# Patient Record
Sex: MALE | Race: WHITE | NOT HISPANIC OR LATINO | Employment: PART TIME
[De-identification: names, ages, dates, MRNs, and addresses within clinical notes are randomized per-mention and may not be internally consistent; named-entity substitution may affect disease eponyms.]

---

## 2018-04-25 ENCOUNTER — RECORDS - HEALTHEAST (OUTPATIENT)
Dept: ADMINISTRATIVE | Facility: OTHER | Age: 33
End: 2018-04-25

## 2018-04-25 ENCOUNTER — SURGERY - HEALTHEAST (OUTPATIENT)
Dept: GASTROENTEROLOGY | Facility: CLINIC | Age: 33
End: 2018-04-25

## 2018-04-25 ASSESSMENT — MIFFLIN-ST. JEOR
SCORE: 1603.51
SCORE: 1603.51

## 2018-04-26 ASSESSMENT — MIFFLIN-ST. JEOR
SCORE: 1609.86
SCORE: 1609.86

## 2018-04-28 ENCOUNTER — SURGERY - HEALTHEAST (OUTPATIENT)
Dept: SURGERY | Facility: CLINIC | Age: 33
End: 2018-04-28

## 2018-04-28 ENCOUNTER — ANESTHESIA - HEALTHEAST (OUTPATIENT)
Dept: SURGERY | Facility: CLINIC | Age: 33
End: 2018-04-28

## 2018-04-29 ASSESSMENT — MIFFLIN-ST. JEOR
SCORE: 1634.8
SCORE: 1634.8

## 2019-05-01 ENCOUNTER — OFFICE VISIT - HEALTHEAST (OUTPATIENT)
Dept: FAMILY MEDICINE | Facility: CLINIC | Age: 34
End: 2019-05-01

## 2019-05-01 DIAGNOSIS — F10.10 ALCOHOL ABUSE: ICD-10-CM

## 2019-05-01 DIAGNOSIS — R19.7 VOMITING AND DIARRHEA: ICD-10-CM

## 2019-05-01 DIAGNOSIS — E86.0 DEHYDRATION: ICD-10-CM

## 2019-05-01 DIAGNOSIS — R11.10 VOMITING AND DIARRHEA: ICD-10-CM

## 2019-05-01 DIAGNOSIS — K92.1 MELENA: ICD-10-CM

## 2019-05-01 DIAGNOSIS — K22.6 MALLORY-WEISS TEAR: ICD-10-CM

## 2019-05-10 ENCOUNTER — OFFICE VISIT - HEALTHEAST (OUTPATIENT)
Dept: FAMILY MEDICINE | Facility: CLINIC | Age: 34
End: 2019-05-10

## 2019-05-10 DIAGNOSIS — E83.39 HYPOPHOSPHATEMIA: ICD-10-CM

## 2019-05-10 DIAGNOSIS — F10.939 ALCOHOL WITHDRAWAL SYNDROME WITH COMPLICATION (H): ICD-10-CM

## 2019-05-10 DIAGNOSIS — Z09 HOSPITAL DISCHARGE FOLLOW-UP: ICD-10-CM

## 2019-05-10 DIAGNOSIS — F32.A DEPRESSION, UNSPECIFIED DEPRESSION TYPE: ICD-10-CM

## 2019-05-10 DIAGNOSIS — E83.42 HYPOMAGNESEMIA: ICD-10-CM

## 2019-05-10 DIAGNOSIS — F10.20 ALCOHOLISM (H): ICD-10-CM

## 2019-05-10 LAB
ALBUMIN SERPL-MCNC: 3.4 G/DL (ref 3.5–5)
ALP SERPL-CCNC: 146 U/L (ref 45–120)
ALT SERPL W P-5'-P-CCNC: 90 U/L (ref 0–45)
ANION GAP SERPL CALCULATED.3IONS-SCNC: 10 MMOL/L (ref 5–18)
AST SERPL W P-5'-P-CCNC: 207 U/L (ref 0–40)
BILIRUB SERPL-MCNC: 0.5 MG/DL (ref 0–1)
BUN SERPL-MCNC: 9 MG/DL (ref 8–22)
CALCIUM SERPL-MCNC: 9.5 MG/DL (ref 8.5–10.5)
CHLORIDE BLD-SCNC: 101 MMOL/L (ref 98–107)
CO2 SERPL-SCNC: 25 MMOL/L (ref 22–31)
CREAT SERPL-MCNC: 0.6 MG/DL (ref 0.7–1.3)
GFR SERPL CREATININE-BSD FRML MDRD: >60 ML/MIN/1.73M2
GLUCOSE BLD-MCNC: 102 MG/DL (ref 70–125)
PHOSPHATE SERPL-MCNC: 4.5 MG/DL (ref 2.5–4.5)
POTASSIUM BLD-SCNC: 5.3 MMOL/L (ref 3.5–5)
PROT SERPL-MCNC: 7 G/DL (ref 6–8)
SODIUM SERPL-SCNC: 136 MMOL/L (ref 136–145)

## 2019-05-10 ASSESSMENT — MIFFLIN-ST. JEOR: SCORE: 1610.31

## 2019-05-13 ENCOUNTER — COMMUNICATION - HEALTHEAST (OUTPATIENT)
Dept: FAMILY MEDICINE | Facility: CLINIC | Age: 34
End: 2019-05-13

## 2019-05-13 DIAGNOSIS — E87.5 HYPERKALEMIA: ICD-10-CM

## 2019-05-14 ENCOUNTER — COMMUNICATION - HEALTHEAST (OUTPATIENT)
Dept: FAMILY MEDICINE | Facility: CLINIC | Age: 34
End: 2019-05-14

## 2020-02-26 ENCOUNTER — RECORDS - HEALTHEAST (OUTPATIENT)
Dept: ADMINISTRATIVE | Facility: OTHER | Age: 35
End: 2020-02-26

## 2020-02-28 ENCOUNTER — OFFICE VISIT - HEALTHEAST (OUTPATIENT)
Dept: FAMILY MEDICINE | Facility: CLINIC | Age: 35
End: 2020-02-28

## 2020-02-28 ENCOUNTER — RECORDS - HEALTHEAST (OUTPATIENT)
Dept: ADMINISTRATIVE | Facility: OTHER | Age: 35
End: 2020-02-28

## 2020-02-28 DIAGNOSIS — K70.30 ALCOHOLIC CIRRHOSIS OF LIVER WITHOUT ASCITES (H): ICD-10-CM

## 2020-02-28 DIAGNOSIS — F17.200 TOBACCO USE DISORDER: ICD-10-CM

## 2020-02-28 DIAGNOSIS — F33.1 MAJOR DEPRESSIVE DISORDER, RECURRENT EPISODE, MODERATE (H): ICD-10-CM

## 2020-02-28 DIAGNOSIS — E43 SEVERE PROTEIN-CALORIE MALNUTRITION (H): ICD-10-CM

## 2020-02-28 DIAGNOSIS — F10.20 ALCOHOL USE DISORDER, SEVERE, DEPENDENCE (H): ICD-10-CM

## 2020-02-28 LAB
ALBUMIN SERPL-MCNC: 2.4 G/DL (ref 3.5–5)
ALP SERPL-CCNC: 100 U/L (ref 45–120)
ALT SERPL W P-5'-P-CCNC: 30 U/L (ref 0–45)
AMYLASE SERPL-CCNC: 85 U/L (ref 5–120)
ANION GAP SERPL CALCULATED.3IONS-SCNC: 9 MMOL/L (ref 5–18)
AST SERPL W P-5'-P-CCNC: 52 U/L (ref 0–40)
BASOPHILS # BLD AUTO: 0 THOU/UL (ref 0–0.2)
BASOPHILS NFR BLD AUTO: 0 % (ref 0–2)
BILIRUB SERPL-MCNC: 1.5 MG/DL (ref 0–1)
BUN SERPL-MCNC: 17 MG/DL (ref 8–22)
CALCIUM SERPL-MCNC: 8.6 MG/DL (ref 8.5–10.5)
CHLORIDE BLD-SCNC: 104 MMOL/L (ref 98–107)
CO2 SERPL-SCNC: 23 MMOL/L (ref 22–31)
CREAT SERPL-MCNC: 0.68 MG/DL (ref 0.7–1.3)
EOSINOPHIL # BLD AUTO: 0.1 THOU/UL (ref 0–0.4)
EOSINOPHIL NFR BLD AUTO: 2 % (ref 0–6)
ERYTHROCYTE [DISTWIDTH] IN BLOOD BY AUTOMATED COUNT: 13.3 % (ref 11–14.5)
FERRITIN SERPL-MCNC: 30 NG/ML (ref 27–300)
GFR SERPL CREATININE-BSD FRML MDRD: >60 ML/MIN/1.73M2
GLUCOSE BLD-MCNC: 120 MG/DL (ref 70–125)
HCT VFR BLD AUTO: 24.6 % (ref 40–54)
HGB BLD-MCNC: 8.2 G/DL (ref 14–18)
IRON SATN MFR SERPL: 9 % (ref 20–50)
IRON SERPL-MCNC: 33 UG/DL (ref 42–175)
LIPASE SERPL-CCNC: 71 U/L (ref 0–52)
LYMPHOCYTES # BLD AUTO: 1.2 THOU/UL (ref 0.8–4.4)
LYMPHOCYTES NFR BLD AUTO: 18 % (ref 20–40)
MAGNESIUM SERPL-MCNC: 1.6 MG/DL (ref 1.8–2.6)
MCH RBC QN AUTO: 29.9 PG (ref 27–34)
MCHC RBC AUTO-ENTMCNC: 33.5 G/DL (ref 32–36)
MCV RBC AUTO: 89 FL (ref 80–100)
MONOCYTES # BLD AUTO: 0.6 THOU/UL (ref 0–0.9)
MONOCYTES NFR BLD AUTO: 9 % (ref 2–10)
NEUTROPHILS # BLD AUTO: 4.8 THOU/UL (ref 2–7.7)
NEUTROPHILS NFR BLD AUTO: 71 % (ref 50–70)
PHOSPHATE SERPL-MCNC: 5.2 MG/DL (ref 2.5–4.5)
PLATELET # BLD AUTO: 190 THOU/UL (ref 140–440)
PMV BLD AUTO: 7.3 FL (ref 7–10)
POTASSIUM BLD-SCNC: 3.9 MMOL/L (ref 3.5–5)
PROT SERPL-MCNC: 6.8 G/DL (ref 6–8)
RBC # BLD AUTO: 2.75 MILL/UL (ref 4.4–6.2)
SODIUM SERPL-SCNC: 136 MMOL/L (ref 136–145)
TIBC SERPL-MCNC: 358 UG/DL (ref 313–563)
TRANSFERRIN SERPL-MCNC: 286 MG/DL (ref 212–360)
WBC: 6.8 THOU/UL (ref 4–11)

## 2020-02-28 ASSESSMENT — ANXIETY QUESTIONNAIRES
6. BECOMING EASILY ANNOYED OR IRRITABLE: MORE THAN HALF THE DAYS
7. FEELING AFRAID AS IF SOMETHING AWFUL MIGHT HAPPEN: NEARLY EVERY DAY
2. NOT BEING ABLE TO STOP OR CONTROL WORRYING: MORE THAN HALF THE DAYS
3. WORRYING TOO MUCH ABOUT DIFFERENT THINGS: NEARLY EVERY DAY
5. BEING SO RESTLESS THAT IT IS HARD TO SIT STILL: SEVERAL DAYS
GAD7 TOTAL SCORE: 16
4. TROUBLE RELAXING: NEARLY EVERY DAY
1. FEELING NERVOUS, ANXIOUS, OR ON EDGE: MORE THAN HALF THE DAYS

## 2020-02-28 ASSESSMENT — MIFFLIN-ST. JEOR: SCORE: 1724.84

## 2020-02-28 ASSESSMENT — PATIENT HEALTH QUESTIONNAIRE - PHQ9: SUM OF ALL RESPONSES TO PHQ QUESTIONS 1-9: 20

## 2020-03-02 ENCOUNTER — COMMUNICATION - HEALTHEAST (OUTPATIENT)
Dept: FAMILY MEDICINE | Facility: CLINIC | Age: 35
End: 2020-03-02

## 2020-03-02 DIAGNOSIS — E87.6 HYPOKALEMIA: ICD-10-CM

## 2020-03-02 DIAGNOSIS — G93.41 ACUTE METABOLIC ENCEPHALOPATHY: ICD-10-CM

## 2020-03-04 ENCOUNTER — COMMUNICATION - HEALTHEAST (OUTPATIENT)
Dept: FAMILY MEDICINE | Facility: CLINIC | Age: 35
End: 2020-03-04

## 2020-03-04 DIAGNOSIS — R00.0 SINUS TACHYCARDIA: ICD-10-CM

## 2020-03-04 DIAGNOSIS — K70.30 ALCOHOLIC CIRRHOSIS OF LIVER WITHOUT ASCITES (H): ICD-10-CM

## 2020-03-04 DIAGNOSIS — I10 BENIGN ESSENTIAL HYPERTENSION: ICD-10-CM

## 2020-03-04 DIAGNOSIS — E43 SEVERE PROTEIN-CALORIE MALNUTRITION (H): ICD-10-CM

## 2020-03-08 ENCOUNTER — COMMUNICATION - HEALTHEAST (OUTPATIENT)
Dept: SCHEDULING | Facility: CLINIC | Age: 35
End: 2020-03-08

## 2020-03-23 ENCOUNTER — OFFICE VISIT - HEALTHEAST (OUTPATIENT)
Dept: FAMILY MEDICINE | Facility: CLINIC | Age: 35
End: 2020-03-23

## 2020-03-23 DIAGNOSIS — E88.09 HYPOALBUMINEMIA: ICD-10-CM

## 2020-03-23 DIAGNOSIS — F10.21 CHRONIC ALCOHOLISM IN REMISSION (H): ICD-10-CM

## 2020-03-23 DIAGNOSIS — M79.89 LEG SWELLING: ICD-10-CM

## 2020-03-31 ENCOUNTER — COMMUNICATION - HEALTHEAST (OUTPATIENT)
Dept: FAMILY MEDICINE | Facility: CLINIC | Age: 35
End: 2020-03-31

## 2020-03-31 DIAGNOSIS — F33.1 MAJOR DEPRESSIVE DISORDER, RECURRENT EPISODE, MODERATE (H): ICD-10-CM

## 2020-04-08 ENCOUNTER — COMMUNICATION - HEALTHEAST (OUTPATIENT)
Dept: FAMILY MEDICINE | Facility: CLINIC | Age: 35
End: 2020-04-08

## 2020-04-08 ENCOUNTER — COMMUNICATION - HEALTHEAST (OUTPATIENT)
Dept: SCHEDULING | Facility: CLINIC | Age: 35
End: 2020-04-08

## 2020-04-08 DIAGNOSIS — F33.2 SEVERE RECURRENT MAJOR DEPRESSION WITHOUT PSYCHOTIC FEATURES (H): ICD-10-CM

## 2020-04-14 ENCOUNTER — COMMUNICATION - HEALTHEAST (OUTPATIENT)
Dept: FAMILY MEDICINE | Facility: CLINIC | Age: 35
End: 2020-04-14

## 2020-04-14 DIAGNOSIS — G47.9 SLEEP DIFFICULTIES: ICD-10-CM

## 2020-04-19 ENCOUNTER — COMMUNICATION - HEALTHEAST (OUTPATIENT)
Dept: FAMILY MEDICINE | Facility: CLINIC | Age: 35
End: 2020-04-19

## 2020-04-19 DIAGNOSIS — F33.1 MAJOR DEPRESSIVE DISORDER, RECURRENT EPISODE, MODERATE (H): ICD-10-CM

## 2020-04-23 ENCOUNTER — OFFICE VISIT - HEALTHEAST (OUTPATIENT)
Dept: FAMILY MEDICINE | Facility: CLINIC | Age: 35
End: 2020-04-23

## 2020-04-23 DIAGNOSIS — F33.1 MAJOR DEPRESSIVE DISORDER, RECURRENT EPISODE, MODERATE (H): ICD-10-CM

## 2020-04-23 DIAGNOSIS — M79.89 LEG SWELLING: ICD-10-CM

## 2020-04-23 DIAGNOSIS — F10.21 ALCOHOL USE DISORDER, MODERATE, IN EARLY REMISSION (H): ICD-10-CM

## 2020-04-29 ENCOUNTER — COMMUNICATION - HEALTHEAST (OUTPATIENT)
Dept: VASCULAR SURGERY | Facility: CLINIC | Age: 35
End: 2020-04-29

## 2020-05-01 ENCOUNTER — OFFICE VISIT - HEALTHEAST (OUTPATIENT)
Dept: FAMILY MEDICINE | Facility: CLINIC | Age: 35
End: 2020-05-01

## 2020-05-01 ENCOUNTER — COMMUNICATION - HEALTHEAST (OUTPATIENT)
Dept: FAMILY MEDICINE | Facility: CLINIC | Age: 35
End: 2020-05-01

## 2020-05-01 ENCOUNTER — AMBULATORY - HEALTHEAST (OUTPATIENT)
Dept: LAB | Facility: CLINIC | Age: 35
End: 2020-05-01

## 2020-05-01 DIAGNOSIS — M79.89 LEG SWELLING: ICD-10-CM

## 2020-05-01 DIAGNOSIS — D50.0 IRON DEFICIENCY ANEMIA DUE TO CHRONIC BLOOD LOSS: ICD-10-CM

## 2020-05-01 DIAGNOSIS — F10.21 ALCOHOL USE DISORDER, MODERATE, IN EARLY REMISSION (H): ICD-10-CM

## 2020-05-01 LAB
ALBUMIN SERPL-MCNC: 2.9 G/DL (ref 3.5–5)
ALP SERPL-CCNC: 84 U/L (ref 45–120)
ALT SERPL W P-5'-P-CCNC: 12 U/L (ref 0–45)
ANION GAP SERPL CALCULATED.3IONS-SCNC: 11 MMOL/L (ref 5–18)
AST SERPL W P-5'-P-CCNC: 24 U/L (ref 0–40)
BASOPHILS # BLD AUTO: 0 THOU/UL (ref 0–0.2)
BASOPHILS NFR BLD AUTO: 0 % (ref 0–2)
BILIRUB SERPL-MCNC: 1.3 MG/DL (ref 0–1)
BUN SERPL-MCNC: 12 MG/DL (ref 8–22)
CALCIUM SERPL-MCNC: 7.8 MG/DL (ref 8.5–10.5)
CHLORIDE BLD-SCNC: 105 MMOL/L (ref 98–107)
CO2 SERPL-SCNC: 19 MMOL/L (ref 22–31)
CREAT SERPL-MCNC: 1.11 MG/DL (ref 0.7–1.3)
EOSINOPHIL # BLD AUTO: 0.1 THOU/UL (ref 0–0.4)
EOSINOPHIL NFR BLD AUTO: 2 % (ref 0–6)
ERYTHROCYTE [DISTWIDTH] IN BLOOD BY AUTOMATED COUNT: 14.4 % (ref 11–14.5)
GFR SERPL CREATININE-BSD FRML MDRD: >60 ML/MIN/1.73M2
GLUCOSE BLD-MCNC: 136 MG/DL (ref 70–125)
HCT VFR BLD AUTO: 17 % (ref 40–54)
HGB BLD-MCNC: 5.6 G/DL (ref 14–18)
LYMPHOCYTES # BLD AUTO: 1.1 THOU/UL (ref 0.8–4.4)
LYMPHOCYTES NFR BLD AUTO: 22 % (ref 20–40)
MCH RBC QN AUTO: 23.7 PG (ref 27–34)
MCHC RBC AUTO-ENTMCNC: 33.3 G/DL (ref 32–36)
MCV RBC AUTO: 71 FL (ref 80–100)
MONOCYTES # BLD AUTO: 0.5 THOU/UL (ref 0–0.9)
MONOCYTES NFR BLD AUTO: 11 % (ref 2–10)
NEUTROPHILS # BLD AUTO: 3.2 THOU/UL (ref 2–7.7)
NEUTROPHILS NFR BLD AUTO: 64 % (ref 50–70)
PLATELET # BLD AUTO: 167 THOU/UL (ref 140–440)
PMV BLD AUTO: 6.9 FL (ref 7–10)
POTASSIUM BLD-SCNC: 3.6 MMOL/L (ref 3.5–5)
PROT SERPL-MCNC: 6.7 G/DL (ref 6–8)
RBC # BLD AUTO: 2.37 MILL/UL (ref 4.4–6.2)
SODIUM SERPL-SCNC: 135 MMOL/L (ref 136–145)
TSH SERPL DL<=0.005 MIU/L-ACNC: 1.71 UIU/ML (ref 0.3–5)
WBC: 5 THOU/UL (ref 4–11)

## 2020-05-03 ENCOUNTER — SURGERY - HEALTHEAST (OUTPATIENT)
Dept: SURGERY | Facility: CLINIC | Age: 35
End: 2020-05-03

## 2020-05-03 ENCOUNTER — ANESTHESIA - HEALTHEAST (OUTPATIENT)
Dept: SURGERY | Facility: CLINIC | Age: 35
End: 2020-05-03

## 2020-05-04 ENCOUNTER — HOME CARE/HOSPICE - HEALTHEAST (OUTPATIENT)
Dept: HOME HEALTH SERVICES | Facility: HOME HEALTH | Age: 35
End: 2020-05-04

## 2020-05-05 ENCOUNTER — COMMUNICATION - HEALTHEAST (OUTPATIENT)
Dept: HOME HEALTH SERVICES | Facility: HOME HEALTH | Age: 35
End: 2020-05-05

## 2020-05-06 ENCOUNTER — COMMUNICATION - HEALTHEAST (OUTPATIENT)
Dept: FAMILY MEDICINE | Facility: CLINIC | Age: 35
End: 2020-05-06

## 2020-05-06 DIAGNOSIS — F33.2 SEVERE RECURRENT MAJOR DEPRESSION WITHOUT PSYCHOTIC FEATURES (H): ICD-10-CM

## 2020-05-07 ENCOUNTER — COMMUNICATION - HEALTHEAST (OUTPATIENT)
Dept: HOME HEALTH SERVICES | Facility: HOME HEALTH | Age: 35
End: 2020-05-07

## 2020-05-07 ENCOUNTER — HOME CARE/HOSPICE - HEALTHEAST (OUTPATIENT)
Dept: HOME HEALTH SERVICES | Facility: HOME HEALTH | Age: 35
End: 2020-05-07

## 2020-05-08 ENCOUNTER — HOME CARE/HOSPICE - HEALTHEAST (OUTPATIENT)
Dept: HOME HEALTH SERVICES | Facility: HOME HEALTH | Age: 35
End: 2020-05-08

## 2020-05-10 ENCOUNTER — COMMUNICATION - HEALTHEAST (OUTPATIENT)
Dept: HOME HEALTH SERVICES | Facility: HOME HEALTH | Age: 35
End: 2020-05-10

## 2020-05-10 DIAGNOSIS — F10.21 CHRONIC ALCOHOLISM IN REMISSION (H): ICD-10-CM

## 2020-05-13 ENCOUNTER — HOME CARE/HOSPICE - HEALTHEAST (OUTPATIENT)
Dept: HOME HEALTH SERVICES | Facility: HOME HEALTH | Age: 35
End: 2020-05-13

## 2020-05-14 ENCOUNTER — HOME CARE/HOSPICE - HEALTHEAST (OUTPATIENT)
Dept: HOME HEALTH SERVICES | Facility: HOME HEALTH | Age: 35
End: 2020-05-14

## 2020-05-18 ENCOUNTER — RECORDS - HEALTHEAST (OUTPATIENT)
Dept: ADMINISTRATIVE | Facility: OTHER | Age: 35
End: 2020-05-18

## 2020-05-18 ENCOUNTER — COMMUNICATION - HEALTHEAST (OUTPATIENT)
Dept: FAMILY MEDICINE | Facility: CLINIC | Age: 35
End: 2020-05-18

## 2020-05-18 DIAGNOSIS — F33.1 MAJOR DEPRESSIVE DISORDER, RECURRENT EPISODE, MODERATE (H): ICD-10-CM

## 2020-05-20 ENCOUNTER — HOME CARE/HOSPICE - HEALTHEAST (OUTPATIENT)
Dept: HOME HEALTH SERVICES | Facility: HOME HEALTH | Age: 35
End: 2020-05-20

## 2020-06-08 ENCOUNTER — AMBULATORY - HEALTHEAST (OUTPATIENT)
Dept: LAB | Facility: CLINIC | Age: 35
End: 2020-06-08

## 2020-06-08 DIAGNOSIS — F10.21 CHRONIC ALCOHOLISM IN REMISSION (H): ICD-10-CM

## 2020-06-08 LAB
ANION GAP SERPL CALCULATED.3IONS-SCNC: 14 MMOL/L (ref 5–18)
BUN SERPL-MCNC: 7 MG/DL (ref 8–22)
CALCIUM SERPL-MCNC: 8.2 MG/DL (ref 8.5–10.5)
CHLORIDE BLD-SCNC: 105 MMOL/L (ref 98–107)
CO2 SERPL-SCNC: 19 MMOL/L (ref 22–31)
CREAT SERPL-MCNC: 0.71 MG/DL (ref 0.7–1.3)
GFR SERPL CREATININE-BSD FRML MDRD: >60 ML/MIN/1.73M2
GLUCOSE BLD-MCNC: 94 MG/DL (ref 70–125)
POTASSIUM BLD-SCNC: 3.1 MMOL/L (ref 3.5–5)
SODIUM SERPL-SCNC: 138 MMOL/L (ref 136–145)

## 2020-06-10 ENCOUNTER — COMMUNICATION - HEALTHEAST (OUTPATIENT)
Dept: FAMILY MEDICINE | Facility: CLINIC | Age: 35
End: 2020-06-10

## 2020-06-10 DIAGNOSIS — F33.2 SEVERE RECURRENT MAJOR DEPRESSION WITHOUT PSYCHOTIC FEATURES (H): ICD-10-CM

## 2020-06-14 ENCOUNTER — COMMUNICATION - HEALTHEAST (OUTPATIENT)
Dept: FAMILY MEDICINE | Facility: CLINIC | Age: 35
End: 2020-06-14

## 2020-06-14 DIAGNOSIS — F33.1 MAJOR DEPRESSIVE DISORDER, RECURRENT EPISODE, MODERATE (H): ICD-10-CM

## 2020-06-23 ENCOUNTER — COMMUNICATION - HEALTHEAST (OUTPATIENT)
Dept: HOME HEALTH SERVICES | Facility: HOME HEALTH | Age: 35
End: 2020-06-23

## 2020-06-23 DIAGNOSIS — K70.30 ALCOHOLIC CIRRHOSIS OF LIVER WITHOUT ASCITES (H): ICD-10-CM

## 2020-07-02 ENCOUNTER — AMBULATORY - HEALTHEAST (OUTPATIENT)
Dept: BEHAVIORAL HEALTH | Facility: CLINIC | Age: 35
End: 2020-07-02

## 2020-07-06 ENCOUNTER — MEDICAL CORRESPONDENCE (OUTPATIENT)
Dept: HEALTH INFORMATION MANAGEMENT | Facility: CLINIC | Age: 35
End: 2020-07-06

## 2020-07-07 ENCOUNTER — TRANSFERRED RECORDS (OUTPATIENT)
Dept: HEALTH INFORMATION MANAGEMENT | Facility: CLINIC | Age: 35
End: 2020-07-07

## 2020-07-08 ENCOUNTER — COMMUNICATION - HEALTHEAST (OUTPATIENT)
Dept: FAMILY MEDICINE | Facility: CLINIC | Age: 35
End: 2020-07-08

## 2020-07-08 DIAGNOSIS — F33.2 SEVERE RECURRENT MAJOR DEPRESSION WITHOUT PSYCHOTIC FEATURES (H): ICD-10-CM

## 2020-07-13 ENCOUNTER — TELEPHONE (OUTPATIENT)
Dept: BEHAVIORAL HEALTH | Facility: CLINIC | Age: 35
End: 2020-07-13

## 2020-07-13 NOTE — TELEPHONE ENCOUNTER
Date: 7/13/2020    To: ANDREIA RN    Please call this patient at [phone] to complete a medical screening to clarify his medications and medical condition and to complete a COVID-19 screening.      The patient has a history of:  Need covid screen    OUTSIDE: This is an outside referral so I will tube up the assessment and any other clinical documentation to the 6th floor.     Thanks,  KEVIN Black

## 2020-07-25 ENCOUNTER — COMMUNICATION - HEALTHEAST (OUTPATIENT)
Dept: HOME HEALTH SERVICES | Facility: HOME HEALTH | Age: 35
End: 2020-07-25

## 2020-07-25 DIAGNOSIS — K70.30 ALCOHOLIC CIRRHOSIS OF LIVER WITHOUT ASCITES (H): ICD-10-CM

## 2020-07-25 DIAGNOSIS — F33.1 MAJOR DEPRESSIVE DISORDER, RECURRENT EPISODE, MODERATE (H): ICD-10-CM

## 2020-07-27 ENCOUNTER — OFFICE VISIT - HEALTHEAST (OUTPATIENT)
Dept: FAMILY MEDICINE | Facility: CLINIC | Age: 35
End: 2020-07-27

## 2020-07-27 ENCOUNTER — COMMUNICATION - HEALTHEAST (OUTPATIENT)
Dept: FAMILY MEDICINE | Facility: CLINIC | Age: 35
End: 2020-07-27

## 2020-07-27 DIAGNOSIS — K70.30 ALCOHOLIC CIRRHOSIS OF LIVER WITHOUT ASCITES (H): ICD-10-CM

## 2020-07-27 DIAGNOSIS — K76.9 LIVER DISEASE, CHRONIC, WITH CIRRHOSIS (H): ICD-10-CM

## 2020-07-27 DIAGNOSIS — K21.9 GASTROESOPHAGEAL REFLUX DISEASE WITHOUT ESOPHAGITIS: ICD-10-CM

## 2020-07-27 DIAGNOSIS — N62 GYNECOMASTIA, MALE: ICD-10-CM

## 2020-07-27 DIAGNOSIS — Z87.19: ICD-10-CM

## 2020-07-27 DIAGNOSIS — F10.21 CHRONIC ALCOHOLISM IN REMISSION (H): ICD-10-CM

## 2020-07-27 DIAGNOSIS — F33.1 MAJOR DEPRESSIVE DISORDER, RECURRENT EPISODE, MODERATE (H): ICD-10-CM

## 2020-07-27 DIAGNOSIS — K74.60 LIVER DISEASE, CHRONIC, WITH CIRRHOSIS (H): ICD-10-CM

## 2020-08-06 ENCOUNTER — COMMUNICATION - HEALTHEAST (OUTPATIENT)
Dept: FAMILY MEDICINE | Facility: CLINIC | Age: 35
End: 2020-08-06

## 2020-08-11 ENCOUNTER — COMMUNICATION - HEALTHEAST (OUTPATIENT)
Dept: SCHEDULING | Facility: CLINIC | Age: 35
End: 2020-08-11

## 2020-09-03 ENCOUNTER — OFFICE VISIT - HEALTHEAST (OUTPATIENT)
Dept: FAMILY MEDICINE | Facility: CLINIC | Age: 35
End: 2020-09-03

## 2020-09-03 DIAGNOSIS — M79.89 LEG SWELLING: ICD-10-CM

## 2020-09-03 DIAGNOSIS — F10.21 CHRONIC ALCOHOLISM IN REMISSION (H): ICD-10-CM

## 2020-11-06 ENCOUNTER — COMMUNICATION - HEALTHEAST (OUTPATIENT)
Dept: FAMILY MEDICINE | Facility: CLINIC | Age: 35
End: 2020-11-06

## 2020-11-12 ENCOUNTER — OFFICE VISIT - HEALTHEAST (OUTPATIENT)
Dept: FAMILY MEDICINE | Facility: CLINIC | Age: 35
End: 2020-11-12

## 2020-11-12 DIAGNOSIS — N52.9 ERECTILE DYSFUNCTION, UNSPECIFIED ERECTILE DYSFUNCTION TYPE: ICD-10-CM

## 2020-11-24 ENCOUNTER — HOSPITAL ENCOUNTER (INPATIENT)
Facility: CLINIC | Age: 35
LOS: 2 days | Discharge: NURSING, REHAB OR LONG-TERM CARE | End: 2020-11-27
Attending: EMERGENCY MEDICINE | Admitting: PSYCHIATRY & NEUROLOGY
Payer: COMMERCIAL

## 2020-11-24 DIAGNOSIS — Z20.828 EXPOSURE TO SARS-ASSOCIATED CORONAVIRUS: ICD-10-CM

## 2020-11-24 DIAGNOSIS — F10.920 ALCOHOLIC INTOXICATION WITHOUT COMPLICATION (H): ICD-10-CM

## 2020-11-24 DIAGNOSIS — F17.200 NICOTINE DEPENDENCE, UNCOMPLICATED, UNSPECIFIED NICOTINE PRODUCT TYPE: Primary | ICD-10-CM

## 2020-11-24 LAB
ALBUMIN SERPL-MCNC: 3.7 G/DL (ref 3.4–5)
ALCOHOL BREATH TEST: 0.3 (ref 0–0.01)
ALP SERPL-CCNC: 171 U/L (ref 40–150)
ALT SERPL W P-5'-P-CCNC: 86 U/L (ref 0–70)
ANION GAP SERPL CALCULATED.3IONS-SCNC: 7 MMOL/L (ref 3–14)
AST SERPL W P-5'-P-CCNC: 209 U/L (ref 0–45)
BASOPHILS # BLD AUTO: 0 10E9/L (ref 0–0.2)
BASOPHILS NFR BLD AUTO: 0.1 %
BILIRUB SERPL-MCNC: 1.1 MG/DL (ref 0.2–1.3)
BUN SERPL-MCNC: 6 MG/DL (ref 7–30)
CALCIUM SERPL-MCNC: 8 MG/DL (ref 8.5–10.1)
CHLORIDE SERPL-SCNC: 107 MMOL/L (ref 94–109)
CO2 SERPL-SCNC: 29 MMOL/L (ref 20–32)
CREAT SERPL-MCNC: 0.62 MG/DL (ref 0.66–1.25)
DIFFERENTIAL METHOD BLD: ABNORMAL
EOSINOPHIL # BLD AUTO: 0.1 10E9/L (ref 0–0.7)
EOSINOPHIL NFR BLD AUTO: 1.2 %
ERYTHROCYTE [DISTWIDTH] IN BLOOD BY AUTOMATED COUNT: 14.9 % (ref 10–15)
GFR SERPL CREATININE-BSD FRML MDRD: >90 ML/MIN/{1.73_M2}
GLUCOSE SERPL-MCNC: 99 MG/DL (ref 70–99)
HCT VFR BLD AUTO: 42.9 % (ref 40–53)
HGB BLD-MCNC: 14.3 G/DL (ref 13.3–17.7)
IMM GRANULOCYTES # BLD: 0 10E9/L (ref 0–0.4)
IMM GRANULOCYTES NFR BLD: 0.1 %
LYMPHOCYTES # BLD AUTO: 2 10E9/L (ref 0.8–5.3)
LYMPHOCYTES NFR BLD AUTO: 28.4 %
MCH RBC QN AUTO: 27.4 PG (ref 26.5–33)
MCHC RBC AUTO-ENTMCNC: 33.3 G/DL (ref 31.5–36.5)
MCV RBC AUTO: 82 FL (ref 78–100)
MONOCYTES # BLD AUTO: 0.3 10E9/L (ref 0–1.3)
MONOCYTES NFR BLD AUTO: 4.1 %
NEUTROPHILS # BLD AUTO: 4.6 10E9/L (ref 1.6–8.3)
NEUTROPHILS NFR BLD AUTO: 66.1 %
NRBC # BLD AUTO: 0 10*3/UL
NRBC BLD AUTO-RTO: 0 /100
PLATELET # BLD AUTO: 70 10E9/L (ref 150–450)
POTASSIUM SERPL-SCNC: 3.3 MMOL/L (ref 3.4–5.3)
PROT SERPL-MCNC: 8.1 G/DL (ref 6.8–8.8)
RBC # BLD AUTO: 5.21 10E12/L (ref 4.4–5.9)
SARS-COV-2 RNA SPEC QL NAA+PROBE: NORMAL
SODIUM SERPL-SCNC: 143 MMOL/L (ref 133–144)
SPECIMEN SOURCE: NORMAL
WBC # BLD AUTO: 6.9 10E9/L (ref 4–11)

## 2020-11-24 PROCEDURE — 99285 EMERGENCY DEPT VISIT HI MDM: CPT | Performed by: EMERGENCY MEDICINE

## 2020-11-24 PROCEDURE — 80320 DRUG SCREEN QUANTALCOHOLS: CPT | Performed by: EMERGENCY MEDICINE

## 2020-11-24 PROCEDURE — 80053 COMPREHEN METABOLIC PANEL: CPT | Performed by: EMERGENCY MEDICINE

## 2020-11-24 PROCEDURE — U0003 INFECTIOUS AGENT DETECTION BY NUCLEIC ACID (DNA OR RNA); SEVERE ACUTE RESPIRATORY SYNDROME CORONAVIRUS 2 (SARS-COV-2) (CORONAVIRUS DISEASE [COVID-19]), AMPLIFIED PROBE TECHNIQUE, MAKING USE OF HIGH THROUGHPUT TECHNOLOGIES AS DESCRIBED BY CMS-2020-01-R: HCPCS | Performed by: EMERGENCY MEDICINE

## 2020-11-24 PROCEDURE — C9803 HOPD COVID-19 SPEC COLLECT: HCPCS | Performed by: EMERGENCY MEDICINE

## 2020-11-24 PROCEDURE — 82075 ASSAY OF BREATH ETHANOL: CPT | Performed by: EMERGENCY MEDICINE

## 2020-11-24 PROCEDURE — 85025 COMPLETE CBC W/AUTO DIFF WBC: CPT | Performed by: EMERGENCY MEDICINE

## 2020-11-24 PROCEDURE — HZ2ZZZZ DETOXIFICATION SERVICES FOR SUBSTANCE ABUSE TREATMENT: ICD-10-PCS | Performed by: PSYCHIATRY & NEUROLOGY

## 2020-11-24 PROCEDURE — 80307 DRUG TEST PRSMV CHEM ANLYZR: CPT | Performed by: EMERGENCY MEDICINE

## 2020-11-24 RX ORDER — TRAZODONE HYDROCHLORIDE 100 MG/1
100 TABLET ORAL AT BEDTIME
Status: ON HOLD | COMMUNITY
Start: 2020-07-27 | End: 2020-11-27

## 2020-11-24 RX ORDER — DIAZEPAM 5 MG
5-20 TABLET ORAL EVERY 30 MIN PRN
Status: DISCONTINUED | OUTPATIENT
Start: 2020-11-24 | End: 2020-11-25 | Stop reason: CLARIF

## 2020-11-24 RX ORDER — HYDROXYZINE PAMOATE 50 MG/1
50 CAPSULE ORAL 4 TIMES DAILY PRN
COMMUNITY
Start: 2020-11-05 | End: 2022-01-01

## 2020-11-24 RX ORDER — QUETIAPINE FUMARATE 100 MG/1
100 TABLET, FILM COATED ORAL AT BEDTIME
Status: ON HOLD | COMMUNITY
Start: 2020-07-16 | End: 2020-11-27

## 2020-11-24 RX ORDER — GABAPENTIN 100 MG/1
200 CAPSULE ORAL 3 TIMES DAILY
Status: ON HOLD | COMMUNITY
Start: 2020-11-11 | End: 2021-05-18

## 2020-11-24 RX ORDER — SILDENAFIL CITRATE 20 MG/1
TABLET ORAL
Status: ON HOLD | COMMUNITY
Start: 2020-11-12 | End: 2021-05-18

## 2020-11-24 RX ORDER — FUROSEMIDE 40 MG
40 TABLET ORAL 2 TIMES DAILY
COMMUNITY
Start: 2020-07-13 | End: 2022-01-01

## 2020-11-24 RX ORDER — MIRTAZAPINE 15 MG/1
15 TABLET, FILM COATED ORAL AT BEDTIME
Status: ON HOLD | COMMUNITY
Start: 2020-11-04 | End: 2020-11-27

## 2020-11-24 RX ORDER — POTASSIUM CHLORIDE 1500 MG/1
20 TABLET, EXTENDED RELEASE ORAL 2 TIMES DAILY
COMMUNITY
Start: 2020-07-13 | End: 2022-01-01

## 2020-11-24 ASSESSMENT — MIFFLIN-ST. JEOR: SCORE: 1546.81

## 2020-11-25 LAB
AMPHETAMINES UR QL SCN: NEGATIVE
BARBITURATES UR QL: NEGATIVE
BENZODIAZ UR QL: POSITIVE
CANNABINOIDS UR QL SCN: POSITIVE
CHOLEST SERPL-MCNC: 99 MG/DL
COCAINE UR QL: NEGATIVE
ETHANOL UR QL SCN: POSITIVE
GGT SERPL-CCNC: 135 U/L (ref 0–75)
HDLC SERPL-MCNC: 51 MG/DL
LABORATORY COMMENT REPORT: NORMAL
LDLC SERPL CALC-MCNC: 39 MG/DL
NONHDLC SERPL-MCNC: 48 MG/DL
OPIATES UR QL SCN: NEGATIVE
SARS-COV-2 RNA SPEC QL NAA+PROBE: NEGATIVE
SPECIMEN SOURCE: NORMAL
TRIGL SERPL-MCNC: 46 MG/DL
TSH SERPL DL<=0.005 MIU/L-ACNC: 1.79 MU/L (ref 0.4–4)
VIT B12 SERPL-MCNC: 1023 PG/ML (ref 193–986)

## 2020-11-25 PROCEDURE — 99207 PR CONSULT E&M CHANGED TO SUBSEQUENT LEVEL: CPT | Performed by: NURSE PRACTITIONER

## 2020-11-25 PROCEDURE — 99232 SBSQ HOSP IP/OBS MODERATE 35: CPT | Performed by: NURSE PRACTITIONER

## 2020-11-25 PROCEDURE — 128N000004 HC R&B CD ADULT

## 2020-11-25 PROCEDURE — 84443 ASSAY THYROID STIM HORMONE: CPT | Performed by: NURSE PRACTITIONER

## 2020-11-25 PROCEDURE — 36415 COLL VENOUS BLD VENIPUNCTURE: CPT | Performed by: NURSE PRACTITIONER

## 2020-11-25 PROCEDURE — 82607 VITAMIN B-12: CPT | Performed by: NURSE PRACTITIONER

## 2020-11-25 PROCEDURE — 250N000013 HC RX MED GY IP 250 OP 250 PS 637: Performed by: NURSE PRACTITIONER

## 2020-11-25 PROCEDURE — 82977 ASSAY OF GGT: CPT | Performed by: NURSE PRACTITIONER

## 2020-11-25 PROCEDURE — 80061 LIPID PANEL: CPT | Performed by: NURSE PRACTITIONER

## 2020-11-25 PROCEDURE — 250N000013 HC RX MED GY IP 250 OP 250 PS 637: Performed by: PSYCHIATRY & NEUROLOGY

## 2020-11-25 RX ORDER — GABAPENTIN 100 MG/1
200 CAPSULE ORAL 3 TIMES DAILY
Status: DISCONTINUED | OUTPATIENT
Start: 2020-11-25 | End: 2020-11-27 | Stop reason: HOSPADM

## 2020-11-25 RX ORDER — MAGNESIUM HYDROXIDE/ALUMINUM HYDROXICE/SIMETHICONE 120; 1200; 1200 MG/30ML; MG/30ML; MG/30ML
30 SUSPENSION ORAL EVERY 4 HOURS PRN
Status: DISCONTINUED | OUTPATIENT
Start: 2020-11-25 | End: 2020-11-27 | Stop reason: HOSPADM

## 2020-11-25 RX ORDER — TRAZODONE HYDROCHLORIDE 100 MG/1
100 TABLET ORAL AT BEDTIME
Status: DISCONTINUED | OUTPATIENT
Start: 2020-11-25 | End: 2020-11-25

## 2020-11-25 RX ORDER — AMOXICILLIN 250 MG
1 CAPSULE ORAL 2 TIMES DAILY PRN
Status: DISCONTINUED | OUTPATIENT
Start: 2020-11-25 | End: 2020-11-27 | Stop reason: HOSPADM

## 2020-11-25 RX ORDER — LANOLIN ALCOHOL/MO/W.PET/CERES
100 CREAM (GRAM) TOPICAL DAILY
Status: DISCONTINUED | OUTPATIENT
Start: 2020-11-25 | End: 2020-11-27 | Stop reason: HOSPADM

## 2020-11-25 RX ORDER — LOPERAMIDE HCL 2 MG
2 CAPSULE ORAL 4 TIMES DAILY PRN
Status: DISCONTINUED | OUTPATIENT
Start: 2020-11-25 | End: 2020-11-27 | Stop reason: HOSPADM

## 2020-11-25 RX ORDER — HYDROXYZINE HYDROCHLORIDE 50 MG/1
50 TABLET, FILM COATED ORAL AT BEDTIME
Status: DISCONTINUED | OUTPATIENT
Start: 2020-11-25 | End: 2020-11-27 | Stop reason: HOSPADM

## 2020-11-25 RX ORDER — FOLIC ACID 1 MG/1
1 TABLET ORAL DAILY
Status: DISCONTINUED | OUTPATIENT
Start: 2020-11-25 | End: 2020-11-27 | Stop reason: HOSPADM

## 2020-11-25 RX ORDER — POTASSIUM CHLORIDE 1500 MG/1
20 TABLET, EXTENDED RELEASE ORAL 2 TIMES DAILY
Status: DISCONTINUED | OUTPATIENT
Start: 2020-11-25 | End: 2020-11-27 | Stop reason: HOSPADM

## 2020-11-25 RX ORDER — LORAZEPAM 1 MG/1
1-4 TABLET ORAL EVERY 30 MIN PRN
Status: DISCONTINUED | OUTPATIENT
Start: 2020-11-25 | End: 2020-11-27 | Stop reason: HOSPADM

## 2020-11-25 RX ORDER — QUETIAPINE FUMARATE 100 MG/1
100 TABLET, FILM COATED ORAL AT BEDTIME
Status: DISCONTINUED | OUTPATIENT
Start: 2020-11-25 | End: 2020-11-25

## 2020-11-25 RX ORDER — LANOLIN ALCOHOL/MO/W.PET/CERES
6 CREAM (GRAM) TOPICAL AT BEDTIME
Status: DISCONTINUED | OUTPATIENT
Start: 2020-11-25 | End: 2020-11-27 | Stop reason: HOSPADM

## 2020-11-25 RX ORDER — MULTIPLE VITAMINS W/ MINERALS TAB 9MG-400MCG
1 TAB ORAL DAILY
Status: DISCONTINUED | OUTPATIENT
Start: 2020-11-25 | End: 2020-11-27 | Stop reason: HOSPADM

## 2020-11-25 RX ORDER — FUROSEMIDE 40 MG
40 TABLET ORAL 2 TIMES DAILY
Status: DISCONTINUED | OUTPATIENT
Start: 2020-11-25 | End: 2020-11-27 | Stop reason: HOSPADM

## 2020-11-25 RX ORDER — NICOTINE 21 MG/24HR
1 PATCH, TRANSDERMAL 24 HOURS TRANSDERMAL DAILY
Status: DISCONTINUED | OUTPATIENT
Start: 2020-11-25 | End: 2020-11-27 | Stop reason: HOSPADM

## 2020-11-25 RX ORDER — ONDANSETRON 4 MG/1
4 TABLET, ORALLY DISINTEGRATING ORAL EVERY 6 HOURS PRN
Status: DISCONTINUED | OUTPATIENT
Start: 2020-11-25 | End: 2020-11-27 | Stop reason: HOSPADM

## 2020-11-25 RX ORDER — ATENOLOL 50 MG/1
50 TABLET ORAL DAILY PRN
Status: DISCONTINUED | OUTPATIENT
Start: 2020-11-25 | End: 2020-11-27 | Stop reason: HOSPADM

## 2020-11-25 RX ORDER — IBUPROFEN 600 MG/1
600 TABLET, FILM COATED ORAL EVERY 6 HOURS PRN
Status: DISCONTINUED | OUTPATIENT
Start: 2020-11-25 | End: 2020-11-27 | Stop reason: HOSPADM

## 2020-11-25 RX ADMIN — POTASSIUM CHLORIDE 20 MEQ: 1500 TABLET, EXTENDED RELEASE ORAL at 20:55

## 2020-11-25 RX ADMIN — POTASSIUM CHLORIDE 20 MEQ: 1500 TABLET, EXTENDED RELEASE ORAL at 09:04

## 2020-11-25 RX ADMIN — NICOTINE 1 PATCH: 21 PATCH, EXTENDED RELEASE TRANSDERMAL at 09:06

## 2020-11-25 RX ADMIN — LORAZEPAM 2 MG: 1 TABLET ORAL at 05:50

## 2020-11-25 RX ADMIN — Medication 100 MG: at 09:12

## 2020-11-25 RX ADMIN — GABAPENTIN 200 MG: 100 CAPSULE ORAL at 09:12

## 2020-11-25 RX ADMIN — GABAPENTIN 200 MG: 100 CAPSULE ORAL at 20:55

## 2020-11-25 RX ADMIN — LORAZEPAM 2 MG: 1 TABLET ORAL at 11:49

## 2020-11-25 RX ADMIN — GABAPENTIN 200 MG: 100 CAPSULE ORAL at 13:32

## 2020-11-25 RX ADMIN — RIFAXIMIN 550 MG: 550 TABLET ORAL at 11:47

## 2020-11-25 RX ADMIN — HYDROXYZINE HYDROCHLORIDE 50 MG: 50 TABLET, FILM COATED ORAL at 21:37

## 2020-11-25 RX ADMIN — RIFAXIMIN 550 MG: 550 TABLET ORAL at 20:55

## 2020-11-25 RX ADMIN — LORAZEPAM 2 MG: 1 TABLET ORAL at 09:12

## 2020-11-25 RX ADMIN — FOLIC ACID 1 MG: 1 TABLET ORAL at 09:12

## 2020-11-25 RX ADMIN — LORAZEPAM 2 MG: 1 TABLET ORAL at 16:29

## 2020-11-25 RX ADMIN — FUROSEMIDE 40 MG: 40 TABLET ORAL at 19:30

## 2020-11-25 RX ADMIN — MULTIPLE VITAMINS W/ MINERALS TAB 1 TABLET: TAB at 09:12

## 2020-11-25 RX ADMIN — MELATONIN TAB 3 MG 6 MG: 3 TAB at 21:37

## 2020-11-25 RX ADMIN — FUROSEMIDE 40 MG: 40 TABLET ORAL at 09:04

## 2020-11-25 NOTE — PLAN OF CARE
"SBAR    S = Situation:   Masoud Huddleston is a 35 year old year old male voluntarily admitted to detox from alcohol.     B  = Background:   Patient reported drinking 1 liter of vodka daily for the past 5 days. Last drink was on 11/24/20 at 1800. Breathalyzer was 0.299 on 11/24/20 at 2107.    He was recently kicked out of his [place] treatment program and his sober house after violating drinking policy. He is currently homeless.    Pt endorsed history of DTs.      History of liver cirrhosis; noncompliant with all of his medications for the past 2 weeks.     A  =  Assessment:   Patient was calm and cooperative. Safety search completed; no contraband found.    MSSA= 5; patient denied withdrawal symptoms. At 05:50am, patient was reassessed. He continues to deny withdrawal symptoms; however, his heart rate was high at 120. MSSA=11; ativan 2mg administered. Disoriented on the date, sleep disturbance, and mild tremor.     Patient denied any homicidal suicidal ideations.    Vital Signs: /71   Pulse 106   Temp 98.5  F (36.9  C) (Temporal)   Resp 16   Ht 1.803 m (5' 11\")   Wt 59 kg (130 lb)   SpO2 96%   BMI 18.13 kg/m    R =   Request or Recommendation:   MSSA protocol with Ativan d/t his elevated liver enzymes. Psychiatrist and internal medicine to assess. CTC to be assigned. Patient is unsure about discharge plans.  "

## 2020-11-25 NOTE — PLAN OF CARE
Physical Therapy Daily Treatment     Visit Count: 2  Plan of Care Dates: Initial: 3/1/2018 Through: 4/26/2018  Insurance Information: Medicaid  Next Referring Provider Visit: Not established   Referred by: Nya Buckley MD  Medical Diagnosis (from order):  Fracture of tibial plateau, right, sequela [905.4 (ICD-9-CM)]  Treatment Diagnosis: Knee Symptoms with Pain, Impaired Posture, Impaired Joint Mobility, Impaired Range of Motion, Impaired Motor Function/Muscle Performance, Radiating Pain, Impaired Mobility, Impaired Balance and Movement Coordination Impairments  Insurance: 1. ANTHEM/BCBS MEDICAID  2. N/A  Diagnosis Precautions: Right knee tibial plateau fracture-2013--open reduction internal fixation of right tibial  Chart reviewed: Relevant co-morbidities, allergies, tests and medications: Yes  : Mabel Rincon      SUBJECTIVE   States that she had a lot of pain in her low back after last session and had a feeling of heat in the R lower leg/foot. Had trouble sleeping.   Current Pain: 3/10.    Functional Change: None reported.    OBJECTIVE   Range of Motion (degrees)  [] All motions within functional/normal limits except those noted.   [x] Only those motions that were assessed are noted.  [] Uninvolved extremity motion within functional/normal limits.     Norm Left Right   Date   Initial Initial   Knee Flexion 135  137 109*   Knee Extension 0-5 -5 -5   standard testing positions unless otherwise noted; Key: ranges are reported in active range of motion unless noted as AA=active assistive or P=passive range of motion, * denotes pain      Muscle Flexibility:  Hamstring - Left: within normal limits, Right: severe tightness   Reproduction of distal symptoms with long arc quads. Patient in mild slump position resulting in neural tensioning (may be a result of keeping knee in flexed position with limited functional use).      Palpation:  Pain over tibial plateau (lateral), pes anserine insertion, as well  Behavioral Team Discussion: (11/25/2020)    Continued Stay Criteria/Rationale: Patient admitted for Chemical Use Issues.  Plan: The following services will be provided to the patient; psychiatric assessment, medication management, therapeutic milieu, individual and group support, and skills groups.   Participants: 3A Provider: Dr. Johnny Denise MD; 3A RN's: Ricardo Bray, RN; 3A CM's: Sonia Victoria.  Summary/Recommendation: Providers will assess today for treatment recommendations, discharge planning, and aftercare plans. CM will meet with pt for discharge planning.   Medical/Physical: Per RN admission note:  History of liver cirrhosis; noncompliant with all of his medications for the past 2 weeks.   Precautions:   Behavioral Orders   Procedures     Code 1 - Restrict to Unit     Routine Programming     As clinically indicated     Status 15     Every 15 minutes.     Withdrawal precautions     Rationale for change in precautions or plan: N/A  Progress: Initial.     as lateral knee, patellar tendon, anterior tibialis, L1-5 (including erector spinae).    Treatment   Therapeutic Exercise:   Attempted seated exercise to mobilize low back/spine to decrease possible neural tension symptoms: Seated spinal twist, seated L SLR, Seated L march, L arm raise. Instructed to perform within pain free/minimal pain ROM and to stop doing them before pain increased or became tired. Most exercises did bother her back.      Current Home Program (not performed this date except as noted above):   -Spinal twist, L SLR, L March, L arm raise (pain free)  -SAQ    ASSESSMENT   Tolerates very little movement in her spine or RLE. Hypersensitive to touch in both areas, as well. Attempted to reintroduce some pain free motion using twists and extremity motion to improve low back pain and movement. Patient reported increase in back pain after treatment today, but knee felt about the same. Had some delay in starting treatment with getting  set up.     Pain after treatment: 5/10  Result of above outlined education: Verbalizes understanding    Goals:  To be obtained by end of this plan of care:  1. Patient independent with modified and progressed home exercise program.  2. Patient will decrease involved knee pain/symptoms to 2/10  to aid in community ambulation for activities of independent living.   3. Patient will increase involved knee active range of motion to 120° to aid in stair ambulation.   4. Patient will increase involved knee strength to 4+/5 to aid in completion of household tasks for independent living and age appropriate activities.  5. Patient will be able to ascend and descend 1 flight of stairs using step-to pattern with minimal pain/difficulty.  6. Improve single leg standing balance to 15 seconds to improve ability to amb on level and unlevel surfaces to improve function in community interaction and reduce risk for falls.  7. Patient will be able to sleep 6 hours without disruption  from pain.      PLAN   Frequency/Duration: 2 times per week for 8 weeks with tapering as the patient progresses  Skilled training and instruction for the following interventions:  Gait Training (69448)  Manual Therapy (68267)  Neuromuscular Re-Education (58897)  Therapeutic Activity (60444)  Therapeutic Exercise (76813)  Electrical Stimulation (78549//45064)   Heat/Cold (30020)  Ultrasound/Phonophoresis (11927)  Vasopneumatic Device (53654)  Aquatic Therapy (47090)  Biofeedback (33875/70354)     The plan of care and goals were established with the patient who concurs.  Patient has been given attendance policy at time of initial evaluation.    THERAPY DAILY BILLING   Primary Insurance:  ANTHEM/BCBS MEDICAID  Secondary Insurance: N/A    Evaluation Procedures:  No evaluation codes were used on this date of service    Timed Procedures:  Therapeutic Exercise, 23 minutes    Untimed Procedures:  No untimed codes were used on this date of service    Total Treatment Time: 35 minutes    The referring provider's electronic or written signature on the evaluation authorizes the therapy plan of care and certifies the need for these services, furnished under this plan of care while under their care.  Physician Signature on file.

## 2020-11-25 NOTE — H&P
Admitted:     11/24/2020      HISTORY OF PRESENT ILLNESS:  Mr. Masoud Huddleston, date of birth 1985, is a 35-year-old man admitted to [facility] detox unit on 11/24/2020.  He was admitted to the hospital to detoxify from alcohol.  He has been drinking 1 liter per day for about 5 days and had a breathalyzer of 0.299.  Prior to that, he had been living in sober housing but was kicked out of sober housing as part of this relapse.      He has a history of alcohol use disorder severe with delirium tremens, liver cirrhosis, esophageal varices, thrombocytopenia, gynecomastia, and there is a reference in Care Everywhere to hepatitis C, but he is uncertain about this.  He had been taking medications of gabapentin 200 mg 3 times a day, Lasix 40 mg per day, Vistaril, mirtazapine 15 mg at bedtime, trazodone 100 mg at bedtime, Seroquel 100 mg at bedtime, Prilosec, Xifaxan, Revatio, and he notes that he stopped these about 2 weeks ago because he was not having a sex drive.  It did not return fully, but stopping them did help.  It was erectile dysfunction primarily that was affecting him.      He notes that he takes the Seroquel simply for sleep.      Seroquel side effects were discussed including tardive dyskinesia, diabetes, and other side effects.      REVIEW OF SYSTEMS:  Ten-point review of systems in the emergency room was unremarkable except for    the alcohol.      VITAL SIGNS:  Temperature 96.5, pulse 114, blood pressure 127/51, respirations 18, height 5 feet 11 inches, weight 130 pounds, SpO2 of 95 on room air and except for being intoxicated and tachycardic, the examination was normal.      LABORATORY DATA:  Returned with platelets at 70.  , ALT 86, alkaline phosphatase 171, GGT at 135.      MENTAL STATUS EXAMINATION:  Shows an alert man lying in bed.        Motor behavior is decreased.  No abnormal movements and no signs of tardive dyskinesia were noted.  General appearance is disheveled.  Speech  production is decreased.  Thought processes are linear.  He is not suicidal.  There is no sign of psychosis.  Associations are intact.  Insight and judgment are good.  Recent and remote memory seemed to be intact.  Attention span and concentration are good.  Mood is fair.  Affect is fair.  Gait is not examined, but muscle strength is unremarkable.  Care Everywhere was reviewed, as well as notes from this hospital stay.      Major depressive diagnosis is present in Care Everywhere, although he denies depression at this time.      DIAGNOSES:   1.  Alcohol use disorder, severe with withdrawal and with complications of liver disease, esophageal varices, gynecomastia, and low platelets.   2.  Major depressive disorder by history.  He has not been taking antidepressant for 2 weeks.      PLAN:  The plan at this time, due to the erectile dysfunction, is to start the gabapentin back again but hold off on the Seroquel, trazodone and mirtazapine.  Will discuss alternate antidepressants if needed when he feels better.  Estimated length of stay is 3-5 days to stabilize, and he is looking at a return to treatment.        This evaluation was done by telemedicine using the [employer] system due to COVID and was 30 minutes.         BELEM LYNCH MD             D: 2020   T: 2020   MT:       Name:     NEERAJ MARQUEZ   MRN:      [id number]        Account:      [id number]   :      1985        Admitted:     2020                   Document: [id number]

## 2020-11-25 NOTE — PROGRESS NOTES
[place] Prescription Drug Control Monitoring Note:      NARX SCORES  Narcotic  000  Sedative  000  Stimulant  000  Explanation and Guidance  OVERDOSE RISK SCORE     000    (Range 000-999)  Explanation and Guidance  ADDITIONAL RISK INDICATORS ( 0 )     Explanation and Guidance   This NarxCare report is based on search criteria supplied and the data entered by the dispensing pharmacy. For more information about any prescription, please contact the dispensing pharmacy or the prescriber. NarxCare scores and reports are intended to aid, not replace, medical decision making. None of the information presented should be used as sole justification for providing or refusing to provide medications. The information on this report is not warranted as accurate or complete.   Graphs   RX GRAPH  Narcotic Buprenorphine Sedative Stimulant Other     All Prescribers    Prescribers    6 - Nas Bloom    5 - Wing Pena    4 - Kelby Dumont,    3 - Martha Park    2 - Trever Pozo    1 - Matti Smith    Timeline  11/25  2m  6m  1y  2y    Buprenorphine mg    16    4    0  28    Timeline  11/25  2m  6m  1y  2y  Morphine MgEq (MME)    200    80    0  320    Timeline  11/25  2m  6m  1y  2y  Lorazepam MgEq (LME)    10    2    0  18    Timeline  11/25  2m  6m  1y  2y  *Per CDC guidance, the MME conversion factors prescribed or provided as part of the medication-assisted treatment for opioid use disorder should not be used to benchmark against dosage thresholds meant for opioids prescribed for pain. Buprenorphine products have no agreed upon morphine equivalency, and as partial opioid agonists, are not expected to be associated with overdose risk in the same dose-dependent manner as doses for full agonist opioids. MME = morphine milligram equivalents. LME = Lorazepam milligram equivalents. mg = dose in milligrams.     Summary     Summary  Total Prescriptions:     10   Total Prescribers:     6   Total Pharmacies:     4    Narcotics* (excluding Buprenorphine)  Current Qty:     0   Current MME/day:     0.00  30 Day Avg MME/day:     0.00   Sedatives*  Current Qty:     0   Current LME/day:    0.00  30 Day Avg LME/day:    0.00  Buprenorphine*  Current Qty:     0   Current mg/day:    0.00  30 Day Avg mg/day:    0.00  Rx Data   PRESCRIPTIONS  Total Prescriptions:  10  Total Private Pay:  0  Fill Date  ID  Written  Sold  Drug  Qty  Days  Prescriber  Rx #  Pharmacy  Refill  Daily Dose *  Pymt Type    11/11/2020   4   10/14/2020     Gabapentin 100 Mg Capsule   180.00  30  Ja Ose   [id number]   Gen (2588)   1/2    [place]  11/03/2020   4   10/14/2020     Gabapentin 100 Mg Capsule   54.00  9  Ja Ose   [id number]   Gen (2588)   0/1    [place]  09/09/2020   3   08/20/2020     Gabapentin 100 Mg Capsule   180.00  30  Ja Ose   [id number]   Gen (2588)   0/1    [place]  08/12/2020   3   07/29/2020     Gabapentin 100 Mg Capsule   180.00  30  Ja Ose   [id number]   Gen (2588)   0/1    [place]  07/16/2020   6   07/16/2020 07/17/2020   Gabapentin 100 Mg Capsule   180.00  30  Ed Tri   [id number]   He[place] (1287)   0/0    [place]icaid   [place]  06/10/2020   2   06/10/2020   06/11/2020   Gabapentin 400 Mg Capsule   90.00  30  To Smi   [id number]   Wal (6020)   0/0    [place]  05/06/2020   2   05/06/2020 05/14/2020   Gabapentin 400 Mg Capsule   90.00  30   Xavier   [id number]   Wal (6020)   0/0    [place]  04/08/2020   2   04/08/2020 04/09/2020   Gabapentin 400 Mg Capsule   90.00  30  Os Ane   [id number]   Wal (6020)   0/0    [place]  03/04/2020   1   03/03/2020 03/04/2020   Gabapentin 400 Mg Capsule   90.00  30  Lauryn Zim   [id number]   Reg (6199)   0/0    [place]  02/07/2020   5   02/07/2020 02/13/2020   Gabapentin 400 Mg Capsule   90.00  30  Ed Tri   [id number]   Hea (1287)   0/0    [place]icaid   [place]  *Per CDC guidance, the MME conversion factors prescribed or provided as part of the medication-assisted treatment for opioid use disorder should not  be used to benchmark against dosage thresholds meant for opioids prescribed for pain. Buprenorphine products have no agreed upon morphine equivalency, and as partial opioid agonists, are not expected to be associated with overdose risk in the same dose-dependent manner as doses for full agonist opioids. MME = morphine milligram equivalents. LME = Lorazepam milligram equivalents. mg = dose in milligrams.     Providers  Total Providers: 6   Name   Address   City   State   Zipcode   Phone   Matti Hammonds  [address]    [place] [facility]  [address]  [phone]  Trever Bonner M.D.  [address] [facility]  [address]  [phone]  Martha Stanton Cheli Vicente  [address] [facility]  [address]  [phone]  Kelby Dumont MD  [address] [facility]  [address]  [phone]  Nas Bloom  [address] [facility] [address]   [facility]  [address]  [phone]  Wing Conn MD  [address] [facility] [address]   [place]  [address]  [phone]  Pharmacies  Total Pharmacies: 4   Name   Address   City   State   Zipcode   Phone   [facility] ([id number])  [address] [place]  [address]  [phone]  [facility] [phone]    [place]  [place]  [address]  [phone]  [facility] [phone]  [facility] [address]   [facility]  [address]  [phone]  [facility] (4858)  [address] [facility] [address]   [place]  [address]  [phone]    The report provided is based upon the search criteria entered and the corresponding data as it has been reported by dispenser(s). If erroneous information is identified or additional information is needed, please contact the dispenser or the prescriber provided on the report. Date Sold signifies the date the prescription was sold (left the pharmacy). The absence of Date Sold does not necessarily indicate the prescription was not dispensed. Fill Date represents the date the medication was filled or prepared by  the pharmacy. Note, federal regulation (CFR Title 42: Part 2) requires patient consent prior to releasing certain patient data from federally funded opioid treatment programs (OTPs). As such, controlled substances dispensed from OTPs for medication-assisted treatment may not appear in the [place]  report. Morphine milligram equivalent (MME) conversion factors published by the CDC are used in the MME calculation. Per the CDC, the MME conversion factor is intended only for analytic purposes where prescription data are used to retrospectively calculate daily MME to inform analyses of risks associated with opioid prescribing. This value does not constitute clinical guidance or recommendations for converting patients from one form of opioid analgesic to another. Per the CDC, the conversion factors for drugs prescribed or provided, as part of medication-assisted treatment for opioid use disorder should not be used to benchmark against MME dosage thresholds meant for opioids prescribed for pain. Buprenorphine products listed in the CDC s MME file do not have an associated conversion factor. Lastly, the CDC notes, in clinical practice, calculating MME for methadone often involves a sliding-scale approach, whereby the conversion factor increases with increasing dose. The conversion factor of 3 for methadone presented in this file could underestimate MME for a given patient. This report contains confidential information, including patient identifiers, and is not a public record. The information on this report must be treated as protected health information and is only to be disclosed to others as authorized by applicable state and Federal regulations.

## 2020-11-25 NOTE — CONSULTS
Internal Medicine Consult - Initial Visit       Masoud Huddleston MRN# [id number]   YOB: 1985 Age: 35 year old   Date of Admission: 11/24/2020  PCP: System, Provider Not In  Date of Service: 11/25/2020    Referring Provider: Rm Solis MD  Reason for Consult: Medical co-management of detox          Assessment and Recommendations:   Masoud Huddleston is a 35 year old male with a history of alcohol use disorder, liver cirrhosis, HE, EV, chronic anemia, depression, and anxiety admitted to station 3A for alcohol withdrawal.         # Alcohol withdrawal, hx of alcohol use disorder - MSSA 10 this shift.  Reports a 5 day period of binge drinking.  Denies hx of withdrawal seizures.  - Continue MSSA   - Folvite, multi-vites, thiamine supplementation   - Further management per Psychiatry     # Elevated LFTs - , ALT 86, .  Likely 2/2 alcohol use c/b underlying liver cirrhosis.  Denies abdominal pain.    - Recheck in 1-2 days to ensure downtrend       # Liver cirrhosis, hx HE, EVs without bleeding, hx portal hypertensive gastropathy - Reports that he stopped taking all of his medications about 2 weeks ago because he felt they were interfering with his ability to have an erection.  Last had a paracentesis about 8 months ago.  Hospitalized in 5/2020 for acute anemia w/ Hgb 5.4 d/t suspected GI bleed.  Denies hx SBP.  Appears that he does not have established care w/ GI.  His family practice physician manages his medications.  Last abdominal US in 7/4/2020 with stable enlarged fatty liver.       - Resume PTA Lasix   - Resume Rifaximin 550mg BID   - Anti-emetics PRN to minimize vomiting given hx varices   - Follow up OP with GI/Hepatology      # Thrombocytopenia - Likely 2/2 alcohol use.  Plt 70 on admission.    - Repeat CBC in 1-2 days to ensure normalization     Medicine will continue to follow along peripherally for repeat labs.  Recommendations relayed to primary team via this progress note.  Thank  "you for the opportunity to be involved in this patient's care.      Juliet Purcell, CNP, APRN  Internal Medicine [facility] Hospitalist  [facility]  Pager [phone]           History of Present Illness:   History is obtained from the patient and medical record.     This patient is a 35 year old male with a history of alcohol use disorder, liver cirrhosis, HE, EV, chronic anemia, depression, and anxiety admitted to station 3A for alcohol withdrawal.           Internal Medicine service was asked to see patient for medical co-management of detox.  Masoud is resting in bed.  He reports going on a \"5 day streak\" of drinking hard liquor, about 1 liter daily.  Denies withdrawal seizures.  Reports that he stopped taking all of his medications about two weeks ago because he was having difficult with getting an erection and his PCP started him on sildenafil.  He feels okay, mostly tired.  Denies hematemesis, had some vomiting prior to admission that was mostly bilious.            Review of Systems:   A 10 point ROS was performed and negative unless otherwise noted in HPI.           Past Medical History:   Reviewed and updated in Epic.  History reviewed. No pertinent past medical history.          Past Surgical History:   Reviewed and updated in Epic.  History reviewed. No pertinent surgical history.          Social History:   Reviewed and updated in [facility].  Social History     Socioeconomic History     Marital status: Single     Spouse name: Not on file     Number of children: Not on file     Years of education: Not on file     Highest education level: Not on file   Occupational History     Not on file   Social Needs     Financial resource strain: Not on file     Food insecurity     Worry: Not on file     Inability: Not on file     Transportation needs     Medical: Not on file     Non-medical: Not on file   Tobacco Use     Smoking status: Current Every Day Smoker     Packs/day: 1.00     Types: Cigarettes     " Smokeless tobacco: Never Used   Substance and Sexual Activity     Alcohol use: Yes     Comment: a liter of Vodka everyday     Drug use: Yes     Types: Marijuana     Sexual activity: Not Currently   Lifestyle     Physical activity     Days per week: Not on file     Minutes per session: Not on file     Stress: Not on file   Relationships     Social connections     Talks on phone: Not on file     Gets together: Not on file     Attends [employer] service: Not on file     Active member of club or organization: Not on file     Attends meetings of clubs or organizations: Not on file     Relationship status: Not on file     Intimate partner violence     Fear of current or ex partner: Not on file     Emotionally abused: Not on file     Physically abused: Not on file     Forced sexual activity: Not on file   Other Topics Concern     Not on file   Social History Narrative     Not on file              Family History:   Reviewed and updated in Epic.  History reviewed. No pertinent family history.          Allergies:   No Known Allergies          Medications:     Current Facility-Administered Medications   Medication     alum & mag hydroxide-simethicone (MAALOX) suspension 30 mL     atenolol (TENORMIN) tablet 50 mg     folic acid (FOLVITE) tablet 1 mg     furosemide (LASIX) tablet 40 mg     gabapentin (NEURONTIN) capsule 200 mg     hydrOXYzine (ATARAX) tablet 50 mg     ibuprofen (ADVIL/MOTRIN) tablet 600 mg     loperamide (IMODIUM) capsule 2 mg     LORazepam (ATIVAN) tablet 1-4 mg     multivitamin w/minerals (THERA-VIT-M) tablet 1 tablet     nicotine (NICODERM CQ) 21 MG/24HR 24 hr patch 1 patch     nicotine Patch in Place     ondansetron (ZOFRAN-ODT) ODT tab 4 mg     potassium chloride ER (KLOR-CON M) CR tablet 20 mEq     QUEtiapine (SEROquel) tablet 100 mg     senna-docusate (SENOKOT-S/PERICOLACE) 8.6-50 MG per tablet 1 tablet     thiamine (B-1) tablet 100 mg     traZODone (DESYREL) tablet 100 mg            Physical Exam:   Blood  "pressure 116/48, pulse 94, temperature 99.2  F (37.3  C), temperature source Temporal, resp. rate 16, height 1.803 m (5' 11\"), weight 59 kg (130 lb), SpO2 96 %.  Body mass index is 18.13 kg/m .    GENERAL: Alert and oriented x 3. Well nourished, well developed.  No acute distress.    HEENT: Normocephalic, atraumatic. Anicteric sclera. Mucous membranes moist.   CV: RRR. S1, S2. Soft systolic murmur (present since childhood per pt report).   RESPIRATORY: Effort normal on room air. Lungs CTAB with no wheezing, rales, or rhonchi.   GI: Abdomen soft and non distended, bowel sounds present x all 4 quadrants. No tenderness, rebound, or guarding.   NEUROLOGICAL: No focal deficits. Follows commands.  Strength equal in upper and lower extremities.   MUSCULOSKELETAL: No joint swelling or tenderness. Moves all extremities.   EXTREMITIES: No gross deformities. No peripheral edema. Intact bilateral pedal pulses.   SKIN: Grossly warm, dry, and intact. No jaundice. No rashes.             Data:   I personally reviewed the following studies:    ROUTINE IP LABS (Last four results)  CMP   Recent Labs   Lab 11/24/20  2236      POTASSIUM 3.3*   CHLORIDE 107   CO2 29   ANIONGAP 7   GLC 99   BUN 6*   CR 0.62*   FÉLIX 8.0*   PROTTOTAL 8.1   ALBUMIN 3.7   BILITOTAL 1.1   ALKPHOS 171*   *   ALT 86*     CBC   Recent Labs   Lab 11/24/20  2236   WBC 6.9   RBC 5.21   HGB 14.3   HCT 42.9   MCV 82   MCH 27.4   MCHC 33.3   RDW 14.9   PLT 70*     INR No lab results found in last 7 days.        Unresulted Labs Ordered in the Past 30 Days of this Admission     Date and Time Order Name Status Description    11/25/2020 0309 Vitamin B12 In process     11/24/2020 2236 SARS-CoV-2 COVID-19 Virus (Coronavirus) RT-PCR In process              "

## 2020-11-25 NOTE — PROGRESS NOTES
11/25/20 0222   Patient Belongings   Did you bring any home meds/supplements to the hospital?  No   Patient Belongings remains with patient;locker;sent to security per site process;returned to patient at discharge   Patient Belongings Remaining with Patient clothing;other (see comments)   Patient Belongings Put in Hospital Secure Location (Security or Locker, etc.) other (see comments)   Belongings Search Yes   Clothing Search Yes   Second Staff Jay [employer] Stephan   STORAGE BIN: sweater with strings, jacket, hat, backpack, pens/markers, binder, spiral notebooks, pencil bag, pants with strings, laced shoes, 2 cigarette pack, 2 lighter, e cig  MED BIN: sunglass, headphone, , wallet, phone, watch  SECURITY: [place] 's license, mastercard ([employer]), Visa ([place]), $104 [place] Check, $1036 cash, white necklace with pendent  A               Admission:  I am responsible for any personal items that are not sent to the safe or pharmacy.  [facility] is not responsible for loss, theft or damage of any property in my possession.    Signature:  _________________________________ Date: _______  Time: _____                                              Staff Signature:  ____________________________ Date: ________  Time: _____      2nd Staff person, if patient is unable/unwilling to sign:    Signature: ________________________________ Date: ________  Time: _____     Discharge:  [facility] has returned all of my personal belongings:    Signature: _________________________________ Date: ________  Time: _____                                          Staff Signature:  ____________________________ Date: ________  Time: _____

## 2020-11-25 NOTE — PROGRESS NOTES
Writer met with pt to discuss aftercare plans. Pt states he was recently kicked out of sober house for drinking alcohol. Pt states he is on a civil commitment through [facility]. Pt states his  is Uyen Blackwood through [facility] ([phone]). Placed call to Uyen and left voicemail message requesting return call to discuss commitment and potential treatment referrals or Care facility referral. Waiting for return call.

## 2020-11-25 NOTE — PHARMACY-ADMISSION MEDICATION HISTORY
"  Admission Medication History Completed by Pharmacy    See [facility] Admission Navigator for allergy information, preferred outpatient pharmacy, prior to admission medications and immunization status.     Medication History Sources:     [facility] and [facility] (via phone)    Changes made to PTA medication list (reason):    Added: Cerovite multivitamin    Deleted: None    Changed: (Per patient and Surescripts records)  o Mirtazapine - added instructions \"take 15 mg by mouth at bedtime\"    Additional Information:    Gabapentin: Last filled and sold on 7/17/20 for #180 capsules (30 day supply)    Spirinolactone: patient reports he asked his provider to take him off of the medication about two and a half months ago because he experienced gynecomastia, or breast development. Did not add to PTA medication list, since last took more than a month ago.    Melatonin: patient reports taking OTC melatonin every night, but unsure of home dose. Did not add to PTA medication list.    Prior to Admission medications    Medication Sig Last Dose Taking? Auth Provider   furosemide (LASIX) 40 MG tablet Take 40 mg by mouth 2 times daily  Past Week at Unknown time Yes Reported, Patient   gabapentin (NEURONTIN) 100 MG capsule 200 mg 3 times daily  Past Week at Unknown time Yes Reported, Patient   hydrOXYzine (VISTARIL) 50 MG capsule 50 mg At Bedtime  Past Week at Unknown time Yes Reported, Patient   mirtazapine (REMERON) 15 MG tablet Take 15 mg by mouth At Bedtime  Past Week at Unknown time Yes Reported, Patient   Multiple Vitamins-Minerals (CEROVITE PO) Take 1 tablet by mouth daily Past Week at Unknown time Yes Unknown, Entered By History   omeprazole (PRILOSEC) 20 MG DR capsule Take 20 mg by mouth daily  Past Week at Patient taking twice daily lately to control heartburn. Yes Reported, Patient   potassium chloride ER (KLOR-CON M) 20 MEQ CR tablet Take 20 mEq by mouth 2 times daily  Past Week at Unknown time Yes " Reported, Patient   QUEtiapine (SEROQUEL) 100 MG tablet Take 100 mg by mouth At Bedtime  Past Week at Unknown time Yes Reported, Patient   rifaximin (XIFAXAN) 550 MG TABS tablet Take 550 mg by mouth 2 times daily  Past Week at Unknown time Yes Reported, Patient   sildenafil (REVATIO) 20 MG tablet Take 3-4 tabs one hour before sexual activity as directed Past Week at Unknown time Yes Reported, Patient   traZODone (DESYREL) 100 MG tablet Take 100 mg by mouth At Bedtime  Past Week at Unknown time Yes Reported, Patient       Date completed: 11/25/20    Medication history completed by:     Nicollette McMann, PharmD  [facility]: Ascom *[phone] or [phone]

## 2020-11-25 NOTE — ED PROVIDER NOTES
ED Provider Note  [facility]      History     Chief Complaint   Patient presents with     Alcohol Intoxication     pt drinks a liter of Vodka,      HPI  Masoud Huddleston is a 35 year old male who presents to emergency room intoxicated.  Patient states that he drinks approximately a liter a day and has been doing this for approximately 5 days.  He has a long history of alcohol abuse and was currently in the [facility] program.  He was asked to leave today because he violated their policy on drinking.  He denies any history of seizures but does admit to having delirium tremens in the past.  He denies any other drug use states that he has a history of liver cirrhosis and is currently noncompliant with all of his medications.  He does not use any other significant drugs other than marijuana.  He denies any homicidal suicidal ideations.  He is requesting detox and is voluntary at this time but he does have a civil commitment.    Past Medical History  History reviewed. No pertinent past medical history.  History reviewed. No pertinent surgical history.       furosemide (LASIX) 40 MG tablet       gabapentin (NEURONTIN) 100 MG capsule       hydrOXYzine (VISTARIL) 50 MG capsule       mirtazapine (REMERON) 15 MG tablet       omeprazole (PRILOSEC) 20 MG DR capsule       potassium chloride ER (KLOR-CON M) 20 MEQ CR tablet       QUEtiapine (SEROQUEL) 100 MG tablet       rifaximin (XIFAXAN) 550 MG TABS tablet       sildenafil (REVATIO) 20 MG tablet       traZODone (DESYREL) 100 MG tablet      No Known Allergies  Family History  History reviewed. No pertinent family history.  Social History   Social History     Tobacco Use     Smoking status: Current Every Day Smoker     Packs/day: 1.00     Types: Cigarettes     Smokeless tobacco: Never Used   Substance Use Topics     Alcohol use: Yes     Comment: a liter of Vodka everyday     Drug use: Yes     Types: Marijuana      Past medical history, past surgical  "history, medications, allergies, family history, and social history were reviewed with the patient. No additional pertinent items.       Review of Systems  A complete review of systems was performed with pertinent positives and negatives noted in the HPI, and all other systems negative.    Physical Exam   BP: 127/51  Pulse: 114  Temp: 96.5  F (35.8  C)  Resp: 18  Height: 180.3 cm (5' 11\")  Weight: 59 kg (130 lb)  SpO2: 95 %  Physical Exam  Vitals signs and nursing note reviewed.   Constitutional:       General: He is not in acute distress.     Appearance: He is well-developed. He is not ill-appearing, toxic-appearing or diaphoretic.      Comments: Moderate intoxication  Comfortably resting, lying in bed, NAD, nondiaphoretic, lucid, fully conversant, no  respiratory distress, alert and oriented.     HENT:      Head: Normocephalic and atraumatic.   Eyes:      General: No scleral icterus.  Neck:      Musculoskeletal: Normal range of motion and neck supple.   Cardiovascular:      Rate and Rhythm: Tachycardia present.   Pulmonary:      Effort: Pulmonary effort is normal. No respiratory distress.   Skin:     General: Skin is warm and dry.      Findings: No rash.   Neurological:      Mental Status: He is alert and oriented to person, place, and time.           ED Course      Procedures                         Results for orders placed or performed during the hospital encounter of 11/24/20   CBC with platelets differential     Status: Abnormal   Result Value Ref Range    WBC 6.9 4.0 - 11.0 10e9/L    RBC Count 5.21 4.4 - 5.9 10e12/L    Hemoglobin 14.3 13.3 - 17.7 g/dL    Hematocrit 42.9 40.0 - 53.0 %    MCV 82 78 - 100 fl    MCH 27.4 26.5 - 33.0 pg    MCHC 33.3 31.5 - 36.5 g/dL    RDW 14.9 10.0 - 15.0 %    Platelet Count 70 (L) 150 - 450 10e9/L    Diff Method Automated Method     % Neutrophils 66.1 %    % Lymphocytes 28.4 %    % Monocytes 4.1 %    % Eosinophils 1.2 %    % Basophils 0.1 %    % Immature Granulocytes 0.1 %    " Nucleated RBCs 0 0 /100    Absolute Neutrophil 4.6 1.6 - 8.3 10e9/L    Absolute Lymphocytes 2.0 0.8 - 5.3 10e9/L    Absolute Monocytes 0.3 0.0 - 1.3 10e9/L    Absolute Eosinophils 0.1 0.0 - 0.7 10e9/L    Absolute Basophils 0.0 0.0 - 0.2 10e9/L    Abs Immature Granulocytes 0.0 0 - 0.4 10e9/L    Absolute Nucleated RBC 0.0    Comprehensive metabolic panel     Status: Abnormal   Result Value Ref Range    Sodium 143 133 - 144 mmol/L    Potassium 3.3 (L) 3.4 - 5.3 mmol/L    Chloride 107 94 - 109 mmol/L    Carbon Dioxide 29 20 - 32 mmol/L    Anion Gap 7 3 - 14 mmol/L    Glucose 99 70 - 99 mg/dL    Urea Nitrogen 6 (L) 7 - 30 mg/dL    Creatinine 0.62 (L) 0.66 - 1.25 mg/dL    GFR Estimate >90 >60 mL/min/[1.73_m2]    GFR Estimate If Black >90 >60 mL/min/[1.73_m2]    Calcium 8.0 (L) 8.5 - 10.1 mg/dL    Bilirubin Total 1.1 0.2 - 1.3 mg/dL    Albumin 3.7 3.4 - 5.0 g/dL    Protein Total 8.1 6.8 - 8.8 g/dL    Alkaline Phosphatase 171 (H) 40 - 150 U/L    ALT 86 (H) 0 - 70 U/L     (H) 0 - 45 U/L   Asymptomatic COVID-19 Virus (Coronavirus) by PCR     Status: None    Specimen: Nasopharyngeal   Result Value Ref Range    COVID-19 Virus PCR to [facility] - Source Nasopharyngeal     COVID-19 Virus PCR to [facility] - Result       Test received-See reflex to IDDL test SARS CoV2 (COVID-19) Virus RT-PCR   Alcohol breath test POCT     Status: Abnormal   Result Value Ref Range    Alcohol Breath Test 0.299 (A) 0.00 - 0.01     Medications   diazepam (VALIUM) tablet 5-20 mg (has no administration in time range)        Assessments & Plan (with Medical Decision Making)   This is a 35-year-old male coming into the emergency room requesting detox from alcohol.  He is moderately intoxicated here in the emergency room but has no significant signs of withdrawal or injuries.  At this time he will be admitted to the detox service for continued care and management.    I have reviewed the nursing notes. I have reviewed the findings, diagnosis, plan and need  for follow up with the patient.    New Prescriptions    No medications on file       Final diagnoses:   Alcoholic intoxication without complication (H)       --  Brayden Nam MD  [facility] EMERGENCY DEPARTMENT  11/24/2020     Brayden Nam MD  11/24/20 8998

## 2020-11-25 NOTE — ED NOTES
"ED to Behavioral Floor Handoff    SITUATION  Masoud Huddleston is a 35 year old male who speaks English and lives in a home with others The patient arrived in the ED by private car from home with a complaint of Alcohol Intoxication (pt drinks a liter of Vodka, )  .The patient's current symptoms started/worsened 1 month(s) ago and during this time the symptoms have increased.   In the ED, pt was diagnosed with   Final diagnoses:   None        Initial vitals were: BP: 127/51  Pulse: 114  Temp: 96.5  F (35.8  C)  Resp: 18  Height: 180.3 cm (5' 11\")  Weight: 59 kg (130 lb)  SpO2: 95 %   --------  Is the patient diabetic? No   If yes, last blood glucose? --     If yes, was this treated in the ED? --  --------  Is the patient inebriated (ETOH) Yes or Impaired on other substances? No  MSSA done? Yes  Last MSSA score: --    Were withdrawal symptoms treated? N/A  Does the patient have a seizure history? No. If yes, date of most recent seizure--  --------  Is the patient patient experiencing suicidal ideation? denies current or recent suicidal ideation     Homicidal ideation? denies current or recent homicidal ideation or behaviors.    Self-injurious behavior/urges? denies current or recent self injurious behavior or ideation.  ------  Was pt aggressive in the ED No  Was a code called No  Is the pt now cooperative? Yes  -------  Meds given in ED:   Medications   diazepam (VALIUM) tablet 5-20 mg (has no administration in time range)      Family present during ED course? No  Family currently present? No    BACKGROUND  Does the patient have a cognitive impairment or developmental disability? No  Allergies: No Known Allergies.   Social demographics are   Social History     Socioeconomic History     Marital status: Single     Spouse name: None     Number of children: None     Years of education: None     Highest education level: None   Occupational History     None   Social Needs     Financial resource strain: None     Food " insecurity     Worry: None     Inability: None     Transportation needs     Medical: None     Non-medical: None   Tobacco Use     Smoking status: Current Every Day Smoker     Packs/day: 1.00     Types: Cigarettes     Smokeless tobacco: Never Used   Substance and Sexual Activity     Alcohol use: Yes     Comment: a liter of Vodka everyday     Drug use: Yes     Types: Marijuana     Sexual activity: Not Currently   Lifestyle     Physical activity     Days per week: None     Minutes per session: None     Stress: None   Relationships     Social connections     Talks on phone: None     Gets together: None     Attends Gnosticism service: None     Active member of club or organization: None     Attends meetings of clubs or organizations: None     Relationship status: None     Intimate partner violence     Fear of current or ex partner: None     Emotionally abused: None     Physically abused: None     Forced sexual activity: None   Other Topics Concern     None   Social History Narrative     None        ASSESSMENT  Labs results   Labs Ordered and Resulted from Time of ED Arrival Up to the Time of Departure from the ED   CBC WITH PLATELETS DIFFERENTIAL - Abnormal; Notable for the following components:       Result Value    Platelet Count 70 (*)     All other components within normal limits   COMPREHENSIVE METABOLIC PANEL - Abnormal; Notable for the following components:    Potassium 3.3 (*)     Urea Nitrogen 6 (*)     Creatinine 0.62 (*)     Calcium 8.0 (*)     All other components within normal limits   ALCOHOL BREATH TEST POCT - Abnormal; Notable for the following components:    Alcohol Breath Test 0.299 (*)     All other components within normal limits   COVID-19 VIRUS (CORONAVIRUS) BY PCR   DRUG ABUSE SCREEN 6 CHEM DEP URINE ([facility])   MSSA SCORE AND VS   NOTIFY      Imaging Studies: No results found for this or any previous visit (from the past 24 hour(s)).   Most recent vital signs /51   Pulse 114   Temp 96.5  F  "(35.8  C) (Tympanic)   Resp 18   Ht 1.803 m (5' 11\")   Wt 59 kg (130 lb)   SpO2 95%   BMI 18.13 kg/m     Abnormal labs/tests/findings requiring intervention:---   Pain control: pt had none  Nausea control: pt had none    RECOMMENDATION  Are any infection precautions needed (MRSA, VRE, etc.)? No If yes, what infection? --  ---  Does the patient have mobility issues? independently. If yes, what device does the pt use? ---  ---  Is patient on 72 hour hold or commitment? No If on 72 hour hold, have hold and rights been given to patient? N/A  Are admitting orders written if after 10 p.m. ?Yes  Tasks needing to be completed:---     Segundo Salazar RN    [phone] [facility] [place]   "

## 2020-11-26 LAB — POTASSIUM SERPL-SCNC: 3.9 MMOL/L (ref 3.4–5.3)

## 2020-11-26 PROCEDURE — 250N000013 HC RX MED GY IP 250 OP 250 PS 637: Performed by: PSYCHIATRY & NEUROLOGY

## 2020-11-26 PROCEDURE — 250N000013 HC RX MED GY IP 250 OP 250 PS 637: Performed by: NURSE PRACTITIONER

## 2020-11-26 PROCEDURE — 36415 COLL VENOUS BLD VENIPUNCTURE: CPT | Performed by: PHYSICIAN ASSISTANT

## 2020-11-26 PROCEDURE — 128N000004 HC R&B CD ADULT

## 2020-11-26 PROCEDURE — 84132 ASSAY OF SERUM POTASSIUM: CPT | Performed by: PHYSICIAN ASSISTANT

## 2020-11-26 RX ADMIN — POTASSIUM CHLORIDE 20 MEQ: 1500 TABLET, EXTENDED RELEASE ORAL at 08:50

## 2020-11-26 RX ADMIN — FOLIC ACID 1 MG: 1 TABLET ORAL at 08:48

## 2020-11-26 RX ADMIN — RIFAXIMIN 550 MG: 550 TABLET ORAL at 20:03

## 2020-11-26 RX ADMIN — HYDROXYZINE HYDROCHLORIDE 50 MG: 50 TABLET, FILM COATED ORAL at 22:06

## 2020-11-26 RX ADMIN — Medication 100 MG: at 08:48

## 2020-11-26 RX ADMIN — GABAPENTIN 200 MG: 100 CAPSULE ORAL at 14:49

## 2020-11-26 RX ADMIN — MULTIPLE VITAMINS W/ MINERALS TAB 1 TABLET: TAB at 08:48

## 2020-11-26 RX ADMIN — NICOTINE 1 PATCH: 21 PATCH, EXTENDED RELEASE TRANSDERMAL at 08:47

## 2020-11-26 RX ADMIN — GABAPENTIN 200 MG: 100 CAPSULE ORAL at 20:03

## 2020-11-26 RX ADMIN — RIFAXIMIN 550 MG: 550 TABLET ORAL at 08:48

## 2020-11-26 RX ADMIN — POTASSIUM CHLORIDE 20 MEQ: 1500 TABLET, EXTENDED RELEASE ORAL at 20:37

## 2020-11-26 RX ADMIN — FUROSEMIDE 40 MG: 40 TABLET ORAL at 18:54

## 2020-11-26 RX ADMIN — GABAPENTIN 200 MG: 100 CAPSULE ORAL at 08:48

## 2020-11-26 RX ADMIN — MELATONIN TAB 3 MG 6 MG: 3 TAB at 22:06

## 2020-11-26 RX ADMIN — FUROSEMIDE 40 MG: 40 TABLET ORAL at 08:48

## 2020-11-26 ASSESSMENT — ACTIVITIES OF DAILY LIVING (ADL)
LAUNDRY: WITH SUPERVISION
ORAL_HYGIENE: INDEPENDENT
DRESS: INDEPENDENT
HYGIENE/GROOMING: INDEPENDENT

## 2020-11-26 NOTE — PLAN OF CARE
Pt continues to be monitored for alcohol withdrawal. MSSA scores this shift 6 and 7. Pt is isolative to room and minimally engaged. Denies depression/anxiety/SI.

## 2020-11-26 NOTE — PROGRESS NOTES
Received voicemail message from pt's commitment  Uyen stating she will not be revoking the provisional discharge so long as pt is agreeable to return to treatment. Writer spoke with pt who reports he would like to return to treatment at [facility]. Pt states he has been in this program recently, is unsure if he will need a new assessment for referral. Pt signed PATRICIA for [facility], writer will follow-up with program on 11/27 after holiday. Pt was instructed to complete paperwork for CD assessment and referral to program should he need new assessment. Pt reports he will work on this and turn into RN when complete. Pt has Blue Plus for insurance. AVS initiated.  CM continues.

## 2020-11-26 NOTE — PLAN OF CARE
Problem: Alcohol Withdrawal  Goal: Alcohol Withdrawal Symptom Control  Outcome: No Change     Pt remains on MSSA for alcohol withdrawal, scores of 10, 7; Pt medicated with 2 mg Ativan on evening shift. Pt was med compliant.    Pt asking when he will be discharged.    Denied SI/SIB.

## 2020-11-26 NOTE — PLAN OF CARE
Problem: Alcohol Withdrawal  Goal: Alcohol Withdrawal Symptom Control  Outcome: Completed     Pt has not required medication for withdrawal symptoms for 24 hours. Pt has been placed in Out Of Detox status    Pt denies SI/SIB and his hopeful for discharge on 11/27 now that he is out of detox.

## 2020-11-27 VITALS
DIASTOLIC BLOOD PRESSURE: 74 MMHG | SYSTOLIC BLOOD PRESSURE: 128 MMHG | RESPIRATION RATE: 16 BRPM | BODY MASS INDEX: 18.2 KG/M2 | WEIGHT: 130 LBS | HEART RATE: 82 BPM | TEMPERATURE: 98.4 F | OXYGEN SATURATION: 98 % | HEIGHT: 71 IN

## 2020-11-27 PROCEDURE — 250N000013 HC RX MED GY IP 250 OP 250 PS 637: Performed by: NURSE PRACTITIONER

## 2020-11-27 RX ORDER — NICOTINE 21 MG/24HR
1 PATCH, TRANSDERMAL 24 HOURS TRANSDERMAL DAILY
Qty: 28 PATCH | Refills: 3 | Status: SHIPPED | OUTPATIENT
Start: 2020-11-28 | End: 2022-01-01

## 2020-11-27 RX ORDER — LANOLIN ALCOHOL/MO/W.PET/CERES
6 CREAM (GRAM) TOPICAL AT BEDTIME
Qty: 60 TABLET | Refills: 3 | Status: SHIPPED | OUTPATIENT
Start: 2020-11-27 | End: 2022-01-01

## 2020-11-27 RX ADMIN — POTASSIUM CHLORIDE 20 MEQ: 1500 TABLET, EXTENDED RELEASE ORAL at 09:14

## 2020-11-27 RX ADMIN — RIFAXIMIN 550 MG: 550 TABLET ORAL at 08:06

## 2020-11-27 RX ADMIN — FUROSEMIDE 40 MG: 40 TABLET ORAL at 08:06

## 2020-11-27 RX ADMIN — Medication 100 MG: at 08:06

## 2020-11-27 RX ADMIN — MULTIPLE VITAMINS W/ MINERALS TAB 1 TABLET: TAB at 08:06

## 2020-11-27 RX ADMIN — FOLIC ACID 1 MG: 1 TABLET ORAL at 08:06

## 2020-11-27 RX ADMIN — NICOTINE 1 PATCH: 21 PATCH, EXTENDED RELEASE TRANSDERMAL at 08:07

## 2020-11-27 RX ADMIN — GABAPENTIN 200 MG: 100 CAPSULE ORAL at 08:06

## 2020-11-27 ASSESSMENT — ACTIVITIES OF DAILY LIVING (ADL)
LAUNDRY: WITH SUPERVISION
ORAL_HYGIENE: INDEPENDENT
HYGIENE/GROOMING: INDEPENDENT
DRESS: INDEPENDENT

## 2020-11-27 NOTE — DISCHARGE INSTRUCTIONS
Behavioral Discharge Planning and Instructions  THANK YOU FOR CHOOSING [facility]  3A  [phone]    Summary: You were admitted to [facility] on 11/25/20 for detoxification from alcohol.  A medical exam was performed that included lab work. You have met with a  and opted to return to [facility].  Please take care and make your recovery a daily priority, Masoud! It was a pleasure working with you and the entire treatment team here wishes you the very best in your recovery!     Recommendation:  [facility]    Main Diagnoses:  Per Dr. Rm Solis MD: psychiatrist  DIAGNOSES:   1.  Alcohol use disorder, severe with withdrawal and with complications of liver disease, esophageal varices, gynecomastia, and low platelets.   2.  Major depressive disorder by history.  He has not been taking antidepressant for 2 weeks.       Major Treatments, Procedures and Findings:  Your alcohol withdrawal was treated with ativan using the Modified Selective Severity Assessment (MSSA) protocol  . You had labs drawn and those results were reviewed with you. Please take a copy of your lab work with you to your next primary care physician appointment.    Symptoms to Report:  If you experience more anxiety, confusion, sleeplessness, deep sadness or thoughts of suicide, notify your treatment team or notify your primary care physician. IF ANY OF THE SYMPTOMS YOU ARE EXPERIENCING ARE A MEDICAL EMERGENCY CALL 911 IMMEDIATELY.     Lifestyle Adjustment: Health Action Plan:  1.Create a daily schedule  2. Eat Healthy  3. Plan Enjoyable Sober Activities  4. Use Problem Solving Skills and Deal with Issues as they Arise.   5. Be Physically Active  6. Take your medications as prescribed  7. Get enough restful sleep  8. Practice Relaxation  9. Spend time with Supportive People  10. No use of alcohol, illegal drugs or addictive medications other than what is currently prescribed.   11.AA, NA  Sponsor are excellent resources for support    Disposition: [facility]    Facts about COVID19 at www.cdc.gov/COVID19 and www.[place].gov/covid19    Keeping hands clean is one of the most important steps we can take to avoid getting sick and spreading germs to others.  Please wash your hands frequently and lather with soap for at least 20 seconds!    Medical Follow-Up:  [facility] in [place], [place]    COVID-19 info: [email or web address]  Address: [address], [place] [address]    Hours:   Open ? Closes 5PM    Phone: [phone]    Appointment : Tuesday , December 1, 2020 @ 2:15 pm     Appointments: [email or web address]  Please follow up with your family physician in 1-2 weeks for hospital follow up and to obtain repeat labs including CBC and CMP.     You are aware you should make a follow up appointment with your primary care doctor for medical and medication management      Treatment Follow-Up:  [facility]  Admission: Monday November 30th at 8:30 am  [address] [place] [address]  [phone]    Recovery apps for your phone to locate current in person and zoom recovery meetings  [place] - meeting juan  AA  - meeting juan  Meeting guide - meeting juan  Quick NA meeting - meeting juan  Laura- has various apps    Resources:  *due to covid-19 most AA/NA meetings are being held online*  AA meetings can be found online; search for them at: http://aa-intergroup.org/directory.php  AA meetings via ZOOM for [place] area can be found online at: https://a[place].org/find-a-meeting/holiday-closings/  NA meetings via ZOOM for [place] area can be found online at: https://sites.google.com/view/[place]regionofnarcoticsanonymous/home?authuser=2  AA/NA and Sponsors are excellent resources for support and you can find one at any support group meeting.   Alcoholics Anonymous (www.alcoholics-anonymous.org): for local information 24 hours/day  AA Intergroup service office  in [place] (http://www.aastpaul.org/) [phone]  AA Intergroup service office in [facility]: [phone]. (http://www.a[place].org/)  Narcotics Anonymous (www.naminn[place].org) (185) 297-4309  https://aafairviewriverside.org/meetings  SMART Recovery - self management for addiction recovery:  www.smartrecRealeyes 3Dy.org  Pathways ~ A Health Crisis Resource & Support Center:  790.297.9261.  https://prescribetoprevent.org/patient-education/videos/  http://www.harmreduction.org  [facility] [phone]  Support Group:  AA/EFREN and Sponsor/support.  National [place] on Mental Illness (www.mn.estuardo.org): 280.733.4029 or 158-573-5942.  Alcoholics Anonymous (www.alcoholics-anonymous.org): Check your phone book for your local chapter.  Suicide Awareness Voices of Education (SAVE) (www.save.org): 754-075-WMKS (4196)  National Suicide Prevention Line (www.mentalhealthmn.org): 459-239-DBBC (4541)  Mental Health Consumer/Survivor Network of [place] (www.mhcsn.net): 897.128.3381 or 098-889-3498  Mental Health Association of [place] (www.mentalhealth.org): 430.447.1370 or 502-201-5995   Substance Abuse and Mental Health Services (www.samhsa.gov)  [place] Opioid Prevention Coalition: www.opioidcoalition.org    [place] Recovery Connection (MRC)  [facility] connects people seeking recovery to resources that help foster and sustain long-term recovery.  Whether you are seeking resources for treatment, transportation, housing, job training, education, health care or other pathways to recovery, [facility] is a great place to start.  [phone].  www.[place].org    Great Pod casts for nutrition and wellness  Listen on Apple Podcasts  Dishing Up Nutrition   [employer]   Nutrition       Understand the connection between what you eat and how you feel. Hosted by licensed nutritionists and dietitians from [employer] we share practical, real-life solutions for healthier living  through nutrition.     General Medication Instructions:   See your medication sheet(s) for instructions.   Take all medications as prescribed.  Make no changes unless your doctor suggests them.   Go to all your doctor visits.  Be sure to have all your required lab tests. This way, your medicines can be refilled on time.  Do not use any forms of alcohol.    Please Note:  If you have any questions at anytime after you are discharged please call [facility] detox unit 3AW at [phone].  [facility], Behavioral Intake [phone]  Medical Records call [phone]  Outpatient Behavioral Intake call [phone]  LP+ Wait List/Bed Availability call [phone]    Please remember to take all of your behavioral discharge planning and lab paperwork to any follow up appointments, it contains your lab results, diagnosis, medication list and discharge recommendations.      THANK YOU FOR CHOOSING [facility]

## 2020-11-27 NOTE — DISCHARGE SUMMARY
HISTORY OF PRESENT ILLNESS:  Mr. Masoud Huddleston, date of birth 1985, is a 35-year-old man admitted to [facility] detox unit on 11/24/2020.  He was admitted to the hospital to detoxify from alcohol.  He has been drinking 1 liter per day for about 5 days and had a breathalyzer of 0.299.  Prior to that, he had been living in sober housing but was kicked out of sober housing as part of this relapse.      He has a history of alcohol use disorder severe with delirium tremens, liver cirrhosis, esophageal varices, thrombocytopenia, gynecomastia, and there is a reference in Care Everywhere to hepatitis C, but he is uncertain about this.  He had been taking medications of gabapentin 200 mg 3 times a day, Lasix 40 mg per day, Vistaril, mirtazapine 15 mg at bedtime, trazodone 100 mg at bedtime, Seroquel 100 mg at bedtime, Prilosec, Xifaxan, Revatio, and he notes that he stopped these about 2 weeks ago because he was not having a sex drive.  It did not return fully, but stopping them did help.  It was erectile dysfunction primarily that was affecting him.      He notes that he takes the Seroquel simply for sleep.      Seroquel side effects were discussed including tardive dyskinesia, diabetes, and other side effects.      REVIEW OF SYSTEMS:  Ten-point review of systems in the emergency room was unremarkable except for    the alcohol.      VITAL SIGNS:  Temperature 96.5, pulse 114, blood pressure 127/51, respirations 18, height 5 feet 11 inches, weight 130 pounds, SpO2 of 95 on room air and except for being intoxicated and tachycardic, the examination was normal.      LABORATORY DATA:  Returned with platelets at 70.  , ALT 86, alkaline phosphatase 171, GGT at 135.      MENTAL STATUS EXAMINATION:  Shows an alert man lying in bed.        Motor behavior is decreased.  No abnormal movements and no signs of tardive dyskinesia were noted.  General appearance is disheveled.  Speech production is decreased.  Thought  processes are linear.  He is not suicidal.  There is no sign of psychosis.  Associations are intact.  Insight and judgment are good.  Recent and remote memory seemed to be intact.  Attention span and concentration are good.  Mood is fair.  Affect is fair.  Gait is not examined, but muscle strength is unremarkable.  Care Everywhere was reviewed, as well as notes from this hospital stay.      Major depressive diagnosis is present in Care Everywhere, although he denies depression at this time.      DIAGNOSES:   1.  Alcohol use disorder, severe with withdrawal and with complications of liver disease, esophageal varices, gynecomastia, and low platelets.   2.  Major depressive disorder by history.  He has not been taking antidepressant for 2 weeks.      PLAN:  The plan at this time, due to the erectile dysfunction, is to start the gabapentin back again but hold off on the Seroquel, trazodone and mirtazapine.  Will discuss alternate antidepressants if needed when he feels better.  Estimated length of stay is 3-5 days to stabilize, and he is looking at a return to treatment.        Hospital course: he completed detox and was discharged.  Meds were stopped as above.      Current Facility-Administered Medications:      alum & mag hydroxide-simethicone (MAALOX) suspension 30 mL, 30 mL, Oral, Q4H PRN, Eliana Oneill APRN CNP     atenolol (TENORMIN) tablet 50 mg, 50 mg, Oral, Daily PRN, Eliana Oneill APRN CNP     folic acid (FOLVITE) tablet 1 mg, 1 mg, Oral, Daily, Eliana Oneill APRN CNP, 1 mg at 11/27/20 0806     furosemide (LASIX) tablet 40 mg, 40 mg, Oral, BID, Eliana Oneill APRN CNP, 40 mg at 11/27/20 0806     gabapentin (NEURONTIN) capsule 200 mg, 200 mg, Oral, TID, Eliana Oneill APRN CNP, 200 mg at 11/27/20 0806     hydrOXYzine (ATARAX) tablet 50 mg, 50 mg, Oral, At Bedtime, Eliana Oneill APRN CNP, 50 mg at 11/26/20 2206     ibuprofen (ADVIL/MOTRIN) tablet 600 mg,  600 mg, Oral, Q6H PRN, Eliana Oneill APRN CNP     loperamide (IMODIUM) capsule 2 mg, 2 mg, Oral, 4x Daily PRN, Eliana Oneill APRN CNP     LORazepam (ATIVAN) tablet 1-4 mg, 1-4 mg, Oral, Q30 Min PRN, Eliana Oneill APRN CNP, 2 mg at 11/25/20 1629     melatonin tablet 6 mg, 6 mg, Oral, At Bedtime, Rm Solis MD, 6 mg at 11/26/20 2206     multivitamin w/minerals (THERA-VIT-M) tablet 1 tablet, 1 tablet, Oral, Daily, Eliana Oneill APRN CNP, 1 tablet at 11/27/20 0806     nicotine (NICODERM CQ) 21 MG/24HR 24 hr patch 1 patch, 1 patch, Transdermal, Daily, Eliana Oneill APRN CNP, 1 patch at 11/27/20 0807     nicotine Patch in Place, , Transdermal, Q8H, Eliana Oneill APRN CNP     ondansetron (ZOFRAN-ODT) ODT tab 4 mg, 4 mg, Oral, Q6H PRN, Eliana Oneill APRN CNP     potassium chloride ER (KLOR-CON M) CR tablet 20 mEq, 20 mEq, Oral, BID, Eliana Oneill APRN CNP, 20 mEq at 11/27/20 0914     rifaximin (XIFAXAN) tablet 550 mg, 550 mg, Oral, BID, Juliet Purcell APRN CNP, 550 mg at 11/27/20 0806     senna-docusate (SENOKOT-S/PERICOLACE) 8.6-50 MG per tablet 1 tablet, 1 tablet, Oral, BID PRN, Eliana Oneill APRN CNP     thiamine (B-1) tablet 100 mg, 100 mg, Oral, Daily, Eliana Oneill APRN CNP, 100 mg at 11/27/20 0806  Recent Results (from the past 168 hour(s))   Alcohol breath test POCT    Collection Time: 11/24/20  9:07 PM   Result Value Ref Range    Alcohol Breath Test 0.299 (A) 0.00 - 0.01   CBC with platelets differential    Collection Time: 11/24/20 10:36 PM   Result Value Ref Range    WBC 6.9 4.0 - 11.0 10e9/L    RBC Count 5.21 4.4 - 5.9 10e12/L    Hemoglobin 14.3 13.3 - 17.7 g/dL    Hematocrit 42.9 40.0 - 53.0 %    MCV 82 78 - 100 fl    MCH 27.4 26.5 - 33.0 pg    MCHC 33.3 31.5 - 36.5 g/dL    RDW 14.9 10.0 - 15.0 %    Platelet Count 70 (L) 150 - 450 10e9/L    Diff Method Automated Method     % Neutrophils 66.1 %     % Lymphocytes 28.4 %    % Monocytes 4.1 %    % Eosinophils 1.2 %    % Basophils 0.1 %    % Immature Granulocytes 0.1 %    Nucleated RBCs 0 0 /100    Absolute Neutrophil 4.6 1.6 - 8.3 10e9/L    Absolute Lymphocytes 2.0 0.8 - 5.3 10e9/L    Absolute Monocytes 0.3 0.0 - 1.3 10e9/L    Absolute Eosinophils 0.1 0.0 - 0.7 10e9/L    Absolute Basophils 0.0 0.0 - 0.2 10e9/L    Abs Immature Granulocytes 0.0 0 - 0.4 10e9/L    Absolute Nucleated RBC 0.0    Comprehensive metabolic panel    Collection Time: 11/24/20 10:36 PM   Result Value Ref Range    Sodium 143 133 - 144 mmol/L    Potassium 3.3 (L) 3.4 - 5.3 mmol/L    Chloride 107 94 - 109 mmol/L    Carbon Dioxide 29 20 - 32 mmol/L    Anion Gap 7 3 - 14 mmol/L    Glucose 99 70 - 99 mg/dL    Urea Nitrogen 6 (L) 7 - 30 mg/dL    Creatinine 0.62 (L) 0.66 - 1.25 mg/dL    GFR Estimate >90 >60 mL/min/[1.73_m2]    GFR Estimate If Black >90 >60 mL/min/[1.73_m2]    Calcium 8.0 (L) 8.5 - 10.1 mg/dL    Bilirubin Total 1.1 0.2 - 1.3 mg/dL    Albumin 3.7 3.4 - 5.0 g/dL    Protein Total 8.1 6.8 - 8.8 g/dL    Alkaline Phosphatase 171 (H) 40 - 150 U/L    ALT 86 (H) 0 - 70 U/L     (H) 0 - 45 U/L   Asymptomatic COVID-19 Virus (Coronavirus) by PCR    Collection Time: 11/24/20 10:36 PM    Specimen: Nasopharyngeal   Result Value Ref Range    COVID-19 Virus PCR to [facility] - Source Nasopharyngeal     COVID-19 Virus PCR to [facility] - Result       Test received-See reflex to IDDL test SARS CoV2 (COVID-19) Virus RT-PCR   SARS-CoV-2 COVID-19 Virus (Coronavirus) RT-PCR Nasopharyngeal    Collection Time: 11/24/20 10:36 PM    Specimen: Nasopharyngeal   Result Value Ref Range    SARS-CoV-2 Virus Specimen Source Nasopharyngeal     SARS-CoV-2 PCR Result NEGATIVE     SARS-CoV-2 PCR Comment       Testing was performed using the Vinylmint SARS-CoV-2 Assay on the Percolate Instrument System.   Additional information about this Emergency Use Authorization (EUA) assay can be found via   the Lab Guide.     Drug abuse  "screen 6 urine (tox)    Collection Time: 11/24/20 11:43 PM   Result Value Ref Range    Amphetamine Qual Urine Negative NEG^Negative    Barbiturates Qual Urine Negative NEG^Negative    Benzodiazepine Qual Urine Positive (A) NEG^Negative    Cannabinoids Qual Urine Positive (A) NEG^Negative    Cocaine Qual Urine Negative NEG^Negative    Ethanol Qual Urine Positive (A) NEG^Negative    Opiates Qualitative Urine Negative NEG^Negative   GGT    Collection Time: 11/25/20  6:55 AM   Result Value Ref Range     (H) 0 - 75 U/L   Lipid panel    Collection Time: 11/25/20  6:55 AM   Result Value Ref Range    Cholesterol 99 <200 mg/dL    Triglycerides 46 <150 mg/dL    HDL Cholesterol 51 >39 mg/dL    LDL Cholesterol Calculated 39 <100 mg/dL    Non HDL Cholesterol 48 <130 mg/dL   Vitamin B12    Collection Time: 11/25/20  6:55 AM   Result Value Ref Range    Vitamin B12 1,023 (H) 193 - 986 pg/mL   TSH with free T4 reflex and/or T3 as indicated    Collection Time: 11/25/20  6:55 AM   Result Value Ref Range    TSH 1.79 0.40 - 4.00 mU/L   Potassium    Collection Time: 11/26/20 12:43 PM   Result Value Ref Range    Potassium 3.9 3.4 - 5.3 mmol/L   11/27:  General appearance: good  Alert.   Affect: fair  Mood: fair    Speech:  normal.   Eye contact:  good.    Psychomotor behavior: normal  Gait: normal.    Abnormal movements: none  Delusions: none  Hallucinations:  none  Thoughts: logical  Associations: intact  Judgement: good  Insight: good  Cognitions: intact in conversation  Memory:  intact in conversation  Orientation: normal    Not suicidal.    11/27: Blood pressure 128/74, pulse 82, temperature 98.4  F (36.9  C), temperature source Temporal, resp. rate 16, height 1.803 m (5' 11\"), weight 59 kg (130 lb), SpO2 98 %.      Video-Visit Details    Type of service:  Video Visit    Video Start Time (time video started): 1100    Video End Time (time video stopped): 1120    Originating Location (pt. Location): [facility]    Distant Location " (provider location): Provider remote location    Mode of Communication:  Video Conference via [employer]    Physician has received verbal consent for a Video Visit from the patient? Yes      Rm Solis MD

## 2020-11-27 NOTE — PLAN OF CARE
Problem: Adult Inpatient Plan of Care  Goal: Readiness for Transition of Care  Outcome: Adequate for Discharge   Pt being discharge to sober house. Pt alcohol withdrawal is complete.   Pt out on unit. Pt reports his depression level a 2 on a 0-10 severe scale.   Denies SI.   Pt parents were picking up patient.   Pt states he has been layed off and worked as a .     Appetite good. Sleep fair.     Pt plans to attended [facility] starting next Monday.     Reviewed discharge instruction with patient and he verbalized understanding.     Pt made a follow up appointment for continued management of his liver function.    See discharge instructions.

## 2020-11-27 NOTE — PROGRESS NOTES
"Pt scheduled to be readmitted to [facility] on Monday 11/20 at 8:30 am by Elizabeth at [facility] in [place]. Pt reports that he is able to return to [facility] sober home [facility] and can go back after discharge from hospital. Pt hopeful to discharge today, 11/27. AVS updated with contact information.   "

## 2020-11-30 ENCOUNTER — COMMUNICATION - HEALTHEAST (OUTPATIENT)
Dept: FAMILY MEDICINE | Facility: CLINIC | Age: 35
End: 2020-11-30

## 2020-11-30 DIAGNOSIS — K70.30 ALCOHOLIC CIRRHOSIS OF LIVER WITHOUT ASCITES (H): ICD-10-CM

## 2020-12-01 ENCOUNTER — OFFICE VISIT - HEALTHEAST (OUTPATIENT)
Dept: FAMILY MEDICINE | Facility: CLINIC | Age: 35
End: 2020-12-01

## 2020-12-01 DIAGNOSIS — K70.30 ALCOHOLIC CIRRHOSIS OF LIVER WITHOUT ASCITES (H): ICD-10-CM

## 2020-12-01 DIAGNOSIS — I89.0 CHRONIC ACQUIRED LYMPHEDEMA: ICD-10-CM

## 2020-12-01 DIAGNOSIS — F10.20 ALCOHOL USE DISORDER, SEVERE, DEPENDENCE (H): ICD-10-CM

## 2020-12-01 DIAGNOSIS — Z87.19: ICD-10-CM

## 2020-12-01 ASSESSMENT — MIFFLIN-ST. JEOR: SCORE: 1708.28

## 2020-12-15 ENCOUNTER — COMMUNICATION - HEALTHEAST (OUTPATIENT)
Dept: BEHAVIORAL HEALTH | Facility: CLINIC | Age: 35
End: 2020-12-15

## 2020-12-17 ENCOUNTER — COMMUNICATION - HEALTHEAST (OUTPATIENT)
Dept: SCHEDULING | Facility: CLINIC | Age: 35
End: 2020-12-17

## 2020-12-24 ENCOUNTER — DOCUMENTATION ONLY (OUTPATIENT)
Dept: OTHER | Facility: CLINIC | Age: 35
End: 2020-12-24

## 2020-12-24 ENCOUNTER — AMBULATORY - HEALTHEAST (OUTPATIENT)
Dept: OTHER | Facility: CLINIC | Age: 35
End: 2020-12-24

## 2021-01-01 ENCOUNTER — HEALTH MAINTENANCE LETTER (OUTPATIENT)
Age: 36
End: 2021-01-01

## 2021-01-04 ENCOUNTER — OFFICE VISIT - HEALTHEAST (OUTPATIENT)
Dept: FAMILY MEDICINE | Facility: CLINIC | Age: 36
End: 2021-01-04

## 2021-01-04 DIAGNOSIS — N52.9 ERECTILE DYSFUNCTION, UNSPECIFIED ERECTILE DYSFUNCTION TYPE: ICD-10-CM

## 2021-01-04 DIAGNOSIS — F10.930 ALCOHOL WITHDRAWAL, UNCOMPLICATED (H): ICD-10-CM

## 2021-01-04 DIAGNOSIS — F10.21 CHRONIC ALCOHOLISM IN REMISSION (H): ICD-10-CM

## 2021-01-04 DIAGNOSIS — I85.00 ESOPHAGEAL VARICES WITHOUT BLEEDING, UNSPECIFIED ESOPHAGEAL VARICES TYPE (H): ICD-10-CM

## 2021-01-04 DIAGNOSIS — E43 SEVERE PROTEIN-CALORIE MALNUTRITION (H): ICD-10-CM

## 2021-01-04 DIAGNOSIS — F33.2 MAJOR DEPRESSIVE DISORDER, RECURRENT SEVERE WITHOUT PSYCHOTIC FEATURES (H): ICD-10-CM

## 2021-01-04 DIAGNOSIS — D69.6 THROMBOCYTOPENIA, UNSPECIFIED (H): ICD-10-CM

## 2021-01-04 ASSESSMENT — MIFFLIN-ST. JEOR: SCORE: 1799.46

## 2021-03-30 ENCOUNTER — COMMUNICATION - HEALTHEAST (OUTPATIENT)
Dept: FAMILY MEDICINE | Facility: CLINIC | Age: 36
End: 2021-03-30

## 2021-04-14 ENCOUNTER — COMMUNICATION - HEALTHEAST (OUTPATIENT)
Dept: FAMILY MEDICINE | Facility: CLINIC | Age: 36
End: 2021-04-14

## 2021-04-14 DIAGNOSIS — I89.0 CHRONIC ACQUIRED LYMPHEDEMA: ICD-10-CM

## 2021-04-14 DIAGNOSIS — K70.30 ALCOHOLIC CIRRHOSIS OF LIVER WITHOUT ASCITES (H): ICD-10-CM

## 2021-05-16 ENCOUNTER — TELEPHONE (OUTPATIENT)
Dept: BEHAVIORAL HEALTH | Facility: CLINIC | Age: 36
End: 2021-05-16

## 2021-05-16 ENCOUNTER — NURSE TRIAGE (OUTPATIENT)
Dept: NURSING | Facility: CLINIC | Age: 36
End: 2021-05-16

## 2021-05-16 ENCOUNTER — HOSPITAL ENCOUNTER (INPATIENT)
Facility: CLINIC | Age: 36
LOS: 2 days | Discharge: HOME | End: 2021-05-18
Attending: EMERGENCY MEDICINE | Admitting: PSYCHIATRY & NEUROLOGY
Payer: COMMERCIAL

## 2021-05-16 DIAGNOSIS — Z20.822 COVID-19 RULED OUT BY LABORATORY TESTING: ICD-10-CM

## 2021-05-16 DIAGNOSIS — F10.930 ALCOHOL WITHDRAWAL SYNDROME WITHOUT COMPLICATION (H): ICD-10-CM

## 2021-05-16 DIAGNOSIS — K74.60 CIRRHOSIS OF LIVER WITHOUT ASCITES, UNSPECIFIED HEPATIC CIRRHOSIS TYPE (H): Primary | ICD-10-CM

## 2021-05-16 LAB
ALBUMIN SERPL-MCNC: 4.1 G/DL (ref 3.4–5)
ALCOHOL BREATH TEST: 0.18 (ref 0–0.01)
ALP SERPL-CCNC: 130 U/L (ref 40–150)
ALT SERPL W P-5'-P-CCNC: 48 U/L (ref 0–70)
AMPHETAMINES UR QL SCN: NEGATIVE
ANION GAP SERPL CALCULATED.3IONS-SCNC: 8 MMOL/L (ref 3–14)
AST SERPL W P-5'-P-CCNC: 79 U/L (ref 0–45)
BARBITURATES UR QL: NEGATIVE
BASOPHILS # BLD AUTO: 0 10E9/L (ref 0–0.2)
BASOPHILS NFR BLD AUTO: 0.3 %
BENZODIAZ UR QL: NEGATIVE
BILIRUB SERPL-MCNC: 0.9 MG/DL (ref 0.2–1.3)
BUN SERPL-MCNC: 12 MG/DL (ref 7–30)
CALCIUM SERPL-MCNC: 8.7 MG/DL (ref 8.5–10.1)
CANNABINOIDS UR QL SCN: NEGATIVE
CHLORIDE SERPL-SCNC: 105 MMOL/L (ref 94–109)
CO2 SERPL-SCNC: 29 MMOL/L (ref 20–32)
COCAINE UR QL: NEGATIVE
CREAT SERPL-MCNC: 0.71 MG/DL (ref 0.66–1.25)
DIFFERENTIAL METHOD BLD: ABNORMAL
EOSINOPHIL # BLD AUTO: 0.1 10E9/L (ref 0–0.7)
EOSINOPHIL NFR BLD AUTO: 1 %
ERYTHROCYTE [DISTWIDTH] IN BLOOD BY AUTOMATED COUNT: 13.8 % (ref 10–15)
ETHANOL UR QL SCN: POSITIVE
GFR SERPL CREATININE-BSD FRML MDRD: >90 ML/MIN/{1.73_M2}
GLUCOSE SERPL-MCNC: 94 MG/DL (ref 70–99)
HCT VFR BLD AUTO: 46.5 % (ref 40–53)
HGB BLD-MCNC: 16.1 G/DL (ref 13.3–17.7)
IMM GRANULOCYTES # BLD: 0 10E9/L (ref 0–0.4)
IMM GRANULOCYTES NFR BLD: 0.1 %
LABORATORY COMMENT REPORT: NORMAL
LYMPHOCYTES # BLD AUTO: 1.6 10E9/L (ref 0.8–5.3)
LYMPHOCYTES NFR BLD AUTO: 21.1 %
MCH RBC QN AUTO: 28.9 PG (ref 26.5–33)
MCHC RBC AUTO-ENTMCNC: 34.6 G/DL (ref 31.5–36.5)
MCV RBC AUTO: 84 FL (ref 78–100)
MONOCYTES # BLD AUTO: 0.3 10E9/L (ref 0–1.3)
MONOCYTES NFR BLD AUTO: 4.5 %
NEUTROPHILS # BLD AUTO: 5.4 10E9/L (ref 1.6–8.3)
NEUTROPHILS NFR BLD AUTO: 73 %
NRBC # BLD AUTO: 0 10*3/UL
NRBC BLD AUTO-RTO: 0 /100
OPIATES UR QL SCN: NEGATIVE
PLATELET # BLD AUTO: 113 10E9/L (ref 150–450)
POTASSIUM SERPL-SCNC: 3.8 MMOL/L (ref 3.4–5.3)
PROT SERPL-MCNC: 8.4 G/DL (ref 6.8–8.8)
RBC # BLD AUTO: 5.57 10E12/L (ref 4.4–5.9)
SARS-COV-2 RNA RESP QL NAA+PROBE: NEGATIVE
SODIUM SERPL-SCNC: 142 MMOL/L (ref 133–144)
SPECIMEN SOURCE: NORMAL
WBC # BLD AUTO: 7.3 10E9/L (ref 4–11)

## 2021-05-16 PROCEDURE — 99285 EMERGENCY DEPT VISIT HI MDM: CPT | Mod: 25 | Performed by: EMERGENCY MEDICINE

## 2021-05-16 PROCEDURE — 250N000011 HC RX IP 250 OP 636: Performed by: EMERGENCY MEDICINE

## 2021-05-16 PROCEDURE — 250N000013 HC RX MED GY IP 250 OP 250 PS 637: Performed by: PSYCHIATRY & NEUROLOGY

## 2021-05-16 PROCEDURE — 80307 DRUG TEST PRSMV CHEM ANLYZR: CPT | Performed by: EMERGENCY MEDICINE

## 2021-05-16 PROCEDURE — 87635 SARS-COV-2 COVID-19 AMP PRB: CPT | Performed by: EMERGENCY MEDICINE

## 2021-05-16 PROCEDURE — 80053 COMPREHEN METABOLIC PANEL: CPT | Performed by: EMERGENCY MEDICINE

## 2021-05-16 PROCEDURE — 128N000004 HC R&B CD ADULT

## 2021-05-16 PROCEDURE — 99285 EMERGENCY DEPT VISIT HI MDM: CPT | Performed by: EMERGENCY MEDICINE

## 2021-05-16 PROCEDURE — 99221 1ST HOSP IP/OBS SF/LOW 40: CPT | Mod: AI | Performed by: PSYCHIATRY & NEUROLOGY

## 2021-05-16 PROCEDURE — HZ2ZZZZ DETOXIFICATION SERVICES FOR SUBSTANCE ABUSE TREATMENT: ICD-10-PCS | Performed by: PSYCHIATRY & NEUROLOGY

## 2021-05-16 PROCEDURE — C9803 HOPD COVID-19 SPEC COLLECT: HCPCS | Performed by: EMERGENCY MEDICINE

## 2021-05-16 PROCEDURE — 80320 DRUG SCREEN QUANTALCOHOLS: CPT | Performed by: EMERGENCY MEDICINE

## 2021-05-16 PROCEDURE — 250N000013 HC RX MED GY IP 250 OP 250 PS 637: Performed by: EMERGENCY MEDICINE

## 2021-05-16 PROCEDURE — 85025 COMPLETE CBC W/AUTO DIFF WBC: CPT | Performed by: EMERGENCY MEDICINE

## 2021-05-16 PROCEDURE — 82075 ASSAY OF BREATH ETHANOL: CPT | Performed by: EMERGENCY MEDICINE

## 2021-05-16 RX ORDER — LORAZEPAM 1 MG/1
1-4 TABLET ORAL EVERY 30 MIN PRN
Status: DISCONTINUED | OUTPATIENT
Start: 2021-05-16 | End: 2021-05-16

## 2021-05-16 RX ORDER — LANOLIN ALCOHOL/MO/W.PET/CERES
100 CREAM (GRAM) TOPICAL DAILY
Status: DISCONTINUED | OUTPATIENT
Start: 2021-05-16 | End: 2021-05-18 | Stop reason: HOSPADM

## 2021-05-16 RX ORDER — ONDANSETRON 8 MG/1
8 TABLET, ORALLY DISINTEGRATING ORAL ONCE
Status: COMPLETED | OUTPATIENT
Start: 2021-05-16 | End: 2021-05-16

## 2021-05-16 RX ORDER — FUROSEMIDE 40 MG
40 TABLET ORAL 2 TIMES DAILY
Status: DISCONTINUED | OUTPATIENT
Start: 2021-05-16 | End: 2021-05-18 | Stop reason: HOSPADM

## 2021-05-16 RX ORDER — MULTIPLE VITAMINS W/ MINERALS TAB 9MG-400MCG
1 TAB ORAL DAILY
Status: DISCONTINUED | OUTPATIENT
Start: 2021-05-16 | End: 2021-05-18 | Stop reason: HOSPADM

## 2021-05-16 RX ORDER — NICOTINE 21 MG/24HR
1 PATCH, TRANSDERMAL 24 HOURS TRANSDERMAL DAILY
Status: DISCONTINUED | OUTPATIENT
Start: 2021-05-16 | End: 2021-05-18 | Stop reason: HOSPADM

## 2021-05-16 RX ORDER — POTASSIUM CHLORIDE 750 MG/1
20 TABLET, EXTENDED RELEASE ORAL 2 TIMES DAILY
Status: DISCONTINUED | OUTPATIENT
Start: 2021-05-16 | End: 2021-05-18 | Stop reason: HOSPADM

## 2021-05-16 RX ORDER — NICOTINE 21 MG/24HR
1 PATCH, TRANSDERMAL 24 HOURS TRANSDERMAL DAILY
Status: DISCONTINUED | OUTPATIENT
Start: 2021-05-16 | End: 2021-05-16

## 2021-05-16 RX ORDER — LORAZEPAM 1 MG/1
1-4 TABLET ORAL EVERY 30 MIN PRN
Status: DISCONTINUED | OUTPATIENT
Start: 2021-05-16 | End: 2021-05-18 | Stop reason: HOSPADM

## 2021-05-16 RX ORDER — NICOTINE 21 MG/24HR
1 PATCH, TRANSDERMAL 24 HOURS TRANSDERMAL ONCE
Status: DISCONTINUED | OUTPATIENT
Start: 2021-05-16 | End: 2021-05-16

## 2021-05-16 RX ORDER — ATENOLOL 50 MG/1
50 TABLET ORAL DAILY PRN
Status: DISCONTINUED | OUTPATIENT
Start: 2021-05-16 | End: 2021-05-18 | Stop reason: HOSPADM

## 2021-05-16 RX ORDER — TRAZODONE HYDROCHLORIDE 100 MG/1
100 TABLET ORAL AT BEDTIME
Status: DISCONTINUED | OUTPATIENT
Start: 2021-05-16 | End: 2021-05-16

## 2021-05-16 RX ORDER — QUETIAPINE FUMARATE 100 MG/1
100 TABLET, FILM COATED ORAL AT BEDTIME
Status: ON HOLD | COMMUNITY
End: 2021-05-18

## 2021-05-16 RX ORDER — MAGNESIUM HYDROXIDE/ALUMINUM HYDROXICE/SIMETHICONE 120; 1200; 1200 MG/30ML; MG/30ML; MG/30ML
30 SUSPENSION ORAL EVERY 4 HOURS PRN
Status: DISCONTINUED | OUTPATIENT
Start: 2021-05-16 | End: 2021-05-18 | Stop reason: HOSPADM

## 2021-05-16 RX ORDER — MIRTAZAPINE 15 MG/1
15 TABLET, FILM COATED ORAL AT BEDTIME
Status: ON HOLD | COMMUNITY
End: 2021-05-18

## 2021-05-16 RX ORDER — FOLIC ACID 1 MG/1
1 TABLET ORAL DAILY
Status: DISCONTINUED | OUTPATIENT
Start: 2021-05-16 | End: 2021-05-16

## 2021-05-16 RX ORDER — MIRTAZAPINE 15 MG/1
15 TABLET, FILM COATED ORAL AT BEDTIME
Status: DISCONTINUED | OUTPATIENT
Start: 2021-05-16 | End: 2021-05-18 | Stop reason: HOSPADM

## 2021-05-16 RX ORDER — FOLIC ACID 1 MG/1
1 TABLET ORAL DAILY
Status: DISCONTINUED | OUTPATIENT
Start: 2021-05-16 | End: 2021-05-18 | Stop reason: HOSPADM

## 2021-05-16 RX ORDER — LANOLIN ALCOHOL/MO/W.PET/CERES
100 CREAM (GRAM) TOPICAL DAILY
Status: DISCONTINUED | OUTPATIENT
Start: 2021-05-16 | End: 2021-05-16

## 2021-05-16 RX ORDER — HYDROXYZINE HYDROCHLORIDE 25 MG/1
50 TABLET, FILM COATED ORAL AT BEDTIME
Status: DISCONTINUED | OUTPATIENT
Start: 2021-05-16 | End: 2021-05-18 | Stop reason: HOSPADM

## 2021-05-16 RX ORDER — AMOXICILLIN 250 MG
1 CAPSULE ORAL 2 TIMES DAILY PRN
Status: DISCONTINUED | OUTPATIENT
Start: 2021-05-16 | End: 2021-05-18 | Stop reason: HOSPADM

## 2021-05-16 RX ORDER — HYDROXYZINE HYDROCHLORIDE 25 MG/1
25 TABLET, FILM COATED ORAL EVERY 4 HOURS PRN
Status: DISCONTINUED | OUTPATIENT
Start: 2021-05-16 | End: 2021-05-18 | Stop reason: HOSPADM

## 2021-05-16 RX ORDER — GABAPENTIN 100 MG/1
200 CAPSULE ORAL 3 TIMES DAILY
Status: DISCONTINUED | OUTPATIENT
Start: 2021-05-16 | End: 2021-05-16

## 2021-05-16 RX ORDER — GABAPENTIN 300 MG/1
300 CAPSULE ORAL 3 TIMES DAILY
Status: DISCONTINUED | OUTPATIENT
Start: 2021-05-16 | End: 2021-05-18 | Stop reason: HOSPADM

## 2021-05-16 RX ORDER — QUETIAPINE FUMARATE 100 MG/1
100 TABLET, FILM COATED ORAL AT BEDTIME
Status: DISCONTINUED | OUTPATIENT
Start: 2021-05-16 | End: 2021-05-18 | Stop reason: HOSPADM

## 2021-05-16 RX ORDER — ONDANSETRON 4 MG/1
4 TABLET, ORALLY DISINTEGRATING ORAL EVERY 6 HOURS PRN
Status: DISCONTINUED | OUTPATIENT
Start: 2021-05-16 | End: 2021-05-18 | Stop reason: HOSPADM

## 2021-05-16 RX ORDER — ACAMPROSATE CALCIUM 333 MG/1
666 TABLET, DELAYED RELEASE ORAL 3 TIMES DAILY
COMMUNITY
End: 2022-01-01

## 2021-05-16 RX ORDER — LOPERAMIDE HCL 2 MG
2 CAPSULE ORAL 4 TIMES DAILY PRN
Status: DISCONTINUED | OUTPATIENT
Start: 2021-05-16 | End: 2021-05-18 | Stop reason: HOSPADM

## 2021-05-16 RX ORDER — TRAZODONE HYDROCHLORIDE 100 MG/1
100 TABLET ORAL AT BEDTIME
Status: ON HOLD | COMMUNITY
End: 2021-05-18

## 2021-05-16 RX ORDER — MULTIPLE VITAMINS W/ MINERALS TAB 9MG-400MCG
1 TAB ORAL DAILY
Status: DISCONTINUED | OUTPATIENT
Start: 2021-05-16 | End: 2021-05-16

## 2021-05-16 RX ADMIN — GABAPENTIN 200 MG: 100 CAPSULE ORAL at 16:26

## 2021-05-16 RX ADMIN — RIFAXIMIN 550 MG: 550 TABLET ORAL at 21:05

## 2021-05-16 RX ADMIN — GABAPENTIN 300 MG: 300 CAPSULE ORAL at 21:04

## 2021-05-16 RX ADMIN — QUETIAPINE FUMARATE 100 MG: 100 TABLET ORAL at 22:28

## 2021-05-16 RX ADMIN — FUROSEMIDE 40 MG: 40 TABLET ORAL at 21:05

## 2021-05-16 RX ADMIN — LORAZEPAM 2 MG: 1 TABLET ORAL at 16:26

## 2021-05-16 RX ADMIN — ONDANSETRON 8 MG: 8 TABLET, ORALLY DISINTEGRATING ORAL at 08:19

## 2021-05-16 RX ADMIN — NICOTINE 1 PATCH: 21 PATCH, EXTENDED RELEASE TRANSDERMAL at 14:24

## 2021-05-16 RX ADMIN — MIRTAZAPINE 15 MG: 15 TABLET, FILM COATED ORAL at 22:28

## 2021-05-16 RX ADMIN — HYDROXYZINE HYDROCHLORIDE 50 MG: 25 TABLET, FILM COATED ORAL at 22:28

## 2021-05-16 RX ADMIN — LORAZEPAM 2 MG: 1 TABLET ORAL at 14:26

## 2021-05-16 RX ADMIN — LORAZEPAM 2 MG: 1 TABLET ORAL at 08:19

## 2021-05-16 RX ADMIN — LORAZEPAM 1 MG: 1 TABLET ORAL at 21:05

## 2021-05-16 RX ADMIN — POTASSIUM CHLORIDE 20 MEQ: 750 TABLET, EXTENDED RELEASE ORAL at 21:05

## 2021-05-16 RX ADMIN — OMEPRAZOLE 20 MG: 20 CAPSULE, DELAYED RELEASE ORAL at 14:24

## 2021-05-16 ASSESSMENT — ACTIVITIES OF DAILY LIVING (ADL)
HYGIENE/GROOMING: INDEPENDENT
DOING_ERRANDS_INDEPENDENTLY_DIFFICULTY: NO
HEARING_DIFFICULTY_OR_DEAF: NO
ADL_ASSESSMENT: WDL
WEAR_GLASSES_OR_BLIND: NO
DIFFICULTY_COMMUNICATING: NO
WALKING_OR_CLIMBING_STAIRS_DIFFICULTY: NO
LAUNDRY: WITH SUPERVISION
TOILETING_ISSUES: NO
DRESS: SCRUBS (BEHAVIORAL HEALTH)
DIFFICULTY_EATING/SWALLOWING: NO
HYGIENE/GROOMING: INDEPENDENT
ORAL_HYGIENE: INDEPENDENT
DRESSING/BATHING_DIFFICULTY: NO
LAUNDRY: WITH SUPERVISION
CONCENTRATING,_REMEMBERING_OR_MAKING_DECISIONS_DIFFICULTY: NO
FALL_HISTORY_WITHIN_LAST_SIX_MONTHS: NO
ORAL_HYGIENE: INDEPENDENT
DRESS: INDEPENDENT
PATIENT_/_FAMILY_COMMUNICATION_STYLE: SPOKEN LANGUAGE (ENGLISH OR BILINGUAL)

## 2021-05-16 ASSESSMENT — ENCOUNTER SYMPTOMS
MUSCULOSKELETAL NEGATIVE: 1
ABDOMINAL PAIN: 0
NERVOUS/ANXIOUS: 1
NAUSEA: 0
SHORTNESS OF BREATH: 0
EYES NEGATIVE: 1
VOMITING: 0
FEVER: 0
HEADACHES: 0

## 2021-05-16 NOTE — TELEPHONE ENCOUNTER
"\"Masoud is detoxing pretty bad.\" Patient's father calling on behalf of his son who is currently with him. Med states that he took his son to [facility], but was directed to call the after hours nurse line when he realized the ED was closed. Caller is wondering where he should bring his son. RN advised that can bring to [facility]. eMd (father) verbalized understanding and had no further questions.     Reason for Disposition    General information question, no triage required and triager able to answer question    Protocols used: INFORMATION ONLY CALL - NO TRIAGE-A-    Zahida Moses, RN-BSN  [facility]   "

## 2021-05-16 NOTE — ED PROVIDER NOTES
"ED Provider Note  [facility]      History     Chief Complaint   Patient presents with     Alcohol Problem     Pt seeking detox from alcohol, last drink 8pm yesterday; relapsed 5/11 and drinking 1.5L vodka since, seizure hx      HPI  Masoud Huddleston is a 35 year old male with a h/o alcohol abuse who who presents for alcohol detox.  He is here with his father.  The patient has recently relapsed and is drinking alcohol continuously.  He states \"I cannot put down the bottle.  The only way I feel better is to drink.\".  The patient had been living in a sober home but has relapsed.  He is currently staying in hotels.  His father states he is drinking himself to death.  He drinks 1-1/2 L of vodka a day.  His last drink was 12 hours ago.  He has had 2 withdrawal seizures, one up at detox.  He also uses marijuana.  Social: Here with father    Past Medical History  Past Medical History:   Diagnosis Date     History of alcohol use disorder      No past surgical history on file.  furosemide (LASIX) 40 MG tablet  gabapentin (NEURONTIN) 100 MG capsule  hydrOXYzine (VISTARIL) 50 MG capsule  melatonin 3 MG tablet  Multiple Vitamins-Minerals (CEROVITE PO)  nicotine (NICODERM CQ) 21 MG/24HR 24 hr patch  omeprazole (PRILOSEC) 20 MG DR capsule  potassium chloride ER (KLOR-CON M) 20 MEQ CR tablet  rifaximin (XIFAXAN) 550 MG TABS tablet  sildenafil (REVATIO) 20 MG tablet      No Known Allergies  Family History  No family history on file.  Social History   Social History     Tobacco Use     Smoking status: Current Every Day Smoker     Packs/day: 1.00     Types: Cigarettes     Smokeless tobacco: Never Used   Substance Use Topics     Alcohol use: Yes     Comment: a liter of Vodka everyday     Drug use: Yes     Types: Marijuana      Past medical history, past surgical history, medications, allergies, family history, and social history were reviewed with the patient. No additional pertinent items.       Review of " Systems   Constitutional: Negative for fever.   Eyes: Negative.    Respiratory: Negative for shortness of breath.    Cardiovascular: Negative for chest pain.   Gastrointestinal: Negative for abdominal pain, nausea and vomiting.   Genitourinary: Negative.    Musculoskeletal: Negative.    Skin: Negative for rash.   Neurological: Negative for headaches.   Psychiatric/Behavioral: Negative for suicidal ideas. The patient is nervous/anxious.    All other systems reviewed and are negative.        Physical Exam   BP: (!) 143/77  Pulse: 110  Temp: 98  F (36.7  C)  Resp: 16  Weight: 73.5 kg (162 lb)  SpO2: 97 %  Physical Exam  Physical Exam   Constitutional:   well nourished, well developed, appears anxious  tremulous  HENT:   Head: Normocephalic and atraumatic.   Eyes: Conjunctivae are normal. Pupils are equal, round, and reactive to light.   TMS are clear, pharynx has no erythema or exudate, mucous membranes are moist  Neck:   no adenopathy, no bony tenderness  Cardiovascular: tachycardic without murmurs or gallops  Pulmonary/Chest: Clear to auscultation bilaterally, with no wheezes or retractions. No respiratory distress.  GI: Soft with good bowel sounds.  Non-tender, non-distended, with no guarding, no rebound, no peritoneal signs.   Back:  No bony or CVA tenderness   Musculoskeletal:  no edema  Skin: Skin is warm and dry. No rash noted. Multiple tattoos  Neurological: alert and oriented to person, place, and time. Nonfocal exam, tremulous  Psychiatric:  normal mood and affect.   ED Course      Procedures        The medical record was reviewed and interpreted.  Current labs reviewed and interpreted.       Results for orders placed or performed during the hospital encounter of 05/16/21   Drug abuse screen 6 urine (chem dep)     Status: Abnormal   Result Value Ref Range    Amphetamine Qual Urine Negative NEG^Negative    Barbiturates Qual Urine Negative NEG^Negative    Benzodiazepine Qual Urine Negative NEG^Negative     Cannabinoids Qual Urine Negative NEG^Negative    Cocaine Qual Urine Negative NEG^Negative    Ethanol Qual Urine Positive (A) NEG^Negative    Opiates Qualitative Urine Negative NEG^Negative   CBC with platelets differential     Status: Abnormal   Result Value Ref Range    WBC 7.3 4.0 - 11.0 10e9/L    RBC Count 5.57 4.4 - 5.9 10e12/L    Hemoglobin 16.1 13.3 - 17.7 g/dL    Hematocrit 46.5 40.0 - 53.0 %    MCV 84 78 - 100 fl    MCH 28.9 26.5 - 33.0 pg    MCHC 34.6 31.5 - 36.5 g/dL    RDW 13.8 10.0 - 15.0 %    Platelet Count 113 (L) 150 - 450 10e9/L    Diff Method Automated Method     % Neutrophils 73.0 %    % Lymphocytes 21.1 %    % Monocytes 4.5 %    % Eosinophils 1.0 %    % Basophils 0.3 %    % Immature Granulocytes 0.1 %    Nucleated RBCs 0 0 /100    Absolute Neutrophil 5.4 1.6 - 8.3 10e9/L    Absolute Lymphocytes 1.6 0.8 - 5.3 10e9/L    Absolute Monocytes 0.3 0.0 - 1.3 10e9/L    Absolute Eosinophils 0.1 0.0 - 0.7 10e9/L    Absolute Basophils 0.0 0.0 - 0.2 10e9/L    Abs Immature Granulocytes 0.0 0 - 0.4 10e9/L    Absolute Nucleated RBC 0.0    Comprehensive metabolic panel     Status: Abnormal   Result Value Ref Range    Sodium 142 133 - 144 mmol/L    Potassium 3.8 3.4 - 5.3 mmol/L    Chloride 105 94 - 109 mmol/L    Carbon Dioxide 29 20 - 32 mmol/L    Anion Gap 8 3 - 14 mmol/L    Glucose 94 70 - 99 mg/dL    Urea Nitrogen 12 7 - 30 mg/dL    Creatinine 0.71 0.66 - 1.25 mg/dL    GFR Estimate >90 >60 mL/min/[1.73_m2]    GFR Estimate If Black >90 >60 mL/min/[1.73_m2]    Calcium 8.7 8.5 - 10.1 mg/dL    Bilirubin Total 0.9 0.2 - 1.3 mg/dL    Albumin 4.1 3.4 - 5.0 g/dL    Protein Total 8.4 6.8 - 8.8 g/dL    Alkaline Phosphatase 130 40 - 150 U/L    ALT 48 0 - 70 U/L    AST 79 (H) 0 - 45 U/L   Alcohol breath test POCT     Status: Abnormal   Result Value Ref Range    Alcohol Breath Test 0.183 (A) 0.00 - 0.01     Medications   LORazepam (ATIVAN) tablet 1-4 mg (2 mg Oral Given 5/16/21 0819)   thiamine (B-1) tablet 100 mg (has no  administration in time range)   folic acid (FOLVITE) tablet 1 mg (has no administration in time range)   multivitamin w/minerals (THERA-VIT-M) tablet 1 tablet (has no administration in time range)   nicotine polacrilex (NICORETTE) gum 4 mg (has no administration in time range)   nicotine (NICODERM CQ) 21 MG/24HR 24 hr patch 1 patch (has no administration in time range)   ondansetron (ZOFRAN-ODT) ODT tab 8 mg (8 mg Oral Given 5/16/21 0819)        Assessments & Plan (with Medical Decision Making)       I have reviewed the nursing notes.   Emergency Department course:  The patient was seen and examined at 0807 am.  The patient appears tremulous and in early alcohol withdrawal.  I placed him on the [facility] protocol for alcohol withdrawal.  He received thiamine, folic acid, and multivitamins p.o.  He also appeared nauseous and received Zofran sublingually.  Breathalyzer is 0.183.  Laboratory studies show thrombocytopenia with platelets of 113.  Comprehensive metabolic panel shows a slightly elevated AST of 79.  Both of these findings are consistent with heavy alcohol abuse.  Urine tox is positive for ethanol.    The patient is a 35-year-old male with a history of alcohol abuse who presents with request for detox and is in alcohol withdrawal.  He will be admitted here to [facility] for detox. He is on the [facility] protocol for alcohol withdrawal.      I have reviewed the findings, diagnosis, plan and need for follow up with the patient.    New Prescriptions    No medications on file       Final diagnoses:   Alcohol withdrawal syndrome without complication (H)       --This note was created in part by the use of Dragon voice recognition dictation system. Inadvertent grammatical errors and typographical errors may still exist.  MD Ivet Young [facility] EMERGENCY DEPARTMENT  5/16/2021     Ivet Tate MD  05/16/21 [id number]

## 2021-05-16 NOTE — H&P
H&P, preliminary    The patient was seen, his current and old charts reviewed, his case discussed with staff, report to follow.    Dx: Alcohol Use Disorder        Alcohol Withdrawal Syndrome        Depression NOS        Anxiety Disorder NOS        Alcohol-induced Liver Disease        Peripheral Neuropathy    Plan: Continue MSSA protocol with Ativan. Resume Remeron 15 mg at bedtime and discontinue Trazodone out of concern of priapism. Continue Seroquel 100 mg at bedtime and increase Neurontin to 300 mg TID. Campral may be restarted on the later date. May also consider adding Naltrexone.    Fredy Diaz MD

## 2021-05-16 NOTE — PROGRESS NOTES
05/16/21 1214   Patient Belongings   Did you bring any home meds/supplements to the hospital?  No   Patient Belongings other (see comments)   Belongings Search Yes   Clothing Search Yes   Second Staff Ricardo REDMOND RN   Comment Items placed in storage   Storage Bin: Belt, shoes  Med Room: Wallet, Cell phone, Deodorant, Mask, Cigarettes, Lighter, Loose change, Metal card/multitool,   SECURITY #[id number]: 2 [place], 1 [employer] card, 2 insurance cards    NO CASH    A               Admission:  I am responsible for any personal items that are not sent to the safe or pharmacy.  [facility] is not responsible for loss, theft or damage of any property in my possession.    Signature:  _________________________________ Date: _______  Time: _____                                              Staff Signature:  ____________________________ Date: ________  Time: _____      2nd Staff person, if patient is unable/unwilling to sign:    Signature: ________________________________ Date: ________  Time: _____     Discharge:  [facility] has returned all of my personal belongings:    Signature: _________________________________ Date: ________  Time: _____                                          Staff Signature:  ____________________________ Date: ________  Time: _____

## 2021-05-16 NOTE — PROGRESS NOTES
"Pt arrived from [facility] today for alcohol detox. Pt reports using 1 liter of vodka daily for the past 6 days with last use reported as yesterday evening.  [facility] [place] on [place] is a 10, will administer valium once orders are obtained. Pt states their drug of choice is alcohol. Endorses hx of seizures. Endorses using marijuana with last use as 8 months ago.     Reports that from here would like to get into a new sober house and find out if he can continue in outpatient treatment at [facility]. States \"was just about to graduate\" their program prior to the 6 day relapse. Reports being sober since 12/15/20 .    Pt reports recent social stressors as relapsing just prior to graduating from the program and likely losing his sober housing. States he is on Commitment via [facility] until 2021. Pt denies any current or past suicidal ideation plans or intent. Denies depression. Endorses anxiety of 6 of 10 in severity.  Does state an Uncle  in 2019 via hanging suicide.     Denies any problems with skin. States approximate 30 pounds recently and relates it to having no appetite during the relapse. Does endorse some nausea.    Will monitor patient through remainder of shift and offer prn medications for symptom management.     "

## 2021-05-16 NOTE — TELEPHONE ENCOUNTER
Patient cleared and ready for behavioral bed placement: Yes     S Pt is a 35 year old male at the [facility] ed    B Pt was living in a sober house and relapsed 2 weeks ago. Pt has been drinking a 1.5L vodka for last 2 weeks. Last drink 12 hours ago breathalyzer .183. Pt has a history of sz no dts. Pt has no MH concerns at this time. Pt is medically cleared with pending labwork. MSSA 14 2mg ativan given.    A Vol    R 3aw/Camelia CD ADMIT    - 940am intake requested negative covid19 as pt will go into shared room and confirmed that detox is staffed for days now  - 942am provider paged and pt accepted but still needs negative covid19 unit and ed aware of admission 945am

## 2021-05-17 PROCEDURE — 99232 SBSQ HOSP IP/OBS MODERATE 35: CPT | Performed by: PSYCHIATRY & NEUROLOGY

## 2021-05-17 PROCEDURE — 99207 PR CONSULT E&M CHANGED TO SUBSEQUENT LEVEL: CPT | Performed by: PHYSICIAN ASSISTANT

## 2021-05-17 PROCEDURE — 128N000004 HC R&B CD ADULT

## 2021-05-17 PROCEDURE — 99232 SBSQ HOSP IP/OBS MODERATE 35: CPT | Performed by: PHYSICIAN ASSISTANT

## 2021-05-17 PROCEDURE — 250N000013 HC RX MED GY IP 250 OP 250 PS 637: Performed by: PSYCHIATRY & NEUROLOGY

## 2021-05-17 RX ADMIN — MULTIPLE VITAMINS W/ MINERALS TAB 1 TABLET: TAB at 08:48

## 2021-05-17 RX ADMIN — GABAPENTIN 300 MG: 300 CAPSULE ORAL at 20:55

## 2021-05-17 RX ADMIN — POTASSIUM CHLORIDE 20 MEQ: 750 TABLET, EXTENDED RELEASE ORAL at 08:48

## 2021-05-17 RX ADMIN — RIFAXIMIN 550 MG: 550 TABLET ORAL at 20:55

## 2021-05-17 RX ADMIN — NICOTINE 1 PATCH: 21 PATCH, EXTENDED RELEASE TRANSDERMAL at 08:49

## 2021-05-17 RX ADMIN — LORAZEPAM 2 MG: 1 TABLET ORAL at 12:50

## 2021-05-17 RX ADMIN — OMEPRAZOLE 20 MG: 20 CAPSULE, DELAYED RELEASE ORAL at 08:49

## 2021-05-17 RX ADMIN — ATENOLOL 50 MG: 50 TABLET ORAL at 16:17

## 2021-05-17 RX ADMIN — GABAPENTIN 300 MG: 300 CAPSULE ORAL at 08:48

## 2021-05-17 RX ADMIN — POTASSIUM CHLORIDE 20 MEQ: 750 TABLET, EXTENDED RELEASE ORAL at 20:55

## 2021-05-17 RX ADMIN — FUROSEMIDE 40 MG: 40 TABLET ORAL at 20:55

## 2021-05-17 RX ADMIN — QUETIAPINE FUMARATE 100 MG: 100 TABLET ORAL at 22:22

## 2021-05-17 RX ADMIN — MIRTAZAPINE 15 MG: 15 TABLET, FILM COATED ORAL at 22:22

## 2021-05-17 RX ADMIN — THIAMINE HCL TAB 100 MG 100 MG: 100 TAB at 08:48

## 2021-05-17 RX ADMIN — HYDROXYZINE HYDROCHLORIDE 50 MG: 25 TABLET, FILM COATED ORAL at 22:22

## 2021-05-17 RX ADMIN — LORAZEPAM 2 MG: 1 TABLET ORAL at 01:09

## 2021-05-17 RX ADMIN — GABAPENTIN 300 MG: 300 CAPSULE ORAL at 13:25

## 2021-05-17 RX ADMIN — RIFAXIMIN 550 MG: 550 TABLET ORAL at 08:48

## 2021-05-17 RX ADMIN — Medication 1 MG: at 08:48

## 2021-05-17 RX ADMIN — FUROSEMIDE 40 MG: 40 TABLET ORAL at 08:48

## 2021-05-17 RX ADMIN — LORAZEPAM 1 MG: 1 TABLET ORAL at 16:17

## 2021-05-17 ASSESSMENT — ACTIVITIES OF DAILY LIVING (ADL)
DRESS: SCRUBS (BEHAVIORAL HEALTH)
LAUNDRY: WITH SUPERVISION
HYGIENE/GROOMING: INDEPENDENT
ORAL_HYGIENE: INDEPENDENT

## 2021-05-17 NOTE — CONSULTS
"  Internal Medicine Initial Visit    Masoud Huddleston MRN# [id number]   Age: 35 year old YOB: 1985   Date of Admission: 5/16/2021    ADMIT DATE: 5/16/2021  DATE OF CONSULT: 5/17/2021    PCP: No primary care provider on file.    REQUESTING SERVICE: chem dep  REASON FOR CONSULT: alcohol use d/o,     CHIEF COMPLAINT: alcohol use    HPI: Masoud Huddleston is a 35 year old male with a past medical history of alcohol use d/o c/b cirrhosis w/ h/o EV, HE and portal hypertensive gastropathy who is admitted to station [facility]A for detox from alcohol.    Per ED note, \"Masoud Huddleston is a 35 year old male with a h/o alcohol abuse who who presents for alcohol detox.  He is here with his father.  The patient has recently relapsed and is drinking alcohol continuously.  He states \"I cannot put down the bottle.  The only way I feel better is to drink.\".  The patient had been living in a sober home but has relapsed.  He is currently staying in hotels.  His father states he is drinking himself to death.  He drinks 1-1/2 L of vodka a day.  His last drink was 12 hours ago.  He has had 2 withdrawal seizures, one up at detox.  He also uses marijuana.  Social: Here with father\".    The patient notes he has been consuming about a liter of vodka daily since Tuesday, last use yesterday at 730 pm. Notes h/o WD seizure while here- didn't tell anyone, describes it as being conscious, full body jerking, unable to verbalize that he needed help. No incontinence or tongue biting, no LOC. He notes a h/o DTs in 01/2019. He notes he currently feels better than he did, still has tremors. He notes he is slowly eating more and more. He notes nausea, no vomiting since yesterday. Vomit yesterday was clear-yellow. He notes 2 days of black \"foamy\" bowel movements, notes last BM was this morning as was green and stringy, no black or bright red stool. No chest pain, dyspnea, cough. He notes he has stable edema on lasix, nothing new. Unsure why he is " on rifaximin, believes it is for liver.     ROS:   10 point ROS asked and otherwise negative, with exceptions as noted above in HPI.     PMH:  Past Medical History:   Diagnosis Date     Alcohol abuse      Cirrhosis of liver (H)      Depression      ED (erectile dysfunction)      Esophageal varices without bleeding (H)      Neuropathy      Seizures (H)     withdrawal       PSH:  Past Surgical History:   Procedure Laterality Date     KNEE SURGERY Right 2018       MEDICATIONS  No current facility-administered medications on file prior to encounter.   acamprosate (CAMPRAL) 333 MG EC tablet, Take 666 mg by mouth 3 times daily  furosemide (LASIX) 40 MG tablet, Take 40 mg by mouth 2 times daily   gabapentin (NEURONTIN) 100 MG capsule, Take 200 mg by mouth 3 times daily   hydrOXYzine (VISTARIL) 50 MG capsule, Take 50 mg by mouth 4 times daily as needed   melatonin 3 MG tablet, Take 2 tablets (6 mg) by mouth At Bedtime  mirtazapine (REMERON) 15 MG tablet, Take 15 mg by mouth At Bedtime  Multiple Vitamins-Minerals (CEROVITE PO), Take 1 tablet by mouth daily  potassium chloride ER (KLOR-CON M) 20 MEQ CR tablet, Take 20 mEq by mouth 2 times daily   QUEtiapine (SEROQUEL) 100 MG tablet, Take 100 mg by mouth At Bedtime  rifaximin (XIFAXAN) 550 MG TABS tablet, Take 550 mg by mouth 2 times daily   sildenafil (REVATIO) 20 MG tablet, Take 3-4 tabs one hour before sexual activity as directed  traZODone (DESYREL) 100 MG tablet, Take 100 mg by mouth At Bedtime  nicotine (NICODERM CQ) 21 MG/24HR 24 hr patch, Place 1 patch onto the skin daily  omeprazole (PRILOSEC) 20 MG DR capsule, Take 20 mg by mouth daily         ALLERGIES:   No Known Allergies    FAMILY HISTORY:  Reviewed and updated in [employer].     SOCIAL HISTORY:  Social History     Socioeconomic History     Marital status: Single     Spouse name: None     Number of children: None     Years of education: None     Highest education level: None   Occupational History     None   Social  Needs     Financial resource strain: None     Food insecurity     Worry: None     Inability: None     Transportation needs     Medical: None     Non-medical: None   Tobacco Use     Smoking status: Current Every Day Smoker     Packs/day: 1.00     Types: Cigarettes     Smokeless tobacco: Never Used   Substance and Sexual Activity     Alcohol use: Yes     Comment: a liter of Vodka everyday     Drug use: Yes     Types: Marijuana     Sexual activity: Not Currently   Lifestyle     Physical activity     Days per week: None     Minutes per session: None     Stress: None   Relationships     Social connections     Talks on phone: None     Gets together: None     Attends Congregation service: None     Active member of club or organization: None     Attends meetings of clubs or organizations: None     Relationship status: None     Intimate partner violence     Fear of current or ex partner: None     Emotionally abused: None     Physically abused: None     Forced sexual activity: None   Other Topics Concern     None   Social History Narrative     None       PHYSICAL EXAM:  Blood pressure 119/75, pulse 121, temperature 97.9  F (36.6  C), temperature source Temporal, resp. rate 16, weight 73.5 kg (162 lb), SpO2 98 %.    GENERAL: Alert and orientated x 3. Appears in no acute distress.   HEENT: Anicteric sclera. Mucous membranes moist and without lesions.   CV: RRR, S1S2, no murmurs appreciated.  RESPIRATORY: Respirations regular, even, and unlabored. Lungs CTAB with no wheezing, rales, rhonchi.   GI: Soft and non distended with normoactive bowel sounds present in all quadrants. No tenderness, rebound, guarding.   EXTREMITIES: No peripheral edema.   NEUROLOGIC: No focal deficits.   MUSCULOSKELETAL: No joint swelling or tenderness.   SKIN: No jaundice. No rashes or lesions.     LABS:  CMP  Recent Labs   Lab 05/16/21  0834      POTASSIUM 3.8   CHLORIDE 105   CO2 29   ANIONGAP 8   GLC 94   BUN 12   CR 0.71   GFRESTIMATED >90    GFRESTBLACK >90   FÉLIX 8.7   PROTTOTAL 8.4   ALBUMIN 4.1   BILITOTAL 0.9   ALKPHOS 130   AST 79*   ALT 48     CBC  Recent Labs   Lab 05/16/21  0834   WBC 7.3   RBC 5.57   HGB 16.1   HCT 46.5   MCV 84   MCH 28.9   MCHC 34.6   RDW 13.8   *     INRNo lab results found in last 7 days.    IMAGING: None to review from this admission    ASSESSMENT & RECOMMENDATIONS:  #Alcohol use d/o, detoxification: In ED, Utox positive for ethanol, breath test 0.183. Consumes ~1 L of vodka per day. Last use yesterday. Endorses h/o DTs and WD seizures in the past. Current WD symptoms include tremor. VS w/ normal BP, elevated HR, afebrile.   -Agree with MSSA w/ Ativan  -Agree with MVi, folate, thiamine  -If HTN, Recommend Clonidine 0.1 mg PO BID PRN for BP >180/110  -Agree with seizure precautions  #H/o Cirrhosis c/b EV, portal hypertensive gastropathy and HE: PTA maintained on lasix, rifaximin, PPI ( no longer taking the latter). US in 07/2020 w/ enlarged liver, diffusely coarsened echotexture, normal surface contour, no focal mass, no ascites. EGD in 05/2020 w/ small distal EV not requiring intervention, portal hypertensive gastropathy. Notes black BMs 2 days ago, BM today was green. Denies any other concerns for blood loss. No vomiting. LFTs w/ mildly elevated AST fo 79, GGT of 135, otherwise WNL, platelet count mildly low at 113. Hgb 16.1 (14.3 in 11/2020).   -Repeat hepatic panel and platelet count w/ PCP w/ in 4 weeks of discharge to ensure continued improvement w/ sobriety  -Continue rifaximin, no HE per chart review, needs OP GI f/u  -Monitor BMs, notify medicine if any melenic stool noted  #Sinus tachycardia: On exam. Likely due to detox. Improved w/ meds. Currently at around 110.   -Continue to monitor & treat detox aggressively, notify medicine if >120 consistently despite treating detox  -Monitor BMs as above  #Neuropathy: Continue gabapentin.  #Edema: Stable on exam. PTA maintained on lasix and potassium, uses DELROY  stockings. ECHO 01/2020 w/ normal LVEF at 70%, normal RV systolic function, no HD significant valvular lesion.   -Continue Lasix w/ OP f/u with PCP, given normal albumin and no edema on exam may be able to trial off as OP    I appreciate the opportunity to participate in the care of this patient. Medicine will sign off, please notify on call [facility] if any intercurrent medical issues arise.     Pari Chery PA-C

## 2021-05-17 NOTE — PROGRESS NOTES
[facility], [place]   Psychiatric Progress Note        Interim history   This is a 35 year old male with 1.  Alcohol use disorder.  2.  Alcohol withdrawal syndrome.  3.  Depression, not otherwise specified.  4.  Anxiety disorder, not otherwise specified.  5.  Alcohol-induced liver disease.  6.  Peripheral neuropathy..Pt seen in rounds.   The patient's care was discussed with the treatment team during the daily team meeting and/or staff's chart notes were reviewed.  Staff report patient has been visible in the milieu,  no acute eventsovernight.     Patient's mood is tired  Energy Level:LOW  Sleep:poor  Appetite:poor low motivation interest   Denied any Suicidal/homicidal ideation/plan intent.psychosis  No prior suicde attempts  No access to gun  Pt is in alcohol withdrawal still being monitered every 4 hrs for it,   Pt mssa score are monitered  Tolerating meds and has no side effects.              Medications:     Current Facility-Administered Medications   Medication     alum & mag hydroxide-simethicone (MAALOX) suspension 30 mL     atenolol (TENORMIN) tablet 50 mg     folic acid (FOLVITE) tablet 1 mg     furosemide (LASIX) tablet 40 mg     gabapentin (NEURONTIN) capsule 300 mg     hydrOXYzine (ATARAX) tablet 25 mg     hydrOXYzine (ATARAX) tablet 50 mg     loperamide (IMODIUM) capsule 2 mg     LORazepam (ATIVAN) tablet 1-4 mg     mirtazapine (REMERON) tablet 15 mg     multivitamin w/minerals (THERA-VIT-M) tablet 1 tablet     nicotine (NICODERM CQ) 21 MG/24HR 24 hr patch 1 patch     nicotine (NICORETTE) lozenge 4 mg     nicotine Patch in Place     omeprazole (priLOSEC) CR capsule 20 mg     ondansetron (ZOFRAN-ODT) ODT tab 4 mg     potassium chloride ER (KLOR-CON M) CR tablet 20 mEq     QUEtiapine (SEROquel) tablet 100 mg     rifaximin (XIFAXAN) tablet 550 mg     senna-docusate (SENOKOT-S/PERICOLACE) 8.6-50 MG per tablet 1 tablet     thiamine (B-1) tablet 100 mg             Allergies:   No  Known Allergies         Psychiatric Examination:   Blood pressure 119/75, pulse 121, temperature 97.9  F (36.6  C), temperature source Temporal, resp. rate 16, weight 73.5 kg (162 lb), SpO2 98 %.  Weight is 162 lbs 0 oz  Body mass index is 22.59 kg/m .    Appearance:  awake, alert and adequately groomed  Attitude:  cooperative  Eye Contact:  good  Mood:  anxious and sad   Affect:  appropriate and in normal range  Speech:  clear, coherent rate /rhythm are good  Psychomotor Behavior:  no evidence of tardive dyskinesia, dystonia, or tics and intact station, gait and muscle tone  Throught Process:  logical  Associations:  no loose associations  Thought Content:  no evidence of suicidal ideation or homicidal ideation and no evidence of psychotic thought  Insight:  limited  Judgement:  limited  Oriented to:  time, person, and place  Attention Span and Concentration:  intact  Recent and Remote Memory:  intact  Language fund of knowledge are adequate         Labs:     No results found for: NTBNPI, NTBNP  Lab Results   Component Value Date    WBC 7.3 05/16/2021    HGB 16.1 05/16/2021    HCT 46.5 05/16/2021    MCV 84 05/16/2021     (L) 05/16/2021     Lab Results   Component Value Date    TSH 1.79 11/25/2020         DX 1.  Alcohol use disorder.  2.  Alcohol withdrawal syndrome.  3.  Depression, not otherwise specified.  4.  Anxiety disorder, not otherwise specified.  5.  Alcohol-induced liver disease.  6.  Peripheral neuropathy.     PLAN   Alcohol intoxication/withdrawal, presently is on MSSA protocol with Valium. Continue the same MSSA protocol as ordered. Continue thiamine 100 mg p.o. daily, M.V.I. one p.o. daily and folate 1 mg p.o. Daily  Will continue mssa protocal to detox off alcohol on ativan,  Pt is c/o of termor , agitation poor sleep and poor appetite, he has sweats, feels shakey  On mssa client scored scored 10today and  needed needed 0 mg po as of yet , total dose since admission was 9mg     MSSA    Eating  Disturbances: ate and enjoyed all of it or not applicable  Tremor: 2  Sleep Disturbance: slept through the night or not applicable  Clouding of Sensorium: no evidence  Hallucinations: 0 - none  Quality of Contact: 0 - awareness of examiner and people around him/her  Agitation: 0 - normal activity  Paroxysmal Sweats: 1 - barely perceptible sweating  Temperature: 99.5 or below  Pulse: 6 - 120 to 129  Total MSSA Score: 10          will work with cm  Pt reports he is on a civil commitment   Laboratory/Imaging: reviewed with patient   Consults: internal medicine consult reviewed  Patient will be treated in therapeutic milieu with appropriate individual and group therapies as described.  PDMP CHECKED     Supportive psychotheraoy provided, madison talked about recovery enviroment, relapse prevention, triggers to use.  Discussed with patient many issues of addiction,triggers, relapse, and establishing a solid recovery program.  Asked pt to be med complinat   Medical diagnoses to be addressed this admission:     im consult pending       Legal Status: voluntary    Safety Assessment:   Checks:  15 min  Precautions: withdrawal precautions  Pt has not required locked seclusion or restraints in the past 24 hours to maintain safety, please refer to RN documentation for further details.  Discussed with patient many issues of addiction,triggers, relapse, and establishing a solid recovery program.  Able to give informed consent:  YES   Discussed Risks/Benefits/Side Effects/Alternatives: YES    After discussion of the indications, risks, benefits, alternatives and consequences of no treatment, the patient elects to complete detox

## 2021-05-17 NOTE — PLAN OF CARE
nowPt MSSA scores today are 6 and 10, administered 2 mg po ativan x 1. Pt denies suicidal ideation plans or intent. Endorses feeling anxiety rated 4 and depression rated 6 of 10 in severity. Also endorses feeling worthlessness. Presents as flat/blunted irritable and tense during assessment. Denies any suicidal ideation plans or intent. States if he develops any thoughts of self harm he is willing to alert staff (contracts for safety) so we can keep him safe. Pt is untidy and in need of a shower. Pulse at 1145 is 121, manual check reveals pulse range from high 130's to 150. Pt denies any shortness of breath, dizziness or chest pain with this elevated pulse but does state feeling slightly light headed. Updated [place] with internal medicine about elevated pulse. Pulse rechecked 30 minutes after ativan administration and pulse was 106. Will continue to monitor and intervene as warranted. Pt denies any unmet needs at this time.    Vitals:    05/17/21 0446 05/17/21 0812 05/17/21 1145 05/17/21 1326   BP: 111/76 116/73 119/75    Pulse: 78 89 121 106   Resp: 16 16 16    Temp: 98.5  F (36.9  C) 97.7  F (36.5  C) 97.9  F (36.6  C)    TempSrc: Temporal Temporal Temporal    SpO2:  98%     Weight:

## 2021-05-17 NOTE — PLAN OF CARE
Behavioral Team Discussion: (5/17/2021)    Continued Stay Criteria/Rationale: Patient admitted for alcohol withdrawal and Use Disorder.  Plan: The following services will be provided to the patient; psychiatric assessment, medication management, therapeutic milieu, individual and group support, and skills groups.   Participants: 3A Provider: Dr. Johnny Denise MD; 3A RN: Nixon Encinas, RN; 3A CM's: Lala Wells .  Summary/Recommendation: Providers will assess today for treatment recommendations, discharge planning, and aftercare plans. CM will meet with pt for discharge planning.   Medical/Physical: peripheral nueropathy  Precautions:   Behavioral Orders   Procedures     Code 1 - Restrict to Unit     Routine Programming     As clinically indicated     Seizure precautions     Status 15     Every 15 minutes.     Withdrawal precautions     Rationale for change in precautions or plan: N/A  Progress: No Change.

## 2021-05-17 NOTE — PROGRESS NOTES
[place] Prescription Monitoring Program   Masoud Huddleston, 35MPowered by [facility]  Narx Report  Resources  Date: 05/17/2021Download PDF  Masoud Huddleston  Risk Indicators  NARX SCORES  Narcotic  000  Sedative  000  Stimulant  000  Explanation and GuidanceOVERDOSE RISK SCORE 000  (Range 000-999)  Explanation and Guidance  ADDITIONAL RISK INDICATORS ( 0 )  Explanation and GuidanceThis [facility] report is based on search criteria supplied and the data entered by the dispensing pharmacy. For more information about any prescription, please contact the dispensing pharmacy or the prescriber. [facility] scores and reports are intended to aid, not replace, medical decision making. None of the information presented should be used as sole justification for providing or refusing to provide medications. The information on this report is not warranted as accurate or complete.  Graphs  RX GRAPH   Narcotic  Buprenorphine  Sedative  Stimulant  Other  All Prescribers  Prescribers  3 - Nas Bloom  2 - Wing Pena  1 - Kelby Dumont,  Timeline  05/17  2m  6m  1y  2y  Buprenorphine mg  16  4  0  28  Timeline  05/17  2m  6m  1y  2y  Morphine MgEq (MME)  200  80  0  320  Timeline  05/17  2m  6m  1y  2y  Lorazepam MgEq (LME)  10  2  0  18  Timeline  05/17  2m  6m  1y  2y  *Per CDC guidance, the MME conversion factors prescribed or provided as part of the medication-assisted treatment for opioid use disorder should not be used to benchmark against dosage thresholds meant for opioids prescribed for pain. Buprenorphine products have no agreed upon morphine equivalency, and as partial opioid agonists, are not expected to be associated with overdose risk in the same dose-dependent manner as doses for full agonist opioids. MME = morphine milligram equivalents. LME = Lorazepam milligram equivalents. mg = dose in milligrams.  Summary  Summary  Total Prescriptions:  8  Total Prescribers:  3  Total Pharmacies:  3  Narcotics* (excluding  Buprenorphine)  Current Qty:  0  Current MME/day:  0.00  30 Day Avg MME/day:  0.00  Sedatives*  Current Qty:  0  Current LME/day:  0.00  30 Day Avg LME/day:  0.00  Buprenorphine*  Current Qty:  0  Current mg/day:  0.00  30 Day Avg mg/day:  0.00  Rx Data  PRESCRIPTIONS  Total Prescriptions: 8  Total Private Pay: 0  Fill Date ID Written Sold Drug Qty Days Prescriber Rx # Pharmacy Refill Daily Dose * Pymt Type   01/12/2021 1 01/11/2021  Gabapentin 100 Mg Capsule  180.00 30 Ja Ose [id number] Gen ([id number]) 0/1  [place]  12/09/2020 3 10/14/2020  Gabapentin 100 Mg Capsule  180.00 30 Ja Ose [id number] Gen (2588) 2/3  [place]  11/11/2020 3 10/14/2020  Gabapentin 100 Mg Capsule  180.00 30 Ja Ose [id number] Gen (2588) 1/2  [place]  11/03/2020 3 10/14/2020  Gabapentin 100 Mg Capsule  54.00 9 Ja Ose [id number] Gen (2588) 0/1  [place]  09/09/2020 2 08/20/2020  Gabapentin 100 Mg Capsule  180.00 30 Ja Ose [id number] Gen (2588) 0/1  [place]  08/12/2020 2 07/29/2020  Gabapentin 100 Mg Capsule  180.00 30 Ja Ose [id number] Gen (2588) 0/1  [place]  07/16/2020 4 07/16/2020 07/17/2020 Gabapentin 100 Mg Capsule  180.00 30 Ed Tri [id number] [place] (1287) 0/0  [place]icaid [place]  06/10/2020 1 06/10/2020 06/11/2020 Gabapentin 400 Mg Capsule  90.00 30 To [facility] [id number] [facility] (6020) 0/0  [place]  *Per CDC guidance, the MME conversion factors prescribed or provided as part of the medication-assisted treatment for opioid use disorder should not be used to benchmark against dosage thresholds meant for opioids prescribed for pain. Buprenorphine products have no agreed upon morphine equivalency, and as partial opioid agonists, are not expected to be associated with overdose risk in the same dose-dependent manner as doses for full agonist opioids. MME = morphine milligram equivalents. LME = Lorazepam milligram equivalents. mg = dose in milligrams.  Providers  Total Providers: 3  Name Address [facility] State Zipcode Phone  Kelby Dumont MD [address] [facility] [address] [phone]  Nas Wolf [address] [facility] [address] [phone]  Wing Conn MD [address] [facility] [address] [place] [address] [phone]  Pharmacies  Total Pharmacies: 3  Name Address City State Zipcode Phone  [facility] [phone]  [place] [address] [phone]  [place] [phone] [facility] [address] [phone]  [facility] ([phone]) [address] [place] [address] [phone]  The report provided is based upon the search criteria entered and the corresponding data as it has been reported by dispenser(s). If erroneous information is identified or additional information is needed, please contact the dispenser or the prescriber provided on the report. Date Sold signifies the date the prescription was sold (left the pharmacy). The absence of Date Sold does not necessarily indicate the prescription was not dispensed. Fill Date represents the date the medication was filled or prepared by the pharmacy. Note, federal regulation (CFR Title 42: Part 2) requires patient consent prior to releasing certain patient data from federally funded opioid treatment programs (OTPs). As such, controlled substances dispensed from OTPs for medication-assisted treatment may not appear in the [place]  report. Morphine milligram equivalent (MME) conversion factors published by the CDC are used in the MME calculation. Per the CDC, the MME conversion factor is intended only for analytic purposes where prescription data are used to retrospectively calculate daily MME to inform analyses of risks associated with opioid prescribing. This value does not constitute clinical guidance or recommendations for converting patients from one form of opioid analgesic to another. Per the CDC, the conversion factors for drugs prescribed or provided, as part of medication-assisted treatment for opioid use disorder should not be  used to benchmark against MME dosage thresholds meant for opioids prescribed for pain. Buprenorphine products listed in the CDC s MME file do not have an associated conversion factor. Lastly, the CDC notes, in clinical practice, calculating MME for methadone often involves a sliding-scale approach, whereby the conversion factor increases with increasing dose. The conversion factor of 3 for methadone presented in this file could underestimate MME for a given patient. This report contains confidential information, including patient identifiers, and is not a public record. The information on this report must be treated as protected health information and is only to be disclosed to others as authorized by applicable state and Federal regulations.

## 2021-05-17 NOTE — PROGRESS NOTES
MSSA scores of9/5,pt recieves 2mg of ativan this shift for alcohol withdrawal. Pt is observed to sleep through out the noc.

## 2021-05-17 NOTE — PROGRESS NOTES
Met with Pt to initiate discharge planning.  Pt is requesting assessment and referral to treatment.  Pt is on a commitment in [facility].  States his CM is aware that he is here.  Signed PATRICIA for CM but was unable to provide her number.  Coached pt to complete paperwork for assessment and referral.  Pt has [employer] MA.

## 2021-05-17 NOTE — PLAN OF CARE
Patient intermittently visible in the milieu. Patient affect flat, mood is anxious. Patient denies SI, SIB, HI, or hallucinations. Patient continues to be monitored for alcohol withdrawal. Patient MSSA scores 12 and 11. Patient was given a total of 3 mg of Lorazepam this evening for withdrawal. Patient reported decreased appetite, ensure supplement ordered. Patient VSS, pulse mildly tachycardic. Patient denies any additional concerns.

## 2021-05-17 NOTE — PROGRESS NOTES
"CLINICAL NUTRITION SERVICES - ASSESSMENT NOTE     Nutrition Prescription    RECOMMENDATIONS FOR MDs/PROVIDERS TO ORDER:  None today    Malnutrition Status:    Unable to determine     Recommendations already ordered by Registered Dietitian (RD):  Changed supplement order to chocolate Ensure Enlive TID (was for 6x/day)    Future/Additional Recommendations:  Adjust supplement pending pt preference      REASON FOR ASSESSMENT  Masoud Huddleston is a/an 35 year old male assessed by the dietitian for a positive MST screen for  weight loss    NUTRITION HISTORY  Pt reports attempting to eat 2 meals a day but he would only be able to eat 1-2 bites of food. He reports never being hungry and forcing himself to eat for the last several years since \"becoming an alcoholic.\"    CURRENT NUTRITION ORDERS  Diet: Regular, Boost Plus TID, Boost Shake TID  Intake/Tolerance: pt reports attempting all of the meals here and is drinking Ensure    LABS  Labs reviewed    MEDICATIONS  Medications reviewed  - Folic Acid  - Thiamine  - Theravit-M  - Lasix BID  - Remeron q HS    ANTHROPOMETRICS  Ht Readings from Last 1 Encounters:   11/25/20 1.803 m (5' 11\")   Most Recent Weight: 73.5 kg (162 lb)  IBW: 78.2 kg (94% IBW)  BMI: Normal BMI  Weight History: Patient has gained 32 lbs (25%) over the last ~6 months. Patient reports UBW of 170-190-200 lbs. Reports his UBW fluctuates.   Wt Readings from Last 10 Encounters:   05/16/21 73.5 kg (162 lb)   11/24/20 59 kg (130 lb)     Dosing Weight: 74 kg - admit wt    ASSESSED NUTRITION NEEDS  Estimated Energy Needs: 5703-0077 kcals/day (25 - 30 kcals/kg)  Justification: Maintenance  Estimated Protein Needs: 59-74 grams protein/day (0.8 - 1 grams of pro/kg)  Justification: Maintenance  Estimated Fluid Needs: 1 mL/kcal  Justification: Per provider pending fluid status    PHYSICAL FINDINGS  See malnutrition section below.    MALNUTRITION  % Intake: Decreased intake does not meet criteria  % Weight Loss: None " noted  Subcutaneous Fat Loss: Unable to assess  Muscle Loss: Unable to assess  Fluid Accumulation/Edema: None noted  Malnutrition Diagnosis: Unable to determine     NUTRITION DIAGNOSIS  Predicted inadequate protein-energy intake related to variable appetite as evidenced by pt reliant on PO intakes to meet 100% of nutritional needs with potential for variation         INTERVENTIONS  Implementation  Discussed nutrition history and PO since admission. Discussed menu ordering and snacks available on the unit. Pt filled out their menu and are finding foods they enjoy and feels he will get enough to eat during this admission. Discussed oral nutrition supplements and they would like Ensure TID (chocolate). Denied any questions or concerns at this time.     Goals  Patient to consume % of nutritionally adequate meal trays TID, or the equivalent with supplements/snacks.     Monitoring/Evaluation  Progress toward goals will be monitored and evaluated per protocol.      Hazel Vilchis RD, LD  5A/OB/Behavioral Health RD Pager: [phone]  Weekend Pager ([place]): [phone]

## 2021-05-17 NOTE — H&P
Admitted: 05/16/2021    HISTORY OF PRESENT ILLNESS:  This 35-year-old single white male with a long history of depression and alcohol abuse who presented to our Emergency Department for alcohol detoxification.  Reportedly, he was brought in by his father.  The patient had recently relapsed after 5-1/2 months of sobriety living in a sober house.  He has been drinking up to 1 liter of vodka a day within the last 6 days, he has been having blackouts on a regular basis and would not take his psychotropic medications, which consisted of gabapentin 100 mg t.i.d., Seroquel 100 mg at bedtime, trazodone 100 mg at bedtime, Remeron 15 mg at bedtime and Campral 666 mg t.i.d.  His blood work upon admission was significant for elevated AST of 79, but normal ALT of 48, low platelet count of 113.  He scored 0.183 on breathalyzer and his U-tox was positive for alcohol.  He was SARS-CoV negative. The patient was transferred to station 3A for detoxification.    HISTORY OF PRESENT ILLNESS:  The patient has a long history of depression, which was first identified at his age of 15 when he started to see a therapist.  He could not tell me exactly when did he start taking antidepressant medications, but told me that except for the last 6 days , he has been taking them as prescribed.  He was hospitalized on multiple occasions, both for psychiatric admissions and med-surg admissions.  His last admission was at [facility] in 12/2020 and the one before that at this unit under the care of Dr. Solis in 11/2020.  On an outpatient basis he has been seeing a psychiatrist, Dr. Nas Lindo at [facility] who prescribes his medications.    He also has a significant history of alcohol abuse, consuming large volumes of alcohol resulting in alcohol-induced liver damage.  He was once diagnosed as suffering from cirrhosis of the liver.  He had frequent blackouts and significant withdrawal shakes with a history of seizures, which the  patient cannot elaborate on during this evaluation.  As it was mentioned above, he had numerous detox admissions and chemical dependency treatment admissions and was committed as chemically dependent sometime in 2020 until July of 2021.  Most recently, he has been experiencing lack of appetite and had lost about 30 pounds within the last couple of weeks.    He denies any history of suicidal ideation or attempts.  He had no psychotic manifestations.    FAMILY HISTORY is remarkable for depression in his paternal uncle who had committed suicide.  His sister also suffers from depression.  His paternal grandfather and another uncle have been described as alcoholics.    PAST MEDICAL HISTORY:  Remarkable for peripheral neuropathy in his feet for which he was prescribed Neurontin.  He also was diagnosed as alcohol-induced liver disease.    ALLERGIES:  The patient is unaware of any.    BRIEF SOCIAL HISTORY:  The patient was born and raised in [place], [place], the oldest of 3 children to his parents, both of whom were present while he was growing up.  He had graduated from high school and had some college, but never graduated.  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  He has been living in a sober house. H has been living in different motels after he relapsed. The patient has never been , but has 2 children from 2 different women who are currently 10 and 7 years of age.  He states that he has been paying child support.    MENTAL STATUS EXAMINATION revealed a normally built and normally developed, generally pleasant, friendly and cooperative with the interview 35-year-old white male, appearing about his stated age.  He was alert and oriented x3. His speech was coherent, logical, and goal directed.  He appeared to be mildly depressed  without noticeable psychomotor retardation.  He denied suicidal ideation or intent.  He denied hallucinations and no prominent delusions could be noted or elicited.  He had no signs of referential thinking, fixed ideas or obsessive thoughts.  He functions at the estimated average level of intelligence.  He has some insight into his problems and his judgment at the moment did not seem to be significantly impaired.    DIAGNOSTIC IMPRESSION  AXIS I:     1.  Alcohol use disorder.  2.  Alcohol withdrawal syndrome.  3.  Depression, not otherwise specified.  4.  Anxiety disorder, not otherwise specified.  5.  Alcohol-induced liver disease.  AXIS II   Deferred  AXIS III   Peripheral neuropathy.    PLAN:  I will continue [facility] protocol with Ativan.  I will resume Remeron 15 mg at bedtime and discontinue Trazodone, out of concern of priapism.  I will continue Seroquel 100 mg at bedtime and increased his Neurontin to 300 mg t.i.d.  I will not restart his Campral, which may be restarted on a later date.  I would also consider giving him a trial of Naltrexone.    Fredy Diaz MD        D: 2021   T: 2021   MT: [id number]    Name:     NEERAJ MARQUEZ  MRN:      [id number]        Account:     [id number]   :      1985           Admitted:    2021       Document: [id number]

## 2021-05-18 VITALS
RESPIRATION RATE: 16 BRPM | HEART RATE: 82 BPM | OXYGEN SATURATION: 100 % | DIASTOLIC BLOOD PRESSURE: 76 MMHG | WEIGHT: 162 LBS | SYSTOLIC BLOOD PRESSURE: 126 MMHG | TEMPERATURE: 98 F | BODY MASS INDEX: 22.59 KG/M2

## 2021-05-18 PROCEDURE — 99239 HOSP IP/OBS DSCHRG MGMT >30: CPT | Performed by: PSYCHIATRY & NEUROLOGY

## 2021-05-18 PROCEDURE — 250N000013 HC RX MED GY IP 250 OP 250 PS 637: Performed by: PSYCHIATRY & NEUROLOGY

## 2021-05-18 RX ORDER — QUETIAPINE FUMARATE 100 MG/1
100 TABLET, FILM COATED ORAL AT BEDTIME
Qty: 30 TABLET | Refills: 0 | Status: SHIPPED | OUTPATIENT
Start: 2021-05-18 | End: 2022-01-01

## 2021-05-18 RX ORDER — GABAPENTIN 300 MG/1
300 CAPSULE ORAL 3 TIMES DAILY
Qty: 90 CAPSULE | Refills: 0 | Status: ON HOLD | OUTPATIENT
Start: 2021-05-18 | End: 2022-01-01

## 2021-05-18 RX ORDER — LANOLIN ALCOHOL/MO/W.PET/CERES
100 CREAM (GRAM) TOPICAL DAILY
Qty: 30 TABLET | Refills: 0 | Status: SHIPPED | OUTPATIENT
Start: 2021-05-19 | End: 2022-01-01

## 2021-05-18 RX ORDER — MIRTAZAPINE 15 MG/1
15 TABLET, FILM COATED ORAL AT BEDTIME
Qty: 30 TABLET | Refills: 0 | Status: ON HOLD | OUTPATIENT
Start: 2021-05-18 | End: 2022-01-01

## 2021-05-18 RX ORDER — MULTIPLE VITAMINS W/ MINERALS TAB 9MG-400MCG
1 TAB ORAL DAILY
Qty: 30 TABLET | Refills: 0 | Status: SHIPPED | OUTPATIENT
Start: 2021-05-19 | End: 2022-01-01

## 2021-05-18 RX ADMIN — GABAPENTIN 300 MG: 300 CAPSULE ORAL at 09:14

## 2021-05-18 RX ADMIN — RIFAXIMIN 550 MG: 550 TABLET ORAL at 09:12

## 2021-05-18 RX ADMIN — THIAMINE HCL TAB 100 MG 100 MG: 100 TAB at 09:12

## 2021-05-18 RX ADMIN — OMEPRAZOLE 20 MG: 20 CAPSULE, DELAYED RELEASE ORAL at 09:12

## 2021-05-18 RX ADMIN — NICOTINE 1 PATCH: 21 PATCH, EXTENDED RELEASE TRANSDERMAL at 09:12

## 2021-05-18 RX ADMIN — POTASSIUM CHLORIDE 20 MEQ: 750 TABLET, EXTENDED RELEASE ORAL at 09:12

## 2021-05-18 RX ADMIN — FUROSEMIDE 40 MG: 40 TABLET ORAL at 10:47

## 2021-05-18 RX ADMIN — GABAPENTIN 300 MG: 300 CAPSULE ORAL at 13:56

## 2021-05-18 RX ADMIN — Medication 1 MG: at 09:12

## 2021-05-18 RX ADMIN — MULTIPLE VITAMINS W/ MINERALS TAB 1 TABLET: TAB at 09:12

## 2021-05-18 ASSESSMENT — ACTIVITIES OF DAILY LIVING (ADL)
LAUNDRY: WITH SUPERVISION
HYGIENE/GROOMING: INDEPENDENT
DRESS: SCRUBS (BEHAVIORAL HEALTH)
ORAL_HYGIENE: INDEPENDENT

## 2021-05-18 NOTE — DISCHARGE SUMMARY
Masoud Huddleston MRN# [id number]   Age: 35 year old YOB: 1985     Date of Admission:  5/16/2021  Date of Discharge:  5/18/2021  Admitting Physician:  Fredy Diaz MD  Discharge Physician:  Johnny Denise MD      DISCHARGE  DX  Alcohol use disorder severe  #H/o Cirrhosis c/b EV, portal hypertensive gastropathy and HE:         Event Leading to Hospitalization:     See Admission note by admitting provider for patient encounter. for additional details.          Hospital Course:   PATIENT was admitted to [facility]with attending  under DR denise, please review the detailed admit note on 5/16/2021 byFredy Diaz MD   The patient was placed under status 15 (15 minute checks) to ensure patient safety.   Patient was detox of alcohol using MSSA protocol and Ativan  He was continued on Remeron Seroquel gabapentin increased to 300 mg 3 times daily.  Patient is on a stay of commitment and this is a 5-day relapse.    MSSA protocol was initiated due to the patient's history of alcohol abuse and concern for withdrawal symptoms.  CBC, BMP and utox obtained.    All outpatient medications were continued    PATIENTdid participate in groups and was visible in the milieu.     The patient's symptoms of alcohol withdrawal symptoms improved improved.     Patients energy motivation , sleep appetite improved.  Pt completed detox . It was un eventful.      Discussed with patient medications for craving.  Spoke with patient about triggers coping skills relapse prevention.    CONSULTS DONE DURING PATIENTS HOSPITALIZATION.  Patient was seen by medicine on date    This as per their medical consult      ASSESSMENT & RECOMMENDATIONS:  #Alcohol use d/o, detoxification: In ED, Utox positive for ethanol, breath test 0.183. Consumes ~1 L of vodka per day. Last use yesterday. Endorses h/o DTs and WD seizures in the past. Current WD symptoms include tremor. VS w/ normal BP, elevated HR, afebrile.   -Agree with MSSA w/  Ativan  -Agree with MVi, folate, thiamine  -If HTN, Recommend Clonidine 0.1 mg PO BID PRN for BP >180/110  -Agree with seizure precautions  #H/o Cirrhosis c/b EV, portal hypertensive gastropathy and HE: PTA maintained on lasix, rifaximin, PPI ( no longer taking the latter). US in 07/2020 w/ enlarged liver, diffusely coarsened echotexture, normal surface contour, no focal mass, no ascites. EGD in 05/2020 w/ small distal EV not requiring intervention, portal hypertensive gastropathy. Notes black BMs 2 days ago, BM today was green. Denies any other concerns for blood loss. No vomiting. LFTs w/ mildly elevated AST fo 79, GGT of 135, otherwise WNL, platelet count mildly low at 113. Hgb 16.1 (14.3 in 11/2020).   -Repeat hepatic panel and platelet count w/ PCP w/ in 4 weeks of discharge to ensure continued improvement w/ sobriety  -Continue rifaximin, no HE per chart review, needs OP GI f/u  -Monitor BMs, notify medicine if any melenic stool noted  #Sinus tachycardia: On exam. Likely due to detox. Improved w/ meds. Currently at around 110.   -Continue to monitor & treat detox aggressively, notify medicine if >120 consistently despite treating detox  -Monitor BMs as above  #Neuropathy: Continue gabapentin.  #Edema: Stable on exam. PTA maintained on lasix and potassium, uses DELROY stockings. ECHO 01/2020 w/ normal LVEF at 70%, normal RV systolic function, no HD significant valvular lesion.   -Continue Lasix w/ OP f/u with PCP, given normal albumin and no edema on exam may be able to trial off as OP      Pt was seen by cm  As per recommendations from cm    Call placed to Pt's CM, Uyen Stinson ([phone]).  VM left advising her of Pt's likely discharge to parent's home today.  Pt reports he is working with the owner of the sober house he was in to get into another sober house and continue in OP treatment with [facility].  Update:  Received call back from Pt's CM who reiterated description of plan for discharge and acknowledged  appreciation for update.  AVS completed.         Labs:reviewed with patient     No results found for this or any previous visit (from the past 48 hour(s)).      Recent Results (from the past 240 hour(s))   Alcohol breath test POCT    Collection Time: 05/16/21  8:08 AM   Result Value Ref Range    Alcohol Breath Test 0.183 (A) 0.00 - 0.01   Drug abuse screen 6 urine (chem dep)    Collection Time: 05/16/21  8:34 AM   Result Value Ref Range    Amphetamine Qual Urine Negative NEG^Negative    Barbiturates Qual Urine Negative NEG^Negative    Benzodiazepine Qual Urine Negative NEG^Negative    Cannabinoids Qual Urine Negative NEG^Negative    Cocaine Qual Urine Negative NEG^Negative    Ethanol Qual Urine Positive (A) NEG^Negative    Opiates Qualitative Urine Negative NEG^Negative   CBC with platelets differential    Collection Time: 05/16/21  8:34 AM   Result Value Ref Range    WBC 7.3 4.0 - 11.0 10e9/L    RBC Count 5.57 4.4 - 5.9 10e12/L    Hemoglobin 16.1 13.3 - 17.7 g/dL    Hematocrit 46.5 40.0 - 53.0 %    MCV 84 78 - 100 fl    MCH 28.9 26.5 - 33.0 pg    MCHC 34.6 31.5 - 36.5 g/dL    RDW 13.8 10.0 - 15.0 %    Platelet Count 113 (L) 150 - 450 10e9/L    Diff Method Automated Method     % Neutrophils 73.0 %    % Lymphocytes 21.1 %    % Monocytes 4.5 %    % Eosinophils 1.0 %    % Basophils 0.3 %    % Immature Granulocytes 0.1 %    Nucleated RBCs 0 0 /100    Absolute Neutrophil 5.4 1.6 - 8.3 10e9/L    Absolute Lymphocytes 1.6 0.8 - 5.3 10e9/L    Absolute Monocytes 0.3 0.0 - 1.3 10e9/L    Absolute Eosinophils 0.1 0.0 - 0.7 10e9/L    Absolute Basophils 0.0 0.0 - 0.2 10e9/L    Abs Immature Granulocytes 0.0 0 - 0.4 10e9/L    Absolute Nucleated RBC 0.0    Comprehensive metabolic panel    Collection Time: 05/16/21  8:34 AM   Result Value Ref Range    Sodium 142 133 - 144 mmol/L    Potassium 3.8 3.4 - 5.3 mmol/L    Chloride 105 94 - 109 mmol/L    Carbon Dioxide 29 20 - 32 mmol/L    Anion Gap 8 3 - 14 mmol/L    Glucose 94 70 - 99 mg/dL     Urea Nitrogen 12 7 - 30 mg/dL    Creatinine 0.71 0.66 - 1.25 mg/dL    GFR Estimate >90 >60 mL/min/[1.73_m2]    GFR Estimate If Black >90 >60 mL/min/[1.73_m2]    Calcium 8.7 8.5 - 10.1 mg/dL    Bilirubin Total 0.9 0.2 - 1.3 mg/dL    Albumin 4.1 3.4 - 5.0 g/dL    Protein Total 8.4 6.8 - 8.8 g/dL    Alkaline Phosphatase 130 40 - 150 U/L    ALT 48 0 - 70 U/L    AST 79 (H) 0 - 45 U/L   Asymptomatic SARS-CoV-2 COVID-19 Virus (Coronavirus) by PCR    Collection Time: 05/16/21  8:34 AM    Specimen: Nasopharyngeal   Result Value Ref Range    SARS-CoV-2 Virus Specimen Source Nasopharyngeal     SARS-CoV-2 PCR Result NEGATIVE     SARS-CoV-2 PCR Comment (Note)             Because this patient meets criteria for an Alcohol Use Disorder, I performed the following brief intervention on the date of this note:              1) Expressed concern that the patient is drinking at unhealthy levels known to increase their risk of alcohol related problems              2) Gave feedback linking alcohol use and health, including personalized feedback explaining how alcohol use can interact with their medical and/or psychiatric problems, and with prescribed medications.              3) Advised patient to abstain.    PT counseled on nicotine cessation and nicotine replacement provided    Discussed with patient many issues of addiction,triggers, relapse, and establishing a solid recovery program.    DISCHARGE MENTAL STATUS EXAMINATION:  The patient is alert, oriented x3.  Good fund of knowledge.  Good use of language.  Recent and remote memory, language, fund of knowledge are all adequate.  Euthymic mood congruent affect  Speech normal rate/rhythm linear tp no loose asso,The patient does not have any active suicidal or homicidal ideation.  Does not have any auditory or visual hallucination.  Fair insight/judgment At this time, the patient was stable to be discharged.        Pt was not determined to not be a danger to himself or others. At the  current time of discharge, the patient does not meet criteria for involuntary hospitalization. On the day of discharge, the patient reports that they do not have suicidal or homicidal ideation and would never hurt themselves or others. Steps taken to minimize risk include: assessing patient s behavior and thought process daily during hospital stay, discharging patient with adequate plan for follow up for mental and physical health and discussing safety plan of returning to the hospital should the patient ever have thoughts of harming themselves or others. Therefore, based on all available evidence including the factors cited above, the patient does not appear to be at imminent risk for self-harm, and is appropriate for outpatient level of care.     Educated about side effects/risk vs benefits /alternative including non treatment.Pt consented to be on medication.     .Total time spent on discharge summary more than 35 min  More than  20 min  planning, coordination of care, medication reconciliation and performance of physical exam on day of discharge.Care was coordinated with unit RN and unit therapist       Masoud Huddleston   Home Medication Instructions SEFERINO:[id number]    Printed on:05/18/21 5402   Medication Information                      acamprosate (CAMPRAL) 333 MG EC tablet  Take 666 mg by mouth 3 times daily             furosemide (LASIX) 40 MG tablet  Take 40 mg by mouth 2 times daily              gabapentin (NEURONTIN) 300 MG capsule  Take 1 capsule (300 mg) by mouth 3 times daily             hydrOXYzine (VISTARIL) 50 MG capsule  Take 50 mg by mouth 4 times daily as needed              melatonin 3 MG tablet  Take 2 tablets (6 mg) by mouth At Bedtime             mirtazapine (REMERON) 15 MG tablet  Take 1 tablet (15 mg) by mouth At Bedtime             multivitamin w/minerals (THERA-VIT-M) tablet  Take 1 tablet by mouth daily             nicotine (NICODERM CQ) 21 MG/24HR 24 hr patch  Place 1 patch onto the  "skin daily             omeprazole (PRILOSEC) 20 MG DR capsule  Take 1 capsule (20 mg) by mouth daily             potassium chloride ER (KLOR-CON M) 20 MEQ CR tablet  Take 20 mEq by mouth 2 times daily              QUEtiapine (SEROQUEL) 100 MG tablet  Take 1 tablet (100 mg) by mouth At Bedtime             rifaximin (XIFAXAN) 550 MG TABS tablet  Take 550 mg by mouth 2 times daily              thiamine (B-1) 100 MG tablet  Take 1 tablet (100 mg) by mouth daily                  Disposition: Home      He is in a stay of commitment  His  is aware the patient is here.  He has been years since 5/16/2021, we have not received any documentation but they are going to either revoke it or continuing it.  Patient is here voluntarily he wants to be discharged he is not holdable he is out of detox..           Facts about COVID19 at www.cdc.gov/COVID19 and www.[place].gov/covid19     Keeping hands clean is one of the most important steps we can take to avoid getting sick and spreading germs to others.  Please wash your hands frequently and lather with soap for at least 20 seconds!     Medical Follow-Up:[place] [address] [phone]          Treatment Follow-Up: as per cm Call placed to Pt's CM, Uyen Stinson ([phone]).  VM left advising her of Pt's likely discharge to parent's home today.  Pt reports he is working with the owner of the sober house he was in to get into another sober house and continue in OP treatment with [facility].  Update:  Received call back from Pt's CM who reiterated description of plan for discharge and acknowledged appreciation for update.  AVS completed.  .        \"Much or all of the text in this note was generated through the use of Dragon Dictate voice to text software. Errors in spelling or words which appear to be out of contact are unintentional, may be present due having escaped editing\"     "

## 2021-05-18 NOTE — PROGRESS NOTES
DISCHARGE NOTE    Pt discharged to home at 4:20PM with referral to follow-up with Dr. Kelby Dumont.  All belongings returned to pt, including locker contents; no items sent to security. Discharge medications provided to pt. Pt denies any MH sx at this time, including SI, SIB, and HI.    Pt escorted off unit by ASHLEY Parker.    Pt will be picked up by cab via [employer].

## 2021-05-18 NOTE — PLAN OF CARE
Pt MSSA scores today are 6 and 4, no ativan required. Total ativan administration is 10 mg po ativan. 5 mg day 1, 5 mg day 2 and none so far day 3. Last ativan administered at 1617 yesterday. Pt denies suicidal ideation plans or intent. Endorses anxiety 5 and depression 6 of 10 in severity. Endorses feeling hopelessness, loneliness and  powerlessness. Pt does state willingness to alert staff should he develop any plans/thoughts regarding suiicide so staff could help keep him safe during the hospitalization (contracts for safety). Pt states wanting to go to some form of sober housing after detox is complete. Will continue to monitor and intervene as warranted. Pt denies any unmet needs at this time.

## 2021-05-18 NOTE — PROGRESS NOTES
Call placed to Pt's CM, Uyne Stinson ([phone]).  VM left advising her of Pt's likely discharge to parent's home today.  Pt reports he is working with the owner of the sober house he was in to get into another sober house and continue in OP treatment with [facility].  Update:  Received call back from Pt's CM who reiterated description of plan for discharge and acknowledged appreciation for update.  AVS completed.

## 2021-05-18 NOTE — PLAN OF CARE
Patient intermittently visible in the milieu, social with peers. Patient affect flat,mood is calm. Patient denies SI, SIB, HI, or hallucinations. Patient continues to be monitored for alcohol withdrawal. Patient MSSA scores 9 and 6. Patient was given Lorazepam 1 mg times one. Patient also given prn atenolol 50 mg times one for a pulse of 128. Pulse decreased to 92. Patient appetite is good, VSS. Patient did not have any bowel movement this evening. Patient denies any additional concerns. /70   Pulse 92   Temp 98.3  F (36.8  C) (Temporal)   Resp 16   Wt 73.5 kg (162 lb)   SpO2 98%   BMI 22.59 kg/m

## 2021-05-18 NOTE — PROGRESS NOTES
MSSA scores of 4/4,pt did not require any ativan for alcohol withdrawal this shift. Pt is observed to sleep throughout the noc.

## 2021-05-18 NOTE — PLAN OF CARE
Patient has not scored greater than an 8 on MSSA for alcohol withdrawal and has not required Lorazepam for over 24 hours. Patient is no longer in active withdrawal.

## 2021-05-18 NOTE — DISCHARGE INSTRUCTIONS
Behavioral Discharge Planning and Instructions  THANK YOU FOR CHOOSING [facility]  3A  [phone]    Summary: You were admitted to [facility] on 5/16/2021 for detoxification from alcohol.  A medical exam was performed that included lab work. You have met with a  and opted to discharge to home and work with the owner of your sober house to find a new sober house.  Please take care and make your recovery a daily priority, Masoud! It was a pleasure working with you and the entire treatment team here wishes you the very best in your recovery!     Recommendation:  If you need more support to maintain sobriety call [phone] to schedule a chemical assessment and follow the recommendations of that assessment.    Main Diagnoses:  Per Dr. Fredy Diaz;  1.  Alcohol use disorder.  2.  Alcohol withdrawal syndrome.  3.  Depression, not otherwise specified.  4.  Anxiety disorder, not otherwise specified.  5.  Alcohol-induced liver disease.  6.  Peripheral neuropathy.    Major Treatments, Procedures and Findings:  You were treated for alcohol detoxification using ativan (lorazepam) administered based on the [facility] protocol. You did not complete a chemical dependency assessment. You had labs drawn and those results were reviewed with you. Please take a copy of your lab work with you to your next primary care provider appointment.    Symptoms to Report:  If you experience more anxiety, confusion, sleeplessness, deep sadness or thoughts of suicide, notify your treatment team or notify your primary care provider. IF ANY OF THE SYMPTOMS YOU ARE EXPERIENCING ARE A MEDICAL EMERGENCY CALL 911 IMMEDIATELY.     Lifestyle Adjustment: Adjust your lifestyle to get enough sleep, relaxation, exercise and good nutrition. Continue to develop healthy coping skills to decrease stress and promote a sober living environment. Do not use mood altering substances including alcohol, illegal drugs or addictive medications other  than what is currently prescribed.     Disposition: Parent's home    Facts about COVID19 at www.cdc.gov/COVID19 and www.[place].gov/covid19    Keeping hands clean is one of the most important steps we can take to avoid getting sick and spreading germs to others.  Please wash your hands frequently and lather with soap for at least 20 seconds!    Follow-up Appointment:  On *** at *** with [place] [address] [phone]    Recovery apps for your phone to locate current in person and zoom recovery meetings  [place] - meeting juan  AA  - meeting juan  Meeting guide - meeting juan  Quick NA meeting - meeting juan  Laura- has various apps    Resources:  *due to covid-19 most AA/NA meetings are being held online*  AA meetings online search for them at: https://aa-intergroup.org (worldwide meeting listings)  AA meetings for [place] area can be found online at: https://a[place].org (click local online meetings listings)  NA meetings for [place] area can be found online at: https://www.nam[place].org  (click find a meeting)  AA and NA Sponsors are excellent resources for support and you can find one at any support group meeting.   Alcoholics Anonymous (https://aa.org/): for information 24 hours/day  AA Intergroup service office in [place] (http://www.aastpaul.org/) 740.372.3960  AA Intergroup service office in [facility]: [phone]. (http://www.a[place].org/)  Narcotics Anonymous (www.nam[place].org) (539) 350-1477  https://aafairviewriverside.org/meetings  SMART Recovery - self management for addiction recovery:  www.smartrecovery.org  Pathways ~ A Health Crisis Resource & Support Center:  167.316.2348.  https://prescribetoprevent.org/patient-education/videos/  http://www.harmreduction.org  [facility] [phone]  Support Group:  AA/NA and Sponsor/support.  National [place] on Mental Illness (www.[place].estuardo.org):  918.558.6578 or 655-486-5630.  Alcoholics Anonymous (www.alcoholics-anonymous.org): Check your phone book for your local chapter.  Suicide Awareness Voices of Education (SAVE) (www.save.org): 829-352-HYFA (1395)  National Suicide Prevention Line (www.mentalhealthmn.org): 205-336-SOAH (2805)  Mental Health Consumer/Survivor Network of [place] (www.mhcsn.net): 478.894.7746 or 042-771-5942  Mental Health Association of [place] (www.mentalhealth.org): 193.613.2265 or 277-527-8939   Substance Abuse and Mental Health Services (www.samhsa.gov)  [place] Opioid Prevention Coalition: www.opioidcoalition.org    [place] Recovery Connection (MRC)  [facility] connects people seeking recovery to resources that help foster and sustain long-term recovery.  Whether you are seeking resources for treatment, transportation, housing, job training, education, health care or other pathways to recovery, [facility] is a great place to start.  457.673.3674.  www.[employer] Pod casts for nutrition and wellness  Listen on Apple Podcasts  [employer] [facility]   Nutrition       Understand the connection between what you eat and how you feel. Hosted by licensed nutritionists and dietitians from [employer] we share practical, real-life solutions for healthier living through nutrition.     General Medication Instructions:   See your medication sheet(s) for instructions.   Take all medications as prescribed.  Make no changes unless your primary care provider suggests them.   Go to all your primary care provider visits.  Be sure to have all your required lab tests. This way, your medicines can be refilled on time.  Do not use any forms of alcohol.    Please Note:  If you have any questions at anytime after you are discharged please call [facility] detox unit 3AW at [phone].   Dayami Matamoros, Behavioral Intake [phone]  Medical Records call [phone]  Outpatient Behavioral  Intake call [phone]  LP+ Wait List/Bed Availability call [phone]    Please remember to take all of your behavioral discharge planning and lab paperwork to any follow up appointments, it contains your lab results, diagnosis, medication list and discharge recommendations.      THANK YOU FOR CHOOSING [facility]

## 2021-05-27 ENCOUNTER — RECORDS - HEALTHEAST (OUTPATIENT)
Dept: ADMINISTRATIVE | Facility: CLINIC | Age: 36
End: 2021-05-27

## 2021-05-27 VITALS
TEMPERATURE: 98 F | SYSTOLIC BLOOD PRESSURE: 104 MMHG | HEART RATE: 91 BPM | DIASTOLIC BLOOD PRESSURE: 54 MMHG | OXYGEN SATURATION: 97 % | RESPIRATION RATE: 16 BRPM

## 2021-05-27 VITALS
SYSTOLIC BLOOD PRESSURE: 98 MMHG | TEMPERATURE: 99.1 F | OXYGEN SATURATION: 100 % | HEART RATE: 94 BPM | RESPIRATION RATE: 18 BRPM | DIASTOLIC BLOOD PRESSURE: 62 MMHG

## 2021-05-27 ASSESSMENT — PATIENT HEALTH QUESTIONNAIRE - PHQ9: SUM OF ALL RESPONSES TO PHQ QUESTIONS 1-9: 20

## 2021-05-28 ASSESSMENT — ANXIETY QUESTIONNAIRES: GAD7 TOTAL SCORE: 16

## 2021-05-28 NOTE — TELEPHONE ENCOUNTER
2nd Attempt   #: [phone]  No answer and left message to return called at [phone]. Upon re turing called ok to relay message below.

## 2021-05-28 NOTE — TELEPHONE ENCOUNTER
Who is calling:  Patient  Reason for Call:  The patient returned the call to the clinic and received the previous message regarding his elevated potassium level. He expressed that he is not taking any extra potassium but would like advise about the current prescriptions he has and if any should be adjusted. Please return his call at the number provided.   Date of last appointment with primary care: 5/10  Okay to leave a detailed message: Yes

## 2021-05-28 NOTE — TELEPHONE ENCOUNTER
Called and spoke with pt , Message was given, pt understood, no further questions.  Patient will call back for the lab only.

## 2021-05-28 NOTE — TELEPHONE ENCOUNTER
It appears all of his medications are really vitamins or supplements.  These should all continue and should not interfere with his potassium.  I would just have him recheck his potassium this week. I can put in an order.

## 2021-05-28 NOTE — TELEPHONE ENCOUNTER
----- Message from Ghazal Yao MD sent at 5/13/2019  3:15 PM CDT -----  Please call patient and make sure he is not taking extra potassium - his levels were a little high at 5.3.  He should recheck this sometime this week.

## 2021-05-28 NOTE — PROGRESS NOTES
Assessment/ Plan  Problem List Items Addressed This Visit        Unprioritized    Alcohol withdrawal (H) - Primary     Currently mild, tremor only, not planning to quit drinking presently.    Discussed importance of quitting alcohol.  If patient is serious about stopping, offered outpatient withdrawal protocol.  He will consider this.         Relevant Medications    multivitamin (ONE A DAY) per tablet    Other Relevant Orders    Phosphorus    Comprehensive Metabolic Panel    Hypomagnesemia    Hypophosphatemia    Depression, unspecified depression type     Patient mentioned this in terms of reasons for drinking.  Offered evaluation going forward, especially in context of f alcohol cessation.         Hospital discharge follow-up     Nausea, vomiting, alcohol intoxication, electrolyte abnormalities.         Alcoholism (H)     Treatment previously, voices intention of quitting eventually.  Does not want to go through treatment now.  Interested in attending AA again.  I offered outpatient withdrawal protocol (tremulous today) but only if he is serious about quitting.  Dressing depression.             Subjective  CC:  Chief Complaint   Patient presents with     Follow-up     dehydration     HPI:  Hospital Follow-up  Date of admission: 5/1  Date of discharge: 5/3  Hospital: [facility]  Chief Diagnosis: Alcohol intoxication, dehydration, electrolyte abnormalities including hypokalemia, hypo-magnesium, hypophosphatemia  Narrative:  Discharge summary copy and pasted  SUMMARY OF HOSPITAL COURSE:       Patient is a 33-year-old with history of chronic alcohol dependence and abuse.  He has been alternating between sober and not drinking and then periods of alcohol abuse.  He has been in treatment for alcohol dependency 3-4 times in the past.  Patient was admitted with tremors and anxiety.  He had acute alcohol intoxication with severe hypokalemia and hypomagnesemia.  Patient was placed on CIWA protocol.  Patient given IV Protonix.   Was given IV fluids.  He is electrolytes were aggressively managed.  He does have significant hypokalemia, hypomagnesemia and hypophosphatemia.  His appetite improved however he was able to eat adequately.  I did advise the patient about the aggressiveness of replacing his electrolytes.  He said that he wanted to go home anyway and will follow up with primary care physician.  He will be discharged with electrolyte replacement including magnesium and phosphorus.  For his hypophosphatemia, creatinine and calcium levels were normal and no signs of hypoparathyroidism at the time of his discharge, he was not having any significant withdrawals.  His CIWA score was about 4.  He was eating adequately without any nausea or vomiting and very insistent on going home.  At one point he threatened to go AMA.  I did advise him however that he should eat adequately and his electrolytes should gradually improve as well as to continue the electrolyte replacements.  Patient was hemodynamically stable.  Was able to ambulate without difficulty.  He was discharged in stable condition with instructions to follow-up with his primary care physician before he can go back to return to work.  Patient is aware of outpatient resources and so far as his alcohol addiction is concerned.                       Procedures During hospital stay not mentioned: None  Radiology studies: None  Labs: As above   Masoud Huddleston   Home Medication Instructions SEFERINO:    Printed on:05/09/19 4695   Medication Information                      folic acid (FOLVITE) 1 MG tablet  Take 1 tablet (1 mg total) by mouth daily.             magnesium oxide (MAGOX) 400 mg (241.3 mg magnesium) tablet  Take 1 tablet (400 mg total) by mouth 2 (two) times a day.             thiamine 100 MG tablet  Take 1 tablet (100 mg total) by mouth daily.               Follow-up recommended by discharging doctor: Recheck phosphorus and  magnesium  ---------------------------------------  Discussed alcohol use with patient.  Had about 1 year of sobriety about 5 years ago, been through treatments 2 times.  Not interested in going through treatment again.  Needs to get back in AA.  Never found a chapter that fit him well.    Thinks that it is his depression that makes him drink.  Has sadness about financial situation.    Withdrawal is uncomfortable, has tremor.  Never had a seizure.  Never has hallucinated.      PFSH:  Patient Active Problem List   Diagnosis     Hematemesis     Alcohol withdrawal (H)     Right patella fracture     Hypomagnesemia     Elevated liver enzymes     Aiyana-Vizcaino tear     Patella fracture     Dehydration     Acute alcoholic intoxication in alcoholism with complication (H)     Electrolyte abnormality     Malnutrition, unspecified type (H)     Hypophosphatemia     Depression, unspecified depression type     Hospital discharge follow-up     Alcoholism (H)     Current medications reviewed as follows:  Current Outpatient Medications on File Prior to Visit   Medication Sig     folic acid (FOLVITE) 1 MG tablet Take 1 tablet (1 mg total) by mouth daily.     magnesium oxide (MAGOX) 400 mg (241.3 mg magnesium) tablet Take 1 tablet (400 mg total) by mouth 2 (two) times a day.     thiamine 100 MG tablet Take 1 tablet (100 mg total) by mouth daily.     No current facility-administered medications on file prior to visit.      Social History     Tobacco Use   Smoking Status Current Every Day Smoker   Smokeless Tobacco Former User     Social History     Social History Narrative     Not on file     Patient Care Team:  Provider, No Primary Care as PCP - General  ROS  Full 10 system review including constitutional, respiratory, cardiac, gi, urinary, rheumatologic, neurologic, reproductive, dermatologic psychiatric is  performed  and is otherwise negative         Objective  Physical Exam  Vitals:    05/10/19 1422   BP: 114/62   Patient Site:  "Left Arm   Patient Position: Sitting   Cuff Size: Adult Regular   Pulse: 90   Resp: 20   Temp: 98  F (36.7  C)   TempSrc: Oral   Weight: 144 lb (65.3 kg)   Height: 5' 11\" (1.803 m)     Body mass index is 20.08 kg/m .  Patient smells strongly of cigarettes, tremor, anxious but very pleasant.  Gen- alert, oriented/ appropriately responsive  HEENT- normal cephalic, atraumatic.   Chest- Normal inspiration and expiration.    Clear to ascultation.    No chest wall deformity or scar.  CV- Heart regular rate and rhythm  normal tones, no murmurs   No gallops or rubs.  Ext- appear well perfused, no edema  Skin- warm and dry,   no visualized rash    Diagnostics:   Pending      Please note: Voice recognition software was used in this dictation.  It may therefore contain typographical errors.        "

## 2021-05-29 ENCOUNTER — RECORDS - HEALTHEAST (OUTPATIENT)
Dept: ADMINISTRATIVE | Facility: CLINIC | Age: 36
End: 2021-05-29

## 2021-05-30 ENCOUNTER — RECORDS - HEALTHEAST (OUTPATIENT)
Dept: ADMINISTRATIVE | Facility: CLINIC | Age: 36
End: 2021-05-30

## 2021-06-01 VITALS — WEIGHT: 150 LBS | BODY MASS INDEX: 22.15 KG/M2

## 2021-06-01 VITALS
HEIGHT: 71 IN | BODY MASS INDEX: 20.92 KG/M2 | WEIGHT: 149.4 LBS | WEIGHT: 149.4 LBS | HEIGHT: 71 IN | BODY MASS INDEX: 20.92 KG/M2

## 2021-06-01 VITALS — HEIGHT: 70 IN | BODY MASS INDEX: 21.52 KG/M2

## 2021-06-01 NOTE — TELEPHONE ENCOUNTER
RECORDS RECEIVED FROM: Internal   Appt Date: 06.16.2021   NOTES STATUS DETAILS   OFFICE NOTE from referring provider Internal 05.16.2021 Pari Hoover PA-C   OFFICE NOTES from other specialists Care Everywhere 12.01.2020 Kelby Dumont MD     DISCHARGE SUMMARY from hospital Internal      Care Everywhere 05.16.2021 Pari Hoover PA-C    07.02.2020 12.30.2020 Tracy Barry,      MEDICATION LIST Internal / CE    LIVER BIOSPY (IF APPLICABLE)      PATHOLOGY REPORTS  N/A    IMAGING     ENDOSCOPY (IF AVAILABLE) N/A    COLONOSCOPY (IF AVAILABLE) N/A    ULTRASOUND LIVER Care Everywhere 07.04.2020, 02.03.2020, 01.17.2020 US ABDOMEN LIMITED    01.23.2020, 01.16.2020, 01.14.2020 ULTRASOUND GUIDANCE PARACENTESIS   CT OF ABDOMEN Care Everywhere 01.23.2020 CT CHEST ABDOMEN PELVIS W ORAL W IV CONTRAST    01.16.2020, 01.11.2020 CT ABDOMEN PELVIS WO ORAL W IV CONTRAST   MRI OF LIVER N/A    FIBROSCAN, US ELASTOGRAPHY, FIBROSIS SCAN, MR ELASTOGRAPHY N/A    LABS     HEPATIC PANEL (LIVER PANEL) Care Everywhere 08.26.2020   BASIC METABOLIC PANEL Care Everywhere 08.26.2020   COMPLETE METABOLIC PANEL Internal 05.16.2021   COMPLETE BLOOD COUNT (CBC) Internal 05.16.2021   INTERNATIONAL NORMALIZED RATIO (INR) Care Everywhere 12.30.2020   HEPATITIS C ANTIBODY N/A    HEPATITIS C VIRAL LOAD/PCR N/A    HEPATITIS C GENOTYPE N/A    HEPATITIS B SURFACE ANTIGEN N/A    HEPATITIS B SURFACE ANTIBODY N/A    HEPATITIS B DNA QUANT LEVEL N/A    HEPATITIS B CORE ANTIBODY N/A      Action 06.01.2021 RM   Action Taken Called [facility] to get images pushed over, called [phone] pending images.      Action 06.02.2021 RM   Action Taken Called [phone] did not receive the US Abd Limited dated 7/4/2020, all images received and uploaded to chart.

## 2021-06-03 VITALS — HEIGHT: 71 IN | WEIGHT: 144 LBS | BODY MASS INDEX: 20.16 KG/M2

## 2021-06-03 VITALS — WEIGHT: 126 LBS | BODY MASS INDEX: 17.57 KG/M2

## 2021-06-04 VITALS
DIASTOLIC BLOOD PRESSURE: 48 MMHG | HEART RATE: 110 BPM | WEIGHT: 174.5 LBS | HEIGHT: 70 IN | BODY MASS INDEX: 24.98 KG/M2 | SYSTOLIC BLOOD PRESSURE: 108 MMHG | OXYGEN SATURATION: 100 %

## 2021-06-04 VITALS
DIASTOLIC BLOOD PRESSURE: 42 MMHG | WEIGHT: 204.9 LBS | HEART RATE: 112 BPM | OXYGEN SATURATION: 98 % | SYSTOLIC BLOOD PRESSURE: 94 MMHG | BODY MASS INDEX: 29.82 KG/M2

## 2021-06-04 VITALS — WEIGHT: 194.1 LBS | BODY MASS INDEX: 28.25 KG/M2

## 2021-06-05 VITALS
BODY MASS INDEX: 26 KG/M2 | HEIGHT: 71 IN | SYSTOLIC BLOOD PRESSURE: 110 MMHG | HEART RATE: 102 BPM | OXYGEN SATURATION: 100 % | WEIGHT: 185.7 LBS | DIASTOLIC BLOOD PRESSURE: 56 MMHG

## 2021-06-05 VITALS
OXYGEN SATURATION: 96 % | DIASTOLIC BLOOD PRESSURE: 70 MMHG | HEART RATE: 99 BPM | BODY MASS INDEX: 24.21 KG/M2 | HEIGHT: 70 IN | SYSTOLIC BLOOD PRESSURE: 126 MMHG | WEIGHT: 169.1 LBS

## 2021-06-06 NOTE — TELEPHONE ENCOUNTER
Medication Question or Clarification  Who is calling: Radha Holt From [facility]   What medication are you calling about (include dose and sig)?:   potassium chloride (K-DUR,KLOR-CON) 20 MEQ tablet 30 tablet 0 2/7/2020 3/8/2020    Sig - Route: Take 1 tablet (20 mEq total) by mouth daily. - Oral      rifAXIMin (XIFAXAN) 550 mg Tab tablet 60 tablet 0 2/6/2020 3/7/2020    Sig - Route: Take 1 tablet (550 mg total) by mouth 2 (two) times a day. - Oral      Who prescribed the medication?: ALINA LLAMAS  What is your question/concern?: Caller states patient is discharged with medications form hospital patient also has hospital follow up visit on 02/28/20 call er is questioning does patient need to continue these medication? If he need to continue   patient is out of medication requesting for refill for XIFAXAN and Potassium . Please advise . Requested Pharmacy: [facility]   Okay to leave a detailed message?: No

## 2021-06-06 NOTE — TELEPHONE ENCOUNTER
Refill Request  Did you contact pharmacy: No  Medication name:   Requested Prescriptions     Pending Prescriptions Disp Refills     thiamine 100 MG tablet 30 tablet 0     Sig: Take 1 tablet (100 mg total) by mouth daily.     folic acid (FOLVITE) 1 MG tablet 30 tablet 0     Sig: Take 1 tablet (1 mg total) by mouth daily.     omeprazole (PRILOSEC) 20 MG capsule 60 capsule 0     Sig: Take 1 capsule (20 mg total) by mouth 2 (two) times a day before meals.     multivitamin with minerals (THERA-M) 9 mg iron-400 mcg Tab tablet  0     Sig: Take 1 tablet by mouth daily.     furosemide (LASIX) 40 MG tablet 60 tablet 0     Sig: Take 1 tablet (40 mg total) by mouth 2 (two) times a day at 9am and 6pm.     spironolactone (ALDACTONE) 100 MG tablet 30 tablet 0     Sig: Take 1 tablet (100 mg total) by mouth daily.     metoprolol tartrate (LOPRESSOR) 25 MG tablet 30 tablet 0     Sig: Take 0.5 tablets (12.5 mg total) by mouth 2 (two) times a day.   patient needs his medication refilled urgently please expedite   Who prescribed the medication:   Requested Pharmacy: [facility]  Is patient out of medication: Yes  Patient notified refills processed in 3 business days:  yes  Okay to leave a detailed message: yes

## 2021-06-06 NOTE — TELEPHONE ENCOUNTER
RN cannot approve Refill Request    RN can NOT refill this medication med is not covered by policy/route to provider. Last office visit: 2/28/2020 Kelby Dumont MD Last Physical: Visit date not found Last MTM visit: Visit date not found Last visit same specialty: 2/28/2020 Kelby Dumont MD.  Next visit within 3 mo: Visit date not found  Next physical within 3 mo: Visit date not found      Kayleigh Rodríguez, [facility] Connection Triage/Med Refill 3/4/2020    Requested Prescriptions   Pending Prescriptions Disp Refills     thiamine 100 MG tablet 30 tablet 0     Sig: Take 1 tablet (100 mg total) by mouth daily.       There is no refill protocol information for this order        folic acid (FOLVITE) 1 MG tablet 30 tablet 0     Sig: Take 1 tablet (1 mg total) by mouth daily.       There is no refill protocol information for this order        omeprazole (PRILOSEC) 20 MG capsule 60 capsule 0     Sig: Take 1 capsule (20 mg total) by mouth 2 (two) times a day before meals.       GI Medications Refill Protocol Passed - 3/4/2020 10:06 AM        Passed - PCP or prescribing provider visit in last 12 or next 3 months.     Last office visit with prescriber/PCP: 2/28/2020 Kelby Dumont MD OR same dept: 2/28/2020 Kelby Dumont MD OR same specialty: 2/28/2020 Kelby Dumont MD  Last physical: Visit date not found Last MTM visit: Visit date not found   Next visit within 3 mo: Visit date not found  Next physical within 3 mo: Visit date not found  Prescriber OR PCP: Kelby Dumont MD  Last diagnosis associated with med order: 1. Severe protein-calorie malnutrition (H)  - thiamine 100 MG tablet; Take 1 tablet (100 mg total) by mouth daily.  Dispense: 30 tablet; Refill: 0  - folic acid (FOLVITE) 1 MG tablet; Take 1 tablet (1 mg total) by mouth daily.  Dispense: 30 tablet; Refill: 0  - multivitamin with minerals (THERA-M) 9 mg iron-400 mcg Tab tablet; Take 1 tablet by mouth daily.; Refill: 0    2. Alcoholic cirrhosis of liver without ascites  (H)  - omeprazole (PRILOSEC) 20 MG capsule; Take 1 capsule (20 mg total) by mouth 2 (two) times a day before meals.  Dispense: 60 capsule; Refill: 0    3. Benign essential hypertension  - furosemide (LASIX) 40 MG tablet; Take 1 tablet (40 mg total) by mouth 2 (two) times a day at 9am and 6pm.  Dispense: 60 tablet; Refill: 0  - spironolactone (ALDACTONE) 100 MG tablet; Take 1 tablet (100 mg total) by mouth daily.  Dispense: 30 tablet; Refill: 0    4. Sinus tachycardia  - metoprolol tartrate (LOPRESSOR) 25 MG tablet; Take 0.5 tablets (12.5 mg total) by mouth 2 (two) times a day.  Dispense: 30 tablet; Refill: 0    If protocol passes may refill for 12 months if within 3 months of last provider visit (or a total of 15 months).             multivitamin with minerals (THERA-M) 9 mg iron-400 mcg Tab tablet  0     Sig: Take 1 tablet by mouth daily.       There is no refill protocol information for this order        furosemide (LASIX) 40 MG tablet 60 tablet 0     Sig: Take 1 tablet (40 mg total) by mouth 2 (two) times a day at 9am and 6pm.       Diuretics/Combination Diuretics Refill Protocol  Passed - 3/4/2020 10:06 AM        Passed - Visit with PCP or prescribing provider visit in past 12 months     Last office visit with prescriber/PCP: 2/28/2020 Kelby Dumont MD OR same dept: 2/28/2020 Kelby Dumont MD OR same specialty: 2/28/2020 Kelby Dumont MD  Last physical: Visit date not found Last MTM visit: Visit date not found   Next visit within 3 mo: Visit date not found  Next physical within 3 mo: Visit date not found  Prescriber OR PCP: Kelby Dumont MD  Last diagnosis associated with med order: 1. Severe protein-calorie malnutrition (H)  - thiamine 100 MG tablet; Take 1 tablet (100 mg total) by mouth daily.  Dispense: 30 tablet; Refill: 0  - folic acid (FOLVITE) 1 MG tablet; Take 1 tablet (1 mg total) by mouth daily.  Dispense: 30 tablet; Refill: 0  - multivitamin with minerals (THERA-M) 9 mg iron-400 mcg Tab tablet;  Take 1 tablet by mouth daily.; Refill: 0    2. Alcoholic cirrhosis of liver without ascites (H)  - omeprazole (PRILOSEC) 20 MG capsule; Take 1 capsule (20 mg total) by mouth 2 (two) times a day before meals.  Dispense: 60 capsule; Refill: 0    3. Benign essential hypertension  - furosemide (LASIX) 40 MG tablet; Take 1 tablet (40 mg total) by mouth 2 (two) times a day at 9am and 6pm.  Dispense: 60 tablet; Refill: 0  - spironolactone (ALDACTONE) 100 MG tablet; Take 1 tablet (100 mg total) by mouth daily.  Dispense: 30 tablet; Refill: 0    4. Sinus tachycardia  - metoprolol tartrate (LOPRESSOR) 25 MG tablet; Take 0.5 tablets (12.5 mg total) by mouth 2 (two) times a day.  Dispense: 30 tablet; Refill: 0    If protocol passes may refill for 12 months if within 3 months of last provider visit (or a total of 15 months).             Passed - Serum Potassium in past 12 months      Lab Results   Component Value Date    Potassium 3.9 02/28/2020             Passed - Serum Sodium in past 12 months      Lab Results   Component Value Date    Sodium 136 02/28/2020             Passed - Blood pressure on file in past 12 months     BP Readings from Last 1 Encounters:   02/28/20 108/48             Passed - Serum Creatinine in past 12 months      Creatinine   Date Value Ref Range Status   02/28/2020 0.68 (L) 0.70 - 1.30 mg/dL Final             spironolactone (ALDACTONE) 100 MG tablet 30 tablet 0     Sig: Take 1 tablet (100 mg total) by mouth daily.       Diuretics/Combination Diuretics Refill Protocol  Passed - 3/4/2020 10:06 AM        Passed - Visit with PCP or prescribing provider visit in past 12 months     Last office visit with prescriber/PCP: 2/28/2020 Kelby Dumont MD OR same dept: 2/28/2020 Kelby Dumont MD OR same specialty: 2/28/2020 Kelby Dumont MD  Last physical: Visit date not found Last MTM visit: Visit date not found   Next visit within 3 mo: Visit date not found  Next physical within 3 mo: Visit date not  found  Prescriber OR PCP: Kelby Dumont MD  Last diagnosis associated with med order: 1. Severe protein-calorie malnutrition (H)  - thiamine 100 MG tablet; Take 1 tablet (100 mg total) by mouth daily.  Dispense: 30 tablet; Refill: 0  - folic acid (FOLVITE) 1 MG tablet; Take 1 tablet (1 mg total) by mouth daily.  Dispense: 30 tablet; Refill: 0  - multivitamin with minerals (THERA-M) 9 mg iron-400 mcg Tab tablet; Take 1 tablet by mouth daily.; Refill: 0    2. Alcoholic cirrhosis of liver without ascites (H)  - omeprazole (PRILOSEC) 20 MG capsule; Take 1 capsule (20 mg total) by mouth 2 (two) times a day before meals.  Dispense: 60 capsule; Refill: 0    3. Benign essential hypertension  - furosemide (LASIX) 40 MG tablet; Take 1 tablet (40 mg total) by mouth 2 (two) times a day at 9am and 6pm.  Dispense: 60 tablet; Refill: 0  - spironolactone (ALDACTONE) 100 MG tablet; Take 1 tablet (100 mg total) by mouth daily.  Dispense: 30 tablet; Refill: 0    4. Sinus tachycardia  - metoprolol tartrate (LOPRESSOR) 25 MG tablet; Take 0.5 tablets (12.5 mg total) by mouth 2 (two) times a day.  Dispense: 30 tablet; Refill: 0    If protocol passes may refill for 12 months if within 3 months of last provider visit (or a total of 15 months).             Passed - Serum Potassium in past 12 months      Lab Results   Component Value Date    Potassium 3.9 02/28/2020             Passed - Serum Sodium in past 12 months      Lab Results   Component Value Date    Sodium 136 02/28/2020             Passed - Blood pressure on file in past 12 months     BP Readings from Last 1 Encounters:   02/28/20 108/48             Passed - Serum Creatinine in past 12 months      Creatinine   Date Value Ref Range Status   02/28/2020 0.68 (L) 0.70 - 1.30 mg/dL Final             metoprolol tartrate (LOPRESSOR) 25 MG tablet 30 tablet 0     Sig: Take 0.5 tablets (12.5 mg total) by mouth 2 (two) times a day.       Beta-Blockers Refill Protocol Passed - 3/4/2020 10:06  AM        Passed - PCP or prescribing provider visit in past 12 months or next 3 months     Last office visit with prescriber/PCP: 2/28/2020 Kelby Dumont MD OR same dept: 2/28/2020 Kelby Dumont MD OR same specialty: 2/28/2020 Kelby Dumont MD  Last physical: Visit date not found Last MTM visit: Visit date not found   Next visit within 3 mo: Visit date not found  Next physical within 3 mo: Visit date not found  Prescriber OR PCP: Kelby Dumont MD  Last diagnosis associated with med order: 1. Severe protein-calorie malnutrition (H)  - thiamine 100 MG tablet; Take 1 tablet (100 mg total) by mouth daily.  Dispense: 30 tablet; Refill: 0  - folic acid (FOLVITE) 1 MG tablet; Take 1 tablet (1 mg total) by mouth daily.  Dispense: 30 tablet; Refill: 0  - multivitamin with minerals (THERA-M) 9 mg iron-400 mcg Tab tablet; Take 1 tablet by mouth daily.; Refill: 0    2. Alcoholic cirrhosis of liver without ascites (H)  - omeprazole (PRILOSEC) 20 MG capsule; Take 1 capsule (20 mg total) by mouth 2 (two) times a day before meals.  Dispense: 60 capsule; Refill: 0    3. Benign essential hypertension  - furosemide (LASIX) 40 MG tablet; Take 1 tablet (40 mg total) by mouth 2 (two) times a day at 9am and 6pm.  Dispense: 60 tablet; Refill: 0  - spironolactone (ALDACTONE) 100 MG tablet; Take 1 tablet (100 mg total) by mouth daily.  Dispense: 30 tablet; Refill: 0    4. Sinus tachycardia  - metoprolol tartrate (LOPRESSOR) 25 MG tablet; Take 0.5 tablets (12.5 mg total) by mouth 2 (two) times a day.  Dispense: 30 tablet; Refill: 0    If protocol passes may refill for 12 months if within 3 months of last provider visit (or a total of 15 months).             Passed - Blood pressure filed in past 12 months     BP Readings from Last 1 Encounters:   02/28/20 108/48

## 2021-06-06 NOTE — TELEPHONE ENCOUNTER
Refill Approved    Rx renewed per Medication Renewal Policy. Medication was last renewed on .  furosemide (LASIX) 40 MG tablet 60 tablet 0 2/7/2020 3/8/2020     omeprazole (PRILOSEC) 20 MG capsule 60 capsule 0 2/7/2020 3/8/2020      metoprolol tartrate (LOPRESSOR) 25 MG tablet 30 tablet 0 2/6/2020 3/7/2020     spironolactone (ALDACTONE) 100 MG tablet 30 tablet 0 2/7/2020 3/8/2020     Kayleigh Rodríguez, [facility] [place] Connection Triage/Med Refill 3/4/2020     Requested Prescriptions   Pending Prescriptions Disp Refills     thiamine 100 MG tablet 30 tablet 0     Sig: Take 1 tablet (100 mg total) by mouth daily.       There is no refill protocol information for this order        folic acid (FOLVITE) 1 MG tablet 30 tablet 0     Sig: Take 1 tablet (1 mg total) by mouth daily.       There is no refill protocol information for this order        omeprazole (PRILOSEC) 20 MG capsule 60 capsule 0     Sig: Take 1 capsule (20 mg total) by mouth 2 (two) times a day before meals.       GI Medications Refill Protocol Passed - 3/4/2020 10:06 AM        Passed - PCP or prescribing provider visit in last 12 or next 3 months.     Last office visit with prescriber/PCP: 2/28/2020 Kelby Dumont MD OR same dept: 2/28/2020 Kelby Dumont MD OR same specialty: 2/28/2020 Kelby Dumont MD  Last physical: Visit date not found Last MTM visit: Visit date not found   Next visit within 3 mo: Visit date not found  Next physical within 3 mo: Visit date not found  Prescriber OR PCP: Kelby Dumont MD  Last diagnosis associated with med order: 1. Severe protein-calorie malnutrition (H)  - thiamine 100 MG tablet; Take 1 tablet (100 mg total) by mouth daily.  Dispense: 30 tablet; Refill: 0  - folic acid (FOLVITE) 1 MG tablet; Take 1 tablet (1 mg total) by mouth daily.  Dispense: 30 tablet; Refill: 0  - multivitamin with minerals (THERA-M) 9 mg iron-400 mcg Tab tablet; Take 1 tablet by mouth daily.; Refill: 0    2. Alcoholic cirrhosis of liver without ascites  (H)  - omeprazole (PRILOSEC) 20 MG capsule; Take 1 capsule (20 mg total) by mouth 2 (two) times a day before meals.  Dispense: 60 capsule; Refill: 0    3. Benign essential hypertension  - furosemide (LASIX) 40 MG tablet; Take 1 tablet (40 mg total) by mouth 2 (two) times a day at 9am and 6pm.  Dispense: 60 tablet; Refill: 0  - spironolactone (ALDACTONE) 100 MG tablet; Take 1 tablet (100 mg total) by mouth daily.  Dispense: 30 tablet; Refill: 0    4. Sinus tachycardia  - metoprolol tartrate (LOPRESSOR) 25 MG tablet; Take 0.5 tablets (12.5 mg total) by mouth 2 (two) times a day.  Dispense: 30 tablet; Refill: 0    If protocol passes may refill for 12 months if within 3 months of last provider visit (or a total of 15 months).             multivitamin with minerals (THERA-M) 9 mg iron-400 mcg Tab tablet  0     Sig: Take 1 tablet by mouth daily.       There is no refill protocol information for this order        furosemide (LASIX) 40 MG tablet 60 tablet 0     Sig: Take 1 tablet (40 mg total) by mouth 2 (two) times a day at 9am and 6pm.       Diuretics/Combination Diuretics Refill Protocol  Passed - 3/4/2020 10:06 AM        Passed - Visit with PCP or prescribing provider visit in past 12 months     Last office visit with prescriber/PCP: 2/28/2020 Kelby Dumont MD OR same dept: 2/28/2020 Kelby Dumont MD OR same specialty: 2/28/2020 Kelby Dumont MD  Last physical: Visit date not found Last MTM visit: Visit date not found   Next visit within 3 mo: Visit date not found  Next physical within 3 mo: Visit date not found  Prescriber OR PCP: Kelby Dumont MD  Last diagnosis associated with med order: 1. Severe protein-calorie malnutrition (H)  - thiamine 100 MG tablet; Take 1 tablet (100 mg total) by mouth daily.  Dispense: 30 tablet; Refill: 0  - folic acid (FOLVITE) 1 MG tablet; Take 1 tablet (1 mg total) by mouth daily.  Dispense: 30 tablet; Refill: 0  - multivitamin with minerals (THERA-M) 9 mg iron-400 mcg Tab tablet;  Take 1 tablet by mouth daily.; Refill: 0    2. Alcoholic cirrhosis of liver without ascites (H)  - omeprazole (PRILOSEC) 20 MG capsule; Take 1 capsule (20 mg total) by mouth 2 (two) times a day before meals.  Dispense: 60 capsule; Refill: 0    3. Benign essential hypertension  - furosemide (LASIX) 40 MG tablet; Take 1 tablet (40 mg total) by mouth 2 (two) times a day at 9am and 6pm.  Dispense: 60 tablet; Refill: 0  - spironolactone (ALDACTONE) 100 MG tablet; Take 1 tablet (100 mg total) by mouth daily.  Dispense: 30 tablet; Refill: 0    4. Sinus tachycardia  - metoprolol tartrate (LOPRESSOR) 25 MG tablet; Take 0.5 tablets (12.5 mg total) by mouth 2 (two) times a day.  Dispense: 30 tablet; Refill: 0    If protocol passes may refill for 12 months if within 3 months of last provider visit (or a total of 15 months).             Passed - Serum Potassium in past 12 months      Lab Results   Component Value Date    Potassium 3.9 02/28/2020             Passed - Serum Sodium in past 12 months      Lab Results   Component Value Date    Sodium 136 02/28/2020             Passed - Blood pressure on file in past 12 months     BP Readings from Last 1 Encounters:   02/28/20 108/48             Passed - Serum Creatinine in past 12 months      Creatinine   Date Value Ref Range Status   02/28/2020 0.68 (L) 0.70 - 1.30 mg/dL Final             spironolactone (ALDACTONE) 100 MG tablet 30 tablet 0     Sig: Take 1 tablet (100 mg total) by mouth daily.       Diuretics/Combination Diuretics Refill Protocol  Passed - 3/4/2020 10:06 AM        Passed - Visit with PCP or prescribing provider visit in past 12 months     Last office visit with prescriber/PCP: 2/28/2020 Kelby Dumont MD OR same dept: 2/28/2020 Kelby Dumont MD OR same specialty: 2/28/2020 Kelby Dumont MD  Last physical: Visit date not found Last MTM visit: Visit date not found   Next visit within 3 mo: Visit date not found  Next physical within 3 mo: Visit date not  found  Prescriber OR PCP: Kelby Dumont MD  Last diagnosis associated with med order: 1. Severe protein-calorie malnutrition (H)  - thiamine 100 MG tablet; Take 1 tablet (100 mg total) by mouth daily.  Dispense: 30 tablet; Refill: 0  - folic acid (FOLVITE) 1 MG tablet; Take 1 tablet (1 mg total) by mouth daily.  Dispense: 30 tablet; Refill: 0  - multivitamin with minerals (THERA-M) 9 mg iron-400 mcg Tab tablet; Take 1 tablet by mouth daily.; Refill: 0    2. Alcoholic cirrhosis of liver without ascites (H)  - omeprazole (PRILOSEC) 20 MG capsule; Take 1 capsule (20 mg total) by mouth 2 (two) times a day before meals.  Dispense: 60 capsule; Refill: 0    3. Benign essential hypertension  - furosemide (LASIX) 40 MG tablet; Take 1 tablet (40 mg total) by mouth 2 (two) times a day at 9am and 6pm.  Dispense: 60 tablet; Refill: 0  - spironolactone (ALDACTONE) 100 MG tablet; Take 1 tablet (100 mg total) by mouth daily.  Dispense: 30 tablet; Refill: 0    4. Sinus tachycardia  - metoprolol tartrate (LOPRESSOR) 25 MG tablet; Take 0.5 tablets (12.5 mg total) by mouth 2 (two) times a day.  Dispense: 30 tablet; Refill: 0    If protocol passes may refill for 12 months if within 3 months of last provider visit (or a total of 15 months).             Passed - Serum Potassium in past 12 months      Lab Results   Component Value Date    Potassium 3.9 02/28/2020             Passed - Serum Sodium in past 12 months      Lab Results   Component Value Date    Sodium 136 02/28/2020             Passed - Blood pressure on file in past 12 months     BP Readings from Last 1 Encounters:   02/28/20 108/48             Passed - Serum Creatinine in past 12 months      Creatinine   Date Value Ref Range Status   02/28/2020 0.68 (L) 0.70 - 1.30 mg/dL Final             metoprolol tartrate (LOPRESSOR) 25 MG tablet 30 tablet 0     Sig: Take 0.5 tablets (12.5 mg total) by mouth 2 (two) times a day.       Beta-Blockers Refill Protocol Passed - 3/4/2020 10:06  AM        Passed - PCP or prescribing provider visit in past 12 months or next 3 months     Last office visit with prescriber/PCP: 2/28/2020 Kelby Dumont MD OR same dept: 2/28/2020 Kelby Dumont MD OR same specialty: 2/28/2020 Kelby Dumont MD  Last physical: Visit date not found Last MTM visit: Visit date not found   Next visit within 3 mo: Visit date not found  Next physical within 3 mo: Visit date not found  Prescriber OR PCP: Kelby Dumont MD  Last diagnosis associated with med order: 1. Severe protein-calorie malnutrition (H)  - thiamine 100 MG tablet; Take 1 tablet (100 mg total) by mouth daily.  Dispense: 30 tablet; Refill: 0  - folic acid (FOLVITE) 1 MG tablet; Take 1 tablet (1 mg total) by mouth daily.  Dispense: 30 tablet; Refill: 0  - multivitamin with minerals (THERA-M) 9 mg iron-400 mcg Tab tablet; Take 1 tablet by mouth daily.; Refill: 0    2. Alcoholic cirrhosis of liver without ascites (H)  - omeprazole (PRILOSEC) 20 MG capsule; Take 1 capsule (20 mg total) by mouth 2 (two) times a day before meals.  Dispense: 60 capsule; Refill: 0    3. Benign essential hypertension  - furosemide (LASIX) 40 MG tablet; Take 1 tablet (40 mg total) by mouth 2 (two) times a day at 9am and 6pm.  Dispense: 60 tablet; Refill: 0  - spironolactone (ALDACTONE) 100 MG tablet; Take 1 tablet (100 mg total) by mouth daily.  Dispense: 30 tablet; Refill: 0    4. Sinus tachycardia  - metoprolol tartrate (LOPRESSOR) 25 MG tablet; Take 0.5 tablets (12.5 mg total) by mouth 2 (two) times a day.  Dispense: 30 tablet; Refill: 0    If protocol passes may refill for 12 months if within 3 months of last provider visit (or a total of 15 months).             Passed - Blood pressure filed in past 12 months     BP Readings from Last 1 Encounters:   02/28/20 108/48

## 2021-06-06 NOTE — TELEPHONE ENCOUNTER
Reached out to the staff at [facility] and relayed the below message. No additional questions at this time. Valeria Castañeda

## 2021-06-06 NOTE — PROGRESS NOTES
ASSESSMENT:  1. Alcohol use disorder, severe, dependence (H)    He has been sober now for 3 weeks after his hospital discharge.  He seems motivated but really has had a hard time staying sober for more than 2 months ever in his adult life.  He seems to enjoy where he is at and doing the counseling in the group sessions and participating well but we will have to see how he does in the long run with this.    It is some very pablo discussions about his life being shortened if he does not stop drinking completely and that he probably came pretty close to dying his last admission.  He seems to understand this and have insight into this.  He does have 2 children that he states he wants to live for.  He is working on finding some purpose in his life in addition to that.  He seems to have plenty of resources at his avail, so I think the best that I can do for him today is encouraged him to continue as he has been doing.    I would like to see him again in 3 or 4 weeks just to make sure that he is doing okay still.  He obviously needs a bunch of blood work rechecked today so we did that for him and we can follow-up with him on the results of the become available.    I told him that his swelling that he has in his lower extremities is likely from malnutrition and that hopefully will start to get better as his albumin and protein levels come up.    I encouraged him to be active and participate in outdoor activities with his housemates, and try to eat better and overall build his strength up gradually so that he can feel better.      - HM1(CBC and Differential)  - Comprehensive Metabolic Panel  - Phosphorus  - Magnesium  - Iron and Transferrin Iron Binding Capacity  - Ferritin  - Amylase  - Lipase  - HM1 (CBC with Diff)    2. Major depressive disorder, recurrent episode, moderate (H)    He is on some some mirtazapine which is likely giving him a bit of antidepressant effect.  He is again seeing counselors and that is being  helpful he thinks that his suicide intent was really something that was driven by blackout as he really does not remember saying that he was going to hurt himself and he certainly is not suicidal in any way this time when I am seeing him today.    3. Alcoholic cirrhosis of liver without ascites (H)    We will check his liver function tests again as well as some other things.  As mentioned above I think the leg swelling is really from a poor nutritional status and giving him some third spacing but we can follow that going forward.  He has some compression stockings coming that he has ordered and he is trying to elevate his legs at night.    4. Severe protein-calorie malnutrition (H)    As mentioned above, I encouraged him to really focus on a protein rich diet and calorie intake and avoiding alcohol and hopefully that will help him get his nutritional status back in order.    5. Tobacco use disorder    Obviously, we will want to talk about smoking at some point but he has plenty to deal with right now with his alcohol use and we can work on that down the road somewhere.    I would not change his medications right now I think the furosemide is helping to get rid of some fluid in his legs, and the other cardiac medicines are working to keep his blood pressure in a good place.  I think the rifaximin is something I would have him continue for now with his liver being an issue as well as the spironolactone.  We will check the lab work however and we can make changes as needed.          PLAN:  There are no Patient Instructions on file for this visit.    Orders Placed This Encounter   Procedures     Comprehensive Metabolic Panel     Phosphorus     Magnesium     Iron and Transferrin Iron Binding Capacity     Ferritin     Amylase     Lipase     HM1 (CBC with Diff)     There are no discontinued medications.    No follow-ups on file.    CHIEF COMPLAINT:  Chief Complaint   Patient presents with     [facility]     INF/  DEVIN/SEVERE RECURRENT MAJOR DEPRESSION/ADMITTED 1.28.2020/DISCHARGING 02.07.2020/FU ONE WEEK, medication check for potassium,rifaximin.     Leg Swelling     Feet and legs to knee caps are swollen by end of each day, Fatigue, Low blood pressure, Anemia, Nose Bleeds, Hep C, needs spleen removed       HISTORY OF PRESENT ILLNESS:  Masoud is a 34 y.o. male presenting to the clinic today for a follow-up from a recent hospital stay.  Please see the notes from [facility], [facility], and [facility] over the timeframe from January 5 to February 7.  He started out at [facility] and he was there for about 2 weeks stabilizing and detoxing.  He states that he has been an alcoholic really most of his adult life.  He has been recently drinking up to 1 L of vodka a day or sometimes even more.  He does not really eat because he spends a lot of his day drinking.  He would try to work as a  and often would get intoxicated before his shift and then carry that on during the day and into the night.  He has been in the hospital a couple of other times but it really seems like it is come to ahead here.  He was throwing up blood and was starting to swell in his legs and just was overall feeling horrible.  He got his weight down to a point where he was way too thin in the 130 pounds and for his height he states that made him look like a cancer victim.  He was hydrated and put through withdrawal protocol and then was eventually sent to [facility] for more treatment and then eventually sent to [facility] to the behavioral floor where he was kept in for about a week and a half for some intensive chemical dependency treatment.  He then was sent to an outpatient inpatient type facility where he is living there and is doing high intensive group therapy sessions and counseling and he seems to be doing quite well.    Obviously he is at a high risk of relapse.  He seems to be well motivated and states that he has 2 children from 2 different  mothers.  He is wanting to live for them and be a part of their life.  He does not really have a vocation that keeps him more motivated to get better.  He is working on that as well.  He is working on the counseling and group sessions as mentioned.  He does have a bit of radha that he relies on also.  He is seeming like he is staying the right things now that he is wanting to be off drinking completely and hopefully he will be able to be successful.    He still notes that he is swelling a lot in his lower extremities.  He is taking some water pills for that and trying to keep his legs elevated when he is sleeping.  He does not have any shortness of breath anymore and he is not having any nausea or vomiting.  He has been eating a lot of food and is actually gotten his weight up to a more reasonable level.  He feels better that way, but he states that his strength is still not really returned.  He is trying to do some working out and walking to try to get some strength back but is frustrated by his slow progression.    He needs labs redrawn today as a lot of his labs were out of control and irregular when he was in the hospital.    He also mentions that they saw some sludge in his gallbladder is wondering if he needs to think about having that taken care of but I looked at it and I would just have him watch it for analysis not really having a lot of pain and we can recheck the ultrasound as was recommended next month.    REVIEW OF SYSTEMS:     All other systems are negative.    PFSH:    Reviewed    Patient is new patient to the clinic. Please see past medical history, surgical history, social history and family history, all of which were completed in their entirety today.     TOBACCO USE:  Social History     Tobacco Use   Smoking Status Current Every Day Smoker     Packs/day: 1.50   Smokeless Tobacco Former User       VITALS:  Vitals:    02/28/20 0858   BP: 108/48   Patient Site: Left Arm   Patient Position: Sitting  "  Cuff Size: Adult Regular   Pulse: (!) 110   SpO2: 100%   Weight: 174 lb 8 oz (79.2 kg)   Height: 5' 9.5\" (1.765 m)     Wt Readings from Last 3 Encounters:   02/28/20 174 lb 8 oz (79.2 kg)   02/06/20 151 lb 9.6 oz (68.8 kg)   01/25/20 159 lb (72.1 kg)     Body mass index is 25.4 kg/m .    PHYSICAL EXAM:  Constitutional: Sallow appearing patient in no acute distress.  Vitals:  Per nursing notes.    HEENT:  Normocephalic, atraumatic.  Ears are clear bilaterally, with no fluid or redness, and landmarks visible.  No icterus seen.  Pupils are equal and reactive to light, extraocular muscles intact, visual fields are full.  Nose is normal, and oropharynx is clear without redness.    Neck is without lymphadenopathy.    Lungs:  Clear to auscultation bilaterally without wheezes, rales or rhonchi.   Cardiac:  Regular rate and rhythm without murmurs, rubs, or gallops.     He has swelling in both lower extremities and 2+ up to his knees.  He has a scar in his right knee from a previous patellar fracture and repair.    ADDITIONAL HISTORY SUMMARIZED (2): 2  CARE EVERYWHERE/ EXTRA INFORMATION (1): None.   RADIOLOGY TESTS (1): reviewed  LABS (1): reviewed  CARDIOLOGY/MEDICINE TESTS (1): reviewed  INDEPENDENT REVIEW (2 each): None.     Total data points:5       The visit lasted a total of 65 minutes face to face with the patient. Over 50% of the time was spent counseling and educating the patient about medications, medication adjustments, medication side effects, and lab testing.        MEDICATIONS:  Current Outpatient Medications   Medication Sig Dispense Refill     benzocaine (ORAJEL) 10 % mucosal gel Apply to the mouth or throat 4 (four) times a day as needed for pain (for mouth pain).       docosanol (ABREVA) 10 % Crea Apply to affected area on face or lip x 7 days. Started 2/3/3020. Discontinue on 2/10/2020  0     folic acid (FOLVITE) 1 MG tablet Take 1 tablet (1 mg total) by mouth daily. 30 tablet 0     furosemide (LASIX) 40 " MG tablet Take 1 tablet (40 mg total) by mouth 2 (two) times a day at 9am and 6pm. 60 tablet 0     gabapentin (NEURONTIN) 400 MG capsule Take 1 capsule (400 mg total) by mouth 3 (three) times a day. 90 capsule 0     lidocaine (ASPERCREME, LIDOCAINE,) 4 % patch Place 1 patch on the skin daily. Apply to most painful area once daily prn, Remove and discard patch with 12 hours or as directed by MD.       melatonin 5 mg Tab tablet Take 1 tablet (5 mg total) by mouth at bedtime as needed.  0     menthol-zinc oxide (CALMOSEPTINE) 0.44-20.6 % Oint ointment Apply to groin and buttock rash area three times daily until resolved 1 Tube 0     metoprolol tartrate (LOPRESSOR) 25 MG tablet Take 0.5 tablets (12.5 mg total) by mouth 2 (two) times a day. 30 tablet 0     mirtazapine (REMERON) 15 MG tablet Take 1 tablet (15 mg total) by mouth at bedtime. (Patient taking differently: Take 30 mg by mouth at bedtime. ) 30 tablet 0     multivitamin with minerals (THERA-M) 9 mg iron-400 mcg Tab tablet Take 1 tablet by mouth daily.  0     nicotine polacrilex (NICORETTE) 4 MG gum Apply 1 each (4 mg total) to the mouth or throat every hour as needed for smoking cessation (smoking cessation, nicotine cravings). 39 each 0     nystatin (MYCOSTATIN) ointment Apply 3 times daily to penis until rash resolved 30 g 0     omeprazole (PRILOSEC) 20 MG capsule Take 1 capsule (20 mg total) by mouth 2 (two) times a day before meals. 60 capsule 0     potassium chloride (K-DUR,KLOR-CON) 20 MEQ tablet Take 1 tablet (20 mEq total) by mouth daily. (Patient taking differently: Take 20 mEq by mouth 2 (two) times a day. ) 30 tablet 0     rifAXIMin (XIFAXAN) 550 mg Tab tablet Take 1 tablet (550 mg total) by mouth 2 (two) times a day. 60 tablet 0     senna-docusate (PERICOLACE) 8.6-50 mg tablet Take 1 tablet by mouth 2 (two) times a day as needed for constipation.  0     spironolactone (ALDACTONE) 100 MG tablet Take 1 tablet (100 mg total) by mouth daily. 30 tablet 0      thiamine 100 MG tablet Take 1 tablet (100 mg total) by mouth daily. 30 tablet 0     traZODone (DESYREL) 50 MG tablet Take 1 tablet (50 mg total) by mouth at bedtime. 30 tablet 0     No current facility-administered medications for this visit.

## 2021-06-06 NOTE — TELEPHONE ENCOUNTER
RN Assessment/Reason for Call:   Okay to leave Detailed Message  Jon calling in,; Dr RUY Dumont appt 3/23/20.  Inpt treatment; graduates tomorrow.  Legs are swollen.  Told to call for treatment.lymphedema.  Will discuss at aptt.    RN Action/Disposition:  Call back if worse symptoms  Discussed home care measures.  Agrees to plan.     Kayleigh Rodríguez, RN    [facility] Triage/med refill  3/8/2020  1:33 PM      Reason for Disposition    [1] Caller requesting NON-URGENT health information AND [2] PCP's office is the best resource    Protocols used: INFORMATION ONLY CALL-A-

## 2021-06-07 NOTE — ANESTHESIA POSTPROCEDURE EVALUATION
Patient: Masoud Huddleston  Procedure(s):  ESOPHAGOGASTRODUODENOSCOPY (EGD)  Anesthesia type: MAC    Patient location: PACU  Last vitals:   Vitals Value Taken Time   /58 5/3/2020  1:47 PM   Temp 36.2  C (97.2  F) 5/3/2020  1:47 PM   Pulse 97 5/3/2020  2:15 PM   Resp 20 5/3/2020  1:47 PM   SpO2 95 % 5/3/2020  1:47 PM   Vitals shown include unvalidated device data.  Post vital signs: stable  Level of consciousness: awake and responds to simple questions  Post-anesthesia pain: pain controlled  Post-anesthesia nausea and vomiting: no  Pulmonary: unassisted, return to baseline  Cardiovascular: stable and blood pressure at baseline  Hydration: adequate  Anesthetic events: no    QCDR Measures:  ASA# 11 - Lilian-op Cardiac Arrest: ASA11B - Patient did NOT experience unanticipated cardiac arrest  ASA# 12 - Lilian-op Mortality Rate: ASA12B - Patient did NOT die  ASA# 13 - PACU Re-Intubation Rate: ASA13B - Patient did NOT require a new airway mgmt  ASA# 10 - Composite Anes Safety: ASA10A - No serious adverse event    Additional Notes:

## 2021-06-07 NOTE — PROGRESS NOTES
ASSESSMENT:  1. Iron deficiency anemia due to chronic blood loss    Due to his severe anemia, and going to the weekend and the fact that he is continued to have blood loss and is somewhat symptomatic, I am going to send him to the ED to see him to get assessed to there.  They can make a determination whether he needs transfusion in the ER or whether he needs admission.  We can follow-up with him after he has completed that work-up there, he also has a follow-up appointment virtually with the vascular clinic next week as well as an echocardiogram planned.    He may need to see hematologist to determine what we can do to further assist him with his chronic blood loss.    2. Alcohol use disorder, moderate, in early remission (H)    He again confirms to me today that he has not had any alcohol for almost 4 months now.  One would hope that his coagulopathy is would start to improve as I still am thinking that this is what is causing his anemia and persistent blood loss.    3. Leg swelling    As mentioned above, he does have an appointment to see virtually the lymphedema folks next week.          PLAN:  There are no Patient Instructions on file for this visit.    No orders of the defined types were placed in this encounter.    Medications Discontinued During This Encounter   Medication Reason     furosemide (LASIX) 40 MG tablet Therapy completed     spironolactone (ALDACTONE) 100 MG tablet Therapy completed       No follow-ups on file.    CHIEF COMPLAINT:  Chief Complaint   Patient presents with     Test Results     Discuss Lab Results       HISTORY OF PRESENT ILLNESS:  Masoud is a 34 y.o. male presenting to the clinic today for a check into an abnormal hemoglobin.  I got a critical lab value call from our lab today and it instructed me that Masoud has a hemoglobin in the 5 range.  He saw me virtually a couple of weeks ago and at that time he was feeling pretty good.  He has had more blood loss recently with a lot of  frequent nosebleeds that are hard to get to stop.  He also gets blood sometimes when he brushes his teeth.  He has not had any hematemesis or melena or hematuria.  He has been trying to eat well.  He again maintains that he has been sober for 3 to 4 months.  He continues to have problems with lymphedema and has actually gotten worse with his lower extremity swelling.  This was attributed at first to a hypoalbuminemic state because of his drinking, but it does not seem to be improving as he continues to be sober.  He does state that he has had some symptoms related to blood loss including shortness of breath with any activity, decreased activity tolerance and persistent fatigue.    REVIEW OF SYSTEMS:     All other systems are negative.    PFSH:        TOBACCO USE:  Social History     Tobacco Use   Smoking Status Current Every Day Smoker     Packs/day: 1.50   Smokeless Tobacco Former User       VITALS:  Vitals:    05/01/20 1050   BP: 94/42   Patient Site: Right Arm   Patient Position: Sitting   Cuff Size: Adult Regular   Pulse: (!) 112   SpO2: 98%   Weight: 204 lb 14.4 oz (92.9 kg)     Wt Readings from Last 3 Encounters:   05/01/20 204 lb 14.4 oz (92.9 kg)   02/28/20 174 lb 8 oz (79.2 kg)   02/06/20 151 lb 9.6 oz (68.8 kg)     Body mass index is 29.82 kg/m .    PHYSICAL EXAM:  Constitutional:  Well appearing patient in no acute distress.  Vitals:  Per nursing notes.    HEENT:  Normocephalic, atraumatic.  Ears are clear bilaterally, with no fluid or redness, and landmarks visible.  Pupils are equal and reactive to light, extraocular muscles intact, visual fields are full.  Nose is normal, and oropharynx is clear without redness.    Neck is without lymphadenopathy.    Lungs:  Clear to auscultation bilaterally without wheezes, rales or rhonchi.   Cardiac: He is slightly tachycardic today.    Lower extremities show significant edema up to about the level of his knees bilaterally.    Recent Results (from the past 24  hour(s))   HM1 (CBC with Diff)   Result Value Ref Range    WBC 5.0 4.0 - 11.0 thou/uL    RBC 2.37 (L) 4.40 - 6.20 mill/uL    Hemoglobin 5.6 (LL) 14.0 - 18.0 g/dL    Hematocrit 17.0 (L) 40.0 - 54.0 %    MCV 71 (L) 80 - 100 fL    MCH 23.7 (L) 27.0 - 34.0 pg    MCHC 33.3 32.0 - 36.0 g/dL    RDW 14.4 11.0 - 14.5 %    Platelets 167 140 - 440 thou/uL    MPV 6.9 (L) 7.0 - 10.0 fL    Neutrophils % 64 50 - 70 %    Lymphocytes % 22 20 - 40 %    Monocytes % 11 (H) 2 - 10 %    Eosinophils % 2 0 - 6 %    Basophils % 0 0 - 2 %    Neutrophils Absolute 3.2 2.0 - 7.7 thou/uL    Lymphocytes Absolute 1.1 0.8 - 4.4 thou/uL    Monocytes Absolute 0.5 0.0 - 0.9 thou/uL    Eosinophils Absolute 0.1 0.0 - 0.4 thou/uL    Basophils Absolute 0.0 0.0 - 0.2 thou/uL        MEDICATIONS:  Current Outpatient Medications   Medication Sig Dispense Refill     CEROVITE ADVANCED FORMULA  mg-mcg Tab        DAILY-HANS tablet Take 1 tablet by mouth daily.       docosanol (ABREVA) 10 % Crea Apply to affected area on face or lip x 7 days. Started 2/3/3020. Discontinue on 2/10/2020  0     gabapentin (NEURONTIN) 400 MG capsule Take 1 capsule (400 mg total) by mouth 3 (three) times a day. 90 capsule 0     lidocaine (ASPERCREME, LIDOCAINE,) 4 % patch Place 1 patch on the skin daily. Apply to most painful area once daily prn, Remove and discard patch with 12 hours or as directed by MD.       melatonin 5 mg Tab tablet Take 1 tablet (5 mg total) by mouth at bedtime as needed. 30 tablet 0     menthol-zinc oxide (CALMOSEPTINE) 0.44-20.6 % Oint ointment Apply to groin and buttock rash area three times daily until resolved 1 Tube 0     mirtazapine (REMERON) 30 MG tablet TAKE 1 TABLET BY MOUTH DAILY AT BEDTIME 30 tablet 0     multivitamin with minerals (THERA-M) 9 mg iron-400 mcg Tab tablet Take 1 tablet by mouth daily.  0     omeprazole (PRILOSEC) 20 MG capsule        potassium chloride (K-DUR,KLOR-CON) 20 MEQ tablet        traZODone (DESYREL) 50 MG tablet Take 1  tablet (50 mg total) by mouth at bedtime. 90 tablet 1     VITAMIN B-1 100 mg tablet        XIFAXAN 550 mg Tab tablet        furosemide (LASIX) 40 MG tablet Take 1 tablet (40 mg total) by mouth 2 (two) times a day at 9am and 6pm. 180 tablet 3     metoprolol tartrate (LOPRESSOR) 25 MG tablet        spironolactone (ALDACTONE) 100 MG tablet Take 1 tablet (100 mg total) by mouth daily. 90 tablet 3     No current facility-administered medications for this visit.

## 2021-06-07 NOTE — TELEPHONE ENCOUNTER
Left message for Masoud that Dr Dumont sent in 1 month of medication to the pharmacy. Paige Rodriguez LPN

## 2021-06-07 NOTE — TELEPHONE ENCOUNTER
Medication Request  Medication name: Trazodone  Requested Pharmacy: [facility] #[id number]  Reason for request: New prescription   When did you use medication last?:  yesterday  Patient offered appointment:  yes, 4/23/2020  Okay to leave a detailed message: yes  [phone]    FYI: This medication is not listed on the patient's current medication list.

## 2021-06-07 NOTE — TELEPHONE ENCOUNTER
Per Dr. Dumont I called pt to come in for a face-to-face visit to discuss his labwork done this morning.  Pt was agreeable and will be here about 10:40am.

## 2021-06-07 NOTE — ANESTHESIA PREPROCEDURE EVALUATION
Anesthesia Evaluation      Patient summary reviewed   No history of anesthetic complications     Airway   Mallampati: II  Neck ROM: full   Pulmonary - negative ROS                          Cardiovascular - negative ROS   Neuro/Psych - negative ROS     Endo/Other - negative ROS      GI/Hepatic/Renal - negative ROS           Dental                         Anesthesia Plan  Planned anesthetic: MAC    ASA 3 - emergent   Induction: intravenous   Anesthetic plan and risks discussed with: patient

## 2021-06-07 NOTE — TELEPHONE ENCOUNTER
Requesting new verbal order for Skilled Nursing start of care assessment to be completed on 5/7/2020 per patient's request     This is outside of the original 48 hour referral, requiring a new verbal order.     Thank you

## 2021-06-07 NOTE — TELEPHONE ENCOUNTER
Refill Request  Did you contact pharmacy: No  Medication name:   Requested Prescriptions     Pending Prescriptions Disp Refills     melatonin 5 mg Tab tablet   0     Sig: Take 1 tablet (5 mg total) by mouth at bedtime as needed.     Who prescribed the medication: The patient states Dr. Hammonds prescribed the melatonin while inpatient at [facility].   [facility]ed [facility]acy: [facility]  Is patient out of medication: No.  2 days left  Patient notified refills processed in 3 business days:  yes  Okay to leave a detailed message: yes  The melatonin was requested per the patient for refill previously when requesting gabapentin.The patient states he received the Gabapentin refill not the Melatonin.

## 2021-06-07 NOTE — TELEPHONE ENCOUNTER
Upcoming Appointment Question  When is the appointment: 4/23  What is your appointment for?: Medication check/ follow up  Who is your appointment scheduled with?: Dr. Kelby Dumont MD  What is your question/concern?: Patient stated that he would like clarification if this appointment is an office visit or a telephone visit. Patient stated that his understanding is that this is an office visit appointment, however it is listed as a telephone visit on the schedule. Please clarify and call patient back.  Okay to leave a detailed message?: Yes  [phone]

## 2021-06-07 NOTE — TELEPHONE ENCOUNTER
Called Masoud that this appointment is set up for Thursday 4/23/20 as a telephone visit. Paige Rodriguez LPN

## 2021-06-07 NOTE — TELEPHONE ENCOUNTER
Refill Request  Did you contact pharmacy: Yes  Medication name:   gabapentin (NEURONTIN) 400 MG capsule  90 capsule  0  2/7/2020  3/8/2020  No   Sig - Route: Take 1 capsule (400 mg total) by mouth 3 (three) times a day. - Oral   Sent to pharmacy as: gabapentin 400 mg capsule (NEURONTIN)   E-Prescribing Status: Receipt confirmed by pharmacy (2/7/2020  9:36 AM CST)     melatonin 5 mg Tab tablet   0  2/6/2020     Sig - Route: Take 1 tablet (5 mg total) by mouth at bedtime as needed. - Oral    Class: OTC   Who prescribed the medication: Dr. Kelby Dumont is PCP per the patient.  Requested Pharmacy: [facility] in [place] on [address] [facility].  Is patient out of medication: No.  2 days left of melatonin and out of gabapentin.  Patient notified refills processed in 3 business days:  yes  Okay to leave a detailed message: yes

## 2021-06-07 NOTE — TELEPHONE ENCOUNTER
Wrong dept linked to this encounter. Encounter closed and new encounter opened.    Dee Perales RN   [facility] Medication Refill and Triage Nurse  10:32 AM  12/12/2019

## 2021-06-07 NOTE — TELEPHONE ENCOUNTER
Refill request for medication: gabapentin 400mg   Last visit addressing this medication: 04/23/20 for medication check   Follow up plan unsure  months  Last refill on 04/08/20 , quantity #90   CSA completed none    checked  05/06/20, last dispensed refill 04/08/20    Appointment: unsure     Ermelinda Fuchs [facility]

## 2021-06-07 NOTE — PROGRESS NOTES
"Masoud Huddleston is a 34 y.o. male who is being evaluated via a billable telephone visit.      The patient has been notified of following:     \"This telephone visit will be conducted via a call between you and your physician/provider. We have found that certain health care needs can be provided without the need for a physical exam.  This service lets us provide the care you need with a short phone conversation.  If a prescription is necessary we can send it directly to your pharmacy.  If lab work is needed we can place an order for that and you can then stop by our lab to have the test done at a later time.    Telephone visits are billed at different rates depending on your insurance coverage. During this emergency period, for some insurers they may be billed the same as an in-person visit.  Please reach out to your insurance provider with any questions.    If during the course of the call the physician/provider feels a telephone visit is not appropriate, you will not be charged for this service.\"    Patient has given verbal consent to a Telephone visit? Yes    Patient would like to receive their AVS by AVS Preference: Mail a copy.    Additional provider notes:    This patient's visit was changed from a regular office visit to a billable telephone visit, due to the recent coronavirus issue, and the patient was comfortable with that.     ASSESSMENT:  1. Major depressive disorder, recurrent episode, moderate (H)    He seems to be pretty happy with the trazodone as he has been taking it.  We will continue to give him that to help him sleep.  He is using along with some melatonin which I think should be fine.  He still adapting to the time when he used to use alcohol to get him to sleep, so he is slowly acclimatizing himself to not needing it.  We will see how that goes and continue with the same plan and follow-up with him in a few months in the clinic hopefully.      - traZODone (DESYREL) 50 MG tablet; Take 1 tablet " (50 mg total) by mouth at bedtime.  Dispense: 90 tablet; Refill: 1    2. Leg swelling    As he is continuing to have issues with leg swelling and he is improved his nutrition and still is not drinking now for about most 4 months, I am to do some lab work on him that he could come into the clinic and do that.  Also send him to vascular medicine, knowing that they may not be doing in person visits for a bit, but we will get him on their list anyway, so that he can see them when the time is appropriate for that.      - HM1(CBC and Differential); Future  - Comprehensive Metabolic Panel; Future  - Thyroid Stimulating Hormone (TSH); Future  - Ambulato he was again congratulated on the fact that he has been sober now for about 111 days.  Ry referral to Vascular Medicine    3. Alcohol use disorder, moderate, in early remission (H)    He is indeed taking it 1 day at a time, doing AA virtually, and has good support network that he is feeling pretty confident about.  He was congratulated about that, and he is confident that he will be able to continue to stay sober going forward.          PLAN:  There are no Patient Instructions on file for this visit.    Orders Placed This Encounter   Procedures     Comprehensive Metabolic Panel     Standing Status:   Future     Standing Expiration Date:   4/23/2021     Thyroid Stimulating Hormone (TSH)     Standing Status:   Future     Standing Expiration Date:   4/23/2021     Ambulatory referral to Vascular Medicine     Referral Priority:   Routine     Referral Type:   Vascular     Referral Reason:   Evaluation and Treatment     Requested Specialty:   Vascular Surgery     Number of Visits Requested:   1     Medications Discontinued During This Encounter   Medication Reason     benzocaine (ORAJEL) 10 % mucosal gel      metoprolol tartrate (LOPRESSOR) 25 MG tablet      traZODone (DESYREL) 50 MG tablet Reorder     nystatin (MYCOSTATIN) ointment      senna-docusate (PERICOLACE) 8.6-50 mg tablet         No follow-ups on file.    CHIEF COMPLAINT:  Chief Complaint   Patient presents with     Med Check     Trazodone med check -        HISTORY OF PRESENT ILLNESS:  Masoud is a 34 y.o. male presenting for a telephone visit today to discuss medication.  He is on trazodone 50 mg at at bedtime for sleep.  As mentioned above, he was a long-term alcoholic and would use alcohol to help get him to sleep at night, and now that he has been sober for almost 4 months, he continues to find it difficult to get to sleep without it.  The trazodone helps a lot, and he can indeed get himself to sleep with that medication along with some melatonin.  He has no side effects or problems with this medication and would like to continue it indefinitely or for quite some time.    He continues to have some trouble with his legs.  He has had some leg swelling since becoming sober.  We attributed it first to some protein deficiency but he has been eating a good diet and feels like that is back to normal but he continues to have swelling.  It is not quite as bad as before but does still get some days where it is bad.  We had talked before about getting him to vascular and he is interested in doing that as well as possibly getting some blood work done.        REVIEW OF SYSTEMS:     All other systems are negative.    PFSH:    Reviewed      TOBACCO USE:  Social History     Tobacco Use   Smoking Status Current Every Day Smoker     Packs/day: 1.50   Smokeless Tobacco Former User         MEDICATIONS:  Current Outpatient Medications   Medication Sig Dispense Refill     CEROVITE ADVANCED FORMULA  mg-mcg Tab        DAILY-HANS tablet Take 1 tablet by mouth daily.       docosanol (ABREVA) 10 % Crea Apply to affected area on face or lip x 7 days. Started 2/3/3020. Discontinue on 2/10/2020  0     gabapentin (NEURONTIN) 400 MG capsule Take 1 capsule (400 mg total) by mouth 3 (three) times a day. 90 capsule 0     lidocaine (ASPERCREME, LIDOCAINE,) 4  % patch Place 1 patch on the skin daily. Apply to most painful area once daily prn, Remove and discard patch with 12 hours or as directed by MD.       melatonin 5 mg Tab tablet Take 1 tablet (5 mg total) by mouth at bedtime as needed. 30 tablet 0     menthol-zinc oxide (CALMOSEPTINE) 0.44-20.6 % Oint ointment Apply to groin and buttock rash area three times daily until resolved 1 Tube 0     mirtazapine (REMERON) 30 MG tablet TAKE 1 TABLET BY MOUTH DAILY AT BEDTIME 30 tablet 0     multivitamin with minerals (THERA-M) 9 mg iron-400 mcg Tab tablet Take 1 tablet by mouth daily.  0     traZODone (DESYREL) 50 MG tablet Take 1 tablet (50 mg total) by mouth at bedtime. 90 tablet 1     VITAMIN B-1 100 mg tablet        furosemide (LASIX) 40 MG tablet Take 1 tablet (40 mg total) by mouth 2 (two) times a day at 9am and 6pm. 180 tablet 3     spironolactone (ALDACTONE) 100 MG tablet Take 1 tablet (100 mg total) by mouth daily. 90 tablet 3     No current facility-administered medications for this visit.             Phone call duration:  22 minutes    RADHA Agustin MD

## 2021-06-07 NOTE — TELEPHONE ENCOUNTER
Refill request for medication: Gabapentin  Last visit addressing this medication: None- Has not discussed with PCP  Follow up plan Unknown  Last refill on 3/3/20, quantity #90   CSA completed None   checked  04/08/20, last dispensed refill 3/4/20    Appointment: Appointment scheduled for 4/23 telephone visit.      Spoke with patient and he stated this medication was being prescribed by his inpatient treatment program. Last filled by Dr. Hammonds from the [facility].   Barbara Alonso LPN

## 2021-06-07 NOTE — PROGRESS NOTES
"Masoud Huddleston is a 34 y.o. male who is being evaluated via a billable telephone visit.      The patient has been notified of following:     \"This telephone visit will be conducted via a call between you and your physician/provider. We have found that certain health care needs can be provided without the need for a physical exam.  This service lets us provide the care you need with a short phone conversation.  If a prescription is necessary we can send it directly to your pharmacy.  If lab work is needed we can place an order for that and you can then stop by our lab to have the test done at a later time.    If during the course of the call the physician/provider feels a telephone visit is not appropriate, you will not be charged for this service.\"         Masoud Huddleston complains of  No chief complaint on file.      I have reviewed and updated the patient's Past Medical History, Social History, Family History and Medication List.    ALLERGIES  Patient has no known allergies.    Additional provider notes: This patient's visit was changed from a regular office visit to a billable telephone visit, due to the recent coronavirus issue, and the patient was comfortable with that.     We did a follow-up today to see how Masoud is doing.  Please see my previous note detailing him meeting me after going through alcohol inpatient treatment.  He states that he is still sober, and is up to 82 days of sobriety now.  He was congratulated on that.  He feels like he is doing pretty well.  He is not doing any outpatient treatment but just going to AA meetings.  He has moved in with his mother which has been helpful as far as nutrition and support.    He feels that he is doing pretty well overall.  He still has a lot of leg swelling and is concerned about that.  He tries to elevate his legs through the day and massage them and tries to get on compression stockings but finds it difficult.  He is been trying to exercise a bit as " well.  He has gained a little weight which he thinks is good and it probably is since he is eating better and having more nutritious meals.  He is trying to do a low sodium high protein diet.  He continues to take the furosemide which is not really all that helpful but he keeps it to a stable level.  He is wondering about seeing a lymphedema doctor or a vascular doctor but I told him I probably are not seeing patients right now with the COVID issue.        Assessment/Plan:  1. Chronic alcoholism in remission (H)    He was congratulated on his sobriety so far.  He was encouraged to continue with AA and consider other resources as well.  It sounds like he is eating better at home with his mother.  He should continue to try to exercise as he can and distract himself from wanted to drink any further but he feels like he is in a good place right now as far as that goes.    2. Leg swelling    I told him to continue to eat right and increase his protein intake.  He should elevate them when he can and use stockings when he can.  If he is still a problem in a month when we have another phone visit we could see if we can get him into the vascular center but I think this is will continue to improve as his nutritional status improves.    3. Hypoalbuminemia          Phone call duration:  21 minutes    RADHA Agustin MD

## 2021-06-08 ENCOUNTER — HOSPITAL ENCOUNTER (EMERGENCY)
Facility: CLINIC | Age: 36
Discharge: HOME | End: 2021-06-08
Attending: EMERGENCY MEDICINE | Admitting: EMERGENCY MEDICINE
Payer: COMMERCIAL

## 2021-06-08 VITALS
TEMPERATURE: 98.7 F | OXYGEN SATURATION: 100 % | RESPIRATION RATE: 18 BRPM | HEART RATE: 88 BPM | DIASTOLIC BLOOD PRESSURE: 58 MMHG | SYSTOLIC BLOOD PRESSURE: 135 MMHG

## 2021-06-08 DIAGNOSIS — F10.20 ALCOHOL DEPENDENCE, CONTINUOUS DRINKING BEHAVIOR (H): ICD-10-CM

## 2021-06-08 LAB — ALCOHOL BREATH TEST: 0.19 (ref 0–0.01)

## 2021-06-08 PROCEDURE — 99283 EMERGENCY DEPT VISIT LOW MDM: CPT | Performed by: EMERGENCY MEDICINE

## 2021-06-08 PROCEDURE — 82075 ASSAY OF BREATH ETHANOL: CPT | Performed by: EMERGENCY MEDICINE

## 2021-06-08 ASSESSMENT — ENCOUNTER SYMPTOMS
COLOR CHANGE: 0
SHORTNESS OF BREATH: 0
ARTHRALGIAS: 0
HEADACHES: 0
NECK STIFFNESS: 0
DIFFICULTY URINATING: 0
EYE REDNESS: 0
CONFUSION: 0
FEVER: 0
ABDOMINAL PAIN: 0

## 2021-06-08 NOTE — TELEPHONE ENCOUNTER
Let's call and have him come in for a potassium check, no hurry.    Pharmacy recommending that he stop his potassium pills for now, while he is spironolactone.    TS

## 2021-06-08 NOTE — TELEPHONE ENCOUNTER
FROM:  HOME CARE DANI Dumont,     JV:     Pt has been admitted to [facility] and with completing med. Rec. The following Severe Drug Interaction was found:     Drug-Drug: potassium chloride, spironolactone and spironolactone   May observe hyperkalemia which may be severe or even fatal.   Pt has denied any side effects from current medications. Call with any questions.     Thank-you,   Shravan Dale RN  [facility]  [phone]

## 2021-06-08 NOTE — TELEPHONE ENCOUNTER
Patient Returning Call  Reason for call:The patient is required to hold the potassium medicaiton.  Information relayed to patient:  See Kelby Dumont MD's message  Patient has additional questions:  No  If YES, what are your questions/concerns:  NA  Okay to leave a detailed message?: No call back needed

## 2021-06-08 NOTE — TELEPHONE ENCOUNTER
Refill Approved    Rx renewed per Medication Renewal Policy. Medication was last renewed on 5/18/20.    Brook Paniagua, [facility] Connection Triage/Med Refill 6/15/2020     Requested Prescriptions   Pending Prescriptions Disp Refills     mirtazapine (REMERON) 30 MG tablet [Pharmacy Med Name: MIRTAZAPINE 30MG TABLETS] 30 tablet 0     Sig: TAKE 1 TABLET BY MOUTH EVERY NIGHT AT BEDTIME       Tricyclics/Misc Antidepressant/Antianxiety Meds Refill Protocol Passed - 6/14/2020  3:13 AM        Passed - PCP or prescribing provider visit in last year     Last office visit with prescriber/PCP: 5/1/2020 Kelby Dumont MD OR same dept: Visit date not found OR same specialty: 5/1/2020 Kelby Dumont MD  Last physical: Visit date not found Last MTM visit: Visit date not found   Next visit within 3 mo: Visit date not found  Next physical within 3 mo: Visit date not found  Prescriber OR PCP: Kelby Dumont MD  Last diagnosis associated with med order: 1. Major depressive disorder, recurrent episode, moderate (H)  - mirtazapine (REMERON) 30 MG tablet [Pharmacy Med Name: MIRTAZAPINE 30MG TABLETS]; TAKE 1 TABLET BY MOUTH EVERY NIGHT AT BEDTIME  Dispense: 30 tablet; Refill: 0    If protocol passes may refill for 12 months if within 3 months of last provider visit (or a total of 15 months).

## 2021-06-08 NOTE — ED PROVIDER NOTES
ED Provider Note  [facility]      History     Chief Complaint   Patient presents with     Drug / Alcohol Assessment     HPI  Masoud Huddleston is a 35 year old male with a history of alcoholism who presents requesting detox.  Patient states that he relapsed approximately a month ago and has been drinking 1 L of vodka per day.  His last drink was this morning.  He states he also uses marijuana but denies other drugs.  He notes a history of 1 seizure 6 months ago as well as significant tremulousness and nausea upon withdrawal.  He denies acute mental health or physical issues.  He states that he has been noncompliant with all of his medications.  He presents with his father.    Past Medical History  Past Medical History:   Diagnosis Date     Alcohol abuse      Cirrhosis of liver (H)      Depression      ED (erectile dysfunction)      Esophageal varices without bleeding (H)      Neuropathy      Seizures (H)     withdrawal     Past Surgical History:   Procedure Laterality Date     KNEE SURGERY Right 2018     acamprosate (CAMPRAL) 333 MG EC tablet  furosemide (LASIX) 40 MG tablet  gabapentin (NEURONTIN) 300 MG capsule  hydrOXYzine (VISTARIL) 50 MG capsule  melatonin 3 MG tablet  mirtazapine (REMERON) 15 MG tablet  multivitamin w/minerals (THERA-VIT-M) tablet  nicotine (NICODERM CQ) 21 MG/24HR 24 hr patch  omeprazole (PRILOSEC) 20 MG DR capsule  potassium chloride ER (KLOR-CON M) 20 MEQ CR tablet  QUEtiapine (SEROQUEL) 100 MG tablet  rifaximin (XIFAXAN) 550 MG TABS tablet  thiamine (B-1) 100 MG tablet      No Known Allergies  Family History  No family history on file.  Social History   Social History     Tobacco Use     Smoking status: Current Every Day Smoker     Packs/day: 1.00     Types: Cigarettes     Smokeless tobacco: Never Used   Substance Use Topics     Alcohol use: Yes     Comment: a liter of Vodka everyday     Drug use: Yes     Types: Marijuana      Past medical history, past surgical  history, medications, allergies, family history, and social history were reviewed with the patient. No additional pertinent items.       Review of Systems   Constitutional: Negative for fever.   HENT: Negative for congestion.    Eyes: Negative for redness.   Respiratory: Negative for shortness of breath.    Cardiovascular: Negative for chest pain.   Gastrointestinal: Negative for abdominal pain.   Genitourinary: Negative for difficulty urinating.   Musculoskeletal: Negative for arthralgias and neck stiffness.   Skin: Negative for color change.   Neurological: Negative for headaches.   Psychiatric/Behavioral: Negative for confusion.     A complete review of systems was performed with pertinent positives and negatives noted in the HPI, and all other systems negative.    Physical Exam   BP: 135/58  Pulse: 88  Temp: 98.7  F (37.1  C)  Resp: 18  SpO2: 100 %  Physical Exam  Constitutional:       General: He is not in acute distress.     Appearance: He is well-developed. He is not diaphoretic.   HENT:      Head: Normocephalic and atraumatic.      Mouth/Throat:      Pharynx: No oropharyngeal exudate.   Eyes:      General: No scleral icterus.     Extraocular Movements:      Right eye: Nystagmus present.      Left eye: Nystagmus present.      Pupils: Pupils are equal, round, and reactive to light.   Cardiovascular:      Heart sounds: Normal heart sounds.   Pulmonary:      Effort: No respiratory distress.      Breath sounds: Normal breath sounds.   Abdominal:      General: Bowel sounds are normal.      Palpations: Abdomen is soft.      Tenderness: There is no abdominal tenderness.   Musculoskeletal:         General: No tenderness.   Skin:     General: Skin is warm.      Findings: No rash.   Neurological:      Mental Status: He is alert.      GCS: GCS eye subscore is 4. GCS verbal subscore is 5. GCS motor subscore is 6.      Cranial Nerves: Cranial nerve deficit (bilateral, lateral gaze nystagmus) present.   Psychiatric:          Speech: Speech is slurred.         ED Course      Procedures        The medical record was reviewed and interpreted.  Current labs reviewed and interpreted.  Previous labs reviewed and interpreted.       Results for orders placed or performed during the hospital encounter of 06/08/21   Alcohol breath test POCT     Status: Abnormal   Result Value Ref Range    Alcohol Breath Test 0.191 (A) 0.00 - 0.01     Medications - No data to display     Assessments & Plan (with Medical Decision Making)   35-year-old male who presents with his father requesting detox from alcohol.  Differential included intoxication, withdrawal, substance abuse, malingering, mental illness, electrolyte abnormality.  Patient's exam revealed stigmata consistent with intoxication as noted above.  Alcohol level was 0.19.  The case was discussed at length with chemical dependency detox and unfortunately there were no beds available for admission.  Patient presented with his father and [facility] was contacted and does have a bed.  Father is willing to bring patient to [facility].  Stipulations per [facility] where that he is voluntary with a minimum 3-day stay with his own medications and no smoking.  Patient is agreeable to these conditions currently.    I have reviewed the nursing notes. I have reviewed the findings, diagnosis, plan and need for follow up with the patient.    New Prescriptions    No medications on file       Final diagnoses:   Alcohol dependence, continuous drinking behavior (H)       --  Dave Parada MD  [facility] EMERGENCY DEPARTMENT  6/8/2021     Dave Parada MD  06/08/21 2758

## 2021-06-08 NOTE — TELEPHONE ENCOUNTER
Reached out to patient and left a message to return phone call. Please relay the below message when patient calls back. Thank you,   Valeria Castañeda

## 2021-06-08 NOTE — TELEPHONE ENCOUNTER
FROM: [facility] RN    For: Dr. Kelby Dumont    Pt was seen today for Initial Start of Care visit. Please note the following:     - VS 98/62 HR 94 O2 sat 100% RA Resp. 18 T.  99.1 temporal  -+1 non- pitting edema to LLE, trace edema to RLE     Requesting orders for:     1. Skilled Nursing visits for 1x a week for 3 weeks with emphasis on Asses & teach: Blood pressure management, Med. Management, Edema Cares    2. Rx for B/P monitor for pt use in home    3. Ok for tubular compression to BLE on daily as mulugeta.     Please note Severe Drug Interaction: Drug-Drug: spironolactone and potassium chloride   May observe hyperkalemia which may be severe or even fatal.    Please reply via Epic Hyperspace with orders.     Thank-you,   Shravan Dale, RN  [facility]  [phone]

## 2021-06-09 NOTE — TELEPHONE ENCOUNTER
RN cannot approve Refill Request    RN can NOT refill this medication Protocol failed and NO refill given. Last office visit: 5/1/2020 Kelby Dumont MD Last Physical: Visit date not found Last MTM visit: Visit date not found Last visit same specialty: Visit date not found.  Next visit within 3 mo: Visit date not found  Next physical within 3 mo: Visit date not found      Brigid Barry, [facility] Connection Triage/Med Refill 7/25/2020    Requested Prescriptions   Pending Prescriptions Disp Refills     XIFAXAN 550 mg Tab tablet [Pharmacy Med Name: XIFAXAN 550MG TABLETS] 60 tablet 0     Sig: TAKE 1 TABLET BY MOUTH TWICE DAILY       There is no refill protocol information for this order        mirtazapine (REMERON) 30 MG tablet [Pharmacy Med Name: MIRTAZAPINE 30MG TABLETS] 30 tablet 0     Sig: TAKE 1 TABLET BY MOUTH EVERY NIGHT AT BEDTIME       There is no refill protocol information for this order

## 2021-06-09 NOTE — TELEPHONE ENCOUNTER
RN cannot approve Refill Request    RN can NOT refill this medication med is not covered by policy/route to provider     . Last office visit: 5/1/2020 Kelby Dumont MD Last Physical: Visit date not found Last MTM visit: Visit date not found Last visit same specialty: Visit date not found.  Next visit within 3 mo: Visit date not found  Next physical within 3 mo: Visit date not found      Sherry Newman, [facility] Triage/Med Refill 6/23/2020    Requested Prescriptions   Pending Prescriptions Disp Refills     XIFAXAN 550 mg Tab tablet [Pharmacy Med Name: XIFAXAN 550MG TABLETS] 60 tablet 0     Sig: TAKE 1 TABLET BY MOUTH TWICE DAILY       There is no refill protocol information for this order

## 2021-06-09 NOTE — PROGRESS NOTES
S: Tierney, [facility] ED, 34/M, SI w plan to jump off a windmill by his house or overdose     B: Pt is on MICD commitment, pt has started drinking again - relapse two weeks ago - pint of alcohol daily  Hx of seizures with withdrawal, no DTs  Pt endorses THC use  Pt reports increased depression, SI w thoughts of jumping off windmill or overdose - obsessively thinking about it   Hyper-manic, mind racing, unable to sleep  Hx of MDD  Last admit at [facility] 5500  No HI, no hallucinations     A: Voluntary     R:     338pm - Intake called ED - per RN in ED pt is not medically cleared, ED is awaiting for pt to be sober enough for an additional assessment by crisis SW. As pt is not medically cleared, intake expressed to RN in ED that intake needs to be notified when pt is medically cleared before we would move forward with a IP  admission. Intake expressed it could be the RN, the MD, or the SW to give us a call. Verified RN had the phone number for when pt is medically cleared. Intake also expressed if moving forward with IP  admit a covid swab will need to be ordered. Intake no longer following the case.

## 2021-06-09 NOTE — PROGRESS NOTES
415pm - Tierney called and reported the MD has medically cleared the pt. Pt is eating, drinking, ambulating independently. Pt does not have covid symptoms and a swab has been ordered.   416pm - Intake left message for unit charge on 5500 inquiring if there will be any beds opening up this evening. Intake expecting a call back from the charge RN   427pm - anastasia Thornton RN reports there is one possible room change/cohort that can occur on the unit to open a bed for the pt coming from the ED. Norberto reports he will inform intake one way or the other when he is able to address it on the unit.   536pm - Writer received word from  that DANI Thornton on 5500 called and reported that the ED MD was wavering if the pt needed to be admitted medically rather than IP MH. Bed unofficially held for the pt, intake awaiting decision from ED MD.   552pm - Per JUSTINO Armenta pt has been medically cleared, ED MD would like to move forward with IP MH bed at this time    R: 5500/Conn    555pm - [facility] accepts for [facility]  Pt placed in que   602pm - no answer to unit number, intake will try back later  611pm - DANI Thornton charge notified of pt in que   Intake on hold for 15 minutes with ED staff attemping to give disposition information   628pm - JUSTINO Hampton notified, expressed she would pass disposition information on to the ED as they are rather busy this evening.

## 2021-06-10 NOTE — TELEPHONE ENCOUNTER
"Medication Question or Clarification  Who is calling: The patient   What medication are you calling about (include dose and sig)?: The patient states Estro void Estrogen B testosterone booster   Who prescribed the medication?: Not a  Prescription, it is over the counter  What is your question/concern?: The patient states he wants to know if he can take this because he is in a treatment center and needs Kelby Dumont MD okay.The patient states he purchased it at a [facility] to  \"stop losing hair and growing boobs\" because he takes Spironolactone for liver Cirrhosis.    Requested Pharmacy: NA   Okay to leave a detailed message?: Yes  If Kelby Dumont MD agrees the patient can take this he would like the order faxed  to #[phone] @[facility]/ [place].             "

## 2021-06-10 NOTE — TELEPHONE ENCOUNTER
Patient Returning Call  Reason for call:  Returning clinic call.  Information relayed to patient:  The patient was read the note entry by Dr Dumont.  Patient expressed understanding of the information given.  Patient will call back to schedule the appointment, as patient is currently in treatment, and has many counseling sessions and appointments currently happening.  Patient has additional questions:  No  If YES, what are your questions/concerns:  NA  Okay to leave a detailed message?: No call back needed

## 2021-06-10 NOTE — TELEPHONE ENCOUNTER
Orders being requested:   Discontinue :   Disp  Refills  Start  End     spironolactone (ALDACTONE) 100 MG tablet  30 tablet  0  7/14/2020      Sig - Route: Take 1 tablet (100 mg total) by mouth daily. - Oral     Sent to pharmacy as: spironolactone 100 mg tablet (ALDACTONE)     E-Prescribing Status: Receipt confirmed by pharmacy (7/13/2020 10:50 AM CDT)         Reason service is needed/diagnosis: Patient is in treatment and nursing needs the order to stop this medication as indicated on message from 8/6/20  When are orders needed by: ASAP  Where to send Orders: Fax: [phone]  Okay to leave detailed message?  Yes

## 2021-06-10 NOTE — TELEPHONE ENCOUNTER
Let's just have him stop the spironolactone for a few weeks, and the symptoms should get better.     Then have him make a virtual appt with me and we can see how he is doing.    TS

## 2021-06-10 NOTE — TELEPHONE ENCOUNTER
Pt given message and wonders if there is another medication he could try besides spironolactone as this is causing him gynocomastia and now lactating.

## 2021-06-10 NOTE — PROGRESS NOTES
Masoud Huddleston is a 34 y.o. male who is being evaluated via a billable telephone visit.          ASSESSMENT:  1. Liver disease, chronic, with cirrhosis (H)    Given his persistent liver issues and his recurrent alcohol issues, I could be wise at this time to send him to get [facility] for evaluation of what we can do for him.  Obviously, it will be great if he can continue to abstain from alcohol and some of that will probably come back, but I would like to get their input and have them follow along with us as he continues to go forward.  He does not really like the spironolactone which is liver protective, because of its side effects but we can see if they have any other suggestions for him in regards to that as well.      - Ambulatory referral to Gastroenterology    2. Chronic alcoholism in remission (H)    He seems to be doing pretty well right now at his inpatient outpatient sort of arrangement that he will be out for another couple of months.  He seems well motivated to be quit of alcohol at this point but we can see how he does again as we go on.      - Ambulatory referral to Gastroenterology    3. Gynecomastia, male    I told him that this unfortunate side effect is likely from a combination of his chronic liver disease as well as the side effect of spironolactone but he will need to be on that or something like that for a while, so we can see again what GI would have to say as far as alternatives.        4. Major depressive disorder, recurrent episode, moderate (H)    He is not really getting complete effect from the trazodone at its current dose, so I will increase him up to 100 mg that he can take at night for sleep.  We could even consider going up to 150 if that is not fully effective he will let me know how things are going.  - traZODone (DESYREL) 100 MG tablet; Take 1 tablet (100 mg total) by mouth at bedtime as needed for sleep.  Dispense: 90 tablet; Refill: 3    5. Alcoholic  cirrhosis of liver without ascites (H)    I did refill the medicine as listed below that he was due for a refill of as it seems to be helping him without significant side effects.      - rifAXIMin (XIFAXAN) 550 mg Tab tablet; Take 1 tablet (550 mg total) by mouth 2 (two) times a day.  Dispense: 60 tablet; Refill: 3    6. H/O alcoholic gastritis    In a similar way I did the omeprazole refill for him also and sent these down to [facility] which will deliver him out to his program.      - omeprazole (PRILOSEC) 20 MG capsule; Take 1 capsule (20 mg total) by mouth 2 (two) times a day before meals.  Dispense: 60 capsule; Refill: 5      I like to have another follow-up call with him in about a month and see how he is doing with his program and hopefully he will continue to be doing better and he will probably be due for another lab draw about that time.    Preventive Health Care:  Reviewed      PLAN:  There are no Patient Instructions on file for this visit.    Orders Placed This Encounter   Procedures     Ambulatory referral to Gastroenterology     Referral Priority:   Routine     Referral Type:   Consultation     Referral Reason:   Evaluation and Treatment     Requested Specialty:   Gastroenterology     Number of Visits Requested:   1       No follow-ups on file.    CHIEF COMPLAINT:  Chief Complaint   Patient presents with     Follow-up      [phone] - INF 7/2/7-17 [facility] - at treatment right now. Discuss meds       HISTORY OF PRESENT ILLNESS:  Masoud is a 34 y.o. male calling in to the clinic today for a follow-up.  He was in the hospital from July 2 to July 17 at [facility].  He went in initially because of chronic alcohol issues as well as the fact that he was suicidal and say whether to hurt himself.  The patient now downplays that suicidality a bit, but seems to be a bit of a defense mechanism as the notes all indicate that he was pretty actively suicidal when he came into the  hospital.  Whether that was just because he was drinking heavily or whether he truly meant all of it is difficult to know but nevertheless he was admitted for about 2 weeks for inpatient treatment for both chemical dependency and withdrawal as well as psychiatric care.    He states that he is now in an inpatient outpatient setting in [place] and he seems to be doing pretty well with that.  He does seem to be pretty well motivated with his sobriety and is anticipating that he will be able to do well.  He is participating in the program and anticipates going to something like AA when he gets out.    As far as his psychiatric care, he again downplays the suicidality part of it but he is also on Seroquel for this, as well as trazodone at night to help him sleep.  He reports that the trazodone really is not helping at the dose that he is on is wondering if he can go up on it a bit.  He does not feel suicidal at all at this point.  He feels that his mood is better now that he is not drinking.  He acknowledges grieving a bit for that life that he was leading and does miss drinking at times but again seems motivated for his sobriety so we will see how he does.    He has known liver disease from his drinking history already at a very young age.  He is on the rifaximin at 550 mg twice a day as well as spironolactone for liver protection.  He is unhappy with that medication because he believes that is why he is getting some gynecomastia and he is very self-conscious about this and wonders if there is anything else that he can use for this.  He has not been seen by GI that was recommended that he set up 1 of those appointments when he left the hospital and that has not been done yet.    REVIEW OF SYSTEMS:     All other systems are negative.    PFSH:    Reviewed      TOBACCO USE:  Social History     Tobacco Use   Smoking Status Current Every Day Smoker     Packs/day: 1.50   Smokeless Tobacco Former User               The visit  lasted a total of 30 minutes   CA intake time  10 minutes    Telephone Consent was completed by  staff and confirmed by     I have reviewed and updated the patient's Past Medical History, Social History, Family History as appropriate.    MEDICATIONS: Reviewed and updated per CA and MD  Current Outpatient Medications   Medication Sig Dispense Refill     carboxymethylcellulose (REFRESH PLUS) 0.5 % Dpet ophthalmic dropperette Administer 2 drops to both eyes 4 (four) times a day as needed (prn dry eyes.).       CEROVITE ADVANCED FORMULA  mg-mcg Tab        furosemide (LASIX) 40 MG tablet Take 1 tablet (40 mg total) by mouth 2 (two) times a day at 9am and 6pm. 60 tablet 0     gabapentin (NEURONTIN) 100 MG capsule Take 200 mg by mouth 3 (three) times a day. 180 capsule 0     hydrOXYzine pamoate (VISTARIL) 50 MG capsule Take 1 capsule (50 mg total) by mouth every 4 (four) hours as needed for anxiety. 30 capsule 0     melatonin 3 mg Tab tablet Take 1 tablet (3 mg total) by mouth at bedtime as needed. 30 tablet 0     multivitamin with minerals (THERA-M) 9 mg iron-400 mcg Tab tablet Take 1 tablet by mouth daily. 30 tablet 0     omeprazole (PRILOSEC) 20 MG capsule Take 1 capsule (20 mg total) by mouth 2 (two) times a day before meals. 60 capsule 5     potassium chloride (K-DUR,KLOR-CON) 20 MEQ tablet Take 1 tablet (20 mEq total) by mouth 2 (two) times a day. 60 tablet 0     QUEtiapine (SEROQUEL) 100 MG tablet Take 1 tablet (100 mg total) by mouth at bedtime. 30 tablet 0     spironolactone (ALDACTONE) 100 MG tablet Take 1 tablet (100 mg total) by mouth daily. 30 tablet 0     traZODone (DESYREL) 100 MG tablet Take 1 tablet (100 mg total) by mouth at bedtime as needed for sleep. 90 tablet 3     vit E-glycerin-dimethicone Lotn Apply topically daily as needed.       rifAXIMin (XIFAXAN) 550 mg Tab tablet Take 1 tablet (550 mg total) by mouth 2 (two) times a day. 60 tablet 3     No current facility-administered  "medications for this visit.            The patient has been notified of following:     \"This telephone visit will be conducted via a call between you and your physician/provider. We have found that certain health care needs can be provided without the need for a physical exam.  This service lets us provide the care you need with a short phone conversation.  If a prescription is necessary we can send it directly to your pharmacy.  If lab work is needed we can place an order for that and you can then stop by our lab to have the test done at a later time.    If during the course of the call the physician/provider feels a telephone visit is not appropriate, you will not be charged for this service.\"      The patient has been notified of following:     \"This telephone visit will be conducted via a call between you and your physician/provider. We have found that certain health care needs can be provided without the need for a physical exam.  This service lets us provide the care you need with a short phone conversation.  If a prescription is necessary we can send it directly to your pharmacy.  If lab work is needed we can place an order for that and you can then stop by our lab to have the test done at a later time.    Telephone visits are billed at different rates depending on your insurance coverage. During this emergency period, for some insurers they may be billed the same as an in-person visit.  Please reach out to your insurance provider with any questions.    If during the course of the call the physician/provider feels a telephone visit is not appropriate, you will not be charged for this service.\"    Patient has given verbal consent to a Telephone visit? Yes    What phone number would you like to be contacted at? N/A    Patient would like to receive their AVS by AVS Preference: Mail a copy.      RADHA Agustin MD   "

## 2021-06-10 NOTE — TELEPHONE ENCOUNTER
Not really comfortable sending an order for this.  I don't generally recommend these over the counter hormone analogs, and I dont' think it will really help him.    TS

## 2021-06-11 NOTE — PROGRESS NOTES
Masoud Huddleston is a 34 y.o. male who is being evaluated via a billable telephone visit.         ASSESSMENT:  1. Chronic alcoholism in remission (H)    2. Leg swelling    I think this is still likely to be a hypo-albumin sort of scenario where his legs swell at times due to his nutritional status.  He does tell me that he is eating better, but with his chronic alcoholism and his liver issues he may have some chronic swelling that we discussed may be with him for a while.  We talked about again continuing his Lasix that he was given and also elevating his feet and using the wraps that we have talked about.    Because of his intolerance to the spironolactone this may be continuing to be a problem as well.  He is not willing to go back on the spironolactone due to the gynecomastia.        Preventive Health Care:      PLAN:  There are no Patient Instructions on file for this visit.    No orders of the defined types were placed in this encounter.      No follow-ups on file.    CHIEF COMPLAINT:  Chief Complaint   Patient presents with     Follow-up     Both Ankle Pain; [facility] [place] [facility] - has had edema/lymphodema in past. Now is painful. Gabapentin makes him feel like he is walking on pins and needles. Pain x3 weeks now. Pt is back in treatment again - every time he gets to about 50-60 days sobriety he gets increased pain like this. Wonders if pulled ligaments playing kick ball a few weeks ago.        HISTORY OF PRESENT ILLNESS:  Masoud is a 34 y.o. male calling in to the clinic today for recheck.  He states that he went into the ED with swelling.  He went into [place] and they did some blood work on him and thought that he just had some leg swelling that was benign in nature and they told him to follow-up with me.  He was has been trying to elevate his legs and use the compression socks as usual.  He has been taking his furosemide daily.  They did give him a couple of extra doses of the furosemide  try to help a bit.  He does have a chronic alcoholism which is in remission, but still has the liver damage from that and it is no doubt playing a part into his chronic swelling.  No redness noted to the swelling.  He has some pain because of the level of swelling that gets better if he elevates his legs.    REVIEW OF SYSTEMS:     All other systems are negative.    PFSH:    Reviewed      TOBACCO USE:  Social History     Tobacco Use   Smoking Status Current Every Day Smoker     Packs/day: 1.50   Smokeless Tobacco Former User       The visit lasted a total of 15 minutes   CA intake time  10 minutes    Telephone Consent was completed by  staff and confirmed by     I have reviewed and updated the patient's Past Medical History, Social History, Family History as appropriate.    MEDICATIONS: Reviewed and updated per CA and MD  Current Outpatient Medications   Medication Sig Dispense Refill     carboxymethylcellulose (REFRESH PLUS) 0.5 % Dpet ophthalmic dropperette Administer 2 drops to both eyes 4 (four) times a day as needed (prn dry eyes.).       CEROVITE ADVANCED FORMULA  mg-mcg Tab        furosemide (LASIX) 40 MG tablet Take 1 tablet (40 mg total) by mouth 2 (two) times a day at 9am and 6pm. 60 tablet 0     multivitamin with minerals (THERA-M) 9 mg iron-400 mcg Tab tablet Take 1 tablet by mouth daily. 30 tablet 0     omeprazole (PRILOSEC) 20 MG capsule Take 1 capsule (20 mg total) by mouth 2 (two) times a day before meals. 60 capsule 5     potassium chloride (K-DUR,KLOR-CON) 20 MEQ tablet Take 1 tablet (20 mEq total) by mouth 2 (two) times a day. 60 tablet 0     rifAXIMin (XIFAXAN) 550 mg Tab tablet Take 1 tablet (550 mg total) by mouth 2 (two) times a day. 60 tablet 3     traZODone (DESYREL) 100 MG tablet Take 1 tablet (100 mg total) by mouth at bedtime as needed for sleep. 90 tablet 3     vit E-glycerin-dimethicone Lotn Apply topically daily as needed.       QUEtiapine (SEROQUEL) 100 MG tablet Take  "1 tablet (100 mg total) by mouth at bedtime. 30 tablet 0     No current facility-administered medications for this visit.            The patient has been notified of following:     \"This telephone visit will be conducted via a call between you and your physician/provider. We have found that certain health care needs can be provided without the need for a physical exam.  This service lets us provide the care you need with a short phone conversation.  If a prescription is necessary we can send it directly to your pharmacy.  If lab work is needed we can place an order for that and you can then stop by our lab to have the test done at a later time.    If during the course of the call the physician/provider feels a telephone visit is not appropriate, you will not be charged for this service.\"       The patient has been notified of following:     \"This telephone visit will be conducted via a call between you and your physician/provider. We have found that certain health care needs can be provided without the need for a physical exam.  This service lets us provide the care you need with a short phone conversation.  If a prescription is necessary we can send it directly to your pharmacy.  If lab work is needed we can place an order for that and you can then stop by our lab to have the test done at a later time.    Telephone visits are billed at different rates depending on your insurance coverage. During this emergency period, for some insurers they may be billed the same as an in-person visit.  Please reach out to your insurance provider with any questions.    If during the course of the call the physician/provider feels a telephone visit is not appropriate, you will not be charged for this service.\"    Patient has given verbal consent to a Telephone visit? Yes    What phone number would you like to be contacted at? N/A    Patient would like to receive their AVS by AVS Preference: Mail a copy.        Riddhi Lugo, " CMA    Kelby Dumont MD

## 2021-06-12 NOTE — TELEPHONE ENCOUNTER
Prior Authorization Request  Who s requesting:  Pharmacy  Ph[facility] Name and Location: [facility]  Medication Name: Omeprazole 20 mg Capsule  Insurance Plan: [employer] GMAC  12 OOP ([facility])  Insurance Member ID Number: [id number]  CoverMyMeds Key: [id number]   Informed patient that prior authorizations can take up to 10 business days for response:   NA  Okay to leave a detailed message: No

## 2021-06-12 NOTE — TELEPHONE ENCOUNTER
Central PA team  [phone]  Pool: HE PA MED ([id number])          PA has been initiated.       PA form completed and faxed insurance via Cover My Meds     Key:  [id number]      Medication:  Omeprazole     Insurance:  [employer]        Response will be received via fax and may take up to 5-10 business days depending on plan

## 2021-06-13 NOTE — TELEPHONE ENCOUNTER
S:  Rachel JUSTINO called at 4:50pm with 35y/M with a court order to [facility] IP .     B:  Pt was in [place] [facility] tx and a sober house following tx.  Pt relapsed and has been kicked out of both programs/housing.   Pt has been to ED for alcoholism and refuses detox multiple times so  initiated court order for IP to [facility].  Pt was at [facility] under [place] in July, 2020.      A:  Court order to [facility]    R:    Provider paged at 5:45pm to present for 5500/[place].   [place] called back at 5:46pm and accepted for 5500.    Pt placed in que and unit called with Disposition at 5:48pm  [facility]

## 2021-06-13 NOTE — PROGRESS NOTES
ASSESSMENT:  1. Alcohol use disorder, severe, dependence (H)    2. Alcoholic cirrhosis of liver without ascites (H)      3. Chronic acquired lymphedema      4. H/O alcoholic gastritis    We spent a great deal of time together today with him and his girlfriend.  He was so intoxicated that he really could not make sense of anything that I was saying, I do not believe.  He is clearly not doing well and has no insight as to what is going on.  He is tells me at one point that he wants to get sober and then laughs about how drunk he is at the time.  Since he just left detox and he is already this intoxicated 3 days later in the middle of the day does not look good for him at all.  I encouraged him strongly that he go to the emergency department today after leaving our clinic.  He does his girlfriend with him who is sober.  I encouraged him to go straight there and see if he can get in to be detox again.  We had a pablo discussion with his girlfriend about how he is going to drink himself to death without too much doubt in my mind unless he really straightens himself up and get sober quickly but I think the chances of that are pretty small.  I hope I am wrong and I hope that he can turn this around to get better but the way he looks right now today I do not think that is going to be possible.  I asked him to come back and see me sometime when he is sober.        PLAN:  There are no Patient Instructions on file for this visit.    No orders of the defined types were placed in this encounter.    Medications Discontinued During This Encounter   Medication Reason     nicotine (NICODERM CQ) 21 mg/24 hr      nicotine (NICODERM CQ) 21 mg/24 hr      QUEtiapine (SEROQUEL) 100 MG tablet      traZODone (DESYREL) 100 MG tablet      furosemide (LASIX) 40 MG tablet Reorder     omeprazole (PRILOSEC) 20 MG capsule Reorder     mirtazapine (REMERON) 15 MG tablet Reorder     XIFAXAN 550 mg Tab tablet Reorder       No follow-ups on  "file.    CHIEF COMPLAINT:  Chief Complaint   Patient presents with     Annual Exam     Not Fasting, been off meds and \"on a wild run for the last 2.5 weeks\",        HISTORY OF PRESENT ILLNESS:  Masoud is a 35 y.o. male presenting to the clinic today for follow-up from detox.  He was in detox at [facility] for a while and then left and since then it sounds like he has been drunk ever since he left detox.  He is here with his girlfriend today who is at her wits and and very frustrated.  The patient is obviously intoxicated here today in our clinic, can barely get to his room without help and is giggling and laughing and totally inappropriate.  He is swearing and making sexual jokes and quite far gone.  I am not sure what we can do for him today.  We discussed his medication list that he is on but it sounds like they discontinued some of those in the ER or detox and we did restart some of them here today.  He has a history of cirrhosis that is from alcohol use and swelling and gastritis and multiple other system issues going on and its not going to get any better obviously until he gets sober and I do not think that is going to happen anytime soon.    REVIEW OF SYSTEMS:     All other systems are negative.    PFSH:    Reviewed      TOBACCO USE:  Social History     Tobacco Use   Smoking Status Current Every Day Smoker     Packs/day: 1.50   Smokeless Tobacco Former User       VITALS:  Vitals:    12/01/20 1446   BP: 126/70   Patient Site: Left Arm   Patient Position: Sitting   Cuff Size: Adult Regular   Pulse: 99   SpO2: 96%   Weight: 169 lb 1.6 oz (76.7 kg)   Height: 5' 10\" (1.778 m)     Wt Readings from Last 3 Encounters:   12/01/20 169 lb 1.6 oz (76.7 kg)   11/22/20 170 lb (77.1 kg)   07/02/20 160 lb (72.6 kg)     Body mass index is 24.26 kg/m .    PHYSICAL EXAM:  Constitutional: He is obviously intoxicated and unable to really focus on anything today.  He is inappropriate and vulgar.          The visit lasted " a total of 55 minutes face to face with the patient. Over 50% of the time was spent counseling and educating the patient about medications, medication adjustments, medication side effects, and lab testing.        MEDICATIONS:  Current Outpatient Medications   Medication Sig Dispense Refill     carboxymethylcellulose (REFRESH PLUS) 0.5 % Dpet ophthalmic dropperette Administer 2 drops to both eyes 4 (four) times a day as needed (prn dry eyes.).       CEROVITE ADVANCED FORMULA  mg-mcg Tab        furosemide (LASIX) 40 MG tablet Take 1 tablet (40 mg total) by mouth 2 (two) times a day at 9am and 6pm for 3 days. 6 tablet 0     gabapentin (NEURONTIN) 100 MG capsule Take 200 mg by mouth 3 (three) times a day for 3 days. 18 capsule 0     hydrOXYzine pamoate (VISTARIL) 50 MG capsule        melatonin 3 mg Tab tablet Take 2 tablets (6 mg total) by mouth at bedtime as needed (difficulty sleeping). 6 tablet 0     metoprolol tartrate (LOPRESSOR) 25 MG tablet Take 0.5 tablets (12.5 mg total) by mouth 2 (two) times a day for 3 days. 3 tablet 0     mirtazapine (REMERON) 15 MG tablet Take 1 tablet (15 mg total) by mouth at bedtime for 3 days. 3 tablet 0     multivitamin with minerals (THERA-M) 9 mg iron-400 mcg Tab tablet Take 1 tablet by mouth daily for 3 days. 3 tablet 0     omeprazole (PRILOSEC) 20 MG capsule Take 1 capsule (20 mg total) by mouth 2 (two) times a day before meals for 3 days. 6 capsule 0     pediatric multivitamin (FLINTSTONES) Chew chewable tablet Chew 1 tablet.       potassium chloride (K-DUR,KLOR-CON) 20 MEQ tablet Take 1 tablet (20 mEq total) by mouth 2 (two) times a day for 3 days. 6 tablet 0     QUEtiapine (SEROQUEL) 100 MG tablet Take 1 tablet (100 mg total) by mouth at bedtime. 3 tablet 0     rifAXIMin (XIFAXAN) 550 mg Tab tablet Take 1 tablet (550 mg total) by mouth 2 (two) times a day for 3 days. 6 tablet 0     sildenafil (REVATIO) 20 mg tablet Take 3-4 tabs one hour before sexual activity as directed  90 tablet 1     spironolactone (ALDACTONE) 100 MG tablet Take 1 tablet (100 mg total) by mouth daily for 3 days. 3 tablet 0     traZODone (DESYREL) 100 MG tablet Take 1 tablet (100 mg total) by mouth at bedtime for 3 days. 3 tablet 0     vit E-glycerin-dimethicone Lotn Apply topically daily as needed.       No current facility-administered medications for this visit.

## 2021-06-13 NOTE — PROGRESS NOTES
Masoud Huddleston is a 35 y.o. male who is being evaluated via a billable telephone visit.         ASSESSMENT:  1. Erectile dysfunction, unspecified erectile dysfunction type    Having tried the sildenafil as listed out below.  We talked about the good Rx program and how he can get it quite a bit cheaper using that voucher program.  Printed out the prescription form he will come in and pick it up along with one of the cards that we have available.    We talked about how the medication works and the potential side effects of the medication.  He will let me know if he has trouble but otherwise he will also let me know how things go for using the medication.    - sildenafil (REVATIO) 20 mg tablet; Take 3-4 tabs one hour before sexual activity as directed  Dispense: 90 tablet; Refill: 1        Preventive Health Care:    Reviewed      PLAN:  There are no Patient Instructions on file for this visit.    No orders of the defined types were placed in this encounter.      No follow-ups on file.    CHIEF COMPLAINT:  Chief Complaint   Patient presents with     Erectile Dysfunction     Discuss trialing viagra       HISTORY OF PRESENT ILLNESS:  Masoud is a 35 y.o. male calling in to the clinic today for some concerns about erectile dysfunction.  He has the long history of issues related to the alcoholism recently and treatment and as well as the psychiatric issues that he has been having going on.  He states that he really has not been sexually active for years but now he has Medi-Nikita and the have been trying to have intercourse and he finds that he can even get an erection sufficient for penetration.  He is stating that he has really good libido but has just not been able to have an erection for some time.  He even has difficulty with masturbation.  He was wondering if he can get something that could help him with this and has been looking into the idea of trying some sildenafil.    REVIEW OF SYSTEMS:     All other systems are  negative.    PFSH:    Reviewed      TOBACCO USE:  Social History     Tobacco Use   Smoking Status Current Every Day Smoker     Packs/day: 1.50   Smokeless Tobacco Former User           The visit lasted a total of 15 minutes   CA intake time  10 minutes    Telephone Consent was completed by  staff and confirmed by     I have reviewed and updated the patient's Past Medical History, Social History, Family History as appropriate.    MEDICATIONS: Reviewed and updated per CA and MD  Current Outpatient Medications   Medication Sig Dispense Refill     carboxymethylcellulose (REFRESH PLUS) 0.5 % Dpet ophthalmic dropperette Administer 2 drops to both eyes 4 (four) times a day as needed (prn dry eyes.).       CEROVITE ADVANCED FORMULA  mg-mcg Tab        furosemide (LASIX) 40 MG tablet Take 1 tablet (40 mg total) by mouth 2 (two) times a day at 9am and 6pm. 60 tablet 0     gabapentin (NEURONTIN) 100 MG capsule        hydrOXYzine pamoate (VISTARIL) 50 MG capsule        mirtazapine (REMERON) 15 MG tablet        multivitamin with minerals (THERA-M) 9 mg iron-400 mcg Tab tablet Take 1 tablet by mouth daily. 30 tablet 0     omeprazole (PRILOSEC) 20 MG capsule Take 1 capsule (20 mg total) by mouth 2 (two) times a day before meals. 60 capsule 5     potassium chloride (K-DUR,KLOR-CON) 20 MEQ tablet Take 1 tablet (20 mEq total) by mouth 2 (two) times a day. 60 tablet 0     rifAXIMin (XIFAXAN) 550 mg Tab tablet Take 1 tablet (550 mg total) by mouth 2 (two) times a day. 60 tablet 3     traZODone (DESYREL) 100 MG tablet Take 1 tablet (100 mg total) by mouth at bedtime as needed for sleep. 90 tablet 3     vit E-glycerin-dimethicone Lotn Apply topically daily as needed.       QUEtiapine (SEROQUEL) 100 MG tablet Take 1 tablet (100 mg total) by mouth at bedtime. 30 tablet 0     sildenafil (REVATIO) 20 mg tablet Take 3-4 tabs one hour before sexual activity as directed 90 tablet 1     No current facility-administered  "medications for this visit.            The patient has been notified of following:     \"This telephone visit will be conducted via a call between you and your physician/provider. We have found that certain health care needs can be provided without the need for a physical exam.  This service lets us provide the care you need with a short phone conversation.  If a prescription is necessary we can send it directly to your pharmacy.  If lab work is needed we can place an order for that and you can then stop by our lab to have the test done at a later time.    If during the course of the call the physician/provider feels a telephone visit is not appropriate, you will not be charged for this service.\"       The patient has been notified of following:     \"This telephone visit will be conducted via a call between you and your physician/provider. We have found that certain health care needs can be provided without the need for a physical exam.  This service lets us provide the care you need with a short phone conversation.  If a prescription is necessary we can send it directly to your pharmacy.  If lab work is needed we can place an order for that and you can then stop by our lab to have the test done at a later time.    Telephone visits are billed at different rates depending on your insurance coverage. During this emergency period, for some insurers they may be billed the same as an in-person visit.  Please reach out to your insurance provider with any questions.    If during the course of the call the physician/provider feels a telephone visit is not appropriate, you will not be charged for this service.\"    Patient has given verbal consent to a Telephone visit? Yes    What phone number would you like to be contacted at? [phone]    Patient would like to receive their AVS by AVS Preference: Mail a copy.        RADHA Agustin MD   "

## 2021-06-14 NOTE — PROGRESS NOTES
Assessment & Plan     Chronic alcoholism in remission (H)    He is 20 days sober now and he was congratulated on that.  He seems to be saying the right things and is going to AA meetings and is plan to get a new sponsor since his existing sponsor is moving out of state.  He is connecting with his psychiatrist as well as his therapist.  He is on the Campral which he thinks is helping with his cravings.  We will continue with that medication and as well as all of the other things that he is doing with his intensive outpatient program.  We will see him back in a couple of months just to make sure that he is doing well and sooner as needed.    Erectile dysfunction, unspecified erectile dysfunction type    Refill written for the sildenafil for him to have on hand should he need it.  He will let us know if he is having any problems filling it or with it working.      - sildenafil (REVATIO) 20 mg tablet  Dispense: 90 tablet; Refill: 1    Esophageal varices without bleeding, unspecified esophageal varices type (H)    He is on the omeprazole and that seems to be helping with the acid issue and has not really had too much problems with epigastric pain or any bleeding.    Severe protein-calorie malnutrition (H)    This is getting a bit better with him being sober and trying to eat a little bit better.  He was encouraged to eat a healthy diet with plenty of fruits and vegetables and not a lot of carbs and more protein.    Alcohol withdrawal, uncomplicated (H)    He is not really in withdrawal at this point, and is more on a maintenance right now but continues on the medications that were given to him in the hospital.    Major depressive disorder, recurrent severe without psychotic features (H)    He states that his depression is better but is still present certainly and is working with his therapist on that.  He continues with the prescription medications that he takes for that as well.    Thrombocytopenia, unspecified  "(H)    We will not check this today as it was too soon from the hospital stay but will check him when he comes back and sees us in a month or 2.      Review of prior external note(s) from - Outside records from hospital stay and various ED visits  Review of the result(s) of each unique test - many tests reviewed from his hospital/ED visits    Discussion of management or test interpretation with external physician/other qualified healthcare professional/appropriate source -   Diagnosis or treatment significantly limited by social determinants of health - tenuous home situation, alcohol use    30 minutes spent on the date of the encounter doing chart review, review of test results, interpretation of tests, patient visit and documentation          Tobacco Cessation:   reports that he has been smoking. He has been smoking about 1.50 packs per day. He has quit using smokeless tobacco.    BMI:   Estimated body mass index is 25.9 kg/m  as calculated from the following:    Height as of this encounter: 5' 11\" (1.803 m).    Weight as of this encounter: 185 lb 11.2 oz (84.2 kg).         No follow-ups on file.    Kelby Dumont MD  [facility]     Masoud Huddleston is 35 y.o. and presents to clinic today for the following health issues   HPI   Patient is here today for a hospital follow-up from an ED visits of multiple occasions, as well as a hospital stay for 15 days in regards to his alcohol abuse and severe depression.  We spent a lot of time today reviewing the chart notes from those ER and hospital stays.  He is doing better and has been sober now 20 days.  He is doing well on his medications and feels like he is in a pretty good place but really is living day-to-day and even minute to minute on his alcohol use.        Review of Systems        Objective    /56 (Patient Site: Left Arm, Patient Position: Sitting, Cuff Size: Adult Regular)   Pulse (!) 102   Ht 5' 11\" (1.803 " m)   Wt 185 lb 11.2 oz (84.2 kg)   SpO2 100%   BMI 25.90 kg/m    Body mass index is 25.9 kg/m .  Physical Exam    General: Awake, Alert and Cooperative   Head: Normocephalic and Atraumatic   Eyes: PERRL, EOMI.   ENT: Normal pearly TMs bilaterally and Oropharynx clear   Neck: Supple and Thyroid without enlargement or nodules   Chest: Chest wall normal   Lungs: Clear to auscultation bilaterally   Heart:: Regular rate and rhythm and no murmurs.  No significant LE edema.   Abdomen: Soft, nontender, nondistended and no hepatosplenomegaly   Musculoskeletal: Moving all extremities and No pain in the extremities   Neuro: Alert and oriented times 3 and Grossly normal   Skin: No rashes or lesions noted

## 2021-06-16 ENCOUNTER — PRE VISIT (OUTPATIENT)
Dept: GASTROENTEROLOGY | Facility: CLINIC | Age: 36
End: 2021-06-16

## 2021-06-16 NOTE — TELEPHONE ENCOUNTER
Refill Approved    Rx renewed per Medication Renewal Policy. Medication was last renewed on 12/30/20.    Norberto Best, [facility] Triage/Med Refill 4/15/2021     Requested Prescriptions   Pending Prescriptions Disp Refills     furosemide (LASIX) 40 MG tablet [Pharmacy Med Name: Furosemide 40MG TABS] 60 tablet 0     Sig: TAKE 1 TABLET BY MOUTH TWICE A DAY (09:00AM & 06:00PM)       Diuretics/Combination Diuretics Refill Protocol  Passed - 4/14/2021 11:51 AM        Passed - Visit with PCP or prescribing provider visit in past 12 months     Last office visit with prescriber/PCP: 1/4/2021 Kelby Dumont MD OR same dept: 1/4/2021 Kelby Dumont MD OR same specialty: 1/4/2021 Kelby Dumont MD  Last physical: 12/1/2020 Last MTM visit: Visit date not found   Next visit within 3 mo: Visit date not found  Next physical within 3 mo: Visit date not found  Prescriber OR PCP: Kelby Dumont MD  Last diagnosis associated with med order: 1. Alcoholic cirrhosis of liver without ascites (H)  - furosemide (LASIX) 40 MG tablet [Pharmacy Med Name: Furosemide 40MG TABS]; TAKE 1 TABLET BY MOUTH TWICE A DAY (09:00AM & 06:00PM)  Dispense: 60 tablet; Refill: 0    2. Chronic acquired lymphedema  - furosemide (LASIX) 40 MG tablet [Pharmacy Med Name: Furosemide 40MG TABS]; TAKE 1 TABLET BY MOUTH TWICE A DAY (09:00AM & 06:00PM)  Dispense: 60 tablet; Refill: 0    If protocol passes may refill for 12 months if within 3 months of last provider visit (or a total of 15 months).             Passed - Serum Potassium in past 12 months      Lab Results   Component Value Date    Potassium 4.5 12/30/2020             Passed - Serum Sodium in past 12 months      Lab Results   Component Value Date    Sodium 139 12/30/2020             Passed - Blood pressure on file in past 12 months     BP Readings from Last 1 Encounters:   01/04/21 110/56             Passed - Serum Creatinine in past 12 months      Creatinine   Date Value Ref Range Status   12/30/2020  0.74 0.70 - 1.30 mg/dL Final

## 2021-06-16 NOTE — TELEPHONE ENCOUNTER
Called Pt and tried to make an appointment for next week with Dr Dumont to discuss this med. Dr Dumont never prescribed this med to Pt. Pt said he will connect to Dr Conn or Dr. Hernandez for the med., they were the ones whom prescribed this med before. Advised to call us back to schedule an appointment with Dr. Dumont for a video visit since he said Pt is very busy lately to discuss med if he can not get in with the other  .

## 2021-06-16 NOTE — TELEPHONE ENCOUNTER
Refill Request  Medication name: mirtazapine (REMERON) 15 MG tablet       Requested Prescriptions      No prescriptions requested or ordered in this encounter     Who prescribed the medication: PCP  Last refill on medication: 12/30/20  Requested [facility]acy: [facility]  Last appointment with PCP: 1/4/21  Next appointment: Not due    Jayshree Cuellar, RT (R)

## 2021-06-17 NOTE — ANESTHESIA POSTPROCEDURE EVALUATION
"Patient: Masoud Huddleston  OPEN REDUCTION INTERNAL FIXATION, FRACTURE, PATELLA  Anesthesia type: spinal    Patient location: PACU  Last vitals:   Vitals:    04/28/18 1740   BP: 113/75   Pulse: 65   Resp: 18   Temp: 36.6  C (97.9  F)   SpO2: 100%     Post vital signs: stable  Level of consciousness: awake, alert and oriented  Post-anesthesia pain: pain controlled  Post-anesthesia nausea and vomiting: no  Pulmonary: unassisted, return to baseline  Cardiovascular: stable and blood pressure at baseline  Hydration: adequate  Anesthetic events: no    QCDR Measures:  ASA# 11 - Lilian-op Cardiac Arrest: ASA11B - Patient did NOT experience unanticipated cardiac arrest  ASA# 12 - Lilian-op Mortality Rate: ASA12B - Patient did NOT die  ASA# 13 - PACU Re-Intubation Rate: NA - No ETT / LMA used for case  ASA# 10 - Composite Anes Safety: ASA10A - No serious adverse event    Additional Notes:  Patient doing well in pacu.  No concerns.  He states, \"That was a great experience, thank you for taking good care of me.\"  "

## 2021-06-17 NOTE — TELEPHONE ENCOUNTER
Telephone Encounter by Melissa Hewitt at 11/9/2020  3:30 PM     Author: Melissa Hewitt Service: -- Author Type: --    Filed: 11/9/2020  3:31 PM Encounter Date: 11/6/2020 Status: Signed    : Melissa Hewitt APPROVED:    Approval start date: 8/9/2020  Approval end date:  11/9/2021    Pharmacy has been notified of approval and will contact patient when medication is ready for pickup.

## 2021-06-17 NOTE — TELEPHONE ENCOUNTER
Telephone Encounter by Rachel Jaquez at 4/29/2020  8:34 AM     Author: Rachel Jaquez Service: -- Author Type: Patient Access    Filed: 4/29/2020  8:35 AM Encounter Date: 4/29/2020 Status: Signed    : Rachel Jaquez (Patient Access)

## 2021-06-17 NOTE — ANESTHESIA PROCEDURE NOTES
Spinal Block    Patient location during procedure: OR  Start time: 4/28/2018 3:17 PM  End time: 4/28/2018 3:19 PM  Reason for block: primary anesthetic    Staffing:  Performing  Anesthesiologist: ILENE BROWN    Preanesthetic Checklist  Completed: patient identified, risks, benefits, and alternatives discussed, timeout performed, consent obtained, airway assessed, oxygen available, suction available, emergency drugs available and hand hygiene performed  Spinal Block  Patient position: sitting  Prep: ChloraPrep  Patient monitoring: heart rate, cardiac monitor, continuous pulse ox and blood pressure  Approach: right paramedian  Location: L3-4  Injection technique: single-shot  Needle type: pencil-tip   Needle gauge: 24 G      Additional Notes:  Tolerated well, placed 1st pass

## 2021-06-17 NOTE — ANESTHESIA PREPROCEDURE EVALUATION
Anesthesia Evaluation      Patient summary reviewed   No history of anesthetic complications     Airway   Mallampati: I  Neck ROM: full   Pulmonary - negative ROS and normal exam                          Cardiovascular   Exercise tolerance: > or = 4 METS  Rhythm: regular  Rate: normal,         Neuro/Psych - negative ROS   (+) depression,     Endo/Other       GI/Hepatic/Renal    (+) esophageal disease,       Other findings: Severe ETOH consumption, withdrawl, h/o mal-anuj tear. Malnutrition.  Coags okay.  No emesis since admit.  Clear diet tolerated well.        Dental                         Anesthesia Plan  Planned anesthetic: spinal    ASA 3     Anesthetic plan and risks discussed with: patient    Post-op plan: routine recovery

## 2021-06-17 NOTE — ANESTHESIA CARE TRANSFER NOTE
Last vitals:   Vitals:    04/28/18 1720   BP: 112/69   Pulse: 67   Resp: 16   Temp: 36.1  C (96.9  F)   SpO2: 100%     Patient's level of consciousness is awake  Spontaneous respirations: yes  Maintains airway independently: yes  Dentition unchanged: yes  Oropharynx: oropharynx clear of all foreign objects    QCDR Measures:  ASA# 20 - Surgical Safety Checklist: WHO surgical safety checklist completed prior to induction  PQRS# 430 - Adult PONV Prevention: 4558F - Pt received => 2 anti-emetic agents (different classes) preop & intraop  ASA# 8 - Peds PONV Prevention: NA - Not pediatric patient, not GA or 2 or more risk factors NOT present  PQRS# 424 - Lilian-op Temp Management: 4559F - At least one body temp DOCUMENTED => 35.5C or 95.9F within required timeframe  PQRS# 426 - PACU Transfer Protocol: - Transfer of care checklist used  ASA# 14 - Acute Post-op Pain: ASA14B - Patient did NOT experience pain >= 7 out of 10

## 2021-06-17 NOTE — PATIENT INSTRUCTIONS - HE
Patient Instructions by Rm Buck PA-C at 5/1/2019  4:20 PM     Author: Rm Buck PA-C Service: -- Author Type: Physician Assistant    Filed: 5/1/2019  4:48 PM Encounter Date: 5/1/2019 Status: Addendum    : Rm Buck PA-C (Physician Assistant)    Related Notes: Original Note by Rm Buck PA-C (Physician Assistant) filed at 5/1/2019  4:48 PM       You will report to the emergency room for evaluation and treatment.  You will need IV hydration therapy and at that time they can access the IV and draw blood for further work-up with CBC and can basic panel for electrolytes  Father will drive you to the emergency room.      Patient Education     Dehydration (Adult)  Dehydration occurs when your body loses too much fluid. This may be the result of prolonged vomiting or diarrhea, excessive sweating, or a high fever. It may also happen if you dont drink enough fluid when youre sick or out in the heat. Misuse of diuretics (water pills) can also be a cause.  Symptoms include thirst and decreased urine output. You may also feel dizzy, weak, fatigued, or very drowsy. The diet described below is usually enough to treat dehydration. In some cases, you may need medicine.  Home care    Drink at least 12 8-ounce glasses of fluid every day to resolve the dehydration. Fluid may include water; orange juice; lemonade; apple, grape, or cranberry juice; clear fruit drinks; electrolyte replacement and sports drinks; and teas and coffee without caffeine. Don't drink alcohol. If you have been diagnosed with a kidney disease, ask your doctor how much and what types of fluids you should drink to prevent dehydration. If you have kidney disease, fluid can build up in the body. This can be dangerous to your health.    If you have a fever, muscle aches, or a headache as a result of a cold or flu, you may take acetaminophen or ibuprofen, unless another medicine was prescribed. If you have chronic liver or kidney disease, or have  ever had a stomach ulcer or gastrointestinal bleeding, talk with your healthcare provider before using these medicines. Don't take aspirin if you are younger than 18 and have a fever. Aspirin raises the chance for severe liver injury.  Follow-up care  Follow up with your healthcare provider, or as advised.  When to seek medical advice  Call your healthcare provider right away if any of these occur:    Continued vomiting    Frequent diarrhea (more than 5 times a day); blood (red or black color) or mucus in diarrhea    Blood in vomit or stool    Swollen abdomen or increasing abdominal pain    Weakness, dizziness, or fainting    Unusual drowsiness or confusion    Reduced urine output or extreme thirst    Fever of 100.4 F (38 C) or higher  Date Last Reviewed: 5/1/2017 2000-2017 [employer]. [address] [place] [address], [place] [address]. All rights reserved. This information is not intended as a substitute for professional medical care. Always follow your healthcare professional's instructions.    Patient Education     Patient Education     Aiyana-Vizcaino Tear    Your esophagus is the tube that carries food from your mouth to your stomach. It plays an important role in digestion. Sometimes a person can tear the tissue of the lower esophagus, and it can start to bleed. This is called a Aiyana-Vizcaino tear.  Causes and symptoms of a Aiyana-Vizcaino tear  The most common cause of a tear is violent coughing or vomiting. Increased pressure in your belly (abdomen) from a hiatal hernia or childbirth can also lead to a tear. Seizures may cause a tear, as can alcoholism leading to severe vomiting. Increasing age also increases the risk of a Aiyana-Vizcaino tear.  A tear can cause symptoms such as:    Vomit that is bright red or looks like coffee grounds    Stools that are black or sticky like tar    Weakness, dizziness, faintness, or shortness of breath    Diarrhea    Abdominal pain  If the bleeding is not treated, it can  continue for a long time. This can cause anemia, fatigue, and shortness of breath.  Diagnosing and treating a Aiyana-Vizcaino tear  If you have symptoms, your healthcare provider may test your stool to look for blood. You may also have an endoscopy. This is a procedure to look at your esophagus. It uses a tool called an endoscope. This is a thin, flexible tube with a camera and light  at the end. After giving you some light sedation, the scope is put through your mouth and down into your esophagus. This lets your healthcare provider look at the inside of your esophagus.  In most cases, a Aiyana-Vizcaino tear will stop bleeding and heal on its own. But some people will need treatment. If needed, your healthcare provider will treat your tear through the endoscope. You may have an injection to make the bleeding stop. Or, you may be given a heat treatment to close the wound. In some cases, a tiny clip may be used to close the tear. Depending on how much blood is lost, you may need a blood transfusion.     Call 911  In rare cases, a Aiyana-Vizcaino tear can lead to severe internal bleeding. This is a medical emergency. Call 911 if you have:    A rapid, weak pulse    Significant vomiting of blood    A blue tint to your lips or fingernails    Very little urine    Pale skin thats cool and moist to the touch    Confusion    Shallow breathing   Date Last Reviewed: 4/9/2016 2000-2017 [employer]. [address] [place] [id number]. All rights reserved. This information is not intended as a substitute for professional medical care. Always follow your healthcare professional's instructions.           Patient Education     When You Have Gastrointestinal (GI) Bleeding    Blood in your vomit or stool can be a sign of gastrointestinal (GI) bleeding. GI bleeding can be scary. But the cause may not be serious. You should always see a doctor if GI bleeding occurs.  The GI tract  The GI tract is the path through  which food travels in the body. Food passes from the mouth down the esophagus (the tube from the mouth to the stomach). Food begins to break down in the stomach. It then moves through the duodenum, the first part of the small intestine. Nutrients are absorbed as food travels through the small intestine. What is left passes into the colon (large intestine) as waste. The colon removes water from the waste. Waste continues from the colon to the rectum (where stool is stored). Waste then leaves the body through the anus.  Causes of GI bleeding  GI bleeding can be caused by many different problems. Some of the more common causes include:    Swollen veins in the anus (hemorrhoids)    Swollen veins in the esophagus (varices)    Sore on the lining of the GI tract (ulcer)    Cuts or scrapes in the mouth or throat    Infection caused by germs such as bacteria or parasites    Food allergies, such as milk allergy in young children    Medicines    Inflammation of the GI tract (gastritis or esophagitis)    Colitis (Crohn's disease or ulcerative colitis)    Cancer (tumors or polyps)    Abnormal pouches in the colon (diverticula)    Tears in the esophagus or anus    Nosebleed    Abnormal blood vessels in the GI tract (angiodysplasia)  Diagnosing the cause of blood in stool  If blood is coming out in your stool, you may have a lower GI tract problem or a very fast upper GI tract bleed. Bleeding from the GI tract can be bright red. Or it may look dark and tarry. Tests may also find blood in your stool that cant be seen with the eye (occult blood). To find out the cause, tests that may be ordered include:    Blood tests. A blood sample is taken and sent to a lab for exam.    Hemoccult test. Checks a stool sample for blood.    Stool culture. Checks a stool sample for bacteria or parasites.    X-ray, ultrasound, or CT scan. Imaging tests that take pictures of the digestive tract.    Colonoscopy or sigmoidoscopy. This test uses a  flexible tube with a tiny camera. The tube is inserted through your anus into your rectum to see the inside of your colon. Your provider can also take a tiny tissue sample (biopsy) and treat a bleeding source  Diagnosing the cause of blood in vomit  If you are vomiting blood or something that looks like coffee grounds, you may have an upper GI tract problem. To find the cause, tests that may be done include:    Upper Endoscopy. A flexible tube with a tiny camera is inserted through your mouth and throat to see inside your upper GI tract. This lets your provider take a tiny tissue sample (biopsy) and treat a bleeding source.    Nasogastric lavage. This can tell if you have upper GI or lower GI bleeding.    X-ray, ultrasound, or CT scan. Imaging tests that take pictures of your digestive tract.    Upper GI series. X-rays of the upper part of your GI tract taken from inside your body.    Enteroscopy. This sends a flexible tube or a small, swallowed capsule camera into your small intestine.  When to call your healthcare provider  Call your healthcare provider right away if you have any of the following:    Bleeding from your mouth or anus that can't be stopped    Fever of 100.4 F (38.0 ) or higher    Bleeding along with feeling lightheaded or dizzy    Signs of fluid loss (dehydration). These include a dry, sticky mouth, decreased urine output; and very dark urine.    Belly (abdominal) pain   Date Last Reviewed: 7/1/2016 2000-2017 [employer]. [address] [place] [address], [place] [address]. All rights reserved. This information is not intended as a substitute for professional medical care. Always follow your healthcare professional's instructions.

## 2021-06-19 NOTE — LETTER
Letter by Murali Sawyer MD at      Author: Murali Sawyer MD Service: -- Author Type: --    Filed:  Encounter Date: 5/14/2019 Status: (Other)         Masoud Huddleston  [address] [place] [address]             May 14, 2019         Dear Mr. Huddleston,    Below are the results from your recent visit:    Resulted Orders   Phosphorus   Result Value Ref Range    Phosphorus 4.5 2.5 - 4.5 mg/dL   Comprehensive Metabolic Panel   Result Value Ref Range    Sodium 136 136 - 145 mmol/L    Potassium 5.3 (H) 3.5 - 5.0 mmol/L    Chloride 101 98 - 107 mmol/L    CO2 25 22 - 31 mmol/L    Anion Gap, Calculation 10 5 - 18 mmol/L    Glucose 102 70 - 125 mg/dL    BUN 9 8 - 22 mg/dL    Creatinine 0.60 (L) 0.70 - 1.30 mg/dL    GFR MDRD Af Amer >60 >60 mL/min/1.73m2    GFR MDRD Non Af Amer >60 >60 mL/min/1.73m2    Bilirubin, Total 0.5 0.0 - 1.0 mg/dL    Calcium 9.5 8.5 - 10.5 mg/dL    Protein, Total 7.0 6.0 - 8.0 g/dL    Albumin 3.4 (L) 3.5 - 5.0 g/dL    Alkaline Phosphatase 146 (H) 45 - 120 U/L     (H) 0 - 40 U/L    ALT 90 (H) 0 - 45 U/L    Narrative    Fasting Glucose reference range is 70-99 mg/dL per  American Diabetes Association (ADA) guidelines.       I think we called you about the elevated Masoud pizano.  Otherwise, electrolytes are back to normal.  Liver is irritated due to alcohol.    Please call with questions or contact us using [employer].    Sincerely,        Electronically signed by Murali Sawyer MD

## 2021-06-20 NOTE — LETTER
Letter by Kelby Dumont MD at      Author: Kelby Dumont MD Service: -- Author Type: --    Filed:  Encounter Date: 3/2/2020 Status: (Other)         Masoud Huddleston  [address] [place] [address]             March 2, 2020         Dear Mr. Huddleston,    Below are the results from your recent visit:    Resulted Orders   Comprehensive Metabolic Panel   Result Value Ref Range    Sodium 136 136 - 145 mmol/L    Potassium 3.9 3.5 - 5.0 mmol/L    Chloride 104 98 - 107 mmol/L    CO2 23 22 - 31 mmol/L    Anion Gap, Calculation 9 5 - 18 mmol/L    Glucose 120 70 - 125 mg/dL    BUN 17 8 - 22 mg/dL    Creatinine 0.68 (L) 0.70 - 1.30 mg/dL    GFR MDRD Af Amer >60 >60 mL/min/1.73m2    GFR MDRD Non Af Amer >60 >60 mL/min/1.73m2    Bilirubin, Total 1.5 (H) 0.0 - 1.0 mg/dL    Calcium 8.6 8.5 - 10.5 mg/dL    Protein, Total 6.8 6.0 - 8.0 g/dL    Albumin 2.4 (L) 3.5 - 5.0 g/dL    Alkaline Phosphatase 100 45 - 120 U/L    AST 52 (H) 0 - 40 U/L    ALT 30 0 - 45 U/L    Narrative    Fasting Glucose reference range is 70-99 mg/dL per  American Diabetes Association (ADA) guidelines.   Phosphorus   Result Value Ref Range    Phosphorus 5.2 (H) 2.5 - 4.5 mg/dL   Magnesium   Result Value Ref Range    Magnesium 1.6 (L) 1.8 - 2.6 mg/dL   Iron and Transferrin Iron Binding Capacity   Result Value Ref Range    Iron 33 (L) 42 - 175 ug/dL    Transferrin 286 212 - 360 mg/dL    Transferrin Saturation, Calculated 9 (L) 20 - 50 %    Transferrin IBC, Calculated 358 313 - 563 ug/dL   Ferritin   Result Value Ref Range    Ferritin 30 27 - 300 ng/mL   Amylase   Result Value Ref Range    Amylase 85 5 - 120 U/L   Lipase   Result Value Ref Range    Lipase 71 (H) 0 - 52 U/L   HM1 (CBC with Diff)   Result Value Ref Range    WBC 6.8 4.0 - 11.0 thou/uL    RBC 2.75 (L) 4.40 - 6.20 mill/uL    Hemoglobin 8.2 (L) 14.0 - 18.0 g/dL    Hematocrit 24.6 (L) 40.0 - 54.0 %    MCV 89 80 - 100 fL    MCH 29.9 27.0 - 34.0 pg    MCHC 33.5 32.0 - 36.0 g/dL    RDW 13.3 11.0 - 14.5  %    Platelets 190 140 - 440 thou/uL    MPV 7.3 7.0 - 10.0 fL    Neutrophils % 71 (H) 50 - 70 %    Lymphocytes % 18 (L) 20 - 40 %    Monocytes % 9 2 - 10 %    Eosinophils % 2 0 - 6 %    Basophils % 0 0 - 2 %    Neutrophils Absolute 4.8 2.0 - 7.7 thou/uL    Lymphocytes Absolute 1.2 0.8 - 4.4 thou/uL    Monocytes Absolute 0.6 0.0 - 0.9 thou/uL    Eosinophils Absolute 0.1 0.0 - 0.4 thou/uL    Basophils Absolute 0.0 0.0 - 0.2 thou/uL   So overall, things look better.  Your liver tests are really quite good!     Pancreatic enzymes are improving as well.     Mild electrolyte abnormalities, but nothing terrible, and they are improving.     Blood counts are still low, related to nutritional status; they will improve as long as you stay sober.     Let's recheck in a month    Please call with questions or contact us using [employer].    Sincerely,        Electronically signed by Kelby Dumont MD

## 2021-06-27 NOTE — PROGRESS NOTES
"Progress Notes by Rm Buck PA-C at 5/1/2019  4:20 PM     Author: Rm Buck PA-C Service: -- Author Type: Physician Assistant    Filed: 5/7/2019  4:11 PM Encounter Date: 5/1/2019 Status: Signed    : Rm Buck PA-C (Physician Assistant)       Subjective:      Patient ID: Masoud Huddleston is a 33 y.o. male.    Chief Complaint:    HPI  Masoud Huddleston is a 33 y.o. male with a ten year long history of alcohol abuse, withdrawal and PMH for Aiyana Wiss tear who presents today complaining of four day history of forcefull wretching and diarrhea. He has been unable to orally hydrate.    Patient states that he had his last alcoholic drink this morning.  He also reports nausea vomiting and diarrhea with multiple times per day that started 5 days ago on Saturday 426.  Patient is having no blood or mucus in the stools or vomitus but he states that he has had dark tarry stools.  He denies any coffee-ground emesis as described by the patient with his vomitus.  He does feel that he is \"dehydrated\" and that he is lightheaded especially when he goes from sitting to standing and he does feel subjectively weak.  He does feel that he has a fast heartbeat and palpitations in his chest.    Patient states that he is cut back his drinking because of his diarrhea and vomiting.     Patient is accompanied by his father who drove him to the office today.    Past Medical History:   Diagnosis Date   ? Elevated liver enzymes 4/27/2018   ? GI (gastrointestinal bleed)     hematemesis   ? Hypomagnesemia 4/27/2018   ? Liver disease    ? 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 ESOPHAGOGASTRODUODENOSCOPY N/A 4/25/2018    Procedure: ESOPHAGOGASTRODUODENOSCOPY (EGD);  Surgeon: Lora Valencia MD;  Location: [facility];  Service:    ? SC OPEN RX PATELLA FX Right 4/28/2018    Procedure: OPEN REDUCTION INTERNAL FIXATION, FRACTURE, PATELLA;  Surgeon: Bertram Beltran MD;  " Location: [facility] OR;  Service: Orthopedics     Family History:  Hypertension: No.    Early coronary artery disease/cardiovascular disease before the age of 50 and females 40 in males: No.    Diabetes: No.    Cancers: No.    Social History     Tobacco Use   ? Smoking status: Current Every Day Smoker   ? Smokeless tobacco: Former User   Substance Use Topics   ? Alcohol use: Yes     Comment: 1/2 of 1.75 L. daily   ? Drug use: Yes     Types: Marijuana       Review of Systems   Constitutional: Positive for activity change, appetite change, diaphoresis, fatigue and fever.   HENT: Negative for congestion.    Eyes: Positive for redness (Scleral hemorrhages bilaterally).   Respiratory: Negative for chest tightness.    Cardiovascular: Negative for chest pain and palpitations.   Gastrointestinal: Positive for abdominal pain (epigastric pain), diarrhea, nausea and vomiting (dark colored vomitus).   Genitourinary: Negative for dysuria and hematuria.   Musculoskeletal: Negative for neck stiffness.   Skin: Negative for rash.   Neurological: Positive for dizziness (Feels lightheaded when he goes from lying to sitting and sitting to standing.), tremors, weakness and light-headedness.   Hematological: Does not bruise/bleed easily.   Psychiatric/Behavioral: Positive for agitation. Negative for confusion and hallucinations. The patient is nervous/anxious.        Objective:     BP (!) 72/51 (Patient Site: Right Arm, Patient Position: Standing)   Pulse (!) 123   Temp 99  F (37.2  C) (Oral)   Resp 17   Wt 126 lb (57.2 kg)   SpO2 97%   BMI 17.57 kg/m      Physical Exam   Constitutional: He is oriented to person, place, and time. He appears well-developed and well-nourished. He appears distressed.   HENT:   Head: Normocephalic and atraumatic.   Right Ear: External ear normal.   Left Ear: External ear normal.   Mouth/Throat: Oropharynx is clear and moist. No oropharyngeal exudate.   Eyes: Pupils are equal, round, and reactive  to light. Conjunctivae and EOM are normal. No scleral icterus.   Scleral hemorrhages bilaterally     Neck: Normal range of motion. Neck supple.   Cardiovascular: Normal rate and regular rhythm.   Pulmonary/Chest: Effort normal and breath sounds normal.   Abdominal: Soft. There is tenderness.   Epigastric tenderness to palpation       Musculoskeletal: Normal range of motion.   Patient is tremulous in the office.  Are shaking.   Lymphadenopathy:     He has no cervical adenopathy.   Neurological: He is alert and oriented to person, place, and time.   Shakey;    Skin: Skin is warm and dry. No rash noted.   Is nondiaphoretic  Capillary refill is slow at greater than 3 seconds.  Skin is with poor turgor.  Mucous membranes are dry and conjunctive are with pallor   Psychiatric: He has a normal mood and affect. Thought content normal.   Nursing note and vitals reviewed.      Assessment:     Procedures    The primary encounter diagnosis was Melena. Diagnoses of Vomiting and diarrhea, Dehydration, Alcohol abuse, and Aiyana-Vizcaino tear were also pertinent to this visit.    Plan:     1. Melena     2. Vomiting and diarrhea     3. Dehydration     4. Alcohol abuse     5. Aiyana-Vizcaino tear         Patient is a very complicated past medical history with alcohol abuse.  Patient has been drinking for quite some time.  He does have symptoms of alcohol withdrawal.  He states he had a small amount of alcohol this morning.  He also has a past medical history of Aiyana-Vizcaino tear.  I am concerned for dehydration, potential Aiyana-Vizcaino tear, potential GI bleed with history of melena and dark stools and dark vomitus and repeated vomitus and retching.  His blood pressure and pulse are converging and he appears to have hypotension.  I am concerned that the patient may be having low volume and is at risk for several diagnoses that are very concerning.  Therefore I am sending him to the emergency room for definitive evaluation and treatment  and possible hospitalization for observation IV hydration and work-up for possible GI bleeding, ulcers, Aiyana-Vizcaino tear.  Questions are answered by the patient before discharge and his father will drive him by personal vehicle to short distance to the emergency room.    Patient Instructions     You will report to the emergency room for evaluation and treatment.  You will need IV hydration therapy and at that time they can access the IV and draw blood for further work-up with CBC and can basic panel for electrolytes  Father will drive you to the emergency room.      Patient Education     Dehydration (Adult)  Dehydration occurs when your body loses too much fluid. This may be the result of prolonged vomiting or diarrhea, excessive sweating, or a high fever. It may also happen if you dont drink enough fluid when youre sick or out in the heat. Misuse of diuretics (water pills) can also be a cause.  Symptoms include thirst and decreased urine output. You may also feel dizzy, weak, fatigued, or very drowsy. The diet described below is usually enough to treat dehydration. In some cases, you may need medicine.  Home care    Drink at least 12 8-ounce glasses of fluid every day to resolve the dehydration. Fluid may include water; orange juice; lemonade; apple, grape, or cranberry juice; clear fruit drinks; electrolyte replacement and sports drinks; and teas and coffee without caffeine. Don't drink alcohol. If you have been diagnosed with a kidney disease, ask your doctor how much and what types of fluids you should drink to prevent dehydration. If you have kidney disease, fluid can build up in the body. This can be dangerous to your health.    If you have a fever, muscle aches, or a headache as a result of a cold or flu, you may take acetaminophen or ibuprofen, unless another medicine was prescribed. If you have chronic liver or kidney disease, or have ever had a stomach ulcer or gastrointestinal bleeding, talk with your  healthcare provider before using these medicines. Don't take aspirin if you are younger than 18 and have a fever. Aspirin raises the chance for severe liver injury.  Follow-up care  Follow up with your healthcare provider, or as advised.  When to seek medical advice  Call your healthcare provider right away if any of these occur:    Continued vomiting    Frequent diarrhea (more than 5 times a day); blood (red or black color) or mucus in diarrhea    Blood in vomit or stool    Swollen abdomen or increasing abdominal pain    Weakness, dizziness, or fainting    Unusual drowsiness or confusion    Reduced urine output or extreme thirst    Fever of 100.4 F (38 C) or higher  Date Last Reviewed: 5/1/2017 2000-2017 [employer]. [address] [place] [address], [place] [address]. All rights reserved. This information is not intended as a substitute for professional medical care. Always follow your healthcare professional's instructions.    Patient Education     Patient Education     Aiyana-Vizciano Tear    Your esophagus is the tube that carries food from your mouth to your stomach. It plays an important role in digestion. Sometimes a person can tear the tissue of the lower esophagus, and it can start to bleed. This is called a Aiyana-Vizcaino tear.  Causes and symptoms of a Aiyana-Vizcaino tear  The most common cause of a tear is violent coughing or vomiting. Increased pressure in your belly (abdomen) from a hiatal hernia or childbirth can also lead to a tear. Seizures may cause a tear, as can alcoholism leading to severe vomiting. Increasing age also increases the risk of a Aiyana-Vizcaino tear.  A tear can cause symptoms such as:    Vomit that is bright red or looks like coffee grounds    Stools that are black or sticky like tar    Weakness, dizziness, faintness, or shortness of breath    Diarrhea    Abdominal pain  If the bleeding is not treated, it can continue for a long time. This can cause anemia, fatigue, and  shortness of breath.  Diagnosing and treating a Aiyana-Vizcaino tear  If you have symptoms, your healthcare provider may test your stool to look for blood. You may also have an endoscopy. This is a procedure to look at your esophagus. It uses a tool called an endoscope. This is a thin, flexible tube with a camera and light  at the end. After giving you some light sedation, the scope is put through your mouth and down into your esophagus. This lets your healthcare provider look at the inside of your esophagus.  In most cases, a Aiyana-Vizcaino tear will stop bleeding and heal on its own. But some people will need treatment. If needed, your healthcare provider will treat your tear through the endoscope. You may have an injection to make the bleeding stop. Or, you may be given a heat treatment to close the wound. In some cases, a tiny clip may be used to close the tear. Depending on how much blood is lost, you may need a blood transfusion.     Call 911  In rare cases, a Aiyana-Vizcaino tear can lead to severe internal bleeding. This is a medical emergency. Call 911 if you have:    A rapid, weak pulse    Significant vomiting of blood    A blue tint to your lips or fingernails    Very little urine    Pale skin thats cool and moist to the touch    Confusion    Shallow breathing   Date Last Reviewed: 4/9/2016 2000-2017 [employer]. [address] [place] [address], [place] [address]. All rights reserved. This information is not intended as a substitute for professional medical care. Always follow your healthcare professional's instructions.           Patient Education     When You Have Gastrointestinal (GI) Bleeding    Blood in your vomit or stool can be a sign of gastrointestinal (GI) bleeding. GI bleeding can be scary. But the cause may not be serious. You should always see a doctor if GI bleeding occurs.  The GI tract  The GI tract is the path through which food travels in the body. Food passes from the mouth down the  esophagus (the tube from the mouth to the stomach). Food begins to break down in the stomach. It then moves through the duodenum, the first part of the small intestine. Nutrients are absorbed as food travels through the small intestine. What is left passes into the colon (large intestine) as waste. The colon removes water from the waste. Waste continues from the colon to the rectum (where stool is stored). Waste then leaves the body through the anus.  Causes of GI bleeding  GI bleeding can be caused by many different problems. Some of the more common causes include:    Swollen veins in the anus (hemorrhoids)    Swollen veins in the esophagus (varices)    Sore on the lining of the GI tract (ulcer)    Cuts or scrapes in the mouth or throat    Infection caused by germs such as bacteria or parasites    Food allergies, such as milk allergy in young children    Medicines    Inflammation of the GI tract (gastritis or esophagitis)    Colitis (Crohn's disease or ulcerative colitis)    Cancer (tumors or polyps)    Abnormal pouches in the colon (diverticula)    Tears in the esophagus or anus    Nosebleed    Abnormal blood vessels in the GI tract (angiodysplasia)  Diagnosing the cause of blood in stool  If blood is coming out in your stool, you may have a lower GI tract problem or a very fast upper GI tract bleed. Bleeding from the GI tract can be bright red. Or it may look dark and tarry. Tests may also find blood in your stool that cant be seen with the eye (occult blood). To find out the cause, tests that may be ordered include:    Blood tests. A blood sample is taken and sent to a lab for exam.    Hemoccult test. Checks a stool sample for blood.    Stool culture. Checks a stool sample for bacteria or parasites.    X-ray, ultrasound, or CT scan. Imaging tests that take pictures of the digestive tract.    Colonoscopy or sigmoidoscopy. This test uses a flexible tube with a tiny camera. The tube is inserted through your anus  into your rectum to see the inside of your colon. Your provider can also take a tiny tissue sample (biopsy) and treat a bleeding source  Diagnosing the cause of blood in vomit  If you are vomiting blood or something that looks like coffee grounds, you may have an upper GI tract problem. To find the cause, tests that may be done include:    Upper Endoscopy. A flexible tube with a tiny camera is inserted through your mouth and throat to see inside your upper GI tract. This lets your provider take a tiny tissue sample (biopsy) and treat a bleeding source.    Nasogastric lavage. This can tell if you have upper GI or lower GI bleeding.    X-ray, ultrasound, or CT scan. Imaging tests that take pictures of your digestive tract.    Upper GI series. X-rays of the upper part of your GI tract taken from inside your body.    Enteroscopy. This sends a flexible tube or a small, swallowed capsule camera into your small intestine.  When to call your healthcare provider  Call your healthcare provider right away if you have any of the following:    Bleeding from your mouth or anus that can't be stopped    Fever of 100.4 F (38.0 ) or higher    Bleeding along with feeling lightheaded or dizzy    Signs of fluid loss (dehydration). These include a dry, sticky mouth, decreased urine output; and very dark urine.    Belly (abdominal) pain   Date Last Reviewed: 7/1/2016 2000-2017 [employer]. [address] [place] [address], [place] [address]. All rights reserved. This information is not intended as a substitute for professional medical care. Always follow your healthcare professional's instructions.

## 2021-07-14 PROBLEM — K92.2 GI BLEED: Status: RESOLVED | Noted: 2020-05-01 | Resolved: 2020-07-27

## 2021-07-14 PROBLEM — K92.0 HEMATEMESIS: Status: RESOLVED | Noted: 2018-04-25 | Resolved: 2020-02-28

## 2021-07-14 PROBLEM — K92.2 GASTROINTESTINAL HEMORRHAGE, UNSPECIFIED GASTROINTESTINAL HEMORRHAGE TYPE: Status: RESOLVED | Noted: 2020-05-01 | Resolved: 2020-07-27

## 2021-07-14 PROBLEM — F32.A DEPRESSION, UNSPECIFIED DEPRESSION TYPE: Status: RESOLVED | Noted: 2019-05-10 | Resolved: 2020-02-28

## 2022-01-01 ENCOUNTER — HOSPITAL ENCOUNTER (EMERGENCY)
Facility: CLINIC | Age: 37
Discharge: HOME | End: 2022-03-22
Attending: STUDENT IN AN ORGANIZED HEALTH CARE EDUCATION/TRAINING PROGRAM | Admitting: STUDENT IN AN ORGANIZED HEALTH CARE EDUCATION/TRAINING PROGRAM
Payer: COMMERCIAL

## 2022-01-01 ENCOUNTER — ANESTHESIA EVENT (OUTPATIENT)
Dept: SURGERY | Facility: CLINIC | Age: 37
End: 2022-01-01
Payer: COMMERCIAL

## 2022-01-01 ENCOUNTER — APPOINTMENT (OUTPATIENT)
Dept: OCCUPATIONAL THERAPY | Facility: CLINIC | Age: 37
End: 2022-01-01
Payer: COMMERCIAL

## 2022-01-01 ENCOUNTER — HOSPITAL ENCOUNTER (EMERGENCY)
Facility: CLINIC | Age: 37
Discharge: HOME | End: 2022-04-02
Attending: EMERGENCY MEDICINE | Admitting: EMERGENCY MEDICINE
Payer: COMMERCIAL

## 2022-01-01 ENCOUNTER — APPOINTMENT (OUTPATIENT)
Dept: ULTRASOUND IMAGING | Facility: CLINIC | Age: 37
End: 2022-01-01
Attending: INTERNAL MEDICINE
Payer: COMMERCIAL

## 2022-01-01 ENCOUNTER — APPOINTMENT (OUTPATIENT)
Dept: ULTRASOUND IMAGING | Facility: CLINIC | Age: 37
End: 2022-01-01
Attending: NURSE PRACTITIONER
Payer: COMMERCIAL

## 2022-01-01 ENCOUNTER — APPOINTMENT (OUTPATIENT)
Dept: CT IMAGING | Facility: CLINIC | Age: 37
End: 2022-01-01
Attending: EMERGENCY MEDICINE
Payer: COMMERCIAL

## 2022-01-01 ENCOUNTER — APPOINTMENT (OUTPATIENT)
Dept: OCCUPATIONAL THERAPY | Facility: CLINIC | Age: 37
End: 2022-01-01
Attending: FAMILY MEDICINE
Payer: COMMERCIAL

## 2022-01-01 ENCOUNTER — APPOINTMENT (OUTPATIENT)
Dept: PHYSICAL THERAPY | Facility: CLINIC | Age: 37
End: 2022-01-01
Payer: COMMERCIAL

## 2022-01-01 ENCOUNTER — HOSPITAL ENCOUNTER (INPATIENT)
Facility: CLINIC | Age: 37
LOS: 6 days | Discharge: HOME | End: 2022-05-16
Attending: EMERGENCY MEDICINE | Admitting: INTERNAL MEDICINE
Payer: COMMERCIAL

## 2022-01-01 ENCOUNTER — APPOINTMENT (OUTPATIENT)
Dept: NUCLEAR MEDICINE | Facility: CLINIC | Age: 37
End: 2022-01-01
Attending: FAMILY MEDICINE
Payer: COMMERCIAL

## 2022-01-01 ENCOUNTER — TELEPHONE (OUTPATIENT)
Dept: FAMILY MEDICINE | Facility: CLINIC | Age: 37
End: 2022-01-01
Payer: COMMERCIAL

## 2022-01-01 ENCOUNTER — APPOINTMENT (OUTPATIENT)
Dept: ULTRASOUND IMAGING | Facility: CLINIC | Age: 37
End: 2022-01-01
Attending: FAMILY MEDICINE
Payer: COMMERCIAL

## 2022-01-01 ENCOUNTER — TELEPHONE (OUTPATIENT)
Dept: RESPIRATORY THERAPY | Facility: CLINIC | Age: 37
End: 2022-01-01
Payer: COMMERCIAL

## 2022-01-01 ENCOUNTER — HOSPITAL ENCOUNTER (EMERGENCY)
Facility: CLINIC | Age: 37
Discharge: HOME | End: 2022-04-28
Attending: EMERGENCY MEDICINE | Admitting: EMERGENCY MEDICINE
Payer: COMMERCIAL

## 2022-01-01 ENCOUNTER — APPOINTMENT (OUTPATIENT)
Dept: PHYSICAL THERAPY | Facility: CLINIC | Age: 37
End: 2022-01-01
Attending: FAMILY MEDICINE
Payer: COMMERCIAL

## 2022-01-01 ENCOUNTER — APPOINTMENT (OUTPATIENT)
Dept: PHYSICAL THERAPY | Facility: CLINIC | Age: 37
End: 2022-01-01
Attending: INTERNAL MEDICINE
Payer: COMMERCIAL

## 2022-01-01 ENCOUNTER — ANESTHESIA (OUTPATIENT)
Dept: SURGERY | Facility: CLINIC | Age: 37
End: 2022-01-01
Payer: COMMERCIAL

## 2022-01-01 ENCOUNTER — PATIENT OUTREACH (OUTPATIENT)
Dept: CARE COORDINATION | Facility: CLINIC | Age: 37
End: 2022-01-01
Payer: COMMERCIAL

## 2022-01-01 ENCOUNTER — APPOINTMENT (OUTPATIENT)
Dept: CARDIOLOGY | Facility: CLINIC | Age: 37
End: 2022-01-01
Attending: FAMILY MEDICINE
Payer: COMMERCIAL

## 2022-01-01 ENCOUNTER — OFFICE VISIT (OUTPATIENT)
Dept: FAMILY MEDICINE | Facility: CLINIC | Age: 37
End: 2022-01-01
Payer: COMMERCIAL

## 2022-01-01 ENCOUNTER — HOSPITAL ENCOUNTER (INPATIENT)
Facility: CLINIC | Age: 37
LOS: 16 days | Discharge: HOME | End: 2022-03-13
Attending: EMERGENCY MEDICINE | Admitting: INTERNAL MEDICINE
Payer: COMMERCIAL

## 2022-01-01 VITALS
DIASTOLIC BLOOD PRESSURE: 76 MMHG | WEIGHT: 169.9 LBS | SYSTOLIC BLOOD PRESSURE: 111 MMHG | TEMPERATURE: 99.4 F | HEIGHT: 71 IN | RESPIRATION RATE: 16 BRPM | HEART RATE: 98 BPM | BODY MASS INDEX: 23.79 KG/M2 | OXYGEN SATURATION: 97 %

## 2022-01-01 VITALS
TEMPERATURE: 98.5 F | WEIGHT: 161 LBS | OXYGEN SATURATION: 100 % | HEART RATE: 94 BPM | HEIGHT: 71 IN | BODY MASS INDEX: 22.54 KG/M2 | DIASTOLIC BLOOD PRESSURE: 70 MMHG | SYSTOLIC BLOOD PRESSURE: 122 MMHG | RESPIRATION RATE: 18 BRPM

## 2022-01-01 VITALS
TEMPERATURE: 98.4 F | BODY MASS INDEX: 24.19 KG/M2 | DIASTOLIC BLOOD PRESSURE: 58 MMHG | HEART RATE: 99 BPM | HEIGHT: 71 IN | OXYGEN SATURATION: 98 % | SYSTOLIC BLOOD PRESSURE: 102 MMHG | WEIGHT: 172.8 LBS | RESPIRATION RATE: 18 BRPM

## 2022-01-01 VITALS
OXYGEN SATURATION: 97 % | RESPIRATION RATE: 16 BRPM | SYSTOLIC BLOOD PRESSURE: 121 MMHG | BODY MASS INDEX: 22.45 KG/M2 | WEIGHT: 161 LBS | DIASTOLIC BLOOD PRESSURE: 69 MMHG | HEART RATE: 106 BPM | TEMPERATURE: 99.1 F

## 2022-01-01 VITALS
OXYGEN SATURATION: 100 % | TEMPERATURE: 99.4 F | DIASTOLIC BLOOD PRESSURE: 60 MMHG | WEIGHT: 160 LBS | SYSTOLIC BLOOD PRESSURE: 132 MMHG | BODY MASS INDEX: 22.4 KG/M2 | HEART RATE: 99 BPM | RESPIRATION RATE: 18 BRPM | HEIGHT: 71 IN

## 2022-01-01 VITALS
RESPIRATION RATE: 18 BRPM | TEMPERATURE: 98 F | WEIGHT: 160 LBS | DIASTOLIC BLOOD PRESSURE: 61 MMHG | BODY MASS INDEX: 22.32 KG/M2 | HEART RATE: 94 BPM | OXYGEN SATURATION: 95 % | SYSTOLIC BLOOD PRESSURE: 110 MMHG

## 2022-01-01 VITALS
SYSTOLIC BLOOD PRESSURE: 110 MMHG | DIASTOLIC BLOOD PRESSURE: 56 MMHG | OXYGEN SATURATION: 99 % | HEART RATE: 101 BPM | WEIGHT: 159 LBS | BODY MASS INDEX: 22.18 KG/M2

## 2022-01-01 DIAGNOSIS — M79.89 LEG SWELLING: Primary | ICD-10-CM

## 2022-01-01 DIAGNOSIS — K12.0 APHTHOUS ULCER OF TONGUE: ICD-10-CM

## 2022-01-01 DIAGNOSIS — E87.6 HYPOKALEMIA: ICD-10-CM

## 2022-01-01 DIAGNOSIS — K92.0 HEMATEMESIS WITH NAUSEA: Primary | ICD-10-CM

## 2022-01-01 DIAGNOSIS — K70.11 ALCOHOLIC HEPATITIS WITH ASCITES (H): Primary | ICD-10-CM

## 2022-01-01 DIAGNOSIS — K70.11 ALCOHOLIC HEPATITIS WITH ASCITES (H): ICD-10-CM

## 2022-01-01 DIAGNOSIS — R10.84 DIFFUSE ABDOMINAL PAIN: ICD-10-CM

## 2022-01-01 DIAGNOSIS — R17 SCLERAL ICTERUS: ICD-10-CM

## 2022-01-01 DIAGNOSIS — K14.0 TONGUE ULCERATION: ICD-10-CM

## 2022-01-01 DIAGNOSIS — K02.9 DENTAL DECAY: ICD-10-CM

## 2022-01-01 DIAGNOSIS — K12.0 APHTHOUS ULCER: ICD-10-CM

## 2022-01-01 DIAGNOSIS — F10.10 ALCOHOL ABUSE: ICD-10-CM

## 2022-01-01 DIAGNOSIS — D69.6 THROMBOCYTOPENIA (H): Chronic | ICD-10-CM

## 2022-01-01 DIAGNOSIS — R17 ELEVATED BILIRUBIN: ICD-10-CM

## 2022-01-01 DIAGNOSIS — M79.89 LEG SWELLING: ICD-10-CM

## 2022-01-01 DIAGNOSIS — K12.0 CANKER SORE: Primary | ICD-10-CM

## 2022-01-01 DIAGNOSIS — Z71.89 OTHER SPECIFIED COUNSELING: ICD-10-CM

## 2022-01-01 DIAGNOSIS — R07.9 ACUTE CHEST PAIN: ICD-10-CM

## 2022-01-01 DIAGNOSIS — E83.42 HYPOMAGNESEMIA: ICD-10-CM

## 2022-01-01 DIAGNOSIS — F10.920 ALCOHOLIC INTOXICATION WITHOUT COMPLICATION (H): ICD-10-CM

## 2022-01-01 DIAGNOSIS — F17.200 TOBACCO USE DISORDER: ICD-10-CM

## 2022-01-01 DIAGNOSIS — R45.851 SUICIDAL THOUGHTS: ICD-10-CM

## 2022-01-01 DIAGNOSIS — I85.11 SECONDARY ESOPHAGEAL VARICES WITH BLEEDING (H): ICD-10-CM

## 2022-01-01 DIAGNOSIS — D69.6 THROMBOCYTOPENIA (H): ICD-10-CM

## 2022-01-01 DIAGNOSIS — K12.0 CANKER SORE: ICD-10-CM

## 2022-01-01 DIAGNOSIS — K92.0 HEMATEMESIS WITH NAUSEA: ICD-10-CM

## 2022-01-01 DIAGNOSIS — K12.0 APHTHOUS ULCER: Primary | ICD-10-CM

## 2022-01-01 DIAGNOSIS — F10.930 ALCOHOL WITHDRAWAL SYNDROME WITHOUT COMPLICATION (H): ICD-10-CM

## 2022-01-01 LAB
% LINING CELLS, BODY FLUID: 1 %
% LINING CELLS, BODY FLUID: 1 %
ABO/RH(D): NORMAL
AFP SERPL-MCNC: <2 NG/ML
ALBUMIN FLD-MCNC: <0.6 G/DL
ALBUMIN SERPL-MCNC: 1.6 G/DL (ref 3.5–5)
ALBUMIN SERPL-MCNC: 1.6 G/DL (ref 3.5–5)
ALBUMIN SERPL-MCNC: 1.7 G/DL (ref 3.5–5)
ALBUMIN SERPL-MCNC: 1.8 G/DL (ref 3.5–5)
ALBUMIN SERPL-MCNC: 1.9 G/DL (ref 3.5–5)
ALBUMIN SERPL-MCNC: 2 G/DL (ref 3.5–5)
ALBUMIN SERPL-MCNC: 2.1 G/DL (ref 3.5–5)
ALBUMIN SERPL-MCNC: 2.4 G/DL (ref 3.5–5)
ALBUMIN SERPL-MCNC: 2.5 G/DL (ref 3.5–5)
ALBUMIN SERPL-MCNC: 2.6 G/DL (ref 3.5–5)
ALBUMIN SERPL-MCNC: 2.7 G/DL (ref 3.5–5)
ALBUMIN SERPL-MCNC: 3.2 G/DL (ref 3.5–5)
ALBUMIN SERPL-MCNC: 3.3 G/DL (ref 3.5–5)
ALBUMIN SERPL-MCNC: 3.4 G/DL (ref 3.5–5)
ALBUMIN UR-MCNC: NEGATIVE MG/DL
ALP SERPL-CCNC: 100 U/L (ref 45–120)
ALP SERPL-CCNC: 104 U/L (ref 45–120)
ALP SERPL-CCNC: 105 U/L (ref 45–120)
ALP SERPL-CCNC: 114 U/L (ref 45–120)
ALP SERPL-CCNC: 116 U/L (ref 45–120)
ALP SERPL-CCNC: 116 U/L (ref 45–120)
ALP SERPL-CCNC: 120 U/L (ref 45–120)
ALP SERPL-CCNC: 127 U/L (ref 45–120)
ALP SERPL-CCNC: 128 U/L (ref 45–120)
ALP SERPL-CCNC: 130 U/L (ref 45–120)
ALP SERPL-CCNC: 132 U/L (ref 45–120)
ALP SERPL-CCNC: 143 U/L (ref 45–120)
ALP SERPL-CCNC: 148 U/L (ref 45–120)
ALP SERPL-CCNC: 151 U/L (ref 45–120)
ALP SERPL-CCNC: 152 U/L (ref 45–120)
ALP SERPL-CCNC: 159 U/L (ref 45–120)
ALP SERPL-CCNC: 196 U/L (ref 45–120)
ALP SERPL-CCNC: 90 U/L (ref 45–120)
ALP SERPL-CCNC: 90 U/L (ref 45–120)
ALP SERPL-CCNC: 92 U/L (ref 45–120)
ALP SERPL-CCNC: 93 U/L (ref 45–120)
ALP SERPL-CCNC: 94 U/L (ref 45–120)
ALT SERPL W P-5'-P-CCNC: 11 U/L (ref 0–45)
ALT SERPL W P-5'-P-CCNC: 12 U/L (ref 0–45)
ALT SERPL W P-5'-P-CCNC: 12 U/L (ref 0–45)
ALT SERPL W P-5'-P-CCNC: 14 U/L (ref 0–45)
ALT SERPL W P-5'-P-CCNC: 17 U/L (ref 0–45)
ALT SERPL W P-5'-P-CCNC: 17 U/L (ref 0–45)
ALT SERPL W P-5'-P-CCNC: 18 U/L (ref 0–45)
ALT SERPL W P-5'-P-CCNC: 20 U/L (ref 0–45)
ALT SERPL W P-5'-P-CCNC: 21 U/L (ref 0–45)
ALT SERPL W P-5'-P-CCNC: 22 U/L (ref 0–45)
ALT SERPL W P-5'-P-CCNC: 23 U/L (ref 0–45)
ALT SERPL W P-5'-P-CCNC: 24 U/L (ref 0–45)
ALT SERPL W P-5'-P-CCNC: 26 U/L (ref 0–45)
ALT SERPL W P-5'-P-CCNC: 27 U/L (ref 0–45)
ALT SERPL W P-5'-P-CCNC: 28 U/L (ref 0–45)
ALT SERPL W P-5'-P-CCNC: 29 U/L (ref 0–45)
ALT SERPL W P-5'-P-CCNC: 31 U/L (ref 0–45)
ALT SERPL W P-5'-P-CCNC: 35 U/L (ref 0–45)
ALT SERPL W P-5'-P-CCNC: 37 U/L (ref 0–45)
ALT SERPL W P-5'-P-CCNC: 38 U/L (ref 0–45)
ALT SERPL W P-5'-P-CCNC: 42 U/L (ref 0–45)
ALT SERPL W P-5'-P-CCNC: 9 U/L (ref 0–45)
AMMONIA PLAS-SCNC: 49 UMOL/L (ref 11–35)
AMMONIA PLAS-SCNC: 52 UMOL/L (ref 11–35)
AMMONIA PLAS-SCNC: 66 UMOL/L (ref 11–35)
AMPHETAMINES UR QL SCN: ABNORMAL
ANION GAP SERPL CALCULATED.3IONS-SCNC: 10 MMOL/L (ref 5–18)
ANION GAP SERPL CALCULATED.3IONS-SCNC: 11 MMOL/L (ref 5–18)
ANION GAP SERPL CALCULATED.3IONS-SCNC: 11 MMOL/L (ref 5–18)
ANION GAP SERPL CALCULATED.3IONS-SCNC: 12 MMOL/L (ref 5–18)
ANION GAP SERPL CALCULATED.3IONS-SCNC: 15 MMOL/L (ref 5–18)
ANION GAP SERPL CALCULATED.3IONS-SCNC: 16 MMOL/L (ref 5–18)
ANION GAP SERPL CALCULATED.3IONS-SCNC: 35 MMOL/L (ref 5–18)
ANION GAP SERPL CALCULATED.3IONS-SCNC: 4 MMOL/L (ref 5–18)
ANION GAP SERPL CALCULATED.3IONS-SCNC: 5 MMOL/L (ref 5–18)
ANION GAP SERPL CALCULATED.3IONS-SCNC: 6 MMOL/L (ref 5–18)
ANION GAP SERPL CALCULATED.3IONS-SCNC: 7 MMOL/L (ref 5–18)
ANION GAP SERPL CALCULATED.3IONS-SCNC: 7 MMOL/L (ref 5–18)
ANION GAP SERPL CALCULATED.3IONS-SCNC: 8 MMOL/L (ref 5–18)
ANION GAP SERPL CALCULATED.3IONS-SCNC: 9 MMOL/L (ref 5–18)
ANTIBODY SCREEN: NEGATIVE
APPEARANCE FLD: CLEAR
APPEARANCE FLD: CLEAR
APPEARANCE UR: CLEAR
APTT PPP: 34 SECONDS (ref 22–38)
APTT PPP: 40 SECONDS (ref 22–38)
AST SERPL W P-5'-P-CCNC: 115 U/L (ref 0–40)
AST SERPL W P-5'-P-CCNC: 117 U/L (ref 0–40)
AST SERPL W P-5'-P-CCNC: 124 U/L (ref 0–40)
AST SERPL W P-5'-P-CCNC: 125 U/L (ref 0–40)
AST SERPL W P-5'-P-CCNC: 130 U/L (ref 0–40)
AST SERPL W P-5'-P-CCNC: 146 U/L (ref 0–40)
AST SERPL W P-5'-P-CCNC: 155 U/L (ref 0–40)
AST SERPL W P-5'-P-CCNC: 48 U/L (ref 0–40)
AST SERPL W P-5'-P-CCNC: 49 U/L (ref 0–40)
AST SERPL W P-5'-P-CCNC: 49 U/L (ref 0–40)
AST SERPL W P-5'-P-CCNC: 53 U/L (ref 0–40)
AST SERPL W P-5'-P-CCNC: 55 U/L (ref 0–40)
AST SERPL W P-5'-P-CCNC: 56 U/L (ref 0–40)
AST SERPL W P-5'-P-CCNC: 63 U/L (ref 0–40)
AST SERPL W P-5'-P-CCNC: 64 U/L (ref 0–40)
AST SERPL W P-5'-P-CCNC: 67 U/L (ref 0–40)
AST SERPL W P-5'-P-CCNC: 74 U/L (ref 0–40)
AST SERPL W P-5'-P-CCNC: 83 U/L (ref 0–40)
AST SERPL W P-5'-P-CCNC: 84 U/L (ref 0–40)
AST SERPL W P-5'-P-CCNC: 88 U/L (ref 0–40)
AST SERPL W P-5'-P-CCNC: 95 U/L (ref 0–40)
AST SERPL W P-5'-P-CCNC: 95 U/L (ref 0–40)
ATRIAL RATE - MUSE: 149 BPM
ATRIAL RATE - MUSE: 92 BPM
ATRIAL RATE - MUSE: 92 BPM
BACTERIA #/AREA URNS HPF: ABNORMAL /HPF
BACTERIA BLD CULT: ABNORMAL
BACTERIA BLD CULT: ABNORMAL
BACTERIA BLD CULT: NO GROWTH
BACTERIA FLD CULT: NO GROWTH
BACTERIA FLD CULT: NO GROWTH
BARBITURATES UR QL: ABNORMAL
BASOPHILS # BLD AUTO: 0 10E3/UL (ref 0–0.2)
BASOPHILS # BLD AUTO: 0.1 10E3/UL (ref 0–0.2)
BASOPHILS NFR BLD AUTO: 0 %
BASOPHILS NFR BLD AUTO: 1 %
BENZODIAZ UR QL: ABNORMAL
BILIRUB DIRECT SERPL-MCNC: 1.8 MG/DL
BILIRUB DIRECT SERPL-MCNC: 2 MG/DL
BILIRUB DIRECT SERPL-MCNC: 2.1 MG/DL
BILIRUB DIRECT SERPL-MCNC: 2.5 MG/DL
BILIRUB DIRECT SERPL-MCNC: 2.8 MG/DL
BILIRUB DIRECT SERPL-MCNC: 3.4 MG/DL
BILIRUB DIRECT SERPL-MCNC: 4.7 MG/DL
BILIRUB DIRECT SERPL-MCNC: 7.3 MG/DL
BILIRUB DIRECT SERPL-MCNC: 7.5 MG/DL
BILIRUB SERPL-MCNC: 11.7 MG/DL (ref 0–1)
BILIRUB SERPL-MCNC: 11.9 MG/DL (ref 0–1)
BILIRUB SERPL-MCNC: 12.5 MG/DL (ref 0–1)
BILIRUB SERPL-MCNC: 13.7 MG/DL (ref 0–1)
BILIRUB SERPL-MCNC: 13.7 MG/DL (ref 0–1)
BILIRUB SERPL-MCNC: 14.2 MG/DL (ref 0–1)
BILIRUB SERPL-MCNC: 14.5 MG/DL (ref 0–1)
BILIRUB SERPL-MCNC: 15 MG/DL (ref 0–1)
BILIRUB SERPL-MCNC: 15.2 MG/DL (ref 0–1)
BILIRUB SERPL-MCNC: 3.3 MG/DL (ref 0–1)
BILIRUB SERPL-MCNC: 3.7 MG/DL (ref 0–1)
BILIRUB SERPL-MCNC: 3.9 MG/DL (ref 0–1)
BILIRUB SERPL-MCNC: 4 MG/DL (ref 0–1)
BILIRUB SERPL-MCNC: 4.4 MG/DL (ref 0–1)
BILIRUB SERPL-MCNC: 5.1 MG/DL (ref 0–1)
BILIRUB SERPL-MCNC: 5.6 MG/DL (ref 0–1)
BILIRUB SERPL-MCNC: 6 MG/DL (ref 0–1)
BILIRUB SERPL-MCNC: 6.2 MG/DL (ref 0–1)
BILIRUB SERPL-MCNC: 6.4 MG/DL (ref 0–1)
BILIRUB SERPL-MCNC: 7.7 MG/DL (ref 0–1)
BILIRUB UR QL STRIP: ABNORMAL
BLD PROD TYP BPU: NORMAL
BLOOD COMPONENT TYPE: NORMAL
BUN SERPL-MCNC: 10 MG/DL (ref 8–22)
BUN SERPL-MCNC: 10 MG/DL (ref 8–22)
BUN SERPL-MCNC: 11 MG/DL (ref 8–22)
BUN SERPL-MCNC: 13 MG/DL (ref 8–22)
BUN SERPL-MCNC: 13 MG/DL (ref 8–22)
BUN SERPL-MCNC: 14 MG/DL (ref 8–22)
BUN SERPL-MCNC: 15 MG/DL (ref 8–22)
BUN SERPL-MCNC: 16 MG/DL (ref 8–22)
BUN SERPL-MCNC: 17 MG/DL (ref 8–22)
BUN SERPL-MCNC: 18 MG/DL (ref 8–22)
BUN SERPL-MCNC: 27 MG/DL (ref 8–22)
BUN SERPL-MCNC: 31 MG/DL (ref 8–22)
BUN SERPL-MCNC: 4 MG/DL (ref 8–22)
BUN SERPL-MCNC: 4 MG/DL (ref 8–22)
BUN SERPL-MCNC: 5 MG/DL (ref 8–22)
BUN SERPL-MCNC: 5 MG/DL (ref 8–22)
BUN SERPL-MCNC: 7 MG/DL (ref 8–22)
BUN SERPL-MCNC: 9 MG/DL (ref 8–22)
C DIFF TOX B STL QL: NEGATIVE
CALCIUM SERPL-MCNC: 6.2 MG/DL (ref 8.5–10.5)
CALCIUM SERPL-MCNC: 6.3 MG/DL (ref 8.5–10.5)
CALCIUM SERPL-MCNC: 6.4 MG/DL (ref 8.5–10.5)
CALCIUM SERPL-MCNC: 6.7 MG/DL (ref 8.5–10.5)
CALCIUM SERPL-MCNC: 6.7 MG/DL (ref 8.5–10.5)
CALCIUM SERPL-MCNC: 6.9 MG/DL (ref 8.5–10.5)
CALCIUM SERPL-MCNC: 6.9 MG/DL (ref 8.5–10.5)
CALCIUM SERPL-MCNC: 7 MG/DL (ref 8.5–10.5)
CALCIUM SERPL-MCNC: 7.1 MG/DL (ref 8.5–10.5)
CALCIUM SERPL-MCNC: 7.1 MG/DL (ref 8.5–10.5)
CALCIUM SERPL-MCNC: 7.2 MG/DL (ref 8.5–10.5)
CALCIUM SERPL-MCNC: 7.2 MG/DL (ref 8.5–10.5)
CALCIUM SERPL-MCNC: 7.3 MG/DL (ref 8.5–10.5)
CALCIUM SERPL-MCNC: 7.5 MG/DL (ref 8.5–10.5)
CALCIUM SERPL-MCNC: 7.5 MG/DL (ref 8.5–10.5)
CALCIUM SERPL-MCNC: 7.6 MG/DL (ref 8.5–10.5)
CALCIUM SERPL-MCNC: 7.6 MG/DL (ref 8.5–10.5)
CALCIUM SERPL-MCNC: 7.8 MG/DL (ref 8.5–10.5)
CALCIUM SERPL-MCNC: 7.9 MG/DL (ref 8.5–10.5)
CALCIUM SERPL-MCNC: 8 MG/DL (ref 8.5–10.5)
CALCIUM SERPL-MCNC: 8.6 MG/DL (ref 8.5–10.5)
CALCIUM, IONIZED MEASURED: 1.07 MMOL/L (ref 1.11–1.3)
CANNABINOIDS UR QL SCN: ABNORMAL
CHLORIDE BLD-SCNC: 100 MMOL/L (ref 98–107)
CHLORIDE BLD-SCNC: 101 MMOL/L (ref 98–107)
CHLORIDE BLD-SCNC: 102 MMOL/L (ref 98–107)
CHLORIDE BLD-SCNC: 104 MMOL/L (ref 98–107)
CHLORIDE BLD-SCNC: 105 MMOL/L (ref 98–107)
CHLORIDE BLD-SCNC: 105 MMOL/L (ref 98–107)
CHLORIDE BLD-SCNC: 106 MMOL/L (ref 98–107)
CHLORIDE BLD-SCNC: 107 MMOL/L (ref 98–107)
CHLORIDE BLD-SCNC: 108 MMOL/L (ref 98–107)
CHLORIDE BLD-SCNC: 109 MMOL/L (ref 98–107)
CHLORIDE BLD-SCNC: 83 MMOL/L (ref 98–107)
CHLORIDE BLD-SCNC: 92 MMOL/L (ref 98–107)
CHLORIDE BLD-SCNC: 97 MMOL/L (ref 98–107)
CHLORIDE BLD-SCNC: 97 MMOL/L (ref 98–107)
CO2 SERPL-SCNC: 19 MMOL/L (ref 22–31)
CO2 SERPL-SCNC: 20 MMOL/L (ref 22–31)
CO2 SERPL-SCNC: 21 MMOL/L (ref 22–31)
CO2 SERPL-SCNC: 21 MMOL/L (ref 22–31)
CO2 SERPL-SCNC: 22 MMOL/L (ref 22–31)
CO2 SERPL-SCNC: 23 MMOL/L (ref 22–31)
CO2 SERPL-SCNC: 24 MMOL/L (ref 22–31)
CO2 SERPL-SCNC: 24 MMOL/L (ref 22–31)
CO2 SERPL-SCNC: 25 MMOL/L (ref 22–31)
CO2 SERPL-SCNC: 26 MMOL/L (ref 22–31)
CO2 SERPL-SCNC: 29 MMOL/L (ref 22–31)
COCAINE UR QL: ABNORMAL
CODING SYSTEM: NORMAL
COLOR FLD: YELLOW
COLOR FLD: YELLOW
COLOR UR AUTO: ABNORMAL
CREAT SERPL-MCNC: 0.58 MG/DL (ref 0.7–1.3)
CREAT SERPL-MCNC: 0.59 MG/DL (ref 0.7–1.3)
CREAT SERPL-MCNC: 0.6 MG/DL (ref 0.7–1.3)
CREAT SERPL-MCNC: 0.6 MG/DL (ref 0.7–1.3)
CREAT SERPL-MCNC: 0.65 MG/DL (ref 0.7–1.3)
CREAT SERPL-MCNC: 0.66 MG/DL (ref 0.7–1.3)
CREAT SERPL-MCNC: 0.67 MG/DL (ref 0.7–1.3)
CREAT SERPL-MCNC: 0.69 MG/DL (ref 0.7–1.3)
CREAT SERPL-MCNC: 0.7 MG/DL (ref 0.7–1.3)
CREAT SERPL-MCNC: 0.72 MG/DL (ref 0.7–1.3)
CREAT SERPL-MCNC: 0.73 MG/DL (ref 0.7–1.3)
CREAT SERPL-MCNC: 0.74 MG/DL (ref 0.7–1.3)
CREAT SERPL-MCNC: 0.75 MG/DL (ref 0.7–1.3)
CREAT SERPL-MCNC: 0.83 MG/DL (ref 0.7–1.3)
CREAT SERPL-MCNC: 0.97 MG/DL (ref 0.7–1.3)
CREAT SERPL-MCNC: 1.05 MG/DL (ref 0.7–1.3)
CREAT SERPL-MCNC: 1.12 MG/DL (ref 0.7–1.3)
CREAT SERPL-MCNC: 1.15 MG/DL (ref 0.7–1.3)
CREAT SERPL-MCNC: 1.32 MG/DL (ref 0.7–1.3)
CREAT SERPL-MCNC: 1.33 MG/DL (ref 0.7–1.3)
CREAT SERPL-MCNC: 1.33 MG/DL (ref 0.7–1.3)
CREAT SERPL-MCNC: 1.38 MG/DL (ref 0.7–1.3)
CREAT SERPL-MCNC: 1.44 MG/DL (ref 0.7–1.3)
CREAT UR-MCNC: 132 MG/DL
CROSSMATCH: NORMAL
CROSSMATCH: NORMAL
DIASTOLIC BLOOD PRESSURE - MUSE: 59 MMHG
DIASTOLIC BLOOD PRESSURE - MUSE: 81 MMHG
DIASTOLIC BLOOD PRESSURE - MUSE: NORMAL MMHG
ENTEROCOCCUS FAECALIS: NOT DETECTED
ENTEROCOCCUS FAECIUM: NOT DETECTED
EOSINOPHIL # BLD AUTO: 0 10E3/UL (ref 0–0.7)
EOSINOPHIL # BLD AUTO: 0 10E3/UL (ref 0–0.7)
EOSINOPHIL # BLD AUTO: 0.1 10E3/UL (ref 0–0.7)
EOSINOPHIL # BLD AUTO: 0.1 10E3/UL (ref 0–0.7)
EOSINOPHIL # BLD AUTO: 0.2 10E3/UL (ref 0–0.7)
EOSINOPHIL NFR BLD AUTO: 0 %
EOSINOPHIL NFR BLD AUTO: 1 %
EOSINOPHIL NFR BLD AUTO: 2 %
ERYTHROCYTE [DISTWIDTH] IN BLOOD BY AUTOMATED COUNT: 13.6 % (ref 10–15)
ERYTHROCYTE [DISTWIDTH] IN BLOOD BY AUTOMATED COUNT: 14 % (ref 10–15)
ERYTHROCYTE [DISTWIDTH] IN BLOOD BY AUTOMATED COUNT: 14.2 % (ref 10–15)
ERYTHROCYTE [DISTWIDTH] IN BLOOD BY AUTOMATED COUNT: 14.4 % (ref 10–15)
ERYTHROCYTE [DISTWIDTH] IN BLOOD BY AUTOMATED COUNT: 14.7 % (ref 10–15)
ERYTHROCYTE [DISTWIDTH] IN BLOOD BY AUTOMATED COUNT: 14.7 % (ref 10–15)
ERYTHROCYTE [DISTWIDTH] IN BLOOD BY AUTOMATED COUNT: 14.8 % (ref 10–15)
ERYTHROCYTE [DISTWIDTH] IN BLOOD BY AUTOMATED COUNT: 14.9 % (ref 10–15)
ERYTHROCYTE [DISTWIDTH] IN BLOOD BY AUTOMATED COUNT: 14.9 % (ref 10–15)
ERYTHROCYTE [DISTWIDTH] IN BLOOD BY AUTOMATED COUNT: 15 % (ref 10–15)
ERYTHROCYTE [DISTWIDTH] IN BLOOD BY AUTOMATED COUNT: 15.2 % (ref 10–15)
ERYTHROCYTE [DISTWIDTH] IN BLOOD BY AUTOMATED COUNT: 16.5 % (ref 10–15)
ERYTHROCYTE [DISTWIDTH] IN BLOOD BY AUTOMATED COUNT: 17.2 % (ref 10–15)
ERYTHROCYTE [DISTWIDTH] IN BLOOD BY AUTOMATED COUNT: 18 % (ref 10–15)
ERYTHROCYTE [DISTWIDTH] IN BLOOD BY AUTOMATED COUNT: 18.1 % (ref 10–15)
ERYTHROCYTE [DISTWIDTH] IN BLOOD BY AUTOMATED COUNT: 18.2 % (ref 10–15)
ERYTHROCYTE [DISTWIDTH] IN BLOOD BY AUTOMATED COUNT: 19.7 % (ref 10–15)
ERYTHROCYTE [DISTWIDTH] IN BLOOD BY AUTOMATED COUNT: 20.6 % (ref 10–15)
ERYTHROCYTE [DISTWIDTH] IN BLOOD BY AUTOMATED COUNT: 20.7 % (ref 10–15)
ERYTHROCYTE [DISTWIDTH] IN BLOOD BY AUTOMATED COUNT: 21.2 % (ref 10–15)
ERYTHROCYTE [DISTWIDTH] IN BLOOD BY AUTOMATED COUNT: 21.5 % (ref 10–15)
ETHANOL SERPL-MCNC: 209 MG/DL
ETHANOL SERPL-MCNC: 32 MG/DL
ETHANOL SERPL-MCNC: 418 MG/DL
ETHANOL SERPL-MCNC: <10 MG/DL
FERRITIN SERPL-MCNC: 68 NG/ML (ref 27–300)
GFR SERPL CREATININE-BSD FRML MDRD: 65 ML/MIN/1.73M2
GFR SERPL CREATININE-BSD FRML MDRD: 68 ML/MIN/1.73M2
GFR SERPL CREATININE-BSD FRML MDRD: 71 ML/MIN/1.73M2
GFR SERPL CREATININE-BSD FRML MDRD: 71 ML/MIN/1.73M2
GFR SERPL CREATININE-BSD FRML MDRD: 72 ML/MIN/1.73M2
GFR SERPL CREATININE-BSD FRML MDRD: 85 ML/MIN/1.73M2
GFR SERPL CREATININE-BSD FRML MDRD: 87 ML/MIN/1.73M2
GFR SERPL CREATININE-BSD FRML MDRD: >90 ML/MIN/1.73M2
GLUCOSE BLD-MCNC: 100 MG/DL (ref 70–125)
GLUCOSE BLD-MCNC: 104 MG/DL (ref 70–125)
GLUCOSE BLD-MCNC: 105 MG/DL (ref 70–125)
GLUCOSE BLD-MCNC: 106 MG/DL (ref 70–125)
GLUCOSE BLD-MCNC: 110 MG/DL (ref 70–125)
GLUCOSE BLD-MCNC: 110 MG/DL (ref 70–125)
GLUCOSE BLD-MCNC: 111 MG/DL (ref 70–125)
GLUCOSE BLD-MCNC: 114 MG/DL (ref 70–125)
GLUCOSE BLD-MCNC: 117 MG/DL (ref 70–125)
GLUCOSE BLD-MCNC: 119 MG/DL (ref 70–125)
GLUCOSE BLD-MCNC: 127 MG/DL (ref 70–125)
GLUCOSE BLD-MCNC: 134 MG/DL (ref 70–125)
GLUCOSE BLD-MCNC: 137 MG/DL (ref 70–125)
GLUCOSE BLD-MCNC: 139 MG/DL (ref 70–125)
GLUCOSE BLD-MCNC: 155 MG/DL (ref 70–125)
GLUCOSE BLD-MCNC: 214 MG/DL (ref 70–125)
GLUCOSE BLD-MCNC: 92 MG/DL (ref 70–125)
GLUCOSE BLD-MCNC: 93 MG/DL (ref 70–125)
GLUCOSE BLD-MCNC: 93 MG/DL (ref 70–125)
GLUCOSE BLD-MCNC: 96 MG/DL (ref 70–125)
GLUCOSE BLD-MCNC: 97 MG/DL (ref 70–125)
GLUCOSE BLDC GLUCOMTR-MCNC: 123 MG/DL (ref 70–99)
GLUCOSE BLDC GLUCOMTR-MCNC: 125 MG/DL (ref 70–99)
GLUCOSE BLDC GLUCOMTR-MCNC: 131 MG/DL (ref 70–99)
GLUCOSE BLDC GLUCOMTR-MCNC: 134 MG/DL (ref 70–99)
GLUCOSE UR STRIP-MCNC: NEGATIVE MG/DL
GRAM STAIN RESULT: NORMAL
HCT VFR BLD AUTO: 20.4 % (ref 40–53)
HCT VFR BLD AUTO: 20.7 % (ref 40–53)
HCT VFR BLD AUTO: 20.7 % (ref 40–53)
HCT VFR BLD AUTO: 21.9 % (ref 40–53)
HCT VFR BLD AUTO: 22.4 % (ref 40–53)
HCT VFR BLD AUTO: 22.7 % (ref 40–53)
HCT VFR BLD AUTO: 23 % (ref 40–53)
HCT VFR BLD AUTO: 23.1 % (ref 40–53)
HCT VFR BLD AUTO: 23.5 % (ref 40–53)
HCT VFR BLD AUTO: 23.8 % (ref 40–53)
HCT VFR BLD AUTO: 23.9 % (ref 40–53)
HCT VFR BLD AUTO: 24.1 % (ref 40–53)
HCT VFR BLD AUTO: 24.9 % (ref 40–53)
HCT VFR BLD AUTO: 25.1 % (ref 40–53)
HCT VFR BLD AUTO: 26.8 % (ref 40–53)
HCT VFR BLD AUTO: 27.2 % (ref 40–53)
HCT VFR BLD AUTO: 27.7 % (ref 40–53)
HCT VFR BLD AUTO: 28.3 % (ref 40–53)
HCT VFR BLD AUTO: 28.6 % (ref 40–53)
HCT VFR BLD AUTO: 32.7 % (ref 40–53)
HCT VFR BLD AUTO: 38 % (ref 40–53)
HGB BLD-MCNC: 10.8 G/DL (ref 13.3–17.7)
HGB BLD-MCNC: 11 G/DL (ref 13.3–17.7)
HGB BLD-MCNC: 11.4 G/DL (ref 13.3–17.7)
HGB BLD-MCNC: 12.9 G/DL (ref 13.3–17.7)
HGB BLD-MCNC: 6.8 G/DL (ref 13.3–17.7)
HGB BLD-MCNC: 7 G/DL (ref 13.3–17.7)
HGB BLD-MCNC: 7.1 G/DL (ref 13.3–17.7)
HGB BLD-MCNC: 7.1 G/DL (ref 13.3–17.7)
HGB BLD-MCNC: 7.2 G/DL (ref 13.3–17.7)
HGB BLD-MCNC: 7.2 G/DL (ref 13.3–17.7)
HGB BLD-MCNC: 7.3 G/DL (ref 13.3–17.7)
HGB BLD-MCNC: 7.3 G/DL (ref 13.3–17.7)
HGB BLD-MCNC: 7.4 G/DL (ref 13.3–17.7)
HGB BLD-MCNC: 7.6 G/DL (ref 13.3–17.7)
HGB BLD-MCNC: 7.7 G/DL (ref 13.3–17.7)
HGB BLD-MCNC: 7.8 G/DL (ref 13.3–17.7)
HGB BLD-MCNC: 7.9 G/DL (ref 13.3–17.7)
HGB BLD-MCNC: 7.9 G/DL (ref 13.3–17.7)
HGB BLD-MCNC: 8 G/DL (ref 13.3–17.7)
HGB BLD-MCNC: 8.1 G/DL (ref 13.3–17.7)
HGB BLD-MCNC: 8.2 G/DL (ref 13.3–17.7)
HGB BLD-MCNC: 8.4 G/DL (ref 13.3–17.7)
HGB BLD-MCNC: 8.9 G/DL (ref 13.3–17.7)
HGB BLD-MCNC: 8.9 G/DL (ref 13.3–17.7)
HGB BLD-MCNC: 9 G/DL (ref 13.3–17.7)
HGB BLD-MCNC: 9 G/DL (ref 13.3–17.7)
HGB BLD-MCNC: 9.1 G/DL (ref 13.3–17.7)
HGB BLD-MCNC: 9.2 G/DL (ref 13.3–17.7)
HGB BLD-MCNC: 9.6 G/DL (ref 13.3–17.7)
HGB BLD-MCNC: 9.9 G/DL (ref 13.3–17.7)
HGB UR QL STRIP: NEGATIVE
HOLD SPECIMEN: NORMAL
IMM GRANULOCYTES # BLD: 0 10E3/UL
IMM GRANULOCYTES # BLD: 0 10E3/UL
IMM GRANULOCYTES # BLD: 0.1 10E3/UL
IMM GRANULOCYTES NFR BLD: 0 %
IMM GRANULOCYTES NFR BLD: 0 %
IMM GRANULOCYTES NFR BLD: 1 %
INR PPP: 1.33 (ref 0.85–1.15)
INR PPP: 1.92 (ref 0.85–1.15)
INR PPP: 1.93 (ref 0.85–1.15)
INR PPP: 1.96 (ref 0.85–1.15)
INR PPP: 2.01 (ref 0.85–1.15)
INR PPP: 2.04 (ref 0.85–1.15)
INR PPP: 2.05 (ref 0.85–1.15)
INR PPP: 2.09 (ref 0.85–1.15)
INR PPP: 2.1 (ref 0.85–1.15)
INR PPP: 2.11 (ref 0.85–1.15)
INR PPP: 2.17 (ref 0.85–1.15)
INR PPP: 2.17 (ref 0.85–1.15)
INR PPP: 2.2 (ref 0.85–1.15)
INR PPP: 2.37 (ref 0.85–1.15)
INR PPP: 2.45 (ref 0.85–1.15)
INR PPP: 2.57 (ref 0.85–1.15)
INR PPP: 2.64 (ref 0.85–1.15)
INTERPRETATION ECG - MUSE: NORMAL
ION CA PH 7.4: 1.08 MMOL/L (ref 1.11–1.3)
IRON SATN MFR SERPL: 15 % (ref 20–50)
IRON SERPL-MCNC: 16 UG/DL (ref 42–175)
ISSUE DATE AND TIME: NORMAL
KETONES UR STRIP-MCNC: NEGATIVE MG/DL
LACTATE SERPL-SCNC: 1.3 MMOL/L (ref 0.7–2)
LACTATE SERPL-SCNC: 3 MMOL/L (ref 0.7–2)
LACTATE SERPL-SCNC: 4.1 MMOL/L (ref 0.7–2)
LACTATE SERPL-SCNC: 5.9 MMOL/L (ref 0.7–2)
LEUKOCYTE ESTERASE UR QL STRIP: ABNORMAL
LIPASE SERPL-CCNC: 137 U/L (ref 0–52)
LIPASE SERPL-CCNC: 16 U/L (ref 0–52)
LIPASE SERPL-CCNC: 56 U/L (ref 0–52)
LISTERIA SPECIES (DETECTED/NOT DETECTED): NOT DETECTED
LVEF ECHO: NORMAL
LYMPHOCYTES # BLD AUTO: 0.8 10E3/UL (ref 0.8–5.3)
LYMPHOCYTES # BLD AUTO: 0.9 10E3/UL (ref 0.8–5.3)
LYMPHOCYTES # BLD AUTO: 1.2 10E3/UL (ref 0.8–5.3)
LYMPHOCYTES # BLD AUTO: 1.3 10E3/UL (ref 0.8–5.3)
LYMPHOCYTES # BLD AUTO: 1.7 10E3/UL (ref 0.8–5.3)
LYMPHOCYTES NFR BLD AUTO: 11 %
LYMPHOCYTES NFR BLD AUTO: 12 %
LYMPHOCYTES NFR BLD AUTO: 13 %
LYMPHOCYTES NFR BLD AUTO: 23 %
LYMPHOCYTES NFR BLD AUTO: 25 %
LYMPHOCYTES NFR FLD MANUAL: 5 %
LYMPHOCYTES NFR FLD MANUAL: 7 %
MAGNESIUM SERPL-MCNC: 0.9 MG/DL (ref 1.8–2.6)
MAGNESIUM SERPL-MCNC: 1 MG/DL (ref 1.8–2.6)
MAGNESIUM SERPL-MCNC: 1.1 MG/DL (ref 1.8–2.6)
MAGNESIUM SERPL-MCNC: 1.2 MG/DL (ref 1.8–2.6)
MAGNESIUM SERPL-MCNC: 1.4 MG/DL (ref 1.8–2.6)
MAGNESIUM SERPL-MCNC: 1.5 MG/DL (ref 1.8–2.6)
MAGNESIUM SERPL-MCNC: 1.6 MG/DL (ref 1.8–2.6)
MAGNESIUM SERPL-MCNC: 1.7 MG/DL (ref 1.8–2.6)
MAGNESIUM SERPL-MCNC: 1.8 MG/DL (ref 1.8–2.6)
MAGNESIUM SERPL-MCNC: 1.8 MG/DL (ref 1.8–2.6)
MAGNESIUM SERPL-MCNC: 1.9 MG/DL (ref 1.8–2.6)
MAGNESIUM SERPL-MCNC: 2.1 MG/DL (ref 1.8–2.6)
MAGNESIUM SERPL-MCNC: 2.2 MG/DL (ref 1.8–2.6)
MAGNESIUM SERPL-MCNC: 2.3 MG/DL (ref 1.8–2.6)
MAGNESIUM SERPL-MCNC: 4 MG/DL (ref 1.8–2.6)
MAGNESIUM SERPL-MCNC: 6.1 MG/DL (ref 1.8–2.6)
MCH RBC QN AUTO: 31.8 PG (ref 26.5–33)
MCH RBC QN AUTO: 31.8 PG (ref 26.5–33)
MCH RBC QN AUTO: 32 PG (ref 26.5–33)
MCH RBC QN AUTO: 32 PG (ref 26.5–33)
MCH RBC QN AUTO: 32.1 PG (ref 26.5–33)
MCH RBC QN AUTO: 32.2 PG (ref 26.5–33)
MCH RBC QN AUTO: 32.3 PG (ref 26.5–33)
MCH RBC QN AUTO: 32.4 PG (ref 26.5–33)
MCH RBC QN AUTO: 32.4 PG (ref 26.5–33)
MCH RBC QN AUTO: 32.5 PG (ref 26.5–33)
MCH RBC QN AUTO: 32.6 PG (ref 26.5–33)
MCH RBC QN AUTO: 32.7 PG (ref 26.5–33)
MCH RBC QN AUTO: 32.7 PG (ref 26.5–33)
MCH RBC QN AUTO: 33.1 PG (ref 26.5–33)
MCH RBC QN AUTO: 33.1 PG (ref 26.5–33)
MCH RBC QN AUTO: 33.5 PG (ref 26.5–33)
MCH RBC QN AUTO: 34.1 PG (ref 26.5–33)
MCHC RBC AUTO-ENTMCNC: 31.8 G/DL (ref 31.5–36.5)
MCHC RBC AUTO-ENTMCNC: 31.9 G/DL (ref 31.5–36.5)
MCHC RBC AUTO-ENTMCNC: 32.2 G/DL (ref 31.5–36.5)
MCHC RBC AUTO-ENTMCNC: 32.4 G/DL (ref 31.5–36.5)
MCHC RBC AUTO-ENTMCNC: 32.4 G/DL (ref 31.5–36.5)
MCHC RBC AUTO-ENTMCNC: 32.7 G/DL (ref 31.5–36.5)
MCHC RBC AUTO-ENTMCNC: 32.9 G/DL (ref 31.5–36.5)
MCHC RBC AUTO-ENTMCNC: 33.2 G/DL (ref 31.5–36.5)
MCHC RBC AUTO-ENTMCNC: 33.2 G/DL (ref 31.5–36.5)
MCHC RBC AUTO-ENTMCNC: 33.3 G/DL (ref 31.5–36.5)
MCHC RBC AUTO-ENTMCNC: 33.5 G/DL (ref 31.5–36.5)
MCHC RBC AUTO-ENTMCNC: 33.5 G/DL (ref 31.5–36.5)
MCHC RBC AUTO-ENTMCNC: 33.6 G/DL (ref 31.5–36.5)
MCHC RBC AUTO-ENTMCNC: 33.6 G/DL (ref 31.5–36.5)
MCHC RBC AUTO-ENTMCNC: 33.8 G/DL (ref 31.5–36.5)
MCHC RBC AUTO-ENTMCNC: 33.9 G/DL (ref 31.5–36.5)
MCHC RBC AUTO-ENTMCNC: 34.4 G/DL (ref 31.5–36.5)
MCHC RBC AUTO-ENTMCNC: 34.6 G/DL (ref 31.5–36.5)
MCHC RBC AUTO-ENTMCNC: 34.9 G/DL (ref 31.5–36.5)
MCV RBC AUTO: 101 FL (ref 78–100)
MCV RBC AUTO: 101 FL (ref 78–100)
MCV RBC AUTO: 102 FL (ref 78–100)
MCV RBC AUTO: 102 FL (ref 78–100)
MCV RBC AUTO: 103 FL (ref 78–100)
MCV RBC AUTO: 93 FL (ref 78–100)
MCV RBC AUTO: 93 FL (ref 78–100)
MCV RBC AUTO: 94 FL (ref 78–100)
MCV RBC AUTO: 95 FL (ref 78–100)
MCV RBC AUTO: 95 FL (ref 78–100)
MCV RBC AUTO: 96 FL (ref 78–100)
MCV RBC AUTO: 96 FL (ref 78–100)
MCV RBC AUTO: 97 FL (ref 78–100)
MCV RBC AUTO: 97 FL (ref 78–100)
MCV RBC AUTO: 98 FL (ref 78–100)
MCV RBC AUTO: 99 FL (ref 78–100)
MONOCYTES # BLD AUTO: 0.2 10E3/UL (ref 0–1.3)
MONOCYTES # BLD AUTO: 0.5 10E3/UL (ref 0–1.3)
MONOCYTES # BLD AUTO: 0.5 10E3/UL (ref 0–1.3)
MONOCYTES # BLD AUTO: 0.6 10E3/UL (ref 0–1.3)
MONOCYTES # BLD AUTO: 0.6 10E3/UL (ref 0–1.3)
MONOCYTES NFR BLD AUTO: 4 %
MONOCYTES NFR BLD AUTO: 5 %
MONOCYTES NFR BLD AUTO: 7 %
MONOCYTES NFR BLD AUTO: 8 %
MONOCYTES NFR BLD AUTO: 8 %
MONOS+MACROS NFR FLD MANUAL: 89 %
MONOS+MACROS NFR FLD MANUAL: 92 %
MUCOUS THREADS #/AREA URNS LPF: PRESENT /LPF
NEUTROPHILS # BLD AUTO: 3.3 10E3/UL (ref 1.6–8.3)
NEUTROPHILS # BLD AUTO: 4.8 10E3/UL (ref 1.6–8.3)
NEUTROPHILS # BLD AUTO: 5.5 10E3/UL (ref 1.6–8.3)
NEUTROPHILS # BLD AUTO: 5.7 10E3/UL (ref 1.6–8.3)
NEUTROPHILS # BLD AUTO: 7.6 10E3/UL (ref 1.6–8.3)
NEUTROPHILS NFR BLD AUTO: 68 %
NEUTROPHILS NFR BLD AUTO: 70 %
NEUTROPHILS NFR BLD AUTO: 77 %
NEUTROPHILS NFR BLD AUTO: 79 %
NEUTROPHILS NFR BLD AUTO: 81 %
NEUTS BAND NFR FLD MANUAL: 2 %
NEUTS BAND NFR FLD MANUAL: 3 %
NITRATE UR QL: NEGATIVE
NRBC # BLD AUTO: 0 10E3/UL
NRBC BLD AUTO-RTO: 0 /100
OPIATES UR QL SCN: ABNORMAL
OSMOLALITY UR: 449 MOSM/KG (ref 300–900)
OXYCODONE UR QL: ABNORMAL
P AXIS - MUSE: 69 DEGREES
P AXIS - MUSE: 80 DEGREES
P AXIS - MUSE: NORMAL DEGREES
PATH REPORT.COMMENTS IMP SPEC: NORMAL
PATH REPORT.COMMENTS IMP SPEC: NORMAL
PATH REPORT.FINAL DX SPEC: NORMAL
PATH REPORT.GROSS SPEC: NORMAL
PATH REPORT.MICROSCOPIC SPEC OTHER STN: NORMAL
PATH REPORT.RELEVANT HX SPEC: NORMAL
PCP UR QL SCN: ABNORMAL
PH UR STRIP: 7 [PH] (ref 5–7)
PH: 7.42 (ref 7.35–7.45)
PHOSPHATE SERPL-MCNC: 1.3 MG/DL (ref 2.5–4.5)
PHOSPHATE SERPL-MCNC: 1.6 MG/DL (ref 2.5–4.5)
PHOSPHATE SERPL-MCNC: 1.6 MG/DL (ref 2.5–4.5)
PHOSPHATE SERPL-MCNC: 1.8 MG/DL (ref 2.5–4.5)
PHOSPHATE SERPL-MCNC: 1.8 MG/DL (ref 2.5–4.5)
PHOSPHATE SERPL-MCNC: 1.9 MG/DL (ref 2.5–4.5)
PHOSPHATE SERPL-MCNC: 2.2 MG/DL (ref 2.5–4.5)
PHOSPHATE SERPL-MCNC: 2.2 MG/DL (ref 2.5–4.5)
PHOSPHATE SERPL-MCNC: 2.6 MG/DL (ref 2.5–4.5)
PHOSPHATE SERPL-MCNC: 2.6 MG/DL (ref 2.5–4.5)
PHOSPHATE SERPL-MCNC: 2.9 MG/DL (ref 2.5–4.5)
PHOSPHATE SERPL-MCNC: 3 MG/DL (ref 2.5–4.5)
PLATELET # BLD AUTO: 108 10E3/UL (ref 150–450)
PLATELET # BLD AUTO: 120 10E3/UL (ref 150–450)
PLATELET # BLD AUTO: 135 10E3/UL (ref 150–450)
PLATELET # BLD AUTO: 144 10E3/UL (ref 150–450)
PLATELET # BLD AUTO: 148 10E3/UL (ref 150–450)
PLATELET # BLD AUTO: 153 10E3/UL (ref 150–450)
PLATELET # BLD AUTO: 161 10E3/UL (ref 150–450)
PLATELET # BLD AUTO: 178 10E3/UL (ref 150–450)
PLATELET # BLD AUTO: 23 10E3/UL (ref 150–450)
PLATELET # BLD AUTO: 25 10E3/UL (ref 150–450)
PLATELET # BLD AUTO: 26 10E3/UL (ref 150–450)
PLATELET # BLD AUTO: 26 10E3/UL (ref 150–450)
PLATELET # BLD AUTO: 32 10E3/UL (ref 150–450)
PLATELET # BLD AUTO: 33 10E3/UL (ref 150–450)
PLATELET # BLD AUTO: 36 10E3/UL (ref 150–450)
PLATELET # BLD AUTO: 36 10E3/UL (ref 150–450)
PLATELET # BLD AUTO: 39 10E3/UL (ref 150–450)
PLATELET # BLD AUTO: 47 10E3/UL (ref 150–450)
PLATELET # BLD AUTO: 53 10E3/UL (ref 150–450)
PLATELET # BLD AUTO: 78 10E3/UL (ref 150–450)
PLATELET # BLD AUTO: 80 10E3/UL (ref 150–450)
PLATELET # BLD AUTO: 90 10E3/UL (ref 150–450)
POTASSIUM BLD-SCNC: 2.5 MMOL/L (ref 3.5–5)
POTASSIUM BLD-SCNC: 2.7 MMOL/L (ref 3.5–5)
POTASSIUM BLD-SCNC: 2.7 MMOL/L (ref 3.5–5)
POTASSIUM BLD-SCNC: 2.8 MMOL/L (ref 3.5–5)
POTASSIUM BLD-SCNC: 2.9 MMOL/L (ref 3.5–5)
POTASSIUM BLD-SCNC: 3 MMOL/L (ref 3.5–5)
POTASSIUM BLD-SCNC: 3.1 MMOL/L (ref 3.5–5)
POTASSIUM BLD-SCNC: 3.2 MMOL/L (ref 3.5–5)
POTASSIUM BLD-SCNC: 3.2 MMOL/L (ref 3.5–5)
POTASSIUM BLD-SCNC: 3.3 MMOL/L (ref 3.5–5)
POTASSIUM BLD-SCNC: 3.3 MMOL/L (ref 3.5–5)
POTASSIUM BLD-SCNC: 3.4 MMOL/L (ref 3.5–5)
POTASSIUM BLD-SCNC: 3.5 MMOL/L (ref 3.5–5)
POTASSIUM BLD-SCNC: 3.5 MMOL/L (ref 3.5–5)
POTASSIUM BLD-SCNC: 3.6 MMOL/L (ref 3.5–5)
POTASSIUM BLD-SCNC: 3.6 MMOL/L (ref 3.5–5)
POTASSIUM BLD-SCNC: 3.7 MMOL/L (ref 3.5–5)
POTASSIUM BLD-SCNC: 3.8 MMOL/L (ref 3.5–5)
POTASSIUM BLD-SCNC: 3.9 MMOL/L (ref 3.5–5)
POTASSIUM BLD-SCNC: 4 MMOL/L (ref 3.5–5)
POTASSIUM BLD-SCNC: 4.1 MMOL/L (ref 3.5–5)
POTASSIUM BLD-SCNC: 4.1 MMOL/L (ref 3.5–5)
POTASSIUM BLD-SCNC: 4.2 MMOL/L (ref 3.5–5)
POTASSIUM BLD-SCNC: 4.4 MMOL/L (ref 3.5–5)
POTASSIUM BLD-SCNC: 4.5 MMOL/L (ref 3.5–5)
POTASSIUM BLD-SCNC: 5.4 MMOL/L (ref 3.5–5)
PR INTERVAL - MUSE: 128 MS
PR INTERVAL - MUSE: 158 MS
PR INTERVAL - MUSE: 168 MS
PROT FLD-MCNC: <0.8 G/DL
PROT SERPL-MCNC: 4.7 G/DL (ref 6–8)
PROT SERPL-MCNC: 5 G/DL (ref 6–8)
PROT SERPL-MCNC: 5.1 G/DL (ref 6–8)
PROT SERPL-MCNC: 5.1 G/DL (ref 6–8)
PROT SERPL-MCNC: 5.2 G/DL (ref 6–8)
PROT SERPL-MCNC: 5.4 G/DL (ref 6–8)
PROT SERPL-MCNC: 5.4 G/DL (ref 6–8)
PROT SERPL-MCNC: 5.5 G/DL (ref 6–8)
PROT SERPL-MCNC: 5.6 G/DL (ref 6–8)
PROT SERPL-MCNC: 5.7 G/DL (ref 6–8)
PROT SERPL-MCNC: 5.8 G/DL (ref 6–8)
PROT SERPL-MCNC: 5.9 G/DL (ref 6–8)
PROT SERPL-MCNC: 6 G/DL (ref 6–8)
PROT SERPL-MCNC: 6.4 G/DL (ref 6–8)
PROT SERPL-MCNC: 6.6 G/DL (ref 6–8)
PROT SERPL-MCNC: 7.1 G/DL (ref 6–8)
PROT SERPL-MCNC: 7.3 G/DL (ref 6–8)
PROT SERPL-MCNC: 7.6 G/DL (ref 6–8)
PROT SERPL-MCNC: 8 G/DL (ref 6–8)
PROT SERPL-MCNC: 9.1 G/DL (ref 6–8)
PTH-INTACT SERPL-MCNC: 153 PG/ML (ref 10–86)
QRS DURATION - MUSE: 104 MS
QRS DURATION - MUSE: 108 MS
QRS DURATION - MUSE: 92 MS
QT - MUSE: 316 MS
QT - MUSE: 328 MS
QT - MUSE: 388 MS
QTC - MUSE: 390 MS
QTC - MUSE: 479 MS
QTC - MUSE: 516 MS
R AXIS - MUSE: -2 DEGREES
R AXIS - MUSE: 38 DEGREES
R AXIS - MUSE: 90 DEGREES
RBC # BLD AUTO: 2.1 10E6/UL (ref 4.4–5.9)
RBC # BLD AUTO: 2.12 10E6/UL (ref 4.4–5.9)
RBC # BLD AUTO: 2.14 10E6/UL (ref 4.4–5.9)
RBC # BLD AUTO: 2.21 10E6/UL (ref 4.4–5.9)
RBC # BLD AUTO: 2.33 10E6/UL (ref 4.4–5.9)
RBC # BLD AUTO: 2.34 10E6/UL (ref 4.4–5.9)
RBC # BLD AUTO: 2.38 10E6/UL (ref 4.4–5.9)
RBC # BLD AUTO: 2.39 10E6/UL (ref 4.4–5.9)
RBC # BLD AUTO: 2.41 10E6/UL (ref 4.4–5.9)
RBC # BLD AUTO: 2.42 10E6/UL (ref 4.4–5.9)
RBC # BLD AUTO: 2.42 10E6/UL (ref 4.4–5.9)
RBC # BLD AUTO: 2.43 10E6/UL (ref 4.4–5.9)
RBC # BLD AUTO: 2.45 10E6/UL (ref 4.4–5.9)
RBC # BLD AUTO: 2.52 10E6/UL (ref 4.4–5.9)
RBC # BLD AUTO: 2.64 10E6/UL (ref 4.4–5.9)
RBC # BLD AUTO: 2.69 10E6/UL (ref 4.4–5.9)
RBC # BLD AUTO: 2.76 10E6/UL (ref 4.4–5.9)
RBC # BLD AUTO: 2.86 10E6/UL (ref 4.4–5.9)
RBC # BLD AUTO: 3.09 10E6/UL (ref 4.4–5.9)
RBC # BLD AUTO: 3.53 10E6/UL (ref 4.4–5.9)
RBC # BLD AUTO: 3.97 10E6/UL (ref 4.4–5.9)
RBC # FLD: 0 /UL
RBC # FLD: 0 /UL
RBC URINE: <1 /HPF
RETICS # AUTO: 0.12 10E6/UL (ref 0.01–0.11)
RETICS/RBC NFR AUTO: 5.6 % (ref 0.8–2.7)
SARS-COV-2 RNA RESP QL NAA+PROBE: NEGATIVE
SODIUM SERPL-SCNC: 128 MMOL/L (ref 136–145)
SODIUM SERPL-SCNC: 130 MMOL/L (ref 136–145)
SODIUM SERPL-SCNC: 132 MMOL/L (ref 136–145)
SODIUM SERPL-SCNC: 134 MMOL/L (ref 136–145)
SODIUM SERPL-SCNC: 135 MMOL/L (ref 136–145)
SODIUM SERPL-SCNC: 135 MMOL/L (ref 136–145)
SODIUM SERPL-SCNC: 136 MMOL/L (ref 136–145)
SODIUM SERPL-SCNC: 136 MMOL/L (ref 136–145)
SODIUM SERPL-SCNC: 137 MMOL/L (ref 136–145)
SODIUM SERPL-SCNC: 138 MMOL/L (ref 136–145)
SODIUM SERPL-SCNC: 138 MMOL/L (ref 136–145)
SODIUM SERPL-SCNC: 139 MMOL/L (ref 136–145)
SODIUM SERPL-SCNC: 140 MMOL/L (ref 136–145)
SODIUM SERPL-SCNC: 141 MMOL/L (ref 136–145)
SODIUM SERPL-SCNC: 144 MMOL/L (ref 136–145)
SODIUM UR-SCNC: <20 MMOL/L
SP GR UR STRIP: 1.01 (ref 1–1.03)
SPECIMEN EXPIRATION DATE: NORMAL
SQUAMOUS EPITHELIAL: <1 /HPF
STAPHYLOCOCCUS AUREUS: NOT DETECTED
STAPHYLOCOCCUS EPIDERMIDIS: NOT DETECTED
STAPHYLOCOCCUS LUGDUNENSIS: NOT DETECTED
STAPHYLOCOCCUS SPECIES: NOT DETECTED
STREPTOCOCCUS AGALACTIAE: NOT DETECTED
STREPTOCOCCUS ANGINOSUS GROUP: NOT DETECTED
STREPTOCOCCUS PNEUMONIAE: NOT DETECTED
STREPTOCOCCUS PYOGENES: NOT DETECTED
STREPTOCOCCUS SPECIES: NOT DETECTED
SYSTOLIC BLOOD PRESSURE - MUSE: 119 MMHG
SYSTOLIC BLOOD PRESSURE - MUSE: 141 MMHG
SYSTOLIC BLOOD PRESSURE - MUSE: NORMAL MMHG
T AXIS - MUSE: 53 DEGREES
T AXIS - MUSE: 70 DEGREES
T AXIS - MUSE: 81 DEGREES
TIBC SERPL-MCNC: 109 UG/DL (ref 313–563)
TRANSFERRIN SERPL-MCNC: 87 MG/DL (ref 212–360)
TROPONIN I SERPL-MCNC: <0.01 NG/ML (ref 0–0.29)
UNIT ABO/RH: NORMAL
UNIT NUMBER: NORMAL
UNIT STATUS: NORMAL
UNIT TYPE ISBT: 600
UPPER GI ENDOSCOPY: NORMAL
UPPER GI ENDOSCOPY: NORMAL
UROBILINOGEN UR STRIP-MCNC: <2 MG/DL
VANCOMYCIN SERPL-MCNC: 7.9 MG/L
VENTRICULAR RATE- MUSE: 149 BPM
VENTRICULAR RATE- MUSE: 92 BPM
VENTRICULAR RATE- MUSE: 92 BPM
VIT B12 SERPL-MCNC: >2000 PG/ML (ref 213–816)
WBC # BLD AUTO: 13.8 10E3/UL (ref 4–11)
WBC # BLD AUTO: 2.4 10E3/UL (ref 4–11)
WBC # BLD AUTO: 2.4 10E3/UL (ref 4–11)
WBC # BLD AUTO: 2.9 10E3/UL (ref 4–11)
WBC # BLD AUTO: 3 10E3/UL (ref 4–11)
WBC # BLD AUTO: 4 10E3/UL (ref 4–11)
WBC # BLD AUTO: 4.2 10E3/UL (ref 4–11)
WBC # BLD AUTO: 4.3 10E3/UL (ref 4–11)
WBC # BLD AUTO: 4.5 10E3/UL (ref 4–11)
WBC # BLD AUTO: 4.5 10E3/UL (ref 4–11)
WBC # BLD AUTO: 4.8 10E3/UL (ref 4–11)
WBC # BLD AUTO: 5.7 10E3/UL (ref 4–11)
WBC # BLD AUTO: 6.8 10E3/UL (ref 4–11)
WBC # BLD AUTO: 6.9 10E3/UL (ref 4–11)
WBC # BLD AUTO: 7 10E3/UL (ref 4–11)
WBC # BLD AUTO: 7.5 10E3/UL (ref 4–11)
WBC # BLD AUTO: 8.1 10E3/UL (ref 4–11)
WBC # BLD AUTO: 8.2 10E3/UL (ref 4–11)
WBC # BLD AUTO: 8.6 10E3/UL (ref 4–11)
WBC # BLD AUTO: 8.7 10E3/UL (ref 4–11)
WBC # BLD AUTO: 9.6 10E3/UL (ref 4–11)
WBC # FLD AUTO: 103 /UL
WBC # FLD AUTO: 83 /UL
WBC URINE: 2 /HPF

## 2022-01-01 PROCEDURE — 250N000013 HC RX MED GY IP 250 OP 250 PS 637: Performed by: NURSE PRACTITIONER

## 2022-01-01 PROCEDURE — 120N000001 HC R&B MED SURG/OB

## 2022-01-01 PROCEDURE — 80053 COMPREHEN METABOLIC PANEL: CPT | Performed by: FAMILY MEDICINE

## 2022-01-01 PROCEDURE — 250N000011 HC RX IP 250 OP 636: Performed by: INTERNAL MEDICINE

## 2022-01-01 PROCEDURE — 82248 BILIRUBIN DIRECT: CPT | Performed by: EMERGENCY MEDICINE

## 2022-01-01 PROCEDURE — 87015 SPECIMEN INFECT AGNT CONCNTJ: CPT | Performed by: INTERNAL MEDICINE

## 2022-01-01 PROCEDURE — 36415 COLL VENOUS BLD VENIPUNCTURE: CPT | Performed by: EMERGENCY MEDICINE

## 2022-01-01 PROCEDURE — 250N000013 HC RX MED GY IP 250 OP 250 PS 637: Performed by: FAMILY MEDICINE

## 2022-01-01 PROCEDURE — 83690 ASSAY OF LIPASE: CPT | Performed by: FAMILY MEDICINE

## 2022-01-01 PROCEDURE — 99233 SBSQ HOSP IP/OBS HIGH 50: CPT | Performed by: FAMILY MEDICINE

## 2022-01-01 PROCEDURE — 36415 COLL VENOUS BLD VENIPUNCTURE: CPT | Performed by: FAMILY MEDICINE

## 2022-01-01 PROCEDURE — 83735 ASSAY OF MAGNESIUM: CPT | Performed by: FAMILY MEDICINE

## 2022-01-01 PROCEDURE — 272N000001 HC OR GENERAL SUPPLY STERILE: Performed by: INTERNAL MEDICINE

## 2022-01-01 PROCEDURE — 258N000003 HC RX IP 258 OP 636: Performed by: INTERNAL MEDICINE

## 2022-01-01 PROCEDURE — 250N000013 HC RX MED GY IP 250 OP 250 PS 637: Performed by: PHYSICIAN ASSISTANT

## 2022-01-01 PROCEDURE — 258N000003 HC RX IP 258 OP 636: Performed by: PHYSICIAN ASSISTANT

## 2022-01-01 PROCEDURE — 83735 ASSAY OF MAGNESIUM: CPT | Performed by: INTERNAL MEDICINE

## 2022-01-01 PROCEDURE — 87205 SMEAR GRAM STAIN: CPT | Performed by: INTERNAL MEDICINE

## 2022-01-01 PROCEDURE — C9803 HOPD COVID-19 SPEC COLLECT: HCPCS

## 2022-01-01 PROCEDURE — 250N000009 HC RX 250: Performed by: INTERNAL MEDICINE

## 2022-01-01 PROCEDURE — 97116 GAIT TRAINING THERAPY: CPT | Mod: GP

## 2022-01-01 PROCEDURE — 85025 COMPLETE CBC W/AUTO DIFF WBC: CPT | Performed by: EMERGENCY MEDICINE

## 2022-01-01 PROCEDURE — 250N000009 HC RX 250: Performed by: NURSE ANESTHETIST, CERTIFIED REGISTERED

## 2022-01-01 PROCEDURE — 85610 PROTHROMBIN TIME: CPT | Performed by: FAMILY MEDICINE

## 2022-01-01 PROCEDURE — 99233 SBSQ HOSP IP/OBS HIGH 50: CPT | Performed by: INTERNAL MEDICINE

## 2022-01-01 PROCEDURE — 85018 HEMOGLOBIN: CPT | Performed by: INTERNAL MEDICINE

## 2022-01-01 PROCEDURE — 83970 ASSAY OF PARATHORMONE: CPT | Performed by: NURSE PRACTITIONER

## 2022-01-01 PROCEDURE — 250N000013 HC RX MED GY IP 250 OP 250 PS 637: Performed by: INTERNAL MEDICINE

## 2022-01-01 PROCEDURE — 84100 ASSAY OF PHOSPHORUS: CPT | Performed by: NURSE PRACTITIONER

## 2022-01-01 PROCEDURE — 96367 TX/PROPH/DG ADDL SEQ IV INF: CPT

## 2022-01-01 PROCEDURE — 85027 COMPLETE CBC AUTOMATED: CPT | Performed by: INTERNAL MEDICINE

## 2022-01-01 PROCEDURE — C9113 INJ PANTOPRAZOLE SODIUM, VIA: HCPCS | Performed by: INTERNAL MEDICINE

## 2022-01-01 PROCEDURE — 36415 COLL VENOUS BLD VENIPUNCTURE: CPT | Performed by: PHYSICIAN ASSISTANT

## 2022-01-01 PROCEDURE — 258N000003 HC RX IP 258 OP 636: Performed by: NURSE ANESTHETIST, CERTIFIED REGISTERED

## 2022-01-01 PROCEDURE — A9537 TC99M MEBROFENIN: HCPCS | Performed by: FAMILY MEDICINE

## 2022-01-01 PROCEDURE — 96365 THER/PROPH/DIAG IV INF INIT: CPT | Mod: 59

## 2022-01-01 PROCEDURE — C9113 INJ PANTOPRAZOLE SODIUM, VIA: HCPCS | Performed by: EMERGENCY MEDICINE

## 2022-01-01 PROCEDURE — 83690 ASSAY OF LIPASE: CPT | Performed by: EMERGENCY MEDICINE

## 2022-01-01 PROCEDURE — 85610 PROTHROMBIN TIME: CPT | Performed by: INTERNAL MEDICINE

## 2022-01-01 PROCEDURE — 97162 PT EVAL MOD COMPLEX 30 MIN: CPT | Mod: GP

## 2022-01-01 PROCEDURE — 250N000011 HC RX IP 250 OP 636: Performed by: EMERGENCY MEDICINE

## 2022-01-01 PROCEDURE — 82040 ASSAY OF SERUM ALBUMIN: CPT | Performed by: FAMILY MEDICINE

## 2022-01-01 PROCEDURE — 88342 IMHCHEM/IMCYTCHM 1ST ANTB: CPT | Mod: 26 | Performed by: PATHOLOGY

## 2022-01-01 PROCEDURE — 80202 ASSAY OF VANCOMYCIN: CPT | Performed by: FAMILY MEDICINE

## 2022-01-01 PROCEDURE — 93005 ELECTROCARDIOGRAM TRACING: CPT | Performed by: EMERGENCY MEDICINE

## 2022-01-01 PROCEDURE — 84100 ASSAY OF PHOSPHORUS: CPT | Performed by: INTERNAL MEDICINE

## 2022-01-01 PROCEDURE — 99207 PR CDG-MDM COMPONENT: MEETS MODERATE - DOWN CODED: CPT | Performed by: INTERNAL MEDICINE

## 2022-01-01 PROCEDURE — 99232 SBSQ HOSP IP/OBS MODERATE 35: CPT | Performed by: INTERNAL MEDICINE

## 2022-01-01 PROCEDURE — 93306 TTE W/DOPPLER COMPLETE: CPT | Mod: 26 | Performed by: INTERNAL MEDICINE

## 2022-01-01 PROCEDURE — 36415 COLL VENOUS BLD VENIPUNCTURE: CPT | Performed by: INTERNAL MEDICINE

## 2022-01-01 PROCEDURE — 250N000011 HC RX IP 250 OP 636: Performed by: ANESTHESIOLOGY

## 2022-01-01 PROCEDURE — 85018 HEMOGLOBIN: CPT | Performed by: PHYSICIAN ASSISTANT

## 2022-01-01 PROCEDURE — 80053 COMPREHEN METABOLIC PANEL: CPT | Performed by: EMERGENCY MEDICINE

## 2022-01-01 PROCEDURE — 710N000010 HC RECOVERY PHASE 1, LEVEL 2, PER MIN: Performed by: INTERNAL MEDICINE

## 2022-01-01 PROCEDURE — P9047 ALBUMIN (HUMAN), 25%, 50ML: HCPCS | Performed by: NURSE PRACTITIONER

## 2022-01-01 PROCEDURE — 258N000003 HC RX IP 258 OP 636: Performed by: FAMILY MEDICINE

## 2022-01-01 PROCEDURE — 36415 COLL VENOUS BLD VENIPUNCTURE: CPT | Performed by: NURSE PRACTITIONER

## 2022-01-01 PROCEDURE — 85027 COMPLETE CBC AUTOMATED: CPT | Performed by: FAMILY MEDICINE

## 2022-01-01 PROCEDURE — 250N000011 HC RX IP 250 OP 636: Mod: JA | Performed by: INTERNAL MEDICINE

## 2022-01-01 PROCEDURE — 96376 TX/PRO/DX INJ SAME DRUG ADON: CPT

## 2022-01-01 PROCEDURE — 250N000011 HC RX IP 250 OP 636: Mod: JA | Performed by: EMERGENCY MEDICINE

## 2022-01-01 PROCEDURE — 80053 COMPREHEN METABOLIC PANEL: CPT | Performed by: INTERNAL MEDICINE

## 2022-01-01 PROCEDURE — 82040 ASSAY OF SERUM ALBUMIN: CPT | Performed by: INTERNAL MEDICINE

## 2022-01-01 PROCEDURE — 82435 ASSAY OF BLOOD CHLORIDE: CPT | Performed by: EMERGENCY MEDICINE

## 2022-01-01 PROCEDURE — 86901 BLOOD TYPING SEROLOGIC RH(D): CPT | Performed by: EMERGENCY MEDICINE

## 2022-01-01 PROCEDURE — 250N000011 HC RX IP 250 OP 636: Performed by: NURSE PRACTITIONER

## 2022-01-01 PROCEDURE — 272N000706 US PARACENTESIS

## 2022-01-01 PROCEDURE — 85730 THROMBOPLASTIN TIME PARTIAL: CPT | Performed by: EMERGENCY MEDICINE

## 2022-01-01 PROCEDURE — 99232 SBSQ HOSP IP/OBS MODERATE 35: CPT | Performed by: FAMILY MEDICINE

## 2022-01-01 PROCEDURE — 258N000003 HC RX IP 258 OP 636: Performed by: EMERGENCY MEDICINE

## 2022-01-01 PROCEDURE — 82248 BILIRUBIN DIRECT: CPT | Performed by: INTERNAL MEDICINE

## 2022-01-01 PROCEDURE — 82077 ASSAY SPEC XCP UR&BREATH IA: CPT | Performed by: EMERGENCY MEDICINE

## 2022-01-01 PROCEDURE — 84466 ASSAY OF TRANSFERRIN: CPT | Performed by: NURSE PRACTITIONER

## 2022-01-01 PROCEDURE — 86901 BLOOD TYPING SEROLOGIC RH(D): CPT | Performed by: FAMILY MEDICINE

## 2022-01-01 PROCEDURE — 85018 HEMOGLOBIN: CPT | Performed by: FAMILY MEDICINE

## 2022-01-01 PROCEDURE — 06L38CZ OCCLUSION OF ESOPHAGEAL VEIN WITH EXTRALUMINAL DEVICE, VIA NATURAL OR ARTIFICIAL OPENING ENDOSCOPIC: ICD-10-PCS | Performed by: INTERNAL MEDICINE

## 2022-01-01 PROCEDURE — 85025 COMPLETE CBC W/AUTO DIFF WBC: CPT | Performed by: FAMILY MEDICINE

## 2022-01-01 PROCEDURE — 99221 1ST HOSP IP/OBS SF/LOW 40: CPT | Performed by: NURSE PRACTITIONER

## 2022-01-01 PROCEDURE — 99239 HOSP IP/OBS DSCHRG MGMT >30: CPT | Performed by: INTERNAL MEDICINE

## 2022-01-01 PROCEDURE — HZ2ZZZZ DETOXIFICATION SERVICES FOR SUBSTANCE ABUSE TREATMENT: ICD-10-PCS | Performed by: INTERNAL MEDICINE

## 2022-01-01 PROCEDURE — 84132 ASSAY OF SERUM POTASSIUM: CPT | Performed by: INTERNAL MEDICINE

## 2022-01-01 PROCEDURE — 36569 INSJ PICC 5 YR+ W/O IMAGING: CPT

## 2022-01-01 PROCEDURE — 99407 BEHAV CHNG SMOKING > 10 MIN: CPT

## 2022-01-01 PROCEDURE — 82105 ALPHA-FETOPROTEIN SERUM: CPT | Performed by: NURSE PRACTITIONER

## 2022-01-01 PROCEDURE — 99223 1ST HOSP IP/OBS HIGH 75: CPT | Performed by: INTERNAL MEDICINE

## 2022-01-01 PROCEDURE — 96368 THER/DIAG CONCURRENT INF: CPT

## 2022-01-01 PROCEDURE — 97535 SELF CARE MNGMENT TRAINING: CPT | Mod: GO

## 2022-01-01 PROCEDURE — 87040 BLOOD CULTURE FOR BACTERIA: CPT | Performed by: INTERNAL MEDICINE

## 2022-01-01 PROCEDURE — U0005 INFEC AGEN DETEC AMPLI PROBE: HCPCS | Performed by: EMERGENCY MEDICINE

## 2022-01-01 PROCEDURE — 83605 ASSAY OF LACTIC ACID: CPT | Performed by: INTERNAL MEDICINE

## 2022-01-01 PROCEDURE — 86850 RBC ANTIBODY SCREEN: CPT | Performed by: FAMILY MEDICINE

## 2022-01-01 PROCEDURE — 87635 SARS-COV-2 COVID-19 AMP PRB: CPT | Performed by: EMERGENCY MEDICINE

## 2022-01-01 PROCEDURE — 85014 HEMATOCRIT: CPT | Performed by: INTERNAL MEDICINE

## 2022-01-01 PROCEDURE — 99207 PR CDG-MDM COMPONENT: MEETS MODERATE - UP CODED: CPT | Performed by: INTERNAL MEDICINE

## 2022-01-01 PROCEDURE — 250N000013 HC RX MED GY IP 250 OP 250 PS 637: Performed by: STUDENT IN AN ORGANIZED HEALTH CARE EDUCATION/TRAINING PROGRAM

## 2022-01-01 PROCEDURE — 250N000011 HC RX IP 250 OP 636

## 2022-01-01 PROCEDURE — 88112 CYTOPATH CELL ENHANCE TECH: CPT | Mod: 26 | Performed by: PATHOLOGY

## 2022-01-01 PROCEDURE — 83735 ASSAY OF MAGNESIUM: CPT | Performed by: STUDENT IN AN ORGANIZED HEALTH CARE EDUCATION/TRAINING PROGRAM

## 2022-01-01 PROCEDURE — 99239 HOSP IP/OBS DSCHRG MGMT >30: CPT | Performed by: FAMILY MEDICINE

## 2022-01-01 PROCEDURE — 96375 TX/PRO/DX INJ NEW DRUG ADDON: CPT

## 2022-01-01 PROCEDURE — 250N000011 HC RX IP 250 OP 636: Performed by: PHYSICIAN ASSISTANT

## 2022-01-01 PROCEDURE — 86850 RBC ANTIBODY SCREEN: CPT | Performed by: EMERGENCY MEDICINE

## 2022-01-01 PROCEDURE — 78227 HEPATOBIL SYST IMAGE W/DRUG: CPT

## 2022-01-01 PROCEDURE — 200N000001 HC R&B ICU

## 2022-01-01 PROCEDURE — 999N000206 HC STATISTICAL VASC ACCESS NURSE TIME, 61+ MINUTES

## 2022-01-01 PROCEDURE — 370N000017 HC ANESTHESIA TECHNICAL FEE, PER MIN: Performed by: INTERNAL MEDICINE

## 2022-01-01 PROCEDURE — 84300 ASSAY OF URINE SODIUM: CPT | Performed by: NURSE PRACTITIONER

## 2022-01-01 PROCEDURE — 76705 ECHO EXAM OF ABDOMEN: CPT

## 2022-01-01 PROCEDURE — 84484 ASSAY OF TROPONIN QUANT: CPT | Performed by: EMERGENCY MEDICINE

## 2022-01-01 PROCEDURE — 87493 C DIFF AMPLIFIED PROBE: CPT | Performed by: INTERNAL MEDICINE

## 2022-01-01 PROCEDURE — 87149 DNA/RNA DIRECT PROBE: CPT | Performed by: INTERNAL MEDICINE

## 2022-01-01 PROCEDURE — 99232 SBSQ HOSP IP/OBS MODERATE 35: CPT

## 2022-01-01 PROCEDURE — 80307 DRUG TEST PRSMV CHEM ANLYZR: CPT | Performed by: INTERNAL MEDICINE

## 2022-01-01 PROCEDURE — 84157 ASSAY OF PROTEIN OTHER: CPT | Performed by: INTERNAL MEDICINE

## 2022-01-01 PROCEDURE — 84520 ASSAY OF UREA NITROGEN: CPT | Performed by: PHYSICIAN ASSISTANT

## 2022-01-01 PROCEDURE — 97161 PT EVAL LOW COMPLEX 20 MIN: CPT | Mod: GP

## 2022-01-01 PROCEDURE — 82140 ASSAY OF AMMONIA: CPT | Performed by: INTERNAL MEDICINE

## 2022-01-01 PROCEDURE — 84132 ASSAY OF SERUM POTASSIUM: CPT | Performed by: FAMILY MEDICINE

## 2022-01-01 PROCEDURE — 86923 COMPATIBILITY TEST ELECTRIC: CPT | Performed by: FAMILY MEDICINE

## 2022-01-01 PROCEDURE — 80076 HEPATIC FUNCTION PANEL: CPT | Performed by: NURSE PRACTITIONER

## 2022-01-01 PROCEDURE — 82140 ASSAY OF AMMONIA: CPT | Performed by: FAMILY MEDICINE

## 2022-01-01 PROCEDURE — 99291 CRITICAL CARE FIRST HOUR: CPT | Mod: 25

## 2022-01-01 PROCEDURE — 87070 CULTURE OTHR SPECIMN AEROBIC: CPT | Performed by: INTERNAL MEDICINE

## 2022-01-01 PROCEDURE — 99213 OFFICE O/P EST LOW 20 MIN: CPT | Performed by: STUDENT IN AN ORGANIZED HEALTH CARE EDUCATION/TRAINING PROGRAM

## 2022-01-01 PROCEDURE — 99233 SBSQ HOSP IP/OBS HIGH 50: CPT | Performed by: NURSE PRACTITIONER

## 2022-01-01 PROCEDURE — 89051 BODY FLUID CELL COUNT: CPT | Performed by: NURSE PRACTITIONER

## 2022-01-01 PROCEDURE — 258N000003 HC RX IP 258 OP 636: Performed by: NURSE PRACTITIONER

## 2022-01-01 PROCEDURE — 0W9G3ZZ DRAINAGE OF PERITONEAL CAVITY, PERCUTANEOUS APPROACH: ICD-10-PCS | Performed by: RADIOLOGY

## 2022-01-01 PROCEDURE — 250N000025 HC SEVOFLURANE, PER MIN: Performed by: INTERNAL MEDICINE

## 2022-01-01 PROCEDURE — 99231 SBSQ HOSP IP/OBS SF/LOW 25: CPT | Performed by: INTERNAL MEDICINE

## 2022-01-01 PROCEDURE — 97129 THER IVNTJ 1ST 15 MIN: CPT | Mod: GO

## 2022-01-01 PROCEDURE — 88305 TISSUE EXAM BY PATHOLOGIST: CPT | Mod: 26 | Performed by: PATHOLOGY

## 2022-01-01 PROCEDURE — 999N000033 HC STATISTIC CHRONIC PULMONARY DISEASE SPECIALIST

## 2022-01-01 PROCEDURE — 83735 ASSAY OF MAGNESIUM: CPT | Performed by: EMERGENCY MEDICINE

## 2022-01-01 PROCEDURE — 85610 PROTHROMBIN TIME: CPT | Performed by: EMERGENCY MEDICINE

## 2022-01-01 PROCEDURE — 90791 PSYCH DIAGNOSTIC EVALUATION: CPT

## 2022-01-01 PROCEDURE — 81001 URINALYSIS AUTO W/SCOPE: CPT | Performed by: INTERNAL MEDICINE

## 2022-01-01 PROCEDURE — 85027 COMPLETE CBC AUTOMATED: CPT | Performed by: EMERGENCY MEDICINE

## 2022-01-01 PROCEDURE — 999N000032 HC STATISTIC CHRONIC DISEASE SPECIALIST RT CONSULT

## 2022-01-01 PROCEDURE — 87205 SMEAR GRAM STAIN: CPT | Performed by: NURSE PRACTITIONER

## 2022-01-01 PROCEDURE — P9035 PLATELET PHERES LEUKOREDUCED: HCPCS | Performed by: INTERNAL MEDICINE

## 2022-01-01 PROCEDURE — 120N000004 HC R&B MS OVERFLOW

## 2022-01-01 PROCEDURE — 250N000011 HC RX IP 250 OP 636: Performed by: FAMILY MEDICINE

## 2022-01-01 PROCEDURE — 97530 THERAPEUTIC ACTIVITIES: CPT | Mod: GP

## 2022-01-01 PROCEDURE — 96365 THER/PROPH/DIAG IV INF INIT: CPT

## 2022-01-01 PROCEDURE — 82248 BILIRUBIN DIRECT: CPT | Performed by: FAMILY MEDICINE

## 2022-01-01 PROCEDURE — 80048 BASIC METABOLIC PNL TOTAL CA: CPT | Performed by: INTERNAL MEDICINE

## 2022-01-01 PROCEDURE — 250N000013 HC RX MED GY IP 250 OP 250 PS 637: Performed by: EMERGENCY MEDICINE

## 2022-01-01 PROCEDURE — P9016 RBC LEUKOCYTES REDUCED: HCPCS | Performed by: HOSPITALIST

## 2022-01-01 PROCEDURE — 250N000011 HC RX IP 250 OP 636: Performed by: HOSPITALIST

## 2022-01-01 PROCEDURE — 99223 1ST HOSP IP/OBS HIGH 75: CPT | Mod: AI | Performed by: INTERNAL MEDICINE

## 2022-01-01 PROCEDURE — 97116 GAIT TRAINING THERAPY: CPT | Mod: GP | Performed by: PHYSICAL THERAPIST

## 2022-01-01 PROCEDURE — 85049 AUTOMATED PLATELET COUNT: CPT | Performed by: PHYSICIAN ASSISTANT

## 2022-01-01 PROCEDURE — 84155 ASSAY OF PROTEIN SERUM: CPT | Performed by: STUDENT IN AN ORGANIZED HEALTH CARE EDUCATION/TRAINING PROGRAM

## 2022-01-01 PROCEDURE — 84132 ASSAY OF SERUM POTASSIUM: CPT | Performed by: NURSE PRACTITIONER

## 2022-01-01 PROCEDURE — 87635 SARS-COV-2 COVID-19 AMP PRB: CPT | Performed by: INTERNAL MEDICINE

## 2022-01-01 PROCEDURE — 86923 COMPATIBILITY TEST ELECTRIC: CPT | Performed by: HOSPITALIST

## 2022-01-01 PROCEDURE — 99222 1ST HOSP IP/OBS MODERATE 55: CPT | Performed by: SPECIALIST

## 2022-01-01 PROCEDURE — 96361 HYDRATE IV INFUSION ADD-ON: CPT

## 2022-01-01 PROCEDURE — 88112 CYTOPATH CELL ENHANCE TECH: CPT | Mod: TC | Performed by: NURSE PRACTITIONER

## 2022-01-01 PROCEDURE — 99285 EMERGENCY DEPT VISIT HI MDM: CPT | Mod: 25

## 2022-01-01 PROCEDURE — 250N000011 HC RX IP 250 OP 636: Performed by: NURSE ANESTHETIST, CERTIFIED REGISTERED

## 2022-01-01 PROCEDURE — 272N000019 HC KIT OPEN ENDED DOUBLE LUMEN

## 2022-01-01 PROCEDURE — 93306 TTE W/DOPPLER COMPLETE: CPT

## 2022-01-01 PROCEDURE — 97166 OT EVAL MOD COMPLEX 45 MIN: CPT | Mod: GO

## 2022-01-01 PROCEDURE — 82077 ASSAY SPEC XCP UR&BREATH IA: CPT | Performed by: STUDENT IN AN ORGANIZED HEALTH CARE EDUCATION/TRAINING PROGRAM

## 2022-01-01 PROCEDURE — 82310 ASSAY OF CALCIUM: CPT | Performed by: FAMILY MEDICINE

## 2022-01-01 PROCEDURE — 99282 EMERGENCY DEPT VISIT SF MDM: CPT

## 2022-01-01 PROCEDURE — 82728 ASSAY OF FERRITIN: CPT | Performed by: NURSE PRACTITIONER

## 2022-01-01 PROCEDURE — 250N000011 HC RX IP 250 OP 636: Performed by: STUDENT IN AN ORGANIZED HEALTH CARE EDUCATION/TRAINING PROGRAM

## 2022-01-01 PROCEDURE — 93005 ELECTROCARDIOGRAM TRACING: CPT | Performed by: STUDENT IN AN ORGANIZED HEALTH CARE EDUCATION/TRAINING PROGRAM

## 2022-01-01 PROCEDURE — 343N000001 HC RX 343: Performed by: FAMILY MEDICINE

## 2022-01-01 PROCEDURE — 87070 CULTURE OTHR SPECIMN AEROBIC: CPT | Performed by: NURSE PRACTITIONER

## 2022-01-01 PROCEDURE — 87015 SPECIMEN INFECT AGNT CONCNTJ: CPT | Performed by: NURSE PRACTITIONER

## 2022-01-01 PROCEDURE — 89050 BODY FLUID CELL COUNT: CPT | Performed by: INTERNAL MEDICINE

## 2022-01-01 PROCEDURE — 360N000075 HC SURGERY LEVEL 2, PER MIN: Performed by: INTERNAL MEDICINE

## 2022-01-01 PROCEDURE — 97110 THERAPEUTIC EXERCISES: CPT | Mod: GP

## 2022-01-01 PROCEDURE — 82330 ASSAY OF CALCIUM: CPT | Performed by: FAMILY MEDICINE

## 2022-01-01 PROCEDURE — 82435 ASSAY OF BLOOD CHLORIDE: CPT | Performed by: FAMILY MEDICINE

## 2022-01-01 PROCEDURE — 999N000141 HC STATISTIC PRE-PROCEDURE NURSING ASSESSMENT: Performed by: INTERNAL MEDICINE

## 2022-01-01 PROCEDURE — P9016 RBC LEUKOCYTES REDUCED: HCPCS | Performed by: FAMILY MEDICINE

## 2022-01-01 PROCEDURE — P9047 ALBUMIN (HUMAN), 25%, 50ML: HCPCS | Performed by: FAMILY MEDICINE

## 2022-01-01 PROCEDURE — 74177 CT ABD & PELVIS W/CONTRAST: CPT

## 2022-01-01 PROCEDURE — 85045 AUTOMATED RETICULOCYTE COUNT: CPT | Performed by: FAMILY MEDICINE

## 2022-01-01 PROCEDURE — 99292 CRITICAL CARE ADDL 30 MIN: CPT

## 2022-01-01 PROCEDURE — 97110 THERAPEUTIC EXERCISES: CPT | Mod: GO

## 2022-01-01 PROCEDURE — 99495 TRANSJ CARE MGMT MOD F2F 14D: CPT | Performed by: FAMILY MEDICINE

## 2022-01-01 PROCEDURE — 258N000003 HC RX IP 258 OP 636: Performed by: ANESTHESIOLOGY

## 2022-01-01 PROCEDURE — 88341 IMHCHEM/IMCYTCHM EA ADD ANTB: CPT | Mod: 26 | Performed by: PATHOLOGY

## 2022-01-01 PROCEDURE — 82607 VITAMIN B-12: CPT | Performed by: FAMILY MEDICINE

## 2022-01-01 PROCEDURE — 82042 OTHER SOURCE ALBUMIN QUAN EA: CPT | Performed by: INTERNAL MEDICINE

## 2022-01-01 PROCEDURE — 83935 ASSAY OF URINE OSMOLALITY: CPT | Performed by: NURSE PRACTITIONER

## 2022-01-01 PROCEDURE — 36415 COLL VENOUS BLD VENIPUNCTURE: CPT | Performed by: STUDENT IN AN ORGANIZED HEALTH CARE EDUCATION/TRAINING PROGRAM

## 2022-01-01 PROCEDURE — 99284 EMERGENCY DEPT VISIT MOD MDM: CPT | Mod: 25

## 2022-01-01 RX ORDER — FUROSEMIDE 10 MG/ML
20 INJECTION INTRAMUSCULAR; INTRAVENOUS 2 TIMES DAILY
Status: DISCONTINUED | OUTPATIENT
Start: 2022-01-01 | End: 2022-01-01

## 2022-01-01 RX ORDER — KIT FOR THE PREPARATION OF TECHNETIUM TC 99M MEBROFENIN 45 MG/10ML
7-9 INJECTION, POWDER, LYOPHILIZED, FOR SOLUTION INTRAVENOUS ONCE
Status: COMPLETED | OUTPATIENT
Start: 2022-01-01 | End: 2022-01-01

## 2022-01-01 RX ORDER — MULTIPLE VITAMINS W/ MINERALS TAB 9MG-400MCG
1 TAB ORAL DAILY
Status: COMPLETED | OUTPATIENT
Start: 2022-01-01 | End: 2022-01-01

## 2022-01-01 RX ORDER — OXYMETAZOLINE HYDROCHLORIDE 0.05 G/100ML
2 SPRAY NASAL 2 TIMES DAILY PRN
Qty: 30 ML | Refills: 0 | Status: SHIPPED | OUTPATIENT
Start: 2022-01-01 | End: 2022-01-01

## 2022-01-01 RX ORDER — DOXYCYCLINE 100 MG/1
CAPSULE ORAL
Qty: 20 CAPSULE | Refills: 0 | Status: SHIPPED | OUTPATIENT
Start: 2022-01-01 | End: 2022-01-01

## 2022-01-01 RX ORDER — PROCHLORPERAZINE MALEATE 10 MG
10 TABLET ORAL EVERY 6 HOURS PRN
Status: DISCONTINUED | OUTPATIENT
Start: 2022-01-01 | End: 2022-01-01 | Stop reason: HOSPADM

## 2022-01-01 RX ORDER — SPIRONOLACTONE 25 MG/1
25 TABLET ORAL DAILY
Status: DISCONTINUED | OUTPATIENT
Start: 2022-01-01 | End: 2022-01-01

## 2022-01-01 RX ORDER — POTASSIUM CHLORIDE 1500 MG/1
20 TABLET, EXTENDED RELEASE ORAL 2 TIMES DAILY
Status: COMPLETED | OUTPATIENT
Start: 2022-01-01 | End: 2022-01-01

## 2022-01-01 RX ORDER — EPHEDRINE SULFATE 50 MG/ML
INJECTION, SOLUTION INTRAMUSCULAR; INTRAVENOUS; SUBCUTANEOUS PRN
Status: DISCONTINUED | OUTPATIENT
Start: 2022-01-01 | End: 2022-01-01

## 2022-01-01 RX ORDER — TRAZODONE HYDROCHLORIDE 50 MG/1
50 TABLET, FILM COATED ORAL AT BEDTIME
Qty: 30 TABLET | Refills: 0 | Status: SHIPPED | OUTPATIENT
Start: 2022-01-01 | End: 2022-01-01

## 2022-01-01 RX ORDER — SODIUM CHLORIDE, SODIUM LACTATE, POTASSIUM CHLORIDE, CALCIUM CHLORIDE 600; 310; 30; 20 MG/100ML; MG/100ML; MG/100ML; MG/100ML
INJECTION, SOLUTION INTRAVENOUS CONTINUOUS
Status: DISCONTINUED | OUTPATIENT
Start: 2022-01-01 | End: 2022-01-01 | Stop reason: HOSPADM

## 2022-01-01 RX ORDER — FOLIC ACID 5 MG/ML
1 INJECTION, SOLUTION INTRAMUSCULAR; INTRAVENOUS; SUBCUTANEOUS DAILY
Status: COMPLETED | OUTPATIENT
Start: 2022-01-01 | End: 2022-01-01

## 2022-01-01 RX ORDER — ALBUMIN (HUMAN) 12.5 G/50ML
50 SOLUTION INTRAVENOUS 2 TIMES DAILY
Status: DISCONTINUED | OUTPATIENT
Start: 2022-01-01 | End: 2022-01-01

## 2022-01-01 RX ORDER — MAGNESIUM SULFATE 4 G/50ML
4 INJECTION INTRAVENOUS ONCE
Status: COMPLETED | OUTPATIENT
Start: 2022-01-01 | End: 2022-01-01

## 2022-01-01 RX ORDER — LACTULOSE 10 G/15ML
10 SOLUTION ORAL 2 TIMES DAILY
Qty: 900 ML | Refills: 0 | Status: SHIPPED | OUTPATIENT
Start: 2022-01-01 | End: 2022-01-01

## 2022-01-01 RX ORDER — TRAZODONE HYDROCHLORIDE 50 MG/1
50 TABLET, FILM COATED ORAL AT BEDTIME
Status: DISCONTINUED | OUTPATIENT
Start: 2022-01-01 | End: 2022-01-01 | Stop reason: HOSPADM

## 2022-01-01 RX ORDER — MIDODRINE HYDROCHLORIDE 10 MG/1
10 TABLET ORAL 3 TIMES DAILY PRN
Qty: 30 TABLET | Refills: 0 | Status: SHIPPED | OUTPATIENT
Start: 2022-01-01 | End: 2022-01-01

## 2022-01-01 RX ORDER — PROPOFOL 10 MG/ML
INJECTION, EMULSION INTRAVENOUS PRN
Status: DISCONTINUED | OUTPATIENT
Start: 2022-01-01 | End: 2022-01-01

## 2022-01-01 RX ORDER — CALCIUM CARBONATE 500 MG/1
500 TABLET, CHEWABLE ORAL 3 TIMES DAILY
Status: DISCONTINUED | OUTPATIENT
Start: 2022-01-01 | End: 2022-01-01 | Stop reason: HOSPADM

## 2022-01-01 RX ORDER — LIDOCAINE HYDROCHLORIDE 10 MG/ML
INJECTION, SOLUTION INFILTRATION; PERINEURAL PRN
Status: DISCONTINUED | OUTPATIENT
Start: 2022-01-01 | End: 2022-01-01

## 2022-01-01 RX ORDER — SODIUM CHLORIDE 9 MG/ML
INJECTION, SOLUTION INTRAVENOUS CONTINUOUS
Status: DISCONTINUED | OUTPATIENT
Start: 2022-01-01 | End: 2022-01-01

## 2022-01-01 RX ORDER — PANTOPRAZOLE SODIUM 40 MG/1
40 TABLET, DELAYED RELEASE ORAL
Qty: 60 TABLET | Refills: 0 | Status: SHIPPED | OUTPATIENT
Start: 2022-01-01 | End: 2022-01-01

## 2022-01-01 RX ORDER — GABAPENTIN 300 MG/1
900 CAPSULE ORAL EVERY 8 HOURS
Status: COMPLETED | OUTPATIENT
Start: 2022-01-01 | End: 2022-01-01

## 2022-01-01 RX ORDER — PANTOPRAZOLE SODIUM 20 MG/1
40 TABLET, DELAYED RELEASE ORAL
Status: DISCONTINUED | OUTPATIENT
Start: 2022-01-01 | End: 2022-01-01

## 2022-01-01 RX ORDER — MULTIPLE VITAMINS W/ MINERALS TAB 9MG-400MCG
1 TAB ORAL DAILY
Status: DISCONTINUED | OUTPATIENT
Start: 2022-01-01 | End: 2022-01-01 | Stop reason: HOSPADM

## 2022-01-01 RX ORDER — MIRTAZAPINE 15 MG/1
15 TABLET, FILM COATED ORAL AT BEDTIME
Status: DISCONTINUED | OUTPATIENT
Start: 2022-01-01 | End: 2022-01-01 | Stop reason: HOSPADM

## 2022-01-01 RX ORDER — MAGNESIUM SULFATE HEPTAHYDRATE 40 MG/ML
2 INJECTION, SOLUTION INTRAVENOUS ONCE
Status: COMPLETED | OUTPATIENT
Start: 2022-01-01 | End: 2022-01-01

## 2022-01-01 RX ORDER — GABAPENTIN 300 MG/1
300 CAPSULE ORAL EVERY 8 HOURS
Status: DISCONTINUED | OUTPATIENT
Start: 2022-01-01 | End: 2022-01-01

## 2022-01-01 RX ORDER — FENTANYL CITRATE 50 UG/ML
INJECTION, SOLUTION INTRAMUSCULAR; INTRAVENOUS PRN
Status: DISCONTINUED | OUTPATIENT
Start: 2022-01-01 | End: 2022-01-01

## 2022-01-01 RX ORDER — HALOPERIDOL 5 MG/ML
1 INJECTION INTRAMUSCULAR
Status: DISCONTINUED | OUTPATIENT
Start: 2022-01-01 | End: 2022-01-01 | Stop reason: HOSPADM

## 2022-01-01 RX ORDER — FAMOTIDINE 10 MG
10 TABLET ORAL 2 TIMES DAILY
Status: DISCONTINUED | OUTPATIENT
Start: 2022-01-01 | End: 2022-01-01

## 2022-01-01 RX ORDER — CHLORHEXIDINE GLUCONATE ORAL RINSE 1.2 MG/ML
15 SOLUTION DENTAL 4 TIMES DAILY PRN
Status: DISCONTINUED | OUTPATIENT
Start: 2022-01-01 | End: 2022-01-01 | Stop reason: HOSPADM

## 2022-01-01 RX ORDER — GABAPENTIN 100 MG/1
100 CAPSULE ORAL 2 TIMES DAILY
Qty: 60 CAPSULE | Refills: 0 | Status: SHIPPED | OUTPATIENT
Start: 2022-01-01

## 2022-01-01 RX ORDER — GABAPENTIN 300 MG/1
300 CAPSULE ORAL 2 TIMES DAILY
Status: DISCONTINUED | OUTPATIENT
Start: 2022-01-01 | End: 2022-01-01

## 2022-01-01 RX ORDER — FUROSEMIDE 40 MG
TABLET ORAL
Qty: 60 TABLET | Refills: 1 | Status: SHIPPED | OUTPATIENT
Start: 2022-01-01 | End: 2022-01-01

## 2022-01-01 RX ORDER — CEPHALEXIN 500 MG/1
500 CAPSULE ORAL 2 TIMES DAILY
Qty: 20 CAPSULE | Refills: 0 | Status: SHIPPED | OUTPATIENT
Start: 2022-01-01 | End: 2022-01-01

## 2022-01-01 RX ORDER — LABETALOL HYDROCHLORIDE 5 MG/ML
INJECTION, SOLUTION INTRAVENOUS PRN
Status: DISCONTINUED | OUTPATIENT
Start: 2022-01-01 | End: 2022-01-01

## 2022-01-01 RX ORDER — FOLIC ACID 1 MG/1
1 TABLET ORAL DAILY
Status: COMPLETED | OUTPATIENT
Start: 2022-01-01 | End: 2022-01-01

## 2022-01-01 RX ORDER — ALBUMIN (HUMAN) 12.5 G/50ML
50 SOLUTION INTRAVENOUS 2 TIMES DAILY
Status: COMPLETED | OUTPATIENT
Start: 2022-01-01 | End: 2022-01-01

## 2022-01-01 RX ORDER — GABAPENTIN 300 MG/1
300 CAPSULE ORAL 3 TIMES DAILY
Status: DISCONTINUED | OUTPATIENT
Start: 2022-01-01 | End: 2022-01-01

## 2022-01-01 RX ORDER — LORAZEPAM 1 MG/1
1-2 TABLET ORAL EVERY 30 MIN PRN
Status: DISCONTINUED | OUTPATIENT
Start: 2022-01-01 | End: 2022-01-01 | Stop reason: HOSPADM

## 2022-01-01 RX ORDER — NICOTINE 21 MG/24HR
1 PATCH, TRANSDERMAL 24 HOURS TRANSDERMAL DAILY
Status: DISCONTINUED | OUTPATIENT
Start: 2022-01-01 | End: 2022-01-01 | Stop reason: HOSPADM

## 2022-01-01 RX ORDER — CHLORHEXIDINE GLUCONATE ORAL RINSE 1.2 MG/ML
15 SOLUTION DENTAL 4 TIMES DAILY PRN
Qty: 473 ML | Refills: 0 | Status: SHIPPED | OUTPATIENT
Start: 2022-01-01 | End: 2022-01-01

## 2022-01-01 RX ORDER — ONDANSETRON 2 MG/ML
4 INJECTION INTRAMUSCULAR; INTRAVENOUS EVERY 6 HOURS PRN
Status: DISCONTINUED | OUTPATIENT
Start: 2022-01-01 | End: 2022-01-01 | Stop reason: HOSPADM

## 2022-01-01 RX ORDER — PROCHLORPERAZINE 25 MG
25 SUPPOSITORY, RECTAL RECTAL EVERY 12 HOURS PRN
Status: DISCONTINUED | OUTPATIENT
Start: 2022-01-01 | End: 2022-01-01 | Stop reason: HOSPADM

## 2022-01-01 RX ORDER — GABAPENTIN 600 MG/1
1200 TABLET ORAL ONCE
Status: COMPLETED | OUTPATIENT
Start: 2022-01-01 | End: 2022-01-01

## 2022-01-01 RX ORDER — LIDOCAINE 40 MG/G
CREAM TOPICAL
Status: DISCONTINUED | OUTPATIENT
Start: 2022-01-01 | End: 2022-01-01 | Stop reason: HOSPADM

## 2022-01-01 RX ORDER — LORAZEPAM 1 MG/1
1-2 TABLET ORAL EVERY 30 MIN PRN
Status: DISCONTINUED | OUTPATIENT
Start: 2022-01-01 | End: 2022-01-01

## 2022-01-01 RX ORDER — MIRTAZAPINE 15 MG/1
15 TABLET, FILM COATED ORAL AT BEDTIME
Status: DISCONTINUED | OUTPATIENT
Start: 2022-01-01 | End: 2022-01-01

## 2022-01-01 RX ORDER — CLONIDINE HYDROCHLORIDE 0.1 MG/1
0.1 TABLET ORAL EVERY 8 HOURS
Status: DISCONTINUED | OUTPATIENT
Start: 2022-01-01 | End: 2022-01-01

## 2022-01-01 RX ORDER — EPLERENONE 25 MG/1
25 TABLET, FILM COATED ORAL DAILY
Status: DISCONTINUED | OUTPATIENT
Start: 2022-01-01 | End: 2022-01-01 | Stop reason: HOSPADM

## 2022-01-01 RX ORDER — MAGNESIUM SULFATE HEPTAHYDRATE 500 MG/ML
2 INJECTION, SOLUTION INTRAMUSCULAR; INTRAVENOUS ONCE
Status: DISCONTINUED | OUTPATIENT
Start: 2022-01-01 | End: 2022-01-01

## 2022-01-01 RX ORDER — FENTANYL CITRATE 50 UG/ML
25 INJECTION, SOLUTION INTRAMUSCULAR; INTRAVENOUS EVERY 5 MIN PRN
Status: DISCONTINUED | OUTPATIENT
Start: 2022-01-01 | End: 2022-01-01 | Stop reason: HOSPADM

## 2022-01-01 RX ORDER — FLUMAZENIL 0.1 MG/ML
0.2 INJECTION, SOLUTION INTRAVENOUS
Status: DISCONTINUED | OUTPATIENT
Start: 2022-01-01 | End: 2022-01-01 | Stop reason: HOSPADM

## 2022-01-01 RX ORDER — POTASSIUM CHLORIDE 1.5 G/1.58G
40 POWDER, FOR SOLUTION ORAL ONCE
Status: COMPLETED | OUTPATIENT
Start: 2022-01-01 | End: 2022-01-01

## 2022-01-01 RX ORDER — NALOXONE HYDROCHLORIDE 0.4 MG/ML
0.2 INJECTION, SOLUTION INTRAMUSCULAR; INTRAVENOUS; SUBCUTANEOUS
Status: DISCONTINUED | OUTPATIENT
Start: 2022-01-01 | End: 2022-01-01 | Stop reason: HOSPADM

## 2022-01-01 RX ORDER — EPLERENONE 25 MG/1
25 TABLET, FILM COATED ORAL DAILY
Qty: 30 TABLET | Refills: 0 | Status: SHIPPED | OUTPATIENT
Start: 2022-01-01

## 2022-01-01 RX ORDER — FUROSEMIDE 40 MG
TABLET ORAL
Qty: 60 TABLET | Refills: 1 | Status: SHIPPED | OUTPATIENT
Start: 2022-01-01 | End: 2022-07-08

## 2022-01-01 RX ORDER — MIDODRINE HYDROCHLORIDE 2.5 MG/1
2.5 TABLET ORAL 3 TIMES DAILY PRN
Status: DISCONTINUED | OUTPATIENT
Start: 2022-01-01 | End: 2022-01-01

## 2022-01-01 RX ORDER — ALBUMIN (HUMAN) 12.5 G/50ML
50 SOLUTION INTRAVENOUS ONCE
Status: DISCONTINUED | OUTPATIENT
Start: 2022-01-01 | End: 2022-01-01

## 2022-01-01 RX ORDER — POTASSIUM CHLORIDE 29.8 MG/ML
20 INJECTION INTRAVENOUS
Status: COMPLETED | OUTPATIENT
Start: 2022-01-01 | End: 2022-01-01

## 2022-01-01 RX ORDER — LACTULOSE 10 G/15ML
10 SOLUTION ORAL 3 TIMES DAILY
Status: DISCONTINUED | OUTPATIENT
Start: 2022-01-01 | End: 2022-01-01

## 2022-01-01 RX ORDER — MAGNESIUM OXIDE 400 MG/1
400 TABLET ORAL 3 TIMES DAILY
Status: DISCONTINUED | OUTPATIENT
Start: 2022-01-01 | End: 2022-01-01 | Stop reason: ALTCHOICE

## 2022-01-01 RX ORDER — POTASSIUM CHLORIDE 1500 MG/1
40 TABLET, EXTENDED RELEASE ORAL ONCE
Status: COMPLETED | OUTPATIENT
Start: 2022-01-01 | End: 2022-01-01

## 2022-01-01 RX ORDER — NALOXONE HYDROCHLORIDE 0.4 MG/ML
0.4 INJECTION, SOLUTION INTRAMUSCULAR; INTRAVENOUS; SUBCUTANEOUS
Status: DISCONTINUED | OUTPATIENT
Start: 2022-01-01 | End: 2022-01-01

## 2022-01-01 RX ORDER — DEXAMETHASONE 0.5 MG/5ML
2 ELIXIR ORAL 3 TIMES DAILY
Qty: 237 ML | Refills: 0 | Status: SHIPPED | OUTPATIENT
Start: 2022-01-01 | End: 2022-01-01

## 2022-01-01 RX ORDER — FENTANYL CITRATE 50 UG/ML
25 INJECTION, SOLUTION INTRAMUSCULAR; INTRAVENOUS
Status: DISCONTINUED | OUTPATIENT
Start: 2022-01-01 | End: 2022-01-01 | Stop reason: HOSPADM

## 2022-01-01 RX ORDER — CEFTRIAXONE 1 G/1
1 INJECTION, POWDER, FOR SOLUTION INTRAMUSCULAR; INTRAVENOUS ONCE
Status: COMPLETED | OUTPATIENT
Start: 2022-01-01 | End: 2022-01-01

## 2022-01-01 RX ORDER — CALCIUM CHLORIDE 100 MG/ML
INJECTION INTRAVENOUS; INTRAVENTRICULAR PRN
Status: DISCONTINUED | OUTPATIENT
Start: 2022-01-01 | End: 2022-01-01

## 2022-01-01 RX ORDER — LORAZEPAM 2 MG/ML
1-2 INJECTION INTRAMUSCULAR EVERY 30 MIN PRN
Status: DISCONTINUED | OUTPATIENT
Start: 2022-01-01 | End: 2022-01-01

## 2022-01-01 RX ORDER — NICOTINE POLACRILEX 4 MG
15-30 LOZENGE BUCCAL
Status: DISCONTINUED | OUTPATIENT
Start: 2022-01-01 | End: 2022-01-01 | Stop reason: HOSPADM

## 2022-01-01 RX ORDER — LACTULOSE 10 G/15ML
10 SOLUTION ORAL 2 TIMES DAILY
Status: DISCONTINUED | OUTPATIENT
Start: 2022-01-01 | End: 2022-01-01 | Stop reason: HOSPADM

## 2022-01-01 RX ORDER — FOLIC ACID 5 MG/ML
1 INJECTION, SOLUTION INTRAMUSCULAR; INTRAVENOUS; SUBCUTANEOUS ONCE
Status: COMPLETED | OUTPATIENT
Start: 2022-01-01 | End: 2022-01-01

## 2022-01-01 RX ORDER — LOPERAMIDE HCL 2 MG
2 CAPSULE ORAL 2 TIMES DAILY
Status: COMPLETED | OUTPATIENT
Start: 2022-01-01 | End: 2022-01-01

## 2022-01-01 RX ORDER — KETOROLAC TROMETHAMINE 30 MG/ML
15 INJECTION, SOLUTION INTRAMUSCULAR; INTRAVENOUS EVERY 6 HOURS PRN
Status: DISCONTINUED | OUTPATIENT
Start: 2022-01-01 | End: 2022-01-01 | Stop reason: HOSPADM

## 2022-01-01 RX ORDER — GABAPENTIN 300 MG/1
600 CAPSULE ORAL EVERY 8 HOURS
Status: DISCONTINUED | OUTPATIENT
Start: 2022-01-01 | End: 2022-01-01

## 2022-01-01 RX ORDER — MIDODRINE HYDROCHLORIDE 5 MG/1
10 TABLET ORAL 3 TIMES DAILY PRN
Status: DISCONTINUED | OUTPATIENT
Start: 2022-01-01 | End: 2022-01-01 | Stop reason: HOSPADM

## 2022-01-01 RX ORDER — SODIUM CHLORIDE 9 MG/ML
INJECTION, SOLUTION INTRAVENOUS CONTINUOUS PRN
Status: DISCONTINUED | OUTPATIENT
Start: 2022-01-01 | End: 2022-01-01

## 2022-01-01 RX ORDER — CALCIUM GLUCONATE 20 MG/ML
1 INJECTION, SOLUTION INTRAVENOUS ONCE
Status: COMPLETED | OUTPATIENT
Start: 2022-01-01 | End: 2022-01-01

## 2022-01-01 RX ORDER — GABAPENTIN 100 MG/1
100 CAPSULE ORAL EVERY 8 HOURS
Status: DISCONTINUED | OUTPATIENT
Start: 2022-01-01 | End: 2022-01-01 | Stop reason: HOSPADM

## 2022-01-01 RX ORDER — MIRTAZAPINE 30 MG/1
30 TABLET, FILM COATED ORAL AT BEDTIME
Qty: 90 TABLET | Refills: 1 | Status: SHIPPED | OUTPATIENT
Start: 2022-01-01

## 2022-01-01 RX ORDER — LORAZEPAM 2 MG/ML
2 INJECTION INTRAMUSCULAR ONCE
Status: COMPLETED | OUTPATIENT
Start: 2022-01-01 | End: 2022-01-01

## 2022-01-01 RX ORDER — POTASSIUM CHLORIDE 7.45 MG/ML
10 INJECTION INTRAVENOUS ONCE
Status: COMPLETED | OUTPATIENT
Start: 2022-01-01 | End: 2022-01-01

## 2022-01-01 RX ORDER — FUROSEMIDE 40 MG
40 TABLET ORAL
Status: DISCONTINUED | OUTPATIENT
Start: 2022-01-01 | End: 2022-01-01

## 2022-01-01 RX ORDER — ONDANSETRON 2 MG/ML
4 INJECTION INTRAMUSCULAR; INTRAVENOUS EVERY 30 MIN PRN
Status: DISCONTINUED | OUTPATIENT
Start: 2022-01-01 | End: 2022-01-01 | Stop reason: HOSPADM

## 2022-01-01 RX ORDER — FUROSEMIDE 40 MG
40 TABLET ORAL
Status: DISCONTINUED | OUTPATIENT
Start: 2022-01-01 | End: 2022-01-01 | Stop reason: HOSPADM

## 2022-01-01 RX ORDER — MIRTAZAPINE 30 MG/1
30 TABLET, FILM COATED ORAL AT BEDTIME
Status: DISCONTINUED | OUTPATIENT
Start: 2022-01-01 | End: 2022-01-01

## 2022-01-01 RX ORDER — FLUMAZENIL 0.1 MG/ML
0.2 INJECTION, SOLUTION INTRAVENOUS
Status: ACTIVE | OUTPATIENT
Start: 2022-01-01 | End: 2022-01-01

## 2022-01-01 RX ORDER — TRAZODONE HYDROCHLORIDE 50 MG/1
50 TABLET, FILM COATED ORAL AT BEDTIME
Qty: 90 TABLET | Refills: 1 | Status: SHIPPED | OUTPATIENT
Start: 2022-01-01

## 2022-01-01 RX ORDER — OCTREOTIDE ACETATE 50 UG/ML
50 INJECTION, SOLUTION INTRAVENOUS; SUBCUTANEOUS ONCE
Status: COMPLETED | OUTPATIENT
Start: 2022-01-01 | End: 2022-01-01

## 2022-01-01 RX ORDER — MIRTAZAPINE 30 MG/1
30 TABLET, FILM COATED ORAL AT BEDTIME
Qty: 30 TABLET | Refills: 0 | Status: SHIPPED | OUTPATIENT
Start: 2022-01-01 | End: 2022-01-01

## 2022-01-01 RX ORDER — PANTOPRAZOLE SODIUM 20 MG/1
40 TABLET, DELAYED RELEASE ORAL
Status: DISCONTINUED | OUTPATIENT
Start: 2022-01-01 | End: 2022-01-01 | Stop reason: HOSPADM

## 2022-01-01 RX ORDER — ALBUMIN (HUMAN) 12.5 G/50ML
50 SOLUTION INTRAVENOUS ONCE
Status: COMPLETED | OUTPATIENT
Start: 2022-01-01 | End: 2022-01-01

## 2022-01-01 RX ORDER — FUROSEMIDE 10 MG/ML
20 INJECTION INTRAMUSCULAR; INTRAVENOUS 2 TIMES DAILY
Status: DISCONTINUED | OUTPATIENT
Start: 2022-01-01 | End: 2022-01-01 | Stop reason: HOSPADM

## 2022-01-01 RX ORDER — DOXYCYCLINE 100 MG/1
100 CAPSULE ORAL 2 TIMES DAILY
Qty: 20 CAPSULE | Refills: 0 | Status: SHIPPED | OUTPATIENT
Start: 2022-01-01 | End: 2022-01-01

## 2022-01-01 RX ORDER — HYDROMORPHONE HCL IN WATER/PF 6 MG/30 ML
0.2 PATIENT CONTROLLED ANALGESIA SYRINGE INTRAVENOUS EVERY 5 MIN PRN
Status: DISCONTINUED | OUTPATIENT
Start: 2022-01-01 | End: 2022-01-01 | Stop reason: HOSPADM

## 2022-01-01 RX ORDER — ALBUMIN (HUMAN) 12.5 G/50ML
12.5 SOLUTION INTRAVENOUS
Status: COMPLETED | OUTPATIENT
Start: 2022-01-01 | End: 2022-01-01

## 2022-01-01 RX ORDER — LACTULOSE 10 G/15ML
10 SOLUTION ORAL 3 TIMES DAILY
Status: DISCONTINUED | OUTPATIENT
Start: 2022-01-01 | End: 2022-01-01 | Stop reason: HOSPADM

## 2022-01-01 RX ORDER — CALCIUM CARBONATE 500 MG/1
500 TABLET, CHEWABLE ORAL 3 TIMES DAILY
Status: DISCONTINUED | OUTPATIENT
Start: 2022-01-01 | End: 2022-01-01

## 2022-01-01 RX ORDER — NICOTINE 21 MG/24HR
1 PATCH, TRANSDERMAL 24 HOURS TRANSDERMAL DAILY
Status: DISCONTINUED | OUTPATIENT
Start: 2022-01-01 | End: 2022-01-01

## 2022-01-01 RX ORDER — GABAPENTIN 100 MG/1
100 CAPSULE ORAL 2 TIMES DAILY
Status: DISCONTINUED | OUTPATIENT
Start: 2022-01-01 | End: 2022-01-01 | Stop reason: HOSPADM

## 2022-01-01 RX ORDER — ONDANSETRON 4 MG/1
4 TABLET, ORALLY DISINTEGRATING ORAL EVERY 30 MIN PRN
Status: DISCONTINUED | OUTPATIENT
Start: 2022-01-01 | End: 2022-01-01 | Stop reason: HOSPADM

## 2022-01-01 RX ORDER — POTASSIUM CHLORIDE 7.45 MG/ML
10 INJECTION INTRAVENOUS
Status: COMPLETED | OUTPATIENT
Start: 2022-01-01 | End: 2022-01-01

## 2022-01-01 RX ORDER — MIRTAZAPINE 15 MG/1
30 TABLET, FILM COATED ORAL AT BEDTIME
Status: DISCONTINUED | OUTPATIENT
Start: 2022-01-01 | End: 2022-01-01 | Stop reason: HOSPADM

## 2022-01-01 RX ORDER — THIAMINE HYDROCHLORIDE 100 MG/ML
100 INJECTION, SOLUTION INTRAMUSCULAR; INTRAVENOUS ONCE
Status: COMPLETED | OUTPATIENT
Start: 2022-01-01 | End: 2022-01-01

## 2022-01-01 RX ORDER — HALOPERIDOL 5 MG/ML
2.5-5 INJECTION INTRAMUSCULAR EVERY 4 HOURS PRN
Status: DISCONTINUED | OUTPATIENT
Start: 2022-01-01 | End: 2022-01-01 | Stop reason: HOSPADM

## 2022-01-01 RX ORDER — DEXTROSE MONOHYDRATE 25 G/50ML
25-50 INJECTION, SOLUTION INTRAVENOUS
Status: DISCONTINUED | OUTPATIENT
Start: 2022-01-01 | End: 2022-01-01 | Stop reason: HOSPADM

## 2022-01-01 RX ORDER — LORAZEPAM 2 MG/ML
1 INJECTION INTRAMUSCULAR ONCE
Status: COMPLETED | OUTPATIENT
Start: 2022-01-01 | End: 2022-01-01

## 2022-01-01 RX ORDER — LORAZEPAM 2 MG/ML
1-2 INJECTION INTRAMUSCULAR EVERY 30 MIN PRN
Status: DISCONTINUED | OUTPATIENT
Start: 2022-01-01 | End: 2022-01-01 | Stop reason: HOSPADM

## 2022-01-01 RX ORDER — PANTOPRAZOLE SODIUM 40 MG/1
40 TABLET, DELAYED RELEASE ORAL
Qty: 180 TABLET | Refills: 3 | Status: SHIPPED | OUTPATIENT
Start: 2022-01-01

## 2022-01-01 RX ORDER — IOPAMIDOL 755 MG/ML
100 INJECTION, SOLUTION INTRAVASCULAR ONCE
Status: COMPLETED | OUTPATIENT
Start: 2022-01-01 | End: 2022-01-01

## 2022-01-01 RX ORDER — ONDANSETRON 2 MG/ML
4 INJECTION INTRAMUSCULAR; INTRAVENOUS EVERY 30 MIN PRN
Status: DISCONTINUED | OUTPATIENT
Start: 2022-01-01 | End: 2022-01-01 | Stop reason: ALTCHOICE

## 2022-01-01 RX ORDER — NICOTINE 21 MG/24HR
1 PATCH, TRANSDERMAL 24 HOURS TRANSDERMAL EVERY 24 HOURS
Status: DISCONTINUED | OUTPATIENT
Start: 2022-01-01 | End: 2022-01-01

## 2022-01-01 RX ORDER — LANOLIN ALCOHOL/MO/W.PET/CERES
CREAM (GRAM) TOPICAL
COMMUNITY
Start: 2021-05-18 | End: 2022-01-01

## 2022-01-01 RX ORDER — ALBUMIN (HUMAN) 12.5 G/50ML
50 SOLUTION INTRAVENOUS 2 TIMES DAILY
Status: DISCONTINUED | OUTPATIENT
Start: 2022-01-01 | End: 2022-01-01 | Stop reason: DRUGHIGH

## 2022-01-01 RX ORDER — ONDANSETRON 4 MG/1
4 TABLET, ORALLY DISINTEGRATING ORAL EVERY 30 MIN PRN
Status: DISCONTINUED | OUTPATIENT
Start: 2022-01-01 | End: 2022-01-01 | Stop reason: ALTCHOICE

## 2022-01-01 RX ORDER — CEFTRIAXONE 1 G/1
1 INJECTION, POWDER, FOR SOLUTION INTRAMUSCULAR; INTRAVENOUS EVERY 24 HOURS
Status: DISCONTINUED | OUTPATIENT
Start: 2022-01-01 | End: 2022-01-01

## 2022-01-01 RX ORDER — METRONIDAZOLE 500 MG/1
500 TABLET ORAL 3 TIMES DAILY
Status: COMPLETED | OUTPATIENT
Start: 2022-01-01 | End: 2022-01-01

## 2022-01-01 RX ORDER — OXYMETAZOLINE HYDROCHLORIDE 0.05 G/100ML
2 SPRAY NASAL 2 TIMES DAILY PRN
Status: DISCONTINUED | OUTPATIENT
Start: 2022-01-01 | End: 2022-01-01 | Stop reason: HOSPADM

## 2022-01-01 RX ORDER — ACETAMINOPHEN 325 MG/1
650 TABLET ORAL EVERY 6 HOURS PRN
Status: DISCONTINUED | OUTPATIENT
Start: 2022-01-01 | End: 2022-01-01

## 2022-01-01 RX ORDER — ACETAMINOPHEN 650 MG/1
650 SUPPOSITORY RECTAL EVERY 6 HOURS PRN
Status: DISCONTINUED | OUTPATIENT
Start: 2022-01-01 | End: 2022-01-01

## 2022-01-01 RX ORDER — QUETIAPINE FUMARATE 100 MG/1
100 TABLET, FILM COATED ORAL AT BEDTIME
Status: DISCONTINUED | OUTPATIENT
Start: 2022-01-01 | End: 2022-01-01 | Stop reason: HOSPADM

## 2022-01-01 RX ORDER — OLANZAPINE 5 MG/1
5-10 TABLET, ORALLY DISINTEGRATING ORAL EVERY 6 HOURS PRN
Status: DISCONTINUED | OUTPATIENT
Start: 2022-01-01 | End: 2022-01-01

## 2022-01-01 RX ORDER — SINCALIDE 5 UG/5ML
0.02 INJECTION, POWDER, LYOPHILIZED, FOR SOLUTION INTRAVENOUS ONCE
Status: COMPLETED | OUTPATIENT
Start: 2022-01-01 | End: 2022-01-01

## 2022-01-01 RX ORDER — METOPROLOL TARTRATE 1 MG/ML
5 INJECTION, SOLUTION INTRAVENOUS EVERY 6 HOURS PRN
Status: DISCONTINUED | OUTPATIENT
Start: 2022-01-01 | End: 2022-01-01 | Stop reason: HOSPADM

## 2022-01-01 RX ORDER — MAGNESIUM HYDROXIDE/ALUMINUM HYDROXICE/SIMETHICONE 120; 1200; 1200 MG/30ML; MG/30ML; MG/30ML
30 SUSPENSION ORAL EVERY 4 HOURS PRN
Status: DISCONTINUED | OUTPATIENT
Start: 2022-01-01 | End: 2022-01-01 | Stop reason: HOSPADM

## 2022-01-01 RX ORDER — CEFTRIAXONE 1 G/1
1 INJECTION, POWDER, FOR SOLUTION INTRAMUSCULAR; INTRAVENOUS EVERY 24 HOURS
Status: COMPLETED | OUTPATIENT
Start: 2022-01-01 | End: 2022-01-01

## 2022-01-01 RX ORDER — HALOPERIDOL 5 MG/ML
2.5-5 INJECTION INTRAMUSCULAR EVERY 6 HOURS PRN
Status: DISCONTINUED | OUTPATIENT
Start: 2022-01-01 | End: 2022-01-01

## 2022-01-01 RX ORDER — PHENYLEPHRINE HYDROCHLORIDE 10 MG/ML
INJECTION INTRAVENOUS PRN
Status: DISCONTINUED | OUTPATIENT
Start: 2022-01-01 | End: 2022-01-01

## 2022-01-01 RX ORDER — MAGNESIUM SULFATE 4 G/50ML
4 INJECTION INTRAVENOUS EVERY 4 HOURS
Status: COMPLETED | OUTPATIENT
Start: 2022-01-01 | End: 2022-01-01

## 2022-01-01 RX ORDER — LACTULOSE 10 G/15ML
10 SOLUTION ORAL DAILY
Status: DISCONTINUED | OUTPATIENT
Start: 2022-01-01 | End: 2022-01-01

## 2022-01-01 RX ORDER — MEPERIDINE HYDROCHLORIDE 25 MG/ML
12.5 INJECTION INTRAMUSCULAR; INTRAVENOUS; SUBCUTANEOUS
Status: DISCONTINUED | OUTPATIENT
Start: 2022-01-01 | End: 2022-01-01 | Stop reason: HOSPADM

## 2022-01-01 RX ORDER — POTASSIUM CHLORIDE 7.45 MG/ML
10 INJECTION INTRAVENOUS ONCE
Status: DISCONTINUED | OUTPATIENT
Start: 2022-01-01 | End: 2022-01-01

## 2022-01-01 RX ORDER — POTASSIUM CHLORIDE 1.5 G/1.58G
20 POWDER, FOR SOLUTION ORAL ONCE
Status: COMPLETED | OUTPATIENT
Start: 2022-01-01 | End: 2022-01-01

## 2022-01-01 RX ORDER — LANOLIN ALCOHOL/MO/W.PET/CERES
100 CREAM (GRAM) TOPICAL DAILY
Qty: 90 TABLET | Refills: 3 | Status: SHIPPED | OUTPATIENT
Start: 2022-01-01

## 2022-01-01 RX ORDER — FOLIC ACID 1 MG/1
1 TABLET ORAL DAILY
Status: DISCONTINUED | OUTPATIENT
Start: 2022-01-01 | End: 2022-01-01 | Stop reason: HOSPADM

## 2022-01-01 RX ORDER — METOCLOPRAMIDE HYDROCHLORIDE 5 MG/ML
10 INJECTION INTRAMUSCULAR; INTRAVENOUS ONCE
Status: COMPLETED | OUTPATIENT
Start: 2022-01-01 | End: 2022-01-01

## 2022-01-01 RX ORDER — QUETIAPINE FUMARATE 100 MG/1
100 TABLET, FILM COATED ORAL AT BEDTIME
COMMUNITY

## 2022-01-01 RX ORDER — ONDANSETRON 4 MG/1
4 TABLET, ORALLY DISINTEGRATING ORAL EVERY 6 HOURS PRN
Status: DISCONTINUED | OUTPATIENT
Start: 2022-01-01 | End: 2022-01-01 | Stop reason: HOSPADM

## 2022-01-01 RX ADMIN — LORAZEPAM 1 MG: 1 TABLET ORAL at 14:26

## 2022-01-01 RX ADMIN — Medication 100 MG: at 14:25

## 2022-01-01 RX ADMIN — POTASSIUM CHLORIDE 10 MEQ: 7.46 INJECTION, SOLUTION INTRAVENOUS at 02:34

## 2022-01-01 RX ADMIN — RIFAXIMIN 550 MG: 550 TABLET ORAL at 09:46

## 2022-01-01 RX ADMIN — PANTOPRAZOLE SODIUM 40 MG: 20 TABLET, DELAYED RELEASE ORAL at 06:46

## 2022-01-01 RX ADMIN — GABAPENTIN 300 MG: 300 CAPSULE ORAL at 09:03

## 2022-01-01 RX ADMIN — PANTOPRAZOLE SODIUM 40 MG: 40 INJECTION, POWDER, FOR SOLUTION INTRAVENOUS at 20:18

## 2022-01-01 RX ADMIN — CLONIDINE HYDROCHLORIDE 0.1 MG: 0.1 TABLET ORAL at 14:41

## 2022-01-01 RX ADMIN — LORAZEPAM 1 MG: 1 TABLET ORAL at 20:16

## 2022-01-01 RX ADMIN — POTASSIUM & SODIUM PHOSPHATES POWDER PACK 280-160-250 MG 1 PACKET: 280-160-250 PACK at 11:37

## 2022-01-01 RX ADMIN — ALBUMIN HUMAN 50 G: 0.25 SOLUTION INTRAVENOUS at 22:06

## 2022-01-01 RX ADMIN — Medication 100 MG: at 08:22

## 2022-01-01 RX ADMIN — OCTREOTIDE ACETATE 50 MCG/HR: 200 INJECTION, SOLUTION INTRAVENOUS; SUBCUTANEOUS at 00:12

## 2022-01-01 RX ADMIN — METRONIDAZOLE 500 MG: 500 TABLET ORAL at 13:27

## 2022-01-01 RX ADMIN — Medication 1 MG: at 21:18

## 2022-01-01 RX ADMIN — MIRTAZAPINE 15 MG: 15 TABLET, FILM COATED ORAL at 21:58

## 2022-01-01 RX ADMIN — SODIUM CHLORIDE: 9 INJECTION, SOLUTION INTRAVENOUS at 12:38

## 2022-01-01 RX ADMIN — OCTREOTIDE ACETATE 50 MCG/HR: 200 INJECTION, SOLUTION INTRAVENOUS; SUBCUTANEOUS at 16:41

## 2022-01-01 RX ADMIN — THIAMINE HYDROCHLORIDE 200 MG: 100 INJECTION, SOLUTION INTRAMUSCULAR; INTRAVENOUS at 14:01

## 2022-01-01 RX ADMIN — METRONIDAZOLE 500 MG: 500 TABLET ORAL at 20:22

## 2022-01-01 RX ADMIN — RIFAXIMIN 550 MG: 550 TABLET ORAL at 22:21

## 2022-01-01 RX ADMIN — METRONIDAZOLE 500 MG: 500 TABLET ORAL at 08:57

## 2022-01-01 RX ADMIN — SODIUM CHLORIDE 8 MG/HR: 9 INJECTION, SOLUTION INTRAVENOUS at 08:18

## 2022-01-01 RX ADMIN — FOLIC ACID 1 MG: 5 INJECTION, SOLUTION INTRAMUSCULAR; INTRAVENOUS; SUBCUTANEOUS at 07:58

## 2022-01-01 RX ADMIN — VANCOMYCIN HYDROCHLORIDE 1250 MG: 5 INJECTION, POWDER, LYOPHILIZED, FOR SOLUTION INTRAVENOUS at 00:22

## 2022-01-01 RX ADMIN — PANTOPRAZOLE SODIUM 40 MG: 40 INJECTION, POWDER, FOR SOLUTION INTRAVENOUS at 16:37

## 2022-01-01 RX ADMIN — RIFAXIMIN 550 MG: 550 TABLET ORAL at 08:35

## 2022-01-01 RX ADMIN — CLONIDINE HYDROCHLORIDE 0.1 MG: 0.1 TABLET ORAL at 13:43

## 2022-01-01 RX ADMIN — GABAPENTIN 300 MG: 300 CAPSULE ORAL at 20:22

## 2022-01-01 RX ADMIN — LORAZEPAM 1 MG: 1 TABLET ORAL at 00:53

## 2022-01-01 RX ADMIN — LORAZEPAM 1 MG: 1 TABLET ORAL at 21:58

## 2022-01-01 RX ADMIN — GABAPENTIN 900 MG: 300 CAPSULE ORAL at 20:47

## 2022-01-01 RX ADMIN — LORAZEPAM 1 MG: 1 TABLET ORAL at 06:45

## 2022-01-01 RX ADMIN — METRONIDAZOLE 500 MG: 500 TABLET ORAL at 14:05

## 2022-01-01 RX ADMIN — CALCIUM CARBONATE (ANTACID) CHEW TAB 500 MG 500 MG: 500 CHEW TAB at 09:46

## 2022-01-01 RX ADMIN — LACTULOSE 10 G: 10 SOLUTION ORAL at 08:15

## 2022-01-01 RX ADMIN — FUROSEMIDE 20 MG: 10 INJECTION, SOLUTION INTRAVENOUS at 17:59

## 2022-01-01 RX ADMIN — ALBUMIN HUMAN 50 G: 0.25 SOLUTION INTRAVENOUS at 16:42

## 2022-01-01 RX ADMIN — POTASSIUM CHLORIDE 10 MEQ: 7.46 INJECTION, SOLUTION INTRAVENOUS at 01:30

## 2022-01-01 RX ADMIN — MAGNESIUM SULFATE HEPTAHYDRATE 4 G: 80 INJECTION, SOLUTION INTRAVENOUS at 08:19

## 2022-01-01 RX ADMIN — METRONIDAZOLE 500 MG: 500 TABLET ORAL at 20:33

## 2022-01-01 RX ADMIN — SODIUM CHLORIDE, PRESERVATIVE FREE: 5 INJECTION INTRAVENOUS at 00:18

## 2022-01-01 RX ADMIN — CALCIUM CARBONATE (ANTACID) CHEW TAB 500 MG 500 MG: 500 CHEW TAB at 08:35

## 2022-01-01 RX ADMIN — POTASSIUM CHLORIDE 10 MEQ: 7.46 INJECTION, SOLUTION INTRAVENOUS at 16:48

## 2022-01-01 RX ADMIN — SODIUM CHLORIDE: 9 INJECTION, SOLUTION INTRAVENOUS at 11:55

## 2022-01-01 RX ADMIN — PANTOPRAZOLE SODIUM 40 MG: 20 TABLET, DELAYED RELEASE ORAL at 08:30

## 2022-01-01 RX ADMIN — CALCIUM CARBONATE (ANTACID) CHEW TAB 500 MG 500 MG: 500 CHEW TAB at 20:22

## 2022-01-01 RX ADMIN — FOLIC ACID 1 MG: 5 INJECTION, SOLUTION INTRAMUSCULAR; INTRAVENOUS; SUBCUTANEOUS at 07:53

## 2022-01-01 RX ADMIN — SODIUM CHLORIDE 1000 ML: 9 INJECTION, SOLUTION INTRAVENOUS at 11:21

## 2022-01-01 RX ADMIN — MIRTAZAPINE 15 MG: 15 TABLET, FILM COATED ORAL at 22:14

## 2022-01-01 RX ADMIN — FOLIC ACID 1 MG: 1 TABLET ORAL at 08:57

## 2022-01-01 RX ADMIN — PANTOPRAZOLE SODIUM 40 MG: 20 TABLET, DELAYED RELEASE ORAL at 05:48

## 2022-01-01 RX ADMIN — Medication 100 MG: at 10:39

## 2022-01-01 RX ADMIN — IOPAMIDOL 100 ML: 755 INJECTION, SOLUTION INTRAVENOUS at 12:03

## 2022-01-01 RX ADMIN — VANCOMYCIN HYDROCHLORIDE 1500 MG: 5 INJECTION, POWDER, LYOPHILIZED, FOR SOLUTION INTRAVENOUS at 17:50

## 2022-01-01 RX ADMIN — MIRTAZAPINE 15 MG: 15 TABLET, FILM COATED ORAL at 20:11

## 2022-01-01 RX ADMIN — MIRTAZAPINE 15 MG: 15 TABLET, FILM COATED ORAL at 21:24

## 2022-01-01 RX ADMIN — GABAPENTIN 300 MG: 300 CAPSULE ORAL at 13:41

## 2022-01-01 RX ADMIN — LORAZEPAM 2 MG: 2 INJECTION INTRAMUSCULAR; INTRAVENOUS at 14:24

## 2022-01-01 RX ADMIN — CALCIUM CARBONATE (ANTACID) CHEW TAB 500 MG 500 MG: 500 CHEW TAB at 08:45

## 2022-01-01 RX ADMIN — FOLIC ACID 1 MG: 1 TABLET ORAL at 08:22

## 2022-01-01 RX ADMIN — NICOTINE 7 MG/24 HR DAILY TRANSDERMAL PATCH 1 PATCH: at 18:00

## 2022-01-01 RX ADMIN — LORAZEPAM 1 MG: 1 TABLET ORAL at 13:05

## 2022-01-01 RX ADMIN — PANTOPRAZOLE SODIUM 40 MG: 40 INJECTION, POWDER, FOR SOLUTION INTRAVENOUS at 16:29

## 2022-01-01 RX ADMIN — CALCIUM CARBONATE (ANTACID) CHEW TAB 500 MG 500 MG: 500 CHEW TAB at 14:58

## 2022-01-01 RX ADMIN — OCTREOTIDE ACETATE 50 MCG: 50 INJECTION, SOLUTION INTRAVENOUS; SUBCUTANEOUS at 12:25

## 2022-01-01 RX ADMIN — SODIUM CHLORIDE 8 MG/HR: 9 INJECTION, SOLUTION INTRAVENOUS at 02:26

## 2022-01-01 RX ADMIN — LACTULOSE 10 G: 10 SOLUTION ORAL at 13:27

## 2022-01-01 RX ADMIN — LACTULOSE 10 G: 10 SOLUTION ORAL at 20:18

## 2022-01-01 RX ADMIN — FENTANYL CITRATE 100 MCG: 50 INJECTION, SOLUTION INTRAMUSCULAR; INTRAVENOUS at 10:41

## 2022-01-01 RX ADMIN — RIFAXIMIN 550 MG: 550 TABLET ORAL at 08:58

## 2022-01-01 RX ADMIN — QUETIAPINE 100 MG: 100 TABLET, FILM COATED ORAL at 21:58

## 2022-01-01 RX ADMIN — CALCIUM CARBONATE (ANTACID) CHEW TAB 500 MG 500 MG: 500 CHEW TAB at 13:41

## 2022-01-01 RX ADMIN — MIRTAZAPINE 15 MG: 15 TABLET, FILM COATED ORAL at 20:21

## 2022-01-01 RX ADMIN — VANCOMYCIN HYDROCHLORIDE 1250 MG: 5 INJECTION, POWDER, LYOPHILIZED, FOR SOLUTION INTRAVENOUS at 16:31

## 2022-01-01 RX ADMIN — METRONIDAZOLE 500 MG: 500 TABLET ORAL at 15:47

## 2022-01-01 RX ADMIN — RIFAXIMIN 550 MG: 550 TABLET ORAL at 20:12

## 2022-01-01 RX ADMIN — GABAPENTIN 300 MG: 300 CAPSULE ORAL at 08:35

## 2022-01-01 RX ADMIN — GABAPENTIN 100 MG: 100 CAPSULE ORAL at 22:06

## 2022-01-01 RX ADMIN — CEFTRIAXONE 1 G: 1 INJECTION, POWDER, FOR SOLUTION INTRAMUSCULAR; INTRAVENOUS at 08:21

## 2022-01-01 RX ADMIN — PANTOPRAZOLE SODIUM 40 MG: 40 INJECTION, POWDER, FOR SOLUTION INTRAVENOUS at 08:23

## 2022-01-01 RX ADMIN — GABAPENTIN 900 MG: 300 CAPSULE ORAL at 05:02

## 2022-01-01 RX ADMIN — THIAMINE HYDROCHLORIDE 200 MG: 100 INJECTION, SOLUTION INTRAMUSCULAR; INTRAVENOUS at 08:24

## 2022-01-01 RX ADMIN — OLANZAPINE 10 MG: 5 TABLET, ORALLY DISINTEGRATING ORAL at 04:19

## 2022-01-01 RX ADMIN — FOLIC ACID 1 MG: 5 INJECTION, SOLUTION INTRAMUSCULAR; INTRAVENOUS; SUBCUTANEOUS at 12:26

## 2022-01-01 RX ADMIN — MAGNESIUM SULFATE HEPTAHYDRATE 2 G: 2 INJECTION, SOLUTION INTRAVENOUS at 08:18

## 2022-01-01 RX ADMIN — POTASSIUM CHLORIDE 40 MEQ: 1500 TABLET, EXTENDED RELEASE ORAL at 06:46

## 2022-01-01 RX ADMIN — NICOTINE 7 MG/24 HR DAILY TRANSDERMAL PATCH 1 PATCH: at 17:07

## 2022-01-01 RX ADMIN — PANTOPRAZOLE SODIUM 40 MG: 40 INJECTION, POWDER, FOR SOLUTION INTRAVENOUS at 11:56

## 2022-01-01 RX ADMIN — OCTREOTIDE ACETATE 50 MCG/HR: 200 INJECTION, SOLUTION INTRAVENOUS; SUBCUTANEOUS at 18:02

## 2022-01-01 RX ADMIN — ALBUMIN HUMAN 12.5 G: 0.25 SOLUTION INTRAVENOUS at 09:44

## 2022-01-01 RX ADMIN — POTASSIUM & SODIUM PHOSPHATES POWDER PACK 280-160-250 MG 1 PACKET: 280-160-250 PACK at 22:06

## 2022-01-01 RX ADMIN — CALCIUM CARBONATE (ANTACID) CHEW TAB 500 MG 500 MG: 500 CHEW TAB at 08:12

## 2022-01-01 RX ADMIN — METOCLOPRAMIDE HYDROCHLORIDE 10 MG: 5 INJECTION INTRAMUSCULAR; INTRAVENOUS at 07:36

## 2022-01-01 RX ADMIN — GABAPENTIN 300 MG: 300 CAPSULE ORAL at 20:35

## 2022-01-01 RX ADMIN — Medication 5 MG: at 01:30

## 2022-01-01 RX ADMIN — CALCIUM CARBONATE (ANTACID) CHEW TAB 500 MG 500 MG: 500 CHEW TAB at 20:04

## 2022-01-01 RX ADMIN — LACTULOSE 10 G: 10 SOLUTION ORAL at 08:33

## 2022-01-01 RX ADMIN — GABAPENTIN 300 MG: 300 CAPSULE ORAL at 22:28

## 2022-01-01 RX ADMIN — VANCOMYCIN HYDROCHLORIDE 1250 MG: 5 INJECTION, POWDER, LYOPHILIZED, FOR SOLUTION INTRAVENOUS at 09:19

## 2022-01-01 RX ADMIN — FUROSEMIDE 40 MG: 40 TABLET ORAL at 07:52

## 2022-01-01 RX ADMIN — CEFTRIAXONE 1 G: 1 INJECTION, POWDER, FOR SOLUTION INTRAMUSCULAR; INTRAVENOUS at 08:28

## 2022-01-01 RX ADMIN — FOLIC ACID 1 MG: 1 TABLET ORAL at 10:29

## 2022-01-01 RX ADMIN — LOPERAMIDE HYDROCHLORIDE 2 MG: 2 CAPSULE ORAL at 20:04

## 2022-01-01 RX ADMIN — TRAZODONE HYDROCHLORIDE 50 MG: 50 TABLET ORAL at 21:59

## 2022-01-01 RX ADMIN — MIRTAZAPINE 30 MG: 15 TABLET, FILM COATED ORAL at 20:35

## 2022-01-01 RX ADMIN — NICOTINE 7 MG/24 HR DAILY TRANSDERMAL PATCH 1 PATCH: at 17:26

## 2022-01-01 RX ADMIN — RIFAXIMIN 550 MG: 550 TABLET ORAL at 20:11

## 2022-01-01 RX ADMIN — SODIUM CHLORIDE 8 MG/HR: 9 INJECTION, SOLUTION INTRAVENOUS at 21:05

## 2022-01-01 RX ADMIN — GABAPENTIN 300 MG: 300 CAPSULE ORAL at 08:30

## 2022-01-01 RX ADMIN — NICOTINE 7 MG/24 HR DAILY TRANSDERMAL PATCH 1 PATCH: at 18:45

## 2022-01-01 RX ADMIN — LORAZEPAM 2 MG: 1 TABLET ORAL at 09:02

## 2022-01-01 RX ADMIN — GABAPENTIN 300 MG: 300 CAPSULE ORAL at 21:22

## 2022-01-01 RX ADMIN — RIFAXIMIN 550 MG: 550 TABLET ORAL at 20:47

## 2022-01-01 RX ADMIN — CLONIDINE HYDROCHLORIDE 0.1 MG: 0.1 TABLET ORAL at 21:58

## 2022-01-01 RX ADMIN — RIFAXIMIN 550 MG: 550 TABLET ORAL at 07:56

## 2022-01-01 RX ADMIN — PANTOPRAZOLE SODIUM 40 MG: 20 TABLET, DELAYED RELEASE ORAL at 18:00

## 2022-01-01 RX ADMIN — POTASSIUM CHLORIDE 10 MEQ: 7.46 INJECTION, SOLUTION INTRAVENOUS at 19:00

## 2022-01-01 RX ADMIN — ACETAMINOPHEN 650 MG: 325 TABLET ORAL at 22:12

## 2022-01-01 RX ADMIN — METRONIDAZOLE 500 MG: 500 TABLET ORAL at 08:30

## 2022-01-01 RX ADMIN — CALCIUM CARBONATE (ANTACID) CHEW TAB 500 MG 500 MG: 500 CHEW TAB at 21:43

## 2022-01-01 RX ADMIN — METRONIDAZOLE 500 MG: 500 TABLET ORAL at 22:29

## 2022-01-01 RX ADMIN — ALUMINUM HYDROXIDE, MAGNESIUM HYDROXIDE, AND SIMETHICONE 30 ML: 200; 200; 20 SUSPENSION ORAL at 05:56

## 2022-01-01 RX ADMIN — METRONIDAZOLE 500 MG: 500 TABLET ORAL at 21:23

## 2022-01-01 RX ADMIN — Medication 5 MG: at 00:53

## 2022-01-01 RX ADMIN — SODIUM CHLORIDE: 9 INJECTION, SOLUTION INTRAVENOUS at 04:06

## 2022-01-01 RX ADMIN — EPLERENONE 25 MG: 25 TABLET, FILM COATED ORAL at 08:58

## 2022-01-01 RX ADMIN — Medication 100 MG: at 13:43

## 2022-01-01 RX ADMIN — MAGNESIUM SULFATE HEPTAHYDRATE 4 G: 80 INJECTION, SOLUTION INTRAVENOUS at 21:32

## 2022-01-01 RX ADMIN — CALCIUM CARBONATE (ANTACID) CHEW TAB 500 MG 500 MG: 500 CHEW TAB at 21:23

## 2022-01-01 RX ADMIN — POTASSIUM CHLORIDE 20 MEQ: 29.8 INJECTION, SOLUTION INTRAVENOUS at 08:48

## 2022-01-01 RX ADMIN — Medication 100 MG: at 20:18

## 2022-01-01 RX ADMIN — CALCIUM CHLORIDE 500 MG: 100 INJECTION, SOLUTION INTRAVENOUS at 14:37

## 2022-01-01 RX ADMIN — PHENYLEPHRINE HYDROCHLORIDE 200 MCG: 10 INJECTION INTRAVENOUS at 14:32

## 2022-01-01 RX ADMIN — GABAPENTIN 300 MG: 300 CAPSULE ORAL at 13:28

## 2022-01-01 RX ADMIN — MULTIPLE VITAMINS W/ MINERALS TAB 1 TABLET: TAB at 08:16

## 2022-01-01 RX ADMIN — GABAPENTIN 300 MG: 300 CAPSULE ORAL at 09:46

## 2022-01-01 RX ADMIN — CALCIUM CHLORIDE 500 MG: 100 INJECTION, SOLUTION INTRAVENOUS at 14:42

## 2022-01-01 RX ADMIN — SODIUM CHLORIDE 8 MG/HR: 9 INJECTION, SOLUTION INTRAVENOUS at 00:10

## 2022-01-01 RX ADMIN — CALCIUM CARBONATE (ANTACID) CHEW TAB 500 MG 500 MG: 500 CHEW TAB at 20:34

## 2022-01-01 RX ADMIN — GABAPENTIN 100 MG: 100 CAPSULE ORAL at 22:21

## 2022-01-01 RX ADMIN — PHENYLEPHRINE HYDROCHLORIDE 200 MCG: 10 INJECTION INTRAVENOUS at 10:54

## 2022-01-01 RX ADMIN — ALBUMIN HUMAN 12.5 G: 0.25 SOLUTION INTRAVENOUS at 10:20

## 2022-01-01 RX ADMIN — METRONIDAZOLE 500 MG: 500 TABLET ORAL at 08:40

## 2022-01-01 RX ADMIN — MULTIPLE VITAMINS W/ MINERALS TAB 1 TABLET: TAB at 08:22

## 2022-01-01 RX ADMIN — RIFAXIMIN 550 MG: 550 TABLET ORAL at 20:06

## 2022-01-01 RX ADMIN — FAMOTIDINE 10 MG: 10 TABLET ORAL at 08:39

## 2022-01-01 RX ADMIN — SODIUM CHLORIDE 8 MG/HR: 9 INJECTION, SOLUTION INTRAVENOUS at 10:28

## 2022-01-01 RX ADMIN — PANTOPRAZOLE SODIUM 40 MG: 20 TABLET, DELAYED RELEASE ORAL at 06:38

## 2022-01-01 RX ADMIN — EPLERENONE 25 MG: 25 TABLET, FILM COATED ORAL at 09:44

## 2022-01-01 RX ADMIN — ALBUMIN HUMAN 50 G: 0.25 SOLUTION INTRAVENOUS at 12:33

## 2022-01-01 RX ADMIN — RIFAXIMIN 550 MG: 550 TABLET ORAL at 14:17

## 2022-01-01 RX ADMIN — METRONIDAZOLE 500 MG: 500 TABLET ORAL at 20:21

## 2022-01-01 RX ADMIN — PANTOPRAZOLE SODIUM 40 MG: 20 TABLET, DELAYED RELEASE ORAL at 08:35

## 2022-01-01 RX ADMIN — GABAPENTIN 900 MG: 300 CAPSULE ORAL at 04:55

## 2022-01-01 RX ADMIN — SODIUM CHLORIDE: 9 INJECTION, SOLUTION INTRAVENOUS at 14:54

## 2022-01-01 RX ADMIN — LACTULOSE 10 G: 10 SOLUTION ORAL at 13:43

## 2022-01-01 RX ADMIN — GABAPENTIN 900 MG: 300 CAPSULE ORAL at 13:09

## 2022-01-01 RX ADMIN — CEFTRIAXONE 1 G: 1 INJECTION, POWDER, FOR SOLUTION INTRAMUSCULAR; INTRAVENOUS at 11:52

## 2022-01-01 RX ADMIN — CALCIUM CARBONATE (ANTACID) CHEW TAB 500 MG 500 MG: 500 CHEW TAB at 22:21

## 2022-01-01 RX ADMIN — SODIUM CHLORIDE: 9 INJECTION, SOLUTION INTRAVENOUS at 18:03

## 2022-01-01 RX ADMIN — ROCURONIUM BROMIDE 10 MG: 10 INJECTION, SOLUTION INTRAVENOUS at 10:53

## 2022-01-01 RX ADMIN — MIRTAZAPINE 30 MG: 15 TABLET, FILM COATED ORAL at 22:29

## 2022-01-01 RX ADMIN — SODIUM CHLORIDE: 9 INJECTION, SOLUTION INTRAVENOUS at 20:04

## 2022-01-01 RX ADMIN — Medication 100 MG: at 08:12

## 2022-01-01 RX ADMIN — OCTREOTIDE ACETATE 50 MCG/HR: 200 INJECTION, SOLUTION INTRAVENOUS; SUBCUTANEOUS at 20:42

## 2022-01-01 RX ADMIN — CALCIUM CARBONATE (ANTACID) CHEW TAB 500 MG 500 MG: 500 CHEW TAB at 20:32

## 2022-01-01 RX ADMIN — SODIUM CHLORIDE 1000 ML: 9 INJECTION, SOLUTION INTRAVENOUS at 07:34

## 2022-01-01 RX ADMIN — POTASSIUM CHLORIDE 10 MEQ: 7.46 INJECTION, SOLUTION INTRAVENOUS at 10:47

## 2022-01-01 RX ADMIN — LIDOCAINE HYDROCHLORIDE 4 ML: 10 INJECTION, SOLUTION INFILTRATION; PERINEURAL at 14:25

## 2022-01-01 RX ADMIN — LORAZEPAM 1 MG: 1 TABLET ORAL at 18:30

## 2022-01-01 RX ADMIN — VANCOMYCIN HYDROCHLORIDE 1500 MG: 5 INJECTION, POWDER, LYOPHILIZED, FOR SOLUTION INTRAVENOUS at 06:45

## 2022-01-01 RX ADMIN — CALCIUM CARBONATE (ANTACID) CHEW TAB 500 MG 500 MG: 500 CHEW TAB at 08:57

## 2022-01-01 RX ADMIN — NICOTINE 7 MG/24 HR DAILY TRANSDERMAL PATCH 1 PATCH: at 17:10

## 2022-01-01 RX ADMIN — SODIUM CHLORIDE, POTASSIUM CHLORIDE, SODIUM LACTATE AND CALCIUM CHLORIDE: 600; 310; 30; 20 INJECTION, SOLUTION INTRAVENOUS at 14:19

## 2022-01-01 RX ADMIN — PHYTONADIONE 5 MG: 10 INJECTION, EMULSION INTRAMUSCULAR; INTRAVENOUS; SUBCUTANEOUS at 12:22

## 2022-01-01 RX ADMIN — POTASSIUM CHLORIDE 10 MEQ: 7.46 INJECTION, SOLUTION INTRAVENOUS at 14:21

## 2022-01-01 RX ADMIN — MIRTAZAPINE 15 MG: 15 TABLET, FILM COATED ORAL at 20:34

## 2022-01-01 RX ADMIN — LACTULOSE 10 G: 10 SOLUTION ORAL at 10:27

## 2022-01-01 RX ADMIN — CHLORHEXIDINE GLUCONATE 15 ML: 1.2 SOLUTION ORAL at 10:37

## 2022-01-01 RX ADMIN — GABAPENTIN 300 MG: 300 CAPSULE ORAL at 14:58

## 2022-01-01 RX ADMIN — MIRTAZAPINE 30 MG: 15 TABLET, FILM COATED ORAL at 21:22

## 2022-01-01 RX ADMIN — FUROSEMIDE 20 MG: 10 INJECTION, SOLUTION INTRAVENOUS at 18:44

## 2022-01-01 RX ADMIN — FOLIC ACID 1 MG: 1 TABLET ORAL at 09:03

## 2022-01-01 RX ADMIN — SODIUM CHLORIDE: 9 INJECTION, SOLUTION INTRAVENOUS at 16:47

## 2022-01-01 RX ADMIN — MIRTAZAPINE 15 MG: 15 TABLET, FILM COATED ORAL at 20:16

## 2022-01-01 RX ADMIN — LOPERAMIDE HYDROCHLORIDE 2 MG: 2 CAPSULE ORAL at 22:30

## 2022-01-01 RX ADMIN — GABAPENTIN 100 MG: 100 CAPSULE ORAL at 08:58

## 2022-01-01 RX ADMIN — POTASSIUM CHLORIDE 10 MEQ: 7.46 INJECTION, SOLUTION INTRAVENOUS at 08:41

## 2022-01-01 RX ADMIN — Medication 10 MG: at 14:37

## 2022-01-01 RX ADMIN — Medication 100 MG: at 20:04

## 2022-01-01 RX ADMIN — CALCIUM CARBONATE (ANTACID) CHEW TAB 500 MG 500 MG: 500 CHEW TAB at 09:17

## 2022-01-01 RX ADMIN — TRAZODONE HYDROCHLORIDE 50 MG: 50 TABLET ORAL at 22:06

## 2022-01-01 RX ADMIN — ONDANSETRON 4 MG: 2 INJECTION INTRAMUSCULAR; INTRAVENOUS at 07:58

## 2022-01-01 RX ADMIN — LACTULOSE 10 G: 10 SOLUTION ORAL at 20:21

## 2022-01-01 RX ADMIN — SODIUM CHLORIDE: 9 INJECTION, SOLUTION INTRAVENOUS at 08:22

## 2022-01-01 RX ADMIN — CALCIUM CARBONATE (ANTACID) CHEW TAB 500 MG 500 MG: 500 CHEW TAB at 13:28

## 2022-01-01 RX ADMIN — RIFAXIMIN 550 MG: 550 TABLET ORAL at 22:14

## 2022-01-01 RX ADMIN — ALBUMIN HUMAN 12.5 G: 0.25 SOLUTION INTRAVENOUS at 13:31

## 2022-01-01 RX ADMIN — METRONIDAZOLE 500 MG: 500 TABLET ORAL at 08:35

## 2022-01-01 RX ADMIN — MIRTAZAPINE 15 MG: 15 TABLET, FILM COATED ORAL at 21:43

## 2022-01-01 RX ADMIN — FOLIC ACID 1 MG: 1 TABLET ORAL at 08:12

## 2022-01-01 RX ADMIN — LACTULOSE 10 G: 10 SOLUTION ORAL at 20:05

## 2022-01-01 RX ADMIN — METRONIDAZOLE 500 MG: 500 TABLET ORAL at 08:45

## 2022-01-01 RX ADMIN — RIFAXIMIN 550 MG: 550 TABLET ORAL at 20:34

## 2022-01-01 RX ADMIN — CALCIUM CARBONATE (ANTACID) CHEW TAB 500 MG 500 MG: 500 CHEW TAB at 13:27

## 2022-01-01 RX ADMIN — MIRTAZAPINE 15 MG: 15 TABLET, FILM COATED ORAL at 20:33

## 2022-01-01 RX ADMIN — SODIUM CHLORIDE: 9 INJECTION, SOLUTION INTRAVENOUS at 15:50

## 2022-01-01 RX ADMIN — MULTIPLE VITAMINS W/ MINERALS TAB 1 TABLET: TAB at 09:45

## 2022-01-01 RX ADMIN — EPLERENONE 25 MG: 25 TABLET, FILM COATED ORAL at 08:16

## 2022-01-01 RX ADMIN — SODIUM CHLORIDE: 9 INJECTION, SOLUTION INTRAVENOUS at 10:24

## 2022-01-01 RX ADMIN — CALCIUM CARBONATE (ANTACID) CHEW TAB 500 MG 500 MG: 500 CHEW TAB at 20:21

## 2022-01-01 RX ADMIN — RIFAXIMIN 550 MG: 550 TABLET ORAL at 08:22

## 2022-01-01 RX ADMIN — RIFAXIMIN 550 MG: 550 TABLET ORAL at 09:52

## 2022-01-01 RX ADMIN — LACTULOSE 10 G: 10 SOLUTION ORAL at 08:31

## 2022-01-01 RX ADMIN — RIFAXIMIN 550 MG: 550 TABLET ORAL at 08:57

## 2022-01-01 RX ADMIN — RIFAXIMIN 550 MG: 550 TABLET ORAL at 20:18

## 2022-01-01 RX ADMIN — TRAZODONE HYDROCHLORIDE 25 MG: 50 TABLET ORAL at 21:23

## 2022-01-01 RX ADMIN — SODIUM CHLORIDE 8 MG/HR: 9 INJECTION, SOLUTION INTRAVENOUS at 16:36

## 2022-01-01 RX ADMIN — PHENYLEPHRINE HYDROCHLORIDE 300 MCG: 10 INJECTION INTRAVENOUS at 14:31

## 2022-01-01 RX ADMIN — MAGNESIUM SULFATE HEPTAHYDRATE 2 G: 40 INJECTION, SOLUTION INTRAVENOUS at 19:32

## 2022-01-01 RX ADMIN — SINCALIDE 1.6 MCG: 5 INJECTION, POWDER, LYOPHILIZED, FOR SOLUTION INTRAVENOUS at 12:46

## 2022-01-01 RX ADMIN — TRAZODONE HYDROCHLORIDE 50 MG: 50 TABLET ORAL at 20:34

## 2022-01-01 RX ADMIN — FUROSEMIDE 40 MG: 40 TABLET ORAL at 09:53

## 2022-01-01 RX ADMIN — PHYTONADIONE 10 MG: 10 INJECTION, EMULSION INTRAMUSCULAR; INTRAVENOUS; SUBCUTANEOUS at 10:00

## 2022-01-01 RX ADMIN — FUROSEMIDE 40 MG: 40 TABLET ORAL at 17:03

## 2022-01-01 RX ADMIN — THIAMINE HYDROCHLORIDE 200 MG: 100 INJECTION, SOLUTION INTRAMUSCULAR; INTRAVENOUS at 07:53

## 2022-01-01 RX ADMIN — THIAMINE HYDROCHLORIDE 200 MG: 100 INJECTION, SOLUTION INTRAMUSCULAR; INTRAVENOUS at 20:12

## 2022-01-01 RX ADMIN — CALCIUM CARBONATE (ANTACID) CHEW TAB 500 MG 500 MG: 500 CHEW TAB at 20:24

## 2022-01-01 RX ADMIN — NICOTINE 1 PATCH: 7 PATCH TRANSDERMAL at 09:41

## 2022-01-01 RX ADMIN — GABAPENTIN 300 MG: 300 CAPSULE ORAL at 14:42

## 2022-01-01 RX ADMIN — NICOTINE 7 MG/24 HR DAILY TRANSDERMAL PATCH 1 PATCH: at 16:44

## 2022-01-01 RX ADMIN — CEFTRIAXONE 1 G: 1 INJECTION, POWDER, FOR SOLUTION INTRAMUSCULAR; INTRAVENOUS at 07:53

## 2022-01-01 RX ADMIN — PANTOPRAZOLE SODIUM 40 MG: 40 INJECTION, POWDER, FOR SOLUTION INTRAVENOUS at 08:11

## 2022-01-01 RX ADMIN — LORAZEPAM 1 MG: 2 INJECTION INTRAMUSCULAR; INTRAVENOUS at 19:39

## 2022-01-01 RX ADMIN — CLONIDINE HYDROCHLORIDE 0.1 MG: 0.1 TABLET ORAL at 22:23

## 2022-01-01 RX ADMIN — IRON SUCROSE 200 MG: 20 INJECTION, SOLUTION INTRAVENOUS at 08:57

## 2022-01-01 RX ADMIN — GABAPENTIN 900 MG: 300 CAPSULE ORAL at 20:12

## 2022-01-01 RX ADMIN — PHENYLEPHRINE HYDROCHLORIDE 100 MCG: 10 INJECTION INTRAVENOUS at 14:25

## 2022-01-01 RX ADMIN — LORAZEPAM 1 MG: 1 TABLET ORAL at 12:16

## 2022-01-01 RX ADMIN — CALCIUM GLUCONATE 1 G: 20 INJECTION, SOLUTION INTRAVENOUS at 14:55

## 2022-01-01 RX ADMIN — POTASSIUM PHOSPHATE, MONOBASIC AND POTASSIUM PHOSPHATE, DIBASIC 9 MMOL: 224; 236 INJECTION, SOLUTION, CONCENTRATE INTRAVENOUS at 08:15

## 2022-01-01 RX ADMIN — NICOTINE 1 PATCH: 21 PATCH, EXTENDED RELEASE TRANSDERMAL at 10:52

## 2022-01-01 RX ADMIN — SODIUM CHLORIDE, PRESERVATIVE FREE: 5 INJECTION INTRAVENOUS at 10:30

## 2022-01-01 RX ADMIN — Medication 5 MG: at 00:33

## 2022-01-01 RX ADMIN — FAMOTIDINE 10 MG: 10 TABLET ORAL at 10:58

## 2022-01-01 RX ADMIN — MAGNESIUM SULFATE HEPTAHYDRATE 4 G: 80 INJECTION, SOLUTION INTRAVENOUS at 08:37

## 2022-01-01 RX ADMIN — NICOTINE 7 MG/24 HR DAILY TRANSDERMAL PATCH 1 PATCH: at 18:58

## 2022-01-01 RX ADMIN — LORAZEPAM 1 MG: 1 TABLET ORAL at 14:03

## 2022-01-01 RX ADMIN — RIFAXIMIN 550 MG: 550 TABLET ORAL at 21:23

## 2022-01-01 RX ADMIN — MEBROFENIN 8.4 MILLICURIE: 45 INJECTION, POWDER, LYOPHILIZED, FOR SOLUTION INTRAVENOUS at 10:59

## 2022-01-01 RX ADMIN — GABAPENTIN 900 MG: 300 CAPSULE ORAL at 04:06

## 2022-01-01 RX ADMIN — GABAPENTIN 300 MG: 300 CAPSULE ORAL at 21:43

## 2022-01-01 RX ADMIN — Medication 1 TABLET: at 08:03

## 2022-01-01 RX ADMIN — CALCIUM CARBONATE (ANTACID) CHEW TAB 500 MG 500 MG: 500 CHEW TAB at 08:03

## 2022-01-01 RX ADMIN — PANTOPRAZOLE SODIUM 40 MG: 20 TABLET, DELAYED RELEASE ORAL at 06:02

## 2022-01-01 RX ADMIN — GABAPENTIN 100 MG: 100 CAPSULE ORAL at 08:56

## 2022-01-01 RX ADMIN — LORAZEPAM 2 MG: 1 TABLET ORAL at 11:07

## 2022-01-01 RX ADMIN — MAGNESIUM SULFATE HEPTAHYDRATE 2 G: 40 INJECTION, SOLUTION INTRAVENOUS at 12:24

## 2022-01-01 RX ADMIN — POTASSIUM CHLORIDE 20 MEQ: 1500 TABLET, EXTENDED RELEASE ORAL at 08:57

## 2022-01-01 RX ADMIN — Medication 100 MG: at 08:57

## 2022-01-01 RX ADMIN — Medication 5 MG: at 20:33

## 2022-01-01 RX ADMIN — NICOTINE 7 MG/24 HR DAILY TRANSDERMAL PATCH 1 PATCH: at 17:04

## 2022-01-01 RX ADMIN — Medication 100 MG: at 22:28

## 2022-01-01 RX ADMIN — CALCIUM CARBONATE (ANTACID) CHEW TAB 500 MG 500 MG: 500 CHEW TAB at 14:42

## 2022-01-01 RX ADMIN — NICOTINE 1 PATCH: 21 PATCH, EXTENDED RELEASE TRANSDERMAL at 17:08

## 2022-01-01 RX ADMIN — MIRTAZAPINE 15 MG: 15 TABLET, FILM COATED ORAL at 21:49

## 2022-01-01 RX ADMIN — THIAMINE HYDROCHLORIDE: 100 INJECTION, SOLUTION INTRAMUSCULAR; INTRAVENOUS at 09:46

## 2022-01-01 RX ADMIN — CLONIDINE HYDROCHLORIDE 0.1 MG: 0.1 TABLET ORAL at 13:31

## 2022-01-01 RX ADMIN — CALCIUM CARBONATE (ANTACID) CHEW TAB 500 MG 500 MG: 500 CHEW TAB at 14:39

## 2022-01-01 RX ADMIN — LORAZEPAM 1 MG: 1 TABLET ORAL at 08:40

## 2022-01-01 RX ADMIN — CALCIUM CARBONATE (ANTACID) CHEW TAB 500 MG 500 MG: 500 CHEW TAB at 14:03

## 2022-01-01 RX ADMIN — MAGNESIUM SULFATE HEPTAHYDRATE 4 G: 80 INJECTION, SOLUTION INTRAVENOUS at 19:00

## 2022-01-01 RX ADMIN — FOLIC ACID 1 MG: 1 TABLET ORAL at 08:16

## 2022-01-01 RX ADMIN — LACTULOSE 10 G: 10 SOLUTION ORAL at 08:03

## 2022-01-01 RX ADMIN — Medication 100 MG: at 09:44

## 2022-01-01 RX ADMIN — SODIUM CHLORIDE: 9 INJECTION, SOLUTION INTRAVENOUS at 00:10

## 2022-01-01 RX ADMIN — LORAZEPAM 2 MG: 2 INJECTION INTRAMUSCULAR; INTRAVENOUS at 12:24

## 2022-01-01 RX ADMIN — POTASSIUM CHLORIDE 40 MEQ: 1500 TABLET, EXTENDED RELEASE ORAL at 08:22

## 2022-01-01 RX ADMIN — VANCOMYCIN HYDROCHLORIDE 1500 MG: 5 INJECTION, POWDER, LYOPHILIZED, FOR SOLUTION INTRAVENOUS at 06:41

## 2022-01-01 RX ADMIN — CLONIDINE HYDROCHLORIDE 0.1 MG: 0.1 TABLET ORAL at 14:03

## 2022-01-01 RX ADMIN — THIAMINE HYDROCHLORIDE 100 MG: 100 INJECTION, SOLUTION INTRAMUSCULAR; INTRAVENOUS at 12:26

## 2022-01-01 RX ADMIN — GABAPENTIN 300 MG: 300 CAPSULE ORAL at 09:17

## 2022-01-01 RX ADMIN — NICOTINE 1 PATCH: 21 PATCH, EXTENDED RELEASE TRANSDERMAL at 08:23

## 2022-01-01 RX ADMIN — Medication 100 MG: at 13:10

## 2022-01-01 RX ADMIN — CEFTRIAXONE 1 G: 1 INJECTION, POWDER, FOR SOLUTION INTRAMUSCULAR; INTRAVENOUS at 07:46

## 2022-01-01 RX ADMIN — CALCIUM CARBONATE (ANTACID) CHEW TAB 500 MG 500 MG: 500 CHEW TAB at 14:23

## 2022-01-01 RX ADMIN — MIRTAZAPINE 30 MG: 15 TABLET, FILM COATED ORAL at 20:04

## 2022-01-01 RX ADMIN — CHLORHEXIDINE GLUCONATE 15 ML: 1.2 SOLUTION ORAL at 22:23

## 2022-01-01 RX ADMIN — SODIUM CHLORIDE 250 ML: 9 INJECTION, SOLUTION INTRAVENOUS at 09:09

## 2022-01-01 RX ADMIN — PROCHLORPERAZINE EDISYLATE 10 MG: 5 INJECTION INTRAMUSCULAR; INTRAVENOUS at 22:12

## 2022-01-01 RX ADMIN — GABAPENTIN 300 MG: 300 CAPSULE ORAL at 15:46

## 2022-01-01 RX ADMIN — GABAPENTIN 300 MG: 300 CAPSULE ORAL at 08:40

## 2022-01-01 RX ADMIN — POTASSIUM CHLORIDE 40 MEQ: 1.5 POWDER, FOR SOLUTION ORAL at 15:47

## 2022-01-01 RX ADMIN — ONDANSETRON 4 MG: 2 INJECTION INTRAMUSCULAR; INTRAVENOUS at 14:16

## 2022-01-01 RX ADMIN — FOLIC ACID 1 MG: 5 INJECTION, SOLUTION INTRAMUSCULAR; INTRAVENOUS; SUBCUTANEOUS at 14:01

## 2022-01-01 RX ADMIN — CLONIDINE HYDROCHLORIDE 0.1 MG: 0.1 TABLET ORAL at 13:09

## 2022-01-01 RX ADMIN — CLONIDINE HYDROCHLORIDE 0.1 MG: 0.1 TABLET ORAL at 21:59

## 2022-01-01 RX ADMIN — LORAZEPAM 1 MG: 1 TABLET ORAL at 00:45

## 2022-01-01 RX ADMIN — LACTULOSE 10 G: 10 SOLUTION ORAL at 08:50

## 2022-01-01 RX ADMIN — QUETIAPINE 100 MG: 100 TABLET, FILM COATED ORAL at 21:24

## 2022-01-01 RX ADMIN — PANTOPRAZOLE SODIUM 40 MG: 40 INJECTION, POWDER, FOR SOLUTION INTRAVENOUS at 06:50

## 2022-01-01 RX ADMIN — POTASSIUM CHLORIDE 40 MEQ: 1.5 POWDER, FOR SOLUTION ORAL at 21:50

## 2022-01-01 RX ADMIN — Medication 1 TABLET: at 10:29

## 2022-01-01 RX ADMIN — Medication 1 TABLET: at 08:12

## 2022-01-01 RX ADMIN — CLONIDINE HYDROCHLORIDE 0.1 MG: 0.1 TABLET ORAL at 05:50

## 2022-01-01 RX ADMIN — SODIUM CHLORIDE: 9 INJECTION, SOLUTION INTRAVENOUS at 09:17

## 2022-01-01 RX ADMIN — Medication 100 MG: at 10:29

## 2022-01-01 RX ADMIN — LORAZEPAM 2 MG: 2 INJECTION INTRAMUSCULAR; INTRAVENOUS at 09:48

## 2022-01-01 RX ADMIN — RIFAXIMIN 550 MG: 550 TABLET ORAL at 08:16

## 2022-01-01 RX ADMIN — CALCIUM CARBONATE (ANTACID) CHEW TAB 500 MG 500 MG: 500 CHEW TAB at 08:31

## 2022-01-01 RX ADMIN — POTASSIUM PHOSPHATE, MONOBASIC AND POTASSIUM PHOSPHATE, DIBASIC 9 MMOL: 224; 236 INJECTION, SOLUTION, CONCENTRATE INTRAVENOUS at 13:58

## 2022-01-01 RX ADMIN — PHYTONADIONE 5 MG: 10 INJECTION, EMULSION INTRAMUSCULAR; INTRAVENOUS; SUBCUTANEOUS at 09:49

## 2022-01-01 RX ADMIN — GABAPENTIN 300 MG: 300 CAPSULE ORAL at 09:48

## 2022-01-01 RX ADMIN — GABAPENTIN 300 MG: 300 CAPSULE ORAL at 08:57

## 2022-01-01 RX ADMIN — PANTOPRAZOLE SODIUM 40 MG: 40 INJECTION, POWDER, FOR SOLUTION INTRAVENOUS at 20:11

## 2022-01-01 RX ADMIN — TRAZODONE HYDROCHLORIDE 25 MG: 50 TABLET ORAL at 20:06

## 2022-01-01 RX ADMIN — PANTOPRAZOLE SODIUM 40 MG: 40 INJECTION, POWDER, FOR SOLUTION INTRAVENOUS at 11:40

## 2022-01-01 RX ADMIN — OCTREOTIDE ACETATE 50 MCG/HR: 200 INJECTION, SOLUTION INTRAVENOUS; SUBCUTANEOUS at 15:17

## 2022-01-01 RX ADMIN — SODIUM CHLORIDE: 9 INJECTION, SOLUTION INTRAVENOUS at 05:05

## 2022-01-01 RX ADMIN — LORAZEPAM 1 MG: 2 INJECTION INTRAMUSCULAR; INTRAVENOUS at 11:23

## 2022-01-01 RX ADMIN — Medication 5 MG: at 21:18

## 2022-01-01 RX ADMIN — POTASSIUM CHLORIDE 20 MEQ: 1500 TABLET, EXTENDED RELEASE ORAL at 22:21

## 2022-01-01 RX ADMIN — FAMOTIDINE 10 MG: 10 TABLET ORAL at 21:23

## 2022-01-01 RX ADMIN — NICOTINE 1 PATCH: 21 PATCH, EXTENDED RELEASE TRANSDERMAL at 08:21

## 2022-01-01 RX ADMIN — PANTOPRAZOLE SODIUM 40 MG: 20 TABLET, DELAYED RELEASE ORAL at 07:41

## 2022-01-01 RX ADMIN — SODIUM CHLORIDE, PRESERVATIVE FREE: 5 INJECTION INTRAVENOUS at 06:44

## 2022-01-01 RX ADMIN — SODIUM CHLORIDE: 9 INJECTION, SOLUTION INTRAVENOUS at 11:04

## 2022-01-01 RX ADMIN — RIFAXIMIN 550 MG: 550 TABLET ORAL at 08:39

## 2022-01-01 RX ADMIN — NICOTINE 7 MG/24 HR DAILY TRANSDERMAL PATCH 1 PATCH: at 17:38

## 2022-01-01 RX ADMIN — GABAPENTIN 300 MG: 300 CAPSULE ORAL at 20:16

## 2022-01-01 RX ADMIN — SODIUM PHOSPHATE, MONOBASIC, MONOHYDRATE 15 MMOL: 276; 142 INJECTION, SOLUTION INTRAVENOUS at 07:42

## 2022-01-01 RX ADMIN — PHYTONADIONE 5 MG: 10 INJECTION, EMULSION INTRAMUSCULAR; INTRAVENOUS; SUBCUTANEOUS at 18:29

## 2022-01-01 RX ADMIN — POTASSIUM CHLORIDE 10 MEQ: 7.46 INJECTION, SOLUTION INTRAVENOUS at 04:25

## 2022-01-01 RX ADMIN — RIFAXIMIN 550 MG: 550 TABLET ORAL at 09:44

## 2022-01-01 RX ADMIN — ONDANSETRON 4 MG: 2 INJECTION INTRAMUSCULAR; INTRAVENOUS at 15:18

## 2022-01-01 RX ADMIN — MIRTAZAPINE 15 MG: 15 TABLET, FILM COATED ORAL at 21:59

## 2022-01-01 RX ADMIN — Medication 100 MG: at 09:02

## 2022-01-01 RX ADMIN — MIRTAZAPINE 30 MG: 15 TABLET, FILM COATED ORAL at 22:21

## 2022-01-01 RX ADMIN — SODIUM PHOSPHATE, MONOBASIC, MONOHYDRATE 15 MMOL: 276; 142 INJECTION, SOLUTION INTRAVENOUS at 08:37

## 2022-01-01 RX ADMIN — SODIUM CHLORIDE: 9 INJECTION, SOLUTION INTRAVENOUS at 00:35

## 2022-01-01 RX ADMIN — FOLIC ACID 1 MG: 1 TABLET ORAL at 09:45

## 2022-01-01 RX ADMIN — PANTOPRAZOLE SODIUM 40 MG: 40 INJECTION, POWDER, FOR SOLUTION INTRAVENOUS at 06:45

## 2022-01-01 RX ADMIN — PANTOPRAZOLE SODIUM 40 MG: 40 INJECTION, POWDER, FOR SOLUTION INTRAVENOUS at 20:05

## 2022-01-01 RX ADMIN — GABAPENTIN 300 MG: 300 CAPSULE ORAL at 08:45

## 2022-01-01 RX ADMIN — RIFAXIMIN 550 MG: 550 TABLET ORAL at 22:28

## 2022-01-01 RX ADMIN — CALCIUM CARBONATE (ANTACID) CHEW TAB 500 MG 500 MG: 500 CHEW TAB at 15:46

## 2022-01-01 RX ADMIN — PANTOPRAZOLE SODIUM 40 MG: 20 TABLET, DELAYED RELEASE ORAL at 06:54

## 2022-01-01 RX ADMIN — Medication 5 MG: at 22:19

## 2022-01-01 RX ADMIN — MIRTAZAPINE 15 MG: 15 TABLET, FILM COATED ORAL at 22:19

## 2022-01-01 RX ADMIN — CALCIUM CARBONATE (ANTACID) CHEW TAB 500 MG 500 MG: 500 CHEW TAB at 14:17

## 2022-01-01 RX ADMIN — CALCIUM CARBONATE (ANTACID) CHEW TAB 500 MG 500 MG: 500 CHEW TAB at 14:30

## 2022-01-01 RX ADMIN — PANTOPRAZOLE SODIUM 40 MG: 20 TABLET, DELAYED RELEASE ORAL at 06:04

## 2022-01-01 RX ADMIN — IRON SUCROSE 200 MG: 20 INJECTION, SOLUTION INTRAVENOUS at 09:45

## 2022-01-01 RX ADMIN — MULTIPLE VITAMINS W/ MINERALS TAB 1 TABLET: TAB at 08:57

## 2022-01-01 RX ADMIN — POTASSIUM CHLORIDE 40 MEQ: 1500 TABLET, EXTENDED RELEASE ORAL at 16:14

## 2022-01-01 RX ADMIN — POTASSIUM CHLORIDE 10 MEQ: 7.46 INJECTION, SOLUTION INTRAVENOUS at 13:33

## 2022-01-01 RX ADMIN — SODIUM CHLORIDE 8 MG/HR: 9 INJECTION, SOLUTION INTRAVENOUS at 13:34

## 2022-01-01 RX ADMIN — CEFTRIAXONE 1 G: 1 INJECTION, POWDER, FOR SOLUTION INTRAMUSCULAR; INTRAVENOUS at 10:39

## 2022-01-01 RX ADMIN — PHENYLEPHRINE HYDROCHLORIDE 300 MCG: 10 INJECTION INTRAVENOUS at 10:51

## 2022-01-01 RX ADMIN — METRONIDAZOLE 500 MG: 500 TABLET ORAL at 20:04

## 2022-01-01 RX ADMIN — CEFTRIAXONE 1 G: 1 INJECTION, POWDER, FOR SOLUTION INTRAMUSCULAR; INTRAVENOUS at 08:56

## 2022-01-01 RX ADMIN — MAGNESIUM SULFATE HEPTAHYDRATE 4 G: 80 INJECTION, SOLUTION INTRAVENOUS at 16:37

## 2022-01-01 RX ADMIN — GABAPENTIN 900 MG: 300 CAPSULE ORAL at 11:56

## 2022-01-01 RX ADMIN — LACTULOSE 10 G: 10 SOLUTION ORAL at 09:02

## 2022-01-01 RX ADMIN — GABAPENTIN 300 MG: 300 CAPSULE ORAL at 20:21

## 2022-01-01 RX ADMIN — MIDAZOLAM 2 MG: 1 INJECTION INTRAMUSCULAR; INTRAVENOUS at 10:29

## 2022-01-01 RX ADMIN — GABAPENTIN 900 MG: 300 CAPSULE ORAL at 13:05

## 2022-01-01 RX ADMIN — EPLERENONE 25 MG: 25 TABLET, FILM COATED ORAL at 07:59

## 2022-01-01 RX ADMIN — POTASSIUM & SODIUM PHOSPHATES POWDER PACK 280-160-250 MG 1 PACKET: 280-160-250 PACK at 20:34

## 2022-01-01 RX ADMIN — LOPERAMIDE HYDROCHLORIDE 2 MG: 2 CAPSULE ORAL at 10:11

## 2022-01-01 RX ADMIN — METRONIDAZOLE 500 MG: 500 TABLET ORAL at 13:41

## 2022-01-01 RX ADMIN — SODIUM CHLORIDE: 9 INJECTION, SOLUTION INTRAVENOUS at 06:58

## 2022-01-01 RX ADMIN — OCTREOTIDE ACETATE 50 MCG: 50 INJECTION, SOLUTION INTRAVENOUS; SUBCUTANEOUS at 07:52

## 2022-01-01 RX ADMIN — CALCIUM CARBONATE (ANTACID) CHEW TAB 500 MG 500 MG: 500 CHEW TAB at 22:06

## 2022-01-01 RX ADMIN — VANCOMYCIN HYDROCHLORIDE 1500 MG: 5 INJECTION, POWDER, LYOPHILIZED, FOR SOLUTION INTRAVENOUS at 18:45

## 2022-01-01 RX ADMIN — CALCIUM CARBONATE (ANTACID) CHEW TAB 500 MG 500 MG: 500 CHEW TAB at 08:33

## 2022-01-01 RX ADMIN — CALCIUM CARBONATE (ANTACID) CHEW TAB 500 MG 500 MG: 500 CHEW TAB at 08:40

## 2022-01-01 RX ADMIN — CEFTRIAXONE 1 G: 1 INJECTION, POWDER, FOR SOLUTION INTRAMUSCULAR; INTRAVENOUS at 07:40

## 2022-01-01 RX ADMIN — GABAPENTIN 900 MG: 300 CAPSULE ORAL at 22:28

## 2022-01-01 RX ADMIN — POTASSIUM CHLORIDE 10 MEQ: 7.46 INJECTION, SOLUTION INTRAVENOUS at 20:12

## 2022-01-01 RX ADMIN — OCTREOTIDE ACETATE 50 MCG/HR: 200 INJECTION, SOLUTION INTRAVENOUS; SUBCUTANEOUS at 12:15

## 2022-01-01 RX ADMIN — LABETALOL HYDROCHLORIDE 20 MG: 5 INJECTION, SOLUTION INTRAVENOUS at 11:05

## 2022-01-01 RX ADMIN — POTASSIUM CHLORIDE 10 MEQ: 7.46 INJECTION, SOLUTION INTRAVENOUS at 11:36

## 2022-01-01 RX ADMIN — ALBUMIN HUMAN 50 G: 0.25 SOLUTION INTRAVENOUS at 13:41

## 2022-01-01 RX ADMIN — MULTIPLE VITAMINS W/ MINERALS TAB 1 TABLET: TAB at 09:53

## 2022-01-01 RX ADMIN — MIRTAZAPINE 15 MG: 15 TABLET, FILM COATED ORAL at 20:22

## 2022-01-01 RX ADMIN — TRAZODONE HYDROCHLORIDE 50 MG: 50 TABLET ORAL at 21:58

## 2022-01-01 RX ADMIN — TRAZODONE HYDROCHLORIDE 50 MG: 50 TABLET ORAL at 22:21

## 2022-01-01 RX ADMIN — POTASSIUM CHLORIDE 10 MEQ: 7.46 INJECTION, SOLUTION INTRAVENOUS at 07:44

## 2022-01-01 RX ADMIN — LOPERAMIDE HYDROCHLORIDE 2 MG: 2 CAPSULE ORAL at 08:35

## 2022-01-01 RX ADMIN — SODIUM PHOSPHATE, MONOBASIC, MONOHYDRATE 15 MMOL: 276; 142 INJECTION, SOLUTION INTRAVENOUS at 19:52

## 2022-01-01 RX ADMIN — LACTULOSE 10 G: 10 SOLUTION ORAL at 14:02

## 2022-01-01 RX ADMIN — PROPOFOL 200 MG: 10 INJECTION, EMULSION INTRAVENOUS at 10:39

## 2022-01-01 RX ADMIN — Medication 100 MG: at 13:27

## 2022-01-01 RX ADMIN — NICOTINE 7 MG/24 HR DAILY TRANSDERMAL PATCH 1 PATCH: at 17:31

## 2022-01-01 RX ADMIN — GABAPENTIN 300 MG: 300 CAPSULE ORAL at 14:05

## 2022-01-01 RX ADMIN — QUETIAPINE 100 MG: 100 TABLET, FILM COATED ORAL at 21:59

## 2022-01-01 RX ADMIN — POTASSIUM & SODIUM PHOSPHATES POWDER PACK 280-160-250 MG 1 PACKET: 280-160-250 PACK at 12:41

## 2022-01-01 RX ADMIN — CALCIUM CARBONATE (ANTACID) CHEW TAB 500 MG 500 MG: 500 CHEW TAB at 22:19

## 2022-01-01 RX ADMIN — CALCIUM CARBONATE (ANTACID) CHEW TAB 500 MG 500 MG: 500 CHEW TAB at 08:58

## 2022-01-01 RX ADMIN — LORAZEPAM 1 MG: 2 INJECTION INTRAMUSCULAR; INTRAVENOUS at 11:35

## 2022-01-01 RX ADMIN — RIFAXIMIN 550 MG: 550 TABLET ORAL at 08:45

## 2022-01-01 RX ADMIN — EPLERENONE 25 MG: 25 TABLET, FILM COATED ORAL at 08:23

## 2022-01-01 RX ADMIN — MULTIPLE VITAMINS W/ MINERALS TAB 1 TABLET: TAB at 07:52

## 2022-01-01 RX ADMIN — ALBUMIN HUMAN 50 G: 0.25 SOLUTION INTRAVENOUS at 14:03

## 2022-01-01 RX ADMIN — TRAZODONE HYDROCHLORIDE 50 MG: 50 TABLET ORAL at 22:23

## 2022-01-01 RX ADMIN — SODIUM CHLORIDE: 9 INJECTION, SOLUTION INTRAVENOUS at 04:44

## 2022-01-01 RX ADMIN — PANTOPRAZOLE SODIUM 80 MG: 40 INJECTION, POWDER, FOR SOLUTION INTRAVENOUS at 11:40

## 2022-01-01 RX ADMIN — LORAZEPAM 2 MG: 1 TABLET ORAL at 10:24

## 2022-01-01 RX ADMIN — SODIUM CHLORIDE, PRESERVATIVE FREE: 5 INJECTION INTRAVENOUS at 19:06

## 2022-01-01 RX ADMIN — TRAZODONE HYDROCHLORIDE 50 MG: 50 TABLET ORAL at 21:24

## 2022-01-01 RX ADMIN — POTASSIUM CHLORIDE 10 MEQ: 7.46 INJECTION, SOLUTION INTRAVENOUS at 06:39

## 2022-01-01 RX ADMIN — LACTULOSE 10 G: 10 SOLUTION ORAL at 18:51

## 2022-01-01 RX ADMIN — LACTULOSE 10 G: 10 SOLUTION ORAL at 08:56

## 2022-01-01 RX ADMIN — CLONIDINE HYDROCHLORIDE 0.1 MG: 0.1 TABLET ORAL at 18:51

## 2022-01-01 RX ADMIN — POTASSIUM CHLORIDE 20 MEQ: 29.8 INJECTION, SOLUTION INTRAVENOUS at 07:53

## 2022-01-01 RX ADMIN — ALBUMIN HUMAN 50 G: 0.25 SOLUTION INTRAVENOUS at 20:06

## 2022-01-01 RX ADMIN — RIFAXIMIN 550 MG: 550 TABLET ORAL at 22:29

## 2022-01-01 RX ADMIN — MAGNESIUM SULFATE IN WATER 4 G: 80 INJECTION, SOLUTION INTRAVENOUS at 08:15

## 2022-01-01 RX ADMIN — LIDOCAINE HYDROCHLORIDE 30 MG: 10 INJECTION, SOLUTION INFILTRATION; PERINEURAL at 10:39

## 2022-01-01 RX ADMIN — PANTOPRAZOLE SODIUM 40 MG: 40 INJECTION, POWDER, FOR SOLUTION INTRAVENOUS at 08:28

## 2022-01-01 RX ADMIN — Medication 100 MG: at 08:03

## 2022-01-01 RX ADMIN — MIRTAZAPINE 30 MG: 15 TABLET, FILM COATED ORAL at 22:06

## 2022-01-01 RX ADMIN — POTASSIUM CHLORIDE 20 MEQ: 1.5 POWDER, FOR SOLUTION ORAL at 18:00

## 2022-01-01 RX ADMIN — POTASSIUM CHLORIDE 10 MEQ: 7.46 INJECTION, SOLUTION INTRAVENOUS at 10:12

## 2022-01-01 RX ADMIN — CLONIDINE HYDROCHLORIDE 0.1 MG: 0.1 TABLET ORAL at 02:37

## 2022-01-01 RX ADMIN — Medication 1 TABLET: at 09:02

## 2022-01-01 RX ADMIN — CEFTRIAXONE 1 G: 1 INJECTION, POWDER, FOR SOLUTION INTRAMUSCULAR; INTRAVENOUS at 11:51

## 2022-01-01 RX ADMIN — METRONIDAZOLE 500 MG: 500 TABLET ORAL at 14:58

## 2022-01-01 RX ADMIN — LORAZEPAM 1 MG: 1 TABLET ORAL at 09:53

## 2022-01-01 RX ADMIN — NICOTINE 7 MG/24 HR DAILY TRANSDERMAL PATCH 1 PATCH: at 18:46

## 2022-01-01 RX ADMIN — POTASSIUM CHLORIDE 10 MEQ: 7.46 INJECTION, SOLUTION INTRAVENOUS at 17:54

## 2022-01-01 RX ADMIN — POTASSIUM CHLORIDE 10 MEQ: 7.46 INJECTION, SOLUTION INTRAVENOUS at 09:43

## 2022-01-01 RX ADMIN — SODIUM CHLORIDE: 9 INJECTION, SOLUTION INTRAVENOUS at 04:18

## 2022-01-01 RX ADMIN — NICOTINE 7 MG/24 HR DAILY TRANSDERMAL PATCH 1 PATCH: at 17:50

## 2022-01-01 RX ADMIN — THIAMINE HYDROCHLORIDE 200 MG: 100 INJECTION, SOLUTION INTRAMUSCULAR; INTRAVENOUS at 16:43

## 2022-01-01 RX ADMIN — POTASSIUM CHLORIDE 20 MEQ: 1500 TABLET, EXTENDED RELEASE ORAL at 09:45

## 2022-01-01 RX ADMIN — CALCIUM CARBONATE (ANTACID) CHEW TAB 500 MG 500 MG: 500 CHEW TAB at 08:56

## 2022-01-01 RX ADMIN — SODIUM CHLORIDE, PRESERVATIVE FREE: 5 INJECTION INTRAVENOUS at 23:49

## 2022-01-01 RX ADMIN — LORAZEPAM 1 MG: 2 INJECTION INTRAMUSCULAR; INTRAVENOUS at 09:59

## 2022-01-01 RX ADMIN — EPLERENONE 25 MG: 25 TABLET, FILM COATED ORAL at 09:53

## 2022-01-01 RX ADMIN — OCTREOTIDE ACETATE 50 MCG: 50 INJECTION, SOLUTION INTRAVENOUS; SUBCUTANEOUS at 14:01

## 2022-01-01 RX ADMIN — MIRTAZAPINE 15 MG: 15 TABLET, FILM COATED ORAL at 22:23

## 2022-01-01 RX ADMIN — METRONIDAZOLE 500 MG: 500 TABLET ORAL at 12:16

## 2022-01-01 RX ADMIN — IRON SUCROSE 200 MG: 20 INJECTION, SOLUTION INTRAVENOUS at 09:47

## 2022-01-01 RX ADMIN — LACTULOSE 10 G: 10 SOLUTION ORAL at 08:08

## 2022-01-01 RX ADMIN — OCTREOTIDE ACETATE 50 MCG/HR: 200 INJECTION, SOLUTION INTRAVENOUS; SUBCUTANEOUS at 12:52

## 2022-01-01 RX ADMIN — TRAZODONE HYDROCHLORIDE 50 MG: 50 TABLET ORAL at 22:29

## 2022-01-01 RX ADMIN — SODIUM CHLORIDE 500 ML: 9 INJECTION, SOLUTION INTRAVENOUS at 16:38

## 2022-01-01 RX ADMIN — METRONIDAZOLE 500 MG: 500 TABLET ORAL at 14:42

## 2022-01-01 RX ADMIN — SODIUM CHLORIDE, PRESERVATIVE FREE: 5 INJECTION INTRAVENOUS at 03:27

## 2022-01-01 RX ADMIN — PROPOFOL 30 MG: 10 INJECTION, EMULSION INTRAVENOUS at 14:40

## 2022-01-01 RX ADMIN — METRONIDAZOLE 500 MG: 500 TABLET ORAL at 08:33

## 2022-01-01 RX ADMIN — OCTREOTIDE ACETATE 50 MCG/HR: 200 INJECTION, SOLUTION INTRAVENOUS; SUBCUTANEOUS at 14:41

## 2022-01-01 RX ADMIN — POTASSIUM CHLORIDE 10 MEQ: 7.46 INJECTION, SOLUTION INTRAVENOUS at 03:27

## 2022-01-01 RX ADMIN — CALCIUM CARBONATE (ANTACID) CHEW TAB 500 MG 500 MG: 500 CHEW TAB at 13:14

## 2022-01-01 RX ADMIN — SODIUM CHLORIDE 8 MG/HR: 9 INJECTION, SOLUTION INTRAVENOUS at 07:45

## 2022-01-01 RX ADMIN — ROCURONIUM BROMIDE 10 MG: 10 INJECTION, SOLUTION INTRAVENOUS at 10:48

## 2022-01-01 RX ADMIN — CALCIUM CARBONATE (ANTACID) CHEW TAB 500 MG 500 MG: 500 CHEW TAB at 09:03

## 2022-01-01 RX ADMIN — POTASSIUM CHLORIDE 40 MEQ: 1500 TABLET, EXTENDED RELEASE ORAL at 19:30

## 2022-01-01 RX ADMIN — PROPOFOL 200 MG: 10 INJECTION, EMULSION INTRAVENOUS at 14:25

## 2022-01-01 RX ADMIN — PANTOPRAZOLE SODIUM 40 MG: 20 TABLET, DELAYED RELEASE ORAL at 06:35

## 2022-01-01 RX ADMIN — GABAPENTIN 300 MG: 300 CAPSULE ORAL at 08:33

## 2022-01-01 RX ADMIN — GABAPENTIN 300 MG: 300 CAPSULE ORAL at 20:04

## 2022-01-01 RX ADMIN — NICOTINE 1 PATCH: 21 PATCH, EXTENDED RELEASE TRANSDERMAL at 08:30

## 2022-01-01 RX ADMIN — PANTOPRAZOLE SODIUM 80 MG: 40 INJECTION, POWDER, FOR SOLUTION INTRAVENOUS at 07:37

## 2022-01-01 RX ADMIN — LORAZEPAM 1 MG: 1 TABLET ORAL at 14:41

## 2022-01-01 RX ADMIN — MAGNESIUM SULFATE IN WATER 4 G: 80 INJECTION, SOLUTION INTRAVENOUS at 08:35

## 2022-01-01 RX ADMIN — NICOTINE 7 MG/24 HR DAILY TRANSDERMAL PATCH 1 PATCH: at 18:43

## 2022-01-01 RX ADMIN — PANTOPRAZOLE SODIUM 40 MG: 20 TABLET, DELAYED RELEASE ORAL at 07:00

## 2022-01-01 RX ADMIN — RIFAXIMIN 550 MG: 550 TABLET ORAL at 20:04

## 2022-01-01 RX ADMIN — GABAPENTIN 300 MG: 300 CAPSULE ORAL at 20:33

## 2022-01-01 RX ADMIN — PANTOPRAZOLE SODIUM 40 MG: 40 INJECTION, POWDER, FOR SOLUTION INTRAVENOUS at 18:45

## 2022-01-01 RX ADMIN — SODIUM CHLORIDE: 9 INJECTION, SOLUTION INTRAVENOUS at 21:58

## 2022-01-01 RX ADMIN — FOLIC ACID 1 MG: 1 TABLET ORAL at 08:03

## 2022-01-01 RX ADMIN — SODIUM CHLORIDE: 9 INJECTION, SOLUTION INTRAVENOUS at 15:17

## 2022-01-01 RX ADMIN — ALBUMIN HUMAN 12.5 G: 0.25 SOLUTION INTRAVENOUS at 10:35

## 2022-01-01 RX ADMIN — CALCIUM CARBONATE (ANTACID) CHEW TAB 500 MG 500 MG: 500 CHEW TAB at 14:05

## 2022-01-01 RX ADMIN — PHENYLEPHRINE HYDROCHLORIDE 200 MCG: 10 INJECTION INTRAVENOUS at 10:48

## 2022-01-01 RX ADMIN — ALUMINUM HYDROXIDE, MAGNESIUM HYDROXIDE, AND SIMETHICONE 30 ML: 200; 200; 20 SUSPENSION ORAL at 10:31

## 2022-01-01 RX ADMIN — SODIUM CHLORIDE: 9 INJECTION, SOLUTION INTRAVENOUS at 07:41

## 2022-01-01 RX ADMIN — PHENYLEPHRINE HYDROCHLORIDE 200 MCG: 10 INJECTION INTRAVENOUS at 14:34

## 2022-01-01 RX ADMIN — FUROSEMIDE 40 MG: 40 TABLET ORAL at 08:57

## 2022-01-01 RX ADMIN — EPLERENONE 25 MG: 25 TABLET, FILM COATED ORAL at 08:56

## 2022-01-01 RX ADMIN — PHENYLEPHRINE HYDROCHLORIDE 100 MCG: 10 INJECTION INTRAVENOUS at 10:45

## 2022-01-01 RX ADMIN — CALCIUM CARBONATE (ANTACID) CHEW TAB 500 MG 500 MG: 500 CHEW TAB at 08:30

## 2022-01-01 RX ADMIN — LORAZEPAM 1 MG: 1 TABLET ORAL at 20:18

## 2022-01-01 RX ADMIN — SUGAMMADEX 100 MG: 100 INJECTION, SOLUTION INTRAVENOUS at 11:05

## 2022-01-01 RX ADMIN — POTASSIUM CHLORIDE 10 MEQ: 7.46 INJECTION, SOLUTION INTRAVENOUS at 05:36

## 2022-01-01 RX ADMIN — CLONIDINE HYDROCHLORIDE 0.1 MG: 0.1 TABLET ORAL at 05:02

## 2022-01-01 RX ADMIN — CALCIUM CARBONATE (ANTACID) CHEW TAB 500 MG 500 MG: 500 CHEW TAB at 22:29

## 2022-01-01 RX ADMIN — IRON SUCROSE 200 MG: 20 INJECTION, SOLUTION INTRAVENOUS at 10:27

## 2022-01-01 RX ADMIN — THIAMINE HYDROCHLORIDE 200 MG: 100 INJECTION, SOLUTION INTRAMUSCULAR; INTRAVENOUS at 20:47

## 2022-01-01 RX ADMIN — QUETIAPINE 100 MG: 100 TABLET, FILM COATED ORAL at 22:24

## 2022-01-01 RX ADMIN — PROCHLORPERAZINE EDISYLATE 10 MG: 5 INJECTION INTRAMUSCULAR; INTRAVENOUS at 08:28

## 2022-01-01 RX ADMIN — LACTULOSE 10 G: 10 SOLUTION ORAL at 14:26

## 2022-01-01 RX ADMIN — POTASSIUM CHLORIDE 10 MEQ: 7.46 INJECTION, SOLUTION INTRAVENOUS at 12:36

## 2022-01-01 RX ADMIN — PANTOPRAZOLE SODIUM 40 MG: 20 TABLET, DELAYED RELEASE ORAL at 09:16

## 2022-01-01 RX ADMIN — CALCIUM CARBONATE (ANTACID) CHEW TAB 500 MG 500 MG: 500 CHEW TAB at 20:33

## 2022-01-01 RX ADMIN — GABAPENTIN 1200 MG: 600 TABLET, FILM COATED ORAL at 16:34

## 2022-01-01 ASSESSMENT — ACTIVITIES OF DAILY LIVING (ADL)
ADLS_ACUITY_SCORE: 15
ADLS_ACUITY_SCORE: 15
ADLS_ACUITY_SCORE: 17
ADLS_ACUITY_SCORE: 19
ADLS_ACUITY_SCORE: 15
ADLS_ACUITY_SCORE: 40
ADLS_ACUITY_SCORE: 15
ADLS_ACUITY_SCORE: 15
ADLS_ACUITY_SCORE: 17
ADLS_ACUITY_SCORE: 15
ADLS_ACUITY_SCORE: 15
ADLS_ACUITY_SCORE: 13
WEAR_GLASSES_OR_BLIND: NO
ADLS_ACUITY_SCORE: 22
ADLS_ACUITY_SCORE: 15
ADLS_ACUITY_SCORE: 19
ADLS_ACUITY_SCORE: 15
ADLS_ACUITY_SCORE: 19
ADLS_ACUITY_SCORE: 15
ADLS_ACUITY_SCORE: 17
ADLS_ACUITY_SCORE: 13
ADLS_ACUITY_SCORE: 15
ADLS_ACUITY_SCORE: 13
ADLS_ACUITY_SCORE: 40
ADLS_ACUITY_SCORE: 19
DIFFICULTY_EATING/SWALLOWING: NO
ADLS_ACUITY_SCORE: 17
ADLS_ACUITY_SCORE: 17
DEPENDENT_IADLS:: INDEPENDENT
ADLS_ACUITY_SCORE: 15
ADLS_ACUITY_SCORE: 15
ADLS_ACUITY_SCORE: 7
ADLS_ACUITY_SCORE: 7
ADLS_ACUITY_SCORE: 15
ADLS_ACUITY_SCORE: 28
ADLS_ACUITY_SCORE: 15
ADLS_ACUITY_SCORE: 19
ADLS_ACUITY_SCORE: 22
ADLS_ACUITY_SCORE: 17
ADLS_ACUITY_SCORE: 19
ADLS_ACUITY_SCORE: 17
ADLS_ACUITY_SCORE: 28
ADLS_ACUITY_SCORE: 17
ADLS_ACUITY_SCORE: 15
ADLS_ACUITY_SCORE: 15
ADLS_ACUITY_SCORE: 17
ADLS_ACUITY_SCORE: 13
ADLS_ACUITY_SCORE: 15
ADLS_ACUITY_SCORE: 28
ADLS_ACUITY_SCORE: 15
ADLS_ACUITY_SCORE: 19
ADLS_ACUITY_SCORE: 19
ADLS_ACUITY_SCORE: 15
ADLS_ACUITY_SCORE: 17
ADLS_ACUITY_SCORE: 15
ADLS_ACUITY_SCORE: 24
ADLS_ACUITY_SCORE: 24
ADLS_ACUITY_SCORE: 28
ADLS_ACUITY_SCORE: 15
ADLS_ACUITY_SCORE: 24
ADLS_ACUITY_SCORE: 15
ADLS_ACUITY_SCORE: 16
ADLS_ACUITY_SCORE: 15
ADLS_ACUITY_SCORE: 15
ADLS_ACUITY_SCORE: 13
ADLS_ACUITY_SCORE: 17
ADLS_ACUITY_SCORE: 15
ADLS_ACUITY_SCORE: 19
ADLS_ACUITY_SCORE: 15
ADLS_ACUITY_SCORE: 15
ADLS_ACUITY_SCORE: 40
ADLS_ACUITY_SCORE: 19
ADLS_ACUITY_SCORE: 20
ADLS_ACUITY_SCORE: 13
ADLS_ACUITY_SCORE: 28
ADLS_ACUITY_SCORE: 17
ADLS_ACUITY_SCORE: 15
ADLS_ACUITY_SCORE: 15
ADLS_ACUITY_SCORE: 17
ADLS_ACUITY_SCORE: 23
ADLS_ACUITY_SCORE: 15
ADLS_ACUITY_SCORE: 19
ADLS_ACUITY_SCORE: 15
ADLS_ACUITY_SCORE: 15
ADLS_ACUITY_SCORE: 16
ADLS_ACUITY_SCORE: 24
DEPENDENT_IADLS:: INDEPENDENT
ADLS_ACUITY_SCORE: 15
ADLS_ACUITY_SCORE: 15
CHANGE_IN_FUNCTIONAL_STATUS_SINCE_ONSET_OF_CURRENT_ILLNESS/INJURY: NO
ADLS_ACUITY_SCORE: 17
ADLS_ACUITY_SCORE: 15
ADLS_ACUITY_SCORE: 17
ADLS_ACUITY_SCORE: 15
DOING_ERRANDS_INDEPENDENTLY_DIFFICULTY: NO
ADLS_ACUITY_SCORE: 15
ADLS_ACUITY_SCORE: 24
ADLS_ACUITY_SCORE: 15
ADLS_ACUITY_SCORE: 22
ADLS_ACUITY_SCORE: 15
ADLS_ACUITY_SCORE: 37
ADLS_ACUITY_SCORE: 17
ADLS_ACUITY_SCORE: 15
ADLS_ACUITY_SCORE: 15
ADLS_ACUITY_SCORE: 16
ADLS_ACUITY_SCORE: 15
ADLS_ACUITY_SCORE: 15
ADLS_ACUITY_SCORE: 14
ADLS_ACUITY_SCORE: 15
ADLS_ACUITY_SCORE: 22
ADLS_ACUITY_SCORE: 15
ADLS_ACUITY_SCORE: 22
ADLS_ACUITY_SCORE: 15
ADLS_ACUITY_SCORE: 7
ADLS_ACUITY_SCORE: 15
ADLS_ACUITY_SCORE: 15
ADLS_ACUITY_SCORE: 13
ADLS_ACUITY_SCORE: 15
ADLS_ACUITY_SCORE: 17
ADLS_ACUITY_SCORE: 15
ADLS_ACUITY_SCORE: 26
ADLS_ACUITY_SCORE: 24
ADLS_ACUITY_SCORE: 9
ADLS_ACUITY_SCORE: 15
DRESSING/BATHING_DIFFICULTY: NO
ADLS_ACUITY_SCORE: 15
ADLS_ACUITY_SCORE: 17
ADLS_ACUITY_SCORE: 17
ADLS_ACUITY_SCORE: 15
ADLS_ACUITY_SCORE: 40
ADLS_ACUITY_SCORE: 28
ADLS_ACUITY_SCORE: 15
ADLS_ACUITY_SCORE: 29
ADLS_ACUITY_SCORE: 17
ADLS_ACUITY_SCORE: 17
ADLS_ACUITY_SCORE: 15
ADLS_ACUITY_SCORE: 17
ADLS_ACUITY_SCORE: 22
ADLS_ACUITY_SCORE: 23
ADLS_ACUITY_SCORE: 20
ADLS_ACUITY_SCORE: 15
ADLS_ACUITY_SCORE: 15
ADLS_ACUITY_SCORE: 17
ADLS_ACUITY_SCORE: 19
ADLS_ACUITY_SCORE: 15
ADLS_ACUITY_SCORE: 15
ADLS_ACUITY_SCORE: 13
ADLS_ACUITY_SCORE: 17
ADLS_ACUITY_SCORE: 16
ADLS_ACUITY_SCORE: 15
ADLS_ACUITY_SCORE: 17
ADLS_ACUITY_SCORE: 19
ADLS_ACUITY_SCORE: 15
ADLS_ACUITY_SCORE: 17
ADLS_ACUITY_SCORE: 28
ADLS_ACUITY_SCORE: 28
ADLS_ACUITY_SCORE: 20
ADLS_ACUITY_SCORE: 15
ADLS_ACUITY_SCORE: 16
ADLS_ACUITY_SCORE: 15
ADLS_ACUITY_SCORE: 15
ADLS_ACUITY_SCORE: 19
ADLS_ACUITY_SCORE: 15
ADLS_ACUITY_SCORE: 15
ADLS_ACUITY_SCORE: 17
ADLS_ACUITY_SCORE: 17
ADLS_ACUITY_SCORE: 28
ADLS_ACUITY_SCORE: 17
ADLS_ACUITY_SCORE: 12
ADLS_ACUITY_SCORE: 15
ADLS_ACUITY_SCORE: 17
ADLS_ACUITY_SCORE: 16
ADLS_ACUITY_SCORE: 17
ADLS_ACUITY_SCORE: 28
ADLS_ACUITY_SCORE: 15
ADLS_ACUITY_SCORE: 40
ADLS_ACUITY_SCORE: 19
DEPENDENT_IADLS:: TRANSPORTATION
ADLS_ACUITY_SCORE: 15
ADLS_ACUITY_SCORE: 17
ADLS_ACUITY_SCORE: 28
TOILETING_ISSUES: NO
ADLS_ACUITY_SCORE: 23
ADLS_ACUITY_SCORE: 15
ADLS_ACUITY_SCORE: 17
ADLS_ACUITY_SCORE: 15
ADLS_ACUITY_SCORE: 19
ADLS_ACUITY_SCORE: 12
ADLS_ACUITY_SCORE: 15
ADLS_ACUITY_SCORE: 24
ADLS_ACUITY_SCORE: 15
ADLS_ACUITY_SCORE: 19
ADLS_ACUITY_SCORE: 15
ADLS_ACUITY_SCORE: 15
ADLS_ACUITY_SCORE: 19
ADLS_ACUITY_SCORE: 15
ADLS_ACUITY_SCORE: 22
ADLS_ACUITY_SCORE: 26
ADLS_ACUITY_SCORE: 19
ADLS_ACUITY_SCORE: 15
ADLS_ACUITY_SCORE: 18
ADLS_ACUITY_SCORE: 13
ADLS_ACUITY_SCORE: 22
ADLS_ACUITY_SCORE: 15
ADLS_ACUITY_SCORE: 24
ADLS_ACUITY_SCORE: 22
ADLS_ACUITY_SCORE: 22
ADLS_ACUITY_SCORE: 17
ADLS_ACUITY_SCORE: 17
ADLS_ACUITY_SCORE: 19
ADLS_ACUITY_SCORE: 15
ADLS_ACUITY_SCORE: 24
ADLS_ACUITY_SCORE: 24
ADLS_ACUITY_SCORE: 14
ADLS_ACUITY_SCORE: 19
ADLS_ACUITY_SCORE: 15
ADLS_ACUITY_SCORE: 13
ADLS_ACUITY_SCORE: 22
ADLS_ACUITY_SCORE: 19
ADLS_ACUITY_SCORE: 12
ADLS_ACUITY_SCORE: 13
ADLS_ACUITY_SCORE: 15
ADLS_ACUITY_SCORE: 24
ADLS_ACUITY_SCORE: 15
ADLS_ACUITY_SCORE: 19
ADLS_ACUITY_SCORE: 15
ADLS_ACUITY_SCORE: 15
ADLS_ACUITY_SCORE: 7
ADLS_ACUITY_SCORE: 19
ADLS_ACUITY_SCORE: 15
ADLS_ACUITY_SCORE: 16
ADLS_ACUITY_SCORE: 16
ADLS_ACUITY_SCORE: 28
ADLS_ACUITY_SCORE: 15
ADLS_ACUITY_SCORE: 19
ADLS_ACUITY_SCORE: 15
ADLS_ACUITY_SCORE: 19
ADLS_ACUITY_SCORE: 28
ADLS_ACUITY_SCORE: 19
ADLS_ACUITY_SCORE: 18
ADLS_ACUITY_SCORE: 17
ADLS_ACUITY_SCORE: 15
ADLS_ACUITY_SCORE: 16
ADLS_ACUITY_SCORE: 22
ADLS_ACUITY_SCORE: 15
ADLS_ACUITY_SCORE: 18
ADLS_ACUITY_SCORE: 15
ADLS_ACUITY_SCORE: 19
ADLS_ACUITY_SCORE: 16
ADLS_ACUITY_SCORE: 16
ADLS_ACUITY_SCORE: 15
ADLS_ACUITY_SCORE: 18
ADLS_ACUITY_SCORE: 26
ADLS_ACUITY_SCORE: 16
ADLS_ACUITY_SCORE: 28
ADLS_ACUITY_SCORE: 15
ADLS_ACUITY_SCORE: 15
ADLS_ACUITY_SCORE: 17
ADLS_ACUITY_SCORE: 18
ADLS_ACUITY_SCORE: 28
ADLS_ACUITY_SCORE: 37
ADLS_ACUITY_SCORE: 15
ADLS_ACUITY_SCORE: 15
ADLS_ACUITY_SCORE: 19
ADLS_ACUITY_SCORE: 15
ADLS_ACUITY_SCORE: 17
ADLS_ACUITY_SCORE: 17
ADLS_ACUITY_SCORE: 22
ADLS_ACUITY_SCORE: 17
ADLS_ACUITY_SCORE: 22
ADLS_ACUITY_SCORE: 19
ADLS_ACUITY_SCORE: 20
ADLS_ACUITY_SCORE: 17
ADLS_ACUITY_SCORE: 24
ADLS_ACUITY_SCORE: 19
ADLS_ACUITY_SCORE: 15
ADLS_ACUITY_SCORE: 17
ADLS_ACUITY_SCORE: 19
ADLS_ACUITY_SCORE: 15
ADLS_ACUITY_SCORE: 15
ADLS_ACUITY_SCORE: 17
ADLS_ACUITY_SCORE: 15
ADLS_ACUITY_SCORE: 19
ADLS_ACUITY_SCORE: 15
ADLS_ACUITY_SCORE: 15
ADLS_ACUITY_SCORE: 18
ADLS_ACUITY_SCORE: 17
ADLS_ACUITY_SCORE: 14
ADLS_ACUITY_SCORE: 15
ADLS_ACUITY_SCORE: 19
ADLS_ACUITY_SCORE: 15
ADLS_ACUITY_SCORE: 17
ADLS_ACUITY_SCORE: 13
ADLS_ACUITY_SCORE: 15
ADLS_ACUITY_SCORE: 40
ADLS_ACUITY_SCORE: 16
ADLS_ACUITY_SCORE: 15
ADLS_ACUITY_SCORE: 24
ADLS_ACUITY_SCORE: 15
ADLS_ACUITY_SCORE: 17
ADLS_ACUITY_SCORE: 19
ADLS_ACUITY_SCORE: 15
ADLS_ACUITY_SCORE: 37
ADLS_ACUITY_SCORE: 15
ADLS_ACUITY_SCORE: 17
ADLS_ACUITY_SCORE: 18
ADLS_ACUITY_SCORE: 19
ADLS_ACUITY_SCORE: 15
ADLS_ACUITY_SCORE: 28
ADLS_ACUITY_SCORE: 19
ADLS_ACUITY_SCORE: 15
ADLS_ACUITY_SCORE: 15
ADLS_ACUITY_SCORE: 37
ADLS_ACUITY_SCORE: 15
ADLS_ACUITY_SCORE: 15
ADLS_ACUITY_SCORE: 16
ADLS_ACUITY_SCORE: 15
ADLS_ACUITY_SCORE: 14
CONCENTRATING,_REMEMBERING_OR_MAKING_DECISIONS_DIFFICULTY: NO
ADLS_ACUITY_SCORE: 19
ADLS_ACUITY_SCORE: 17
ADLS_ACUITY_SCORE: 15
ADLS_ACUITY_SCORE: 40
ADLS_ACUITY_SCORE: 24
ADLS_ACUITY_SCORE: 15
ADLS_ACUITY_SCORE: 37
ADLS_ACUITY_SCORE: 17
ADLS_ACUITY_SCORE: 19
ADLS_ACUITY_SCORE: 15
ADLS_ACUITY_SCORE: 18
ADLS_ACUITY_SCORE: 15
ADLS_ACUITY_SCORE: 16
ADLS_ACUITY_SCORE: 15
ADLS_ACUITY_SCORE: 23
ADLS_ACUITY_SCORE: 20
ADLS_ACUITY_SCORE: 15
ADLS_ACUITY_SCORE: 18
ADLS_ACUITY_SCORE: 15
ADLS_ACUITY_SCORE: 15
ADLS_ACUITY_SCORE: 26
ADLS_ACUITY_SCORE: 15
ADLS_ACUITY_SCORE: 28
ADLS_ACUITY_SCORE: 15
ADLS_ACUITY_SCORE: 16
ADLS_ACUITY_SCORE: 19
ADLS_ACUITY_SCORE: 19
ADLS_ACUITY_SCORE: 22
ADLS_ACUITY_SCORE: 17
ADLS_ACUITY_SCORE: 19
ADLS_ACUITY_SCORE: 28
ADLS_ACUITY_SCORE: 15
ADLS_ACUITY_SCORE: 17
ADLS_ACUITY_SCORE: 15
ADLS_ACUITY_SCORE: 17
ADLS_ACUITY_SCORE: 13
ADLS_ACUITY_SCORE: 17
ADLS_ACUITY_SCORE: 16
ADLS_ACUITY_SCORE: 13
ADLS_ACUITY_SCORE: 24
ADLS_ACUITY_SCORE: 15
ADLS_ACUITY_SCORE: 17
ADLS_ACUITY_SCORE: 15
ADLS_ACUITY_SCORE: 28
ADLS_ACUITY_SCORE: 19
ADLS_ACUITY_SCORE: 15
ADLS_ACUITY_SCORE: 13
ADLS_ACUITY_SCORE: 40
ADLS_ACUITY_SCORE: 18
ADLS_ACUITY_SCORE: 18
ADLS_ACUITY_SCORE: 17
ADLS_ACUITY_SCORE: 15
ADLS_ACUITY_SCORE: 16
FALL_HISTORY_WITHIN_LAST_SIX_MONTHS: NO
ADLS_ACUITY_SCORE: 24
ADLS_ACUITY_SCORE: 15
ADLS_ACUITY_SCORE: 17
ADLS_ACUITY_SCORE: 15
ADLS_ACUITY_SCORE: 17
ADLS_ACUITY_SCORE: 15
ADLS_ACUITY_SCORE: 17
ADLS_ACUITY_SCORE: 28
ADLS_ACUITY_SCORE: 15
ADLS_ACUITY_SCORE: 15
ADLS_ACUITY_SCORE: 16
ADLS_ACUITY_SCORE: 15
ADLS_ACUITY_SCORE: 40
ADLS_ACUITY_SCORE: 15
ADLS_ACUITY_SCORE: 13
ADLS_ACUITY_SCORE: 15
ADLS_ACUITY_SCORE: 14
ADLS_ACUITY_SCORE: 19
ADLS_ACUITY_SCORE: 13
ADLS_ACUITY_SCORE: 15
ADLS_ACUITY_SCORE: 18
ADLS_ACUITY_SCORE: 24
ADLS_ACUITY_SCORE: 15
ADLS_ACUITY_SCORE: 40
ADLS_ACUITY_SCORE: 19
ADLS_ACUITY_SCORE: 15
ADLS_ACUITY_SCORE: 19
ADLS_ACUITY_SCORE: 17
ADLS_ACUITY_SCORE: 15
ADLS_ACUITY_SCORE: 24
ADLS_ACUITY_SCORE: 15
WALKING_OR_CLIMBING_STAIRS_DIFFICULTY: NO
ADLS_ACUITY_SCORE: 15
ADLS_ACUITY_SCORE: 22
ADLS_ACUITY_SCORE: 15
ADLS_ACUITY_SCORE: 9
ADLS_ACUITY_SCORE: 16
ADLS_ACUITY_SCORE: 37
ADLS_ACUITY_SCORE: 17
ADLS_ACUITY_SCORE: 15
ADLS_ACUITY_SCORE: 17
ADLS_ACUITY_SCORE: 13
ADLS_ACUITY_SCORE: 28

## 2022-01-01 ASSESSMENT — ENCOUNTER SYMPTOMS
FEVER: 0
ABDOMINAL PAIN: 0
TROUBLE SWALLOWING: 0
DIARRHEA: 0
CHILLS: 0
COUGH: 0
DIARRHEA: 0
SHORTNESS OF BREATH: 0
DYSURIA: 0
SHORTNESS OF BREATH: 0
ABDOMINAL PAIN: 1
DIFFICULTY URINATING: 0
ROS GI COMMENTS: +BLACK STOOLS
WOUND: 1
VOMITING: 1
VOMITING: 0
HALLUCINATIONS: 0
BLOOD IN STOOL: 0
FEVER: 0
NAUSEA: 1

## 2022-02-25 PROBLEM — K92.0 HEMATEMESIS WITH NAUSEA: Status: ACTIVE | Noted: 2022-01-01

## 2022-02-25 PROBLEM — R10.84 DIFFUSE ABDOMINAL PAIN: Status: ACTIVE | Noted: 2022-01-01

## 2022-02-25 PROBLEM — F10.930 ALCOHOL WITHDRAWAL SYNDROME WITHOUT COMPLICATION (H): Status: ACTIVE | Noted: 2021-05-16

## 2022-02-25 PROBLEM — R07.9 ACUTE CHEST PAIN: Status: ACTIVE | Noted: 2022-01-01

## 2022-02-25 PROBLEM — E83.42 HYPOMAGNESEMIA: Status: ACTIVE | Noted: 2022-01-01

## 2022-02-25 PROBLEM — R17 SCLERAL ICTERUS: Status: ACTIVE | Noted: 2022-01-01

## 2022-02-25 PROBLEM — E87.6 HYPOKALEMIA: Status: ACTIVE | Noted: 2022-01-01

## 2022-02-25 NOTE — CONSULTS
[facility] Consultation     Masoud Huddleston  [address] [facility] [address]  36 year old male     Admission Date/Time: 2/25/2022  Primary Care Provider: System, Provider Not In  Referring / Attending Physician:  Sam     We were asked to see the patient in consultation by MD for evaluation of hematemesis.       CC: hematemesis, melena     HPI:  Masoud Huddleston is a 36 year old male with PMH alcoholic cirrhosis with esophageal varices with ongoing alcohol use, alcohol withdrawal, seizure who presented to ER earlier today for vomiting and hematemesis. He provides limited history given altered mental status and frequent retching/coughing during my visit. Per chart review, his symptoms started 2/23, which prompted him to stop drinking since then. He started to have black stool at the same time and reports diffuse abdominal pain due to vomiting. No abdominal distension or LE edema.      ROS: A comprehensive ten point review of systems was negative aside from those in mentioned in the HPI.      PAST MEDICAL HISTORY:  Patient Active Problem List    Diagnosis Date Noted     Hypokalemia 02/25/2022     Priority: Medium     Hypomagnesemia 02/25/2022     Priority: Medium     Acute chest pain 02/25/2022     Priority: Medium     Scleral icterus 02/25/2022     Priority: Medium     Diffuse abdominal pain 02/25/2022     Priority: Medium     Hematemesis with nausea 02/25/2022     Priority: Medium     Alcohol withdrawal syndrome without complication (H) 05/16/2021     Priority: Medium     Alcoholic intoxication without complication (H) 11/25/2020     Priority: Medium     SOCIAL HISTORY:  Social History     Tobacco Use     Smoking status: Current Every Day Smoker     Packs/day: 1.00     Types: Cigarettes     Smokeless tobacco: Never Used   Substance Use Topics     Alcohol use: Yes     Comment: a liter of Vodka everyday     Drug use: Yes     Types: Marijuana   Daily etoh of 1 L, last 2/23, + tobacco and  "marijuana    FAMILY HISTORY:  History reviewed. No pertinent family history.   No pertinent family history.    ALLERGIES: No Known Allergies  MEDICATIONS:   Current Facility-Administered Medications   Medication     magnesium sulfate 2 g in water intermittent infusion     octreotide (sandoSTATIN) 1,250 mcg in D5W 250 mL     potassium chloride 10 mEq in 100 mL sterile water intermittent infusion (premix)     potassium chloride 10 mEq in 100 mL sterile water intermittent infusion (premix)     Current Outpatient Medications   Medication     furosemide (LASIX) 40 MG tablet     mirtazapine (REMERON) 15 MG tablet     gabapentin (NEURONTIN) 300 MG capsule     thiamine (B-1) 100 MG tablet     PHYSICAL EXAM:   /72   Pulse 106   Temp 97.2  F (36.2  C) (Temporal)   Resp 18   Ht 1.803 m (5' 11\")   Wt 65 kg (143 lb 4.8 oz)   SpO2 100%   BMI 19.99 kg/m     GEN: NAD, young male appears stated age moving around in bed  HEENT: + icterus, no lymphadenopathy  HRT: RRR  LUNGS: CTA  ABD: +BS, soft, mild diffuse tenderness, + hepatomegaly  SKIN: No rash, jaundice  MSKL: no LE edema, strength 5/5 all 4 extrems  NEURO: Confused at times, does not respond appropriately or clearly to some questions     ADDITIONAL DATA:   I reviewed the patient's new clinical lab test results.   Recent Labs   Lab Test 02/25/22  1115 05/16/21  0834 12/30/20  0808 12/20/20  0808 12/17/20  0921   WBC 13.8* 7.3 4.7   < > 3.6*   HGB 12.9* 16.1 14.3   < > 13.4*   MCV 96 84 89   < > 85   PLT 90* 113* 135*   < > 41*   INR 1.33*  --  1.21*  --  1.27*    < > = values in this interval not displayed.     Recent Labs   Lab Test 02/25/22  1115 05/16/21  0834 12/30/20  0808   POTASSIUM 3.0* 3.8 4.5   CHLORIDE 83* 105 104   CO2 19* 29 27   BUN 31* 12 15   ANIONGAP 35* 8 8     Recent Labs   Lab Test 02/25/22  1115 05/16/21  0834 12/30/20  0808 12/20/20  0808 12/15/20  1718 12/15/20  1304 11/24/20  2236 07/10/20  0751 07/05/20  0728 05/01/20  0857 02/28/20  0954 " 01/09/20  0542 01/08/20  1644 01/06/20  0638 01/05/20  2106   ALBUMIN 3.4* 4.1 3.7   < >  --  3.8   < > 2.9* 2.6*   < > 2.4*   < >  --    < >  --    BILITOTAL 6.4* 0.9 0.7   < >  --  0.8   < > 5.1* 3.8*   < > 1.5*   < >  --    < >  --    ALT 28 48 92*   < >  --  104*   < > 38 54*   < > 30   < >  --    < >  --    * 79* 61*   < >  --  181*   < > 99* 141*   < > 52*   < >  --    < >  --    PROTEIN  --   --   --   --  30 mg/dL*  --   --   --   --   --   --   --  Negative  --  30 mg/dL*   LIPASE 16  --   --   --   --  55*  --  73* 77*   < > 71*   < >  --   --   --    AMYLASE  --   --   --   --   --   --   --   --  83  --  85  --   --   --   --     < > = values in this interval not displayed.        Imaging results:  CT chest/abdomen/pelvis 2/25/22:  IMPRESSION:   1.  Cirrhosis, moderate to severe steatosis and moderate hepatomegaly compatible with alcoholic liver disease and portal to caval collateral vein formation as detailed above consistent with portal hypertension.  2.  Cholelithiasis.  3.  Mild bronchial wall thickening throughout both lungs and mid and upper lung predominant faint micronodular peribronchiolar opacities compatible with mild nonspecific chronic active bronchial and bronchiolar inflammation/infection.    Procedure results:  EGD 5/3/20 ([facility]):  FINDINGS / CONCLUSIONS:    Esophagus: Three columns of varices distal esophagus, few punctate red markings but varices flatten with insufflation).    Stomach: Portal hypertensive gastropathy.    Duodenum: Small amount of heme in duodenal bulb but no lesion seen post irrigation (multiple passes of scope performed across this region).       IMPRESSION:  1. Small distal esophageal varices (few punctate red markings but varices flatten with insufflation)  2. Portal hypertensive gastropathy  3. Small amount of heme duodenal bulb but no lesion seen post-irrigation     PLAN:   1. Patient would likely benefit from iron replacement with hemoglobin / iron level  monitoring in coming months  2. ENT evaluation if nosebleeds recurs  3. Ongoing strict alcohol cessation  4. We will arrange a follow up in Hepatology Clinic  5. Favor outpatient EGD and colonoscopy in 4 weeks (repeat EGD to re-evaluate duodenal bulb and follow up esophageal varices in light of few punctate red markings: varices not banded today as they were flat with insufflation).   6. Please call with any questions or concerns.          ASSESSMENT:    Alcoholic cirrhosis  Alcohol withdrawal  Hematemesis  This is a 35 y/o male with PMH alcoholic cirrhosis with esophageal varices with ongoing alcohol use, alcohol withdrawal, seizure admitted 2/25 for hematemesis and melena. Reportedly started having hematemesis 2/23 and stopped drinking since, now with alcohol withdrawal. No emesis or stool since arrival per ER staff. Hemoglobin 12.9, down from 14-16 range in the past. He has known varices so bleeding could be variceal vs Aiyana Vizcanio tear from vomiting, portal hypertensive gastropathy/PUD, esophagitis. He needs EGD this admission but given active withdrawal, current mental status, and lack of continued emesis since arrival to ER this will be deferred for today.     PLAN:  -  NPO  - Continue Octreotide and IV PPI  - EGD this admission but when more stable, please call GI if patient has active bleeding  - Hemoglobin monitoring per medicine  - Alcohol cessation and outpatient follow up after discharge        Jennifer Romero PA-C  [facility]  2/25/2022 1:16 PM  [phone] (office)    This case was discussed with Dr. Manley who agrees to the above assessment and plan.    Approximately 40 minutes of total time was spent providing patient care, including patient evaluation, reviewing documentation/test result, and .   ________________________________________________________________________        GI staff addendum.  Patient is a 36 years old old male with history of alcoholic  cirrhosis and esophageal varices with ongoing alcohol use.  He comes with history of hematemesis and melena, currently he is withdrawing from alcohol use, given Ativan.  Sleepy but arousable.  He also has complained about diffuse abdominal pain.  No hematemesis or melena since he has been in the ER.  Started on octreotide and IV PPI.  Hemoglobin 12.9, platelet count 90, INR 1.33  EGD on 5/3/2020 showing small distal esophageal varices.  CT chest, abdomen and pelvis: Cirrhosis, portal to caval collateral vein, cholelithiasis.  Mild bronchial thickening in both lungs.  On examination.  Patient sleepy, arousable.  Icteric.  Abdomen soft nontender.  Slowly answers to questions.    Assessment.  36 years old male with active alcohol use, cirrhosis, previous small varices who presents with nausea vomiting, history of hematemesis and melena.  No hematemesis or melena seen in the ER, patient has symptoms of alcohol withdrawal, given Ativan.   GI bleeding.  Could be from Aiyana-Vizcaino tear, AC peptic disease or esophageal varices.  Currently no active bleeding noticed in the ER.  History of abdominal pain.  Etiology unclear.    Recommendations.  Continue with octreotide and IV PPI  Keep n.p.o.  Plan to proceed with EGD this admission once clinically more stable.  However if he has active GI bleeding then call GI for urgent EGD.  Follow clinically and hemoglobin.  Alcohol cessation.    Patient was seen, evaluated and discussed with Jennifer Romero PA-C, agree with her evaluation, assessment and plan.    Kemal Manley MD

## 2022-02-25 NOTE — ED PROVIDER NOTES
EMERGENCY DEPARTMENT ENCOUNTER      NAME: Masoud Huddleston  AGE: 36 year old male  YOB: 1985  MRN: [id number]  EVALUATION DATE & TIME: 2022 11:00 AM    PCP: Pari Hoover    ED PROVIDER: Dee Flores M.D.        Chief Complaint   Patient presents with     Hematemesis         FINAL IMPRESSION:    1. Alcohol withdrawal syndrome without complication (H)    2. Hematemesis with nausea    3. Scleral icterus    4. Acute chest pain    5. Diffuse abdominal pain            MEDICAL DECISION MAKIN year old male with history of extensive alcohol abuse, esophageal varices, and cirrhosis who presents emergency department with hematemesis, vomiting, and active alcohol withdrawal.  Certainly receiving Ativan for withdrawal and having heart rate improvement.  Getting IV fluids.  Laboratories, CT imaging, and ultimate admission to the hospital pending.  Patient signed out to Dr. Del Real awaiting these results and admission.      ED COURSE:  11:22 AM I met with the patient to gather history and perform my exam. ED course and treatment discussed.    11:50 AM  Patient signed out to my partner waiting laboratory results, CT imaging, management of alcohol withdrawal and ultimate admission to the hospital for hematemesis.  Patient does have history of esophageal varices.  May be the source of his bleeding though also worried about things like Boerhaave's or Aiyana-Vizcaino tear.  Patient being treated with medications at this time.  Improving with Ativan and IV fluids.    I do not think that this represents ACS, PE, ruptured AAA, aortic dissection, bowel obstruction, bowel ischemia, cholecystitis, pancreatitis, appendicitis, diverticulitis, kidney stone, pyelonephritis, incarcerated or strangulated hernia, ovarian/testicular torsion, PID, ectopic pregnancy, tubo-ovarian abscess, viscus perforation, perforated GI ulcer, or other such etiologies at this time.      COVID-19 PPE worn during patient  evaluation:  Mask: n95 and homemade masks  Eye Protection: goggles  Gown: none  Hair cover: yes  Face shield: yes   Patient wearing a mask: yes    At the conclusion of the encounter I discussed the results of all of the tests and the disposition. Their questions were answered. The patient (and any family present) acknowledged understanding and were agreeable with the care plan.      Mental Health Risk Assessment      PSS-3    Date and Time Over the past 2 weeks have you felt down, depressed, or hopeless? Over the past 2 weeks have you had thoughts of killing yourself? Have you ever attempted to kill yourself? When did this last happen? User   02/25/22 1105 yes no yes -- [facility]                Item Assessment   Suicidal Ideation None   Plan none   Intent none   Suicidal or self-harm behaviors none   Risk Factors denies active suicidal thoughts or ideation   Protective Factors Identified reasons for living         CONSULTANTS:  none      MEDICATIONS GIVEN IN THE EMERGENCY:  Medications   octreotide (sandoSTATIN) 1,250 mcg in D5W 250 mL (has no administration in time range)   octreotide (sandoSTATIN) injection 50 mcg (has no administration in time range)   thiamine (B-1) injection 100 mg (has no administration in time range)   folic acid injection 1 mg (has no administration in time range)   0.9% sodium chloride BOLUS (1,000 mLs Intravenous New Bag 2/25/22 1121)   LORazepam (ATIVAN) injection 1 mg (1 mg Intravenous Given 2/25/22 1123)   LORazepam (ATIVAN) injection 1 mg (1 mg Intravenous Given 2/25/22 1135)   pantoprazole (PROTONIX) IV push injection 80 mg (80 mg Intravenous Given 2/25/22 1140)   iopamidol (ISOVUE-370) solution 100 mL (100 mLs Intravenous Given 2/25/22 1203)             NEW PRESCRIPTIONS STARTED AT TODAY'S ER VISIT     Medication List      There are no discharge medications for this visit.             CONDITION:  stable        DISPOSITION:  Pending admission          =================================================================  =================================================================    HPI    Patient information was obtained from: patient    Use of Intrepreter: N/A      Masoud Huddleston is a 36 year old male with history of alcohol abuse and withdrawals, alcoholic cirrhosis of liver, seizures, substance abuse, alcoholic ketoacidosis,  who presents to the ER with complaints of hematemesis.    Patient has a history of alcoholism and generally drinks 1 liter of vodka a day.  On Wednesday approximate 2 days ago he started having vomiting and hematemesis.  He decided that that time that you be good for him to stop drinking and has not had alcohol since then.  He has associated chest and abdominal pain.     Patient also endorses black stools, but denies fevers or a cough. He has a known history of of cirrhosis.       REVIEW OF SYSTEMS  Review of Systems   Constitutional: Negative for fever.   HENT: Negative for trouble swallowing.    Respiratory: Negative for cough and shortness of breath.    Cardiovascular: Positive for chest pain.   Gastrointestinal: Positive for abdominal pain, nausea and vomiting. Negative for diarrhea.        +black stools   Genitourinary: Negative for dysuria.   Allergic/Immunologic: Negative for immunocompromised state.   All other systems reviewed and are negative.      PAST MEDICAL HISTORY:  Past Medical History:   Diagnosis Date     Acute alcoholic intoxication in alcoholism with complication (H)      Acute metabolic encephalopathy      Alcohol abuse      Alcoholic cirrhosis of liver without ascites (H)      Alcoholic ketoacidosis 9/19/2019     Chronic acquired lymphedema      Cirrhosis of liver (H)      Depression      ED (erectile dysfunction)      Elevated liver enzymes 4/27/2018     Esophageal varices without bleeding (H)      GI (gastrointestinal bleed)     hematemesis     Hematemesis 4/25/2018    Added automatically from request  for surgery [id number]     History of transfusion      Hypokalemia 9/19/2019     Hypomagnesemia 4/27/2018     Liver disease      Aiyana-Vizcaino tear 4/27/2018     Nausea with vomiting      Neuropathy      Right patella fracture      Seizures (H)     withdrawal     Substance abuse (H)      Thrombocytopenia (H)          PAST SURGICAL HISTORY:  Past Surgical History:   Procedure Laterality Date     ESOPHAGOSCOPY, GASTROSCOPY, DUODENOSCOPY (EGD), COMBINED N/A 4/25/2018    Procedure: ESOPHAGOGASTRODUODENOSCOPY (EGD);  Surgeon: Lora Valencia MD;  Location: [facility] GI;  Service:      HC OPEN RX PATELLA FX Right 4/28/2018    Procedure: OPEN REDUCTION INTERNAL FIXATION, FRACTURE, PATELLA;  Surgeon: Bertram Beltran MD;  Location: [facility] OR;  Service: Orthopedics     KNEE SURGERY Right 2018     HI ESOPHAGOGASTRODUODENOSCOPY TRANSORAL DIAGNOSTIC N/A 5/3/2020    Procedure: ESOPHAGOGASTRODUODENOSCOPY (EGD);  Surgeon: Ricardo Barbosa MD;  Location: [facility];  Service: Gastroenterology      PARACENTESIS  1/16/2020      PARACENTESIS  1/23/2020         CURRENT MEDICATIONS:    Prior to Admission medications    Medication Sig Start Date End Date Taking? Authorizing Provider   acamprosate (CAMPRAL) 333 MG EC tablet Take 666 mg by mouth 3 times daily    Unknown, Entered By History   furosemide (LASIX) 40 MG tablet TAKE 1 TABLET BY MOUTH TWICE A DAY (09:00AM & 06:00PM) 1/17/22   Kelby Dumont MD   gabapentin (NEURONTIN) 300 MG capsule Take 1 capsule (300 mg) by mouth 3 times daily 5/18/21   Johnny Denise MD   hydrOXYzine (VISTARIL) 50 MG capsule Take 50 mg by mouth 4 times daily as needed  11/5/20   Reported, Patient   melatonin 3 MG tablet Take 2 tablets (6 mg) by mouth At Bedtime 11/27/20   Rm Solis MD   mirtazapine (REMERON) 15 MG tablet Take 1 tablet (15 mg) by mouth At Bedtime 5/18/21   Johnny Denise MD   multivitamin w/minerals (THERA-VIT-M) tablet Take 1 tablet by mouth daily 5/19/21    Johnny Denise MD   nicotine (NICODERM CQ) 21 MG/24HR 24 hr patch Place 1 patch onto the skin daily 11/28/20   Rm Solis MD   omeprazole (PRILOSEC) 20 MG DR capsule Take 1 capsule (20 mg) by mouth daily 5/18/21   Johnny Denise MD   potassium chloride ER (KLOR-CON M) 20 MEQ CR tablet Take 20 mEq by mouth 2 times daily  7/13/20   Reported, Patient   QUEtiapine (SEROQUEL) 100 MG tablet Take 1 tablet (100 mg) by mouth At Bedtime 5/18/21   Johnny Denise MD   rifaximin (XIFAXAN) 550 MG TABS tablet Take 550 mg by mouth 2 times daily  7/27/20   Reported, Patient   thiamine (B-1) 100 MG tablet Take 1 tablet (100 mg) by mouth daily 5/19/21   Johnny Denise MD         ALLERGIES:  No Known Allergies      FAMILY HISTORY:  History reviewed. No pertinent family history.      SOCIAL HISTORY:  Social History     Socioeconomic History     Marital status: Single     Spouse name: Not on file     Number of children: Not on file     Years of education: Not on file     Highest education level: Not on file   Occupational History     Not on file   Tobacco Use     Smoking status: Current Every Day Smoker     Packs/day: 1.00     Types: Cigarettes     Smokeless tobacco: Never Used   Substance and Sexual Activity     Alcohol use: Yes     Comment: a liter of Vodka everyday     Drug use: Yes     Types: Marijuana     Sexual activity: Not Currently   Other Topics Concern     Not on file   Social History Narrative     Not on file     Social Determinants of Health     Financial Resource Strain: Not on file   Food Insecurity: Not on file   Transportation Needs: Not on file   Physical Activity: Not on file   Stress: Not on file   Social Connections: Not on file   Intimate Partner Violence: Not on file   Housing Stability: Not on file         VITALS:  Patient Vitals for the past 24 hrs:   BP Temp Temp src Pulse Resp SpO2 Height Weight   02/25/22 1115 118/69 -- -- (!) 151 29 100 % -- --   02/25/22 1110 -- -- -- -- -- -- 1.803 m  "(5' 11\") 65 kg (143 lb 4.8 oz)   02/25/22 1108 (!) 141/81 97.2  F (36.2  C) Temporal (!) 125 18 -- -- --   02/25/22 1107 -- 97.2  F (36.2  C) Temporal (!) 152 18 100 % -- --       Wt Readings from Last 3 Encounters:   02/25/22 65 kg (143 lb 4.8 oz)   01/04/21 84.2 kg (185 lb 11.2 oz)   12/01/20 76.7 kg (169 lb 1.6 oz)         PHYSICAL EXAM    Constitutional:  Well developed, Well nourished, NAD, GCS 15  HENT:  Normocephalic, Atraumatic, Bilateral external ears normal, Nose normal. Neck-Normal range of motion, No tenderness, Supple, No stridor.   Eyes:  PERRL, EOMI, scleral icterus, no discharge.  Respiratory:  Normal breath sounds, No respiratory distress, No wheezing, Speaks full sentences easily. No cough.   Cardiovascular:  tachy heart rate, Regular rhythm, No murmurs, No rubs, No gallops. Chest wall nontender. No crepitus.  GI:  No excessive obesity.  Bowel sounds normal, Soft, +mild diffuse tenderness, No masses, No flank tenderness. No rebound or guarding.  : deferred  Musculoskeletal: No cyanosis, No clubbing. Good range of motion in all major joints. No tenderness to palpation or major deformities noted.   Integument:  Warm, +diaphoretic, No erythema, No rash.  No petechiae.   Neurologic:  Alert & oriented x 3, No focal deficits noted.   Psychiatric:  Affect normal, Judgment normal, Mood normal. Cooperative, denies being actively suicidal         LAB:  All pertinent labs reviewed and interpreted.  Recent Results (from the past 24 hour(s))   ECG 12-LEAD WITH MUSE ([employer])    Collection Time: 02/25/22 11:07 AM   Result Value Ref Range    Systolic Blood Pressure 141 mmHg    Diastolic Blood Pressure 81 mmHg    Ventricular Rate 149 BPM    Atrial Rate 149 BPM    MN Interval 128 ms    QRS Duration 92 ms     ms    QTc 516 ms    P Axis  degrees    R AXIS -2 degrees    T Axis 81 degrees    Interpretation ECG       Sinus tachycardia  Possible Inferior infarct , age undetermined  Cannot rule out Anterior infarct , " age undetermined  T wave abnormality, consider lateral ischemia  Abnormal ECG  When compared with ECG of 06-JUL-2020 12:15,  Vent. rate has increased BY  69 BPM  Borderline criteria for Inferior infarct are now Present  ST now depressed in Lateral leads  T wave inversion now evident in Lateral leads  Confirmed by SEE ED PROVIDER NOTE FOR, ECG INTERPRETATION ([id number]),  GENIA SANCHEZTT ([id number]) on 2/25/2022 11:21:32 AM     CBC (+ platelets, no diff)    Collection Time: 02/25/22 11:15 AM   Result Value Ref Range    WBC Count 13.8 (H) 4.0 - 11.0 10e3/uL    RBC Count 3.97 (L) 4.40 - 5.90 10e6/uL    Hemoglobin 12.9 (L) 13.3 - 17.7 g/dL    Hematocrit 38.0 (L) 40.0 - 53.0 %    MCV 96 78 - 100 fL    MCH 32.5 26.5 - 33.0 pg    MCHC 33.9 31.5 - 36.5 g/dL    RDW 13.6 10.0 - 15.0 %    Platelet Count 90 (L) 150 - 450 10e3/uL   Alcohol level blood    Collection Time: 02/25/22 11:15 AM   Result Value Ref Range    Alcohol, Blood 32 (H) None detected mg/dL   INR    Collection Time: 02/25/22 11:15 AM   Result Value Ref Range    INR 1.33 (H) 0.85 - 1.15   PTT    Collection Time: 02/25/22 11:15 AM   Result Value Ref Range    aPTT 34 22 - 38 Seconds       Lab Results   Component Value Date    ABORH A NEG 05/01/2020           RADIOLOGY:  Reviewed all pertinent imaging. Please see official radiology report.    CT Chest/Abdomen/Pelvis w Contrast    (Results Pending)         EKG:    Performed at: 11:07am    Impression: Sinus rhythm at 149 bpm.  Flipped T waves noted in lead aVR and V1.  Parable 128 ms, QRS 92 ms, QTC 5 and 60 ms.  Nonspecific ST changes compared to July 6, 2020.    I have independently reviewed and interpreted the EKG(s) documented above.        PROCEDURES:  none      I, Nella Tirado, am serving as a scribe to document services personally performed by Dr. Dee Flores based on my observation and the provider's statements to me. I, Dr. Dee Flores MD attest that Nella Tirado is acting in a scribe capacity, has  observed my performance of the services and has documented them in accordance with my direction.        Dee Flores M.D. [facility]  Emergency Medicine and Medical Toxicology  [facility]ly [facility] EMERGENCY ROOM  [address] [address]  [phone]  Dept: [phone]           Dee Flores MD  02/25/22 [id number]

## 2022-02-25 NOTE — ED PROVIDER NOTES
EMERGENCY DEPARTMENT SIGN OUT NOTE        ED COURSE AND MEDICAL DECISION MAKING  Patient was signed out to me by Dr Dee Flores at 11:45 AM.    In brief, Masoud Huddleston is a 36 year old male who initially presented with hematemesis. Patient drinks 1 Liter of vodka daily. Developed hematemesis and black stools on Wednesday (~2 days ago).  Patient then decided to quit drinking alcohol and has not had a drink since Wednesday.    At time of sign out, disposition and labs were pending.    12:13 PM I introduced myself to the patient and reassessed him.   12:26 PM I discussed case with Dr. Dior, GI.  12:43 PM I discussed case with Dr. Monson, hospitalist who accepts patient for admission.    ED Course as of 02/25/22 1334   Fri Feb 25, 2022   1153 Pt with EtOH withdrawal receiving lorazepam, protonix and IVF with hematemesis, pending CT, h/o esophageal varices   1214 Pt back from CT, still somewhat tremulous, repleted potassium and magnesium with labs low, given further ativan 2mg now IV and on monitor with sinus rhythm improved to 104 HR from 150s thus improving with time, will d/w GI (paged) and intensivist, octreotide ordered with folid acid/thiamine, RN aware/updated, COVID19 PCR underway, hemoglobin 12.9, pt did receive the protonix and notes he has had dark stool and vomiting blood without clots since Wednesday   1227 I spoke with Dr Roberto from [facility] who agrees to plan to octreotide and they will see patient in the hospital this afternoon. I spoke with Evan from ICU who says if no active exsanguination / emergent scope situation and not requiring precedex gtt, ok to admit to telemetry. Hospitalist paged   [id number] Pt endorsed to hospitalist Iona to tele         Critical Care time was 65 minutes for this patient excluding procedures.      FINAL IMPRESSION    1. Alcohol withdrawal syndrome without complication (H)    2. Hematemesis with nausea    3. Scleral icterus    4. Acute chest pain    5. Diffuse  abdominal pain    6. Hypokalemia    7. Hypomagnesemia        ED MEDS  Medications   octreotide (sandoSTATIN) 1,250 mcg in D5W 250 mL ( Intravenous Canceled Entry 2/25/22 1217)   potassium chloride 10 mEq in 100 mL sterile water intermittent infusion (premix) (0 mEq Intravenous Stopped 2/25/22 1320)   potassium chloride 10 mEq in 100 mL sterile water intermittent infusion (premix) ( Intravenous Restarted 2/25/22 1320)   0.9% sodium chloride BOLUS (0 mLs Intravenous Stopped 2/25/22 1216)   LORazepam (ATIVAN) injection 1 mg (1 mg Intravenous Given 2/25/22 1123)   LORazepam (ATIVAN) injection 1 mg (1 mg Intravenous Given 2/25/22 1135)   pantoprazole (PROTONIX) IV push injection 80 mg (80 mg Intravenous Given 2/25/22 1140)   octreotide (sandoSTATIN) injection 50 mcg (50 mcg Intravenous Given 2/25/22 1225)   thiamine (B-1) injection 100 mg (100 mg Intravenous Given 2/25/22 1226)   folic acid injection 1 mg (1 mg Intravenous Given 2/25/22 1226)   iopamidol (ISOVUE-370) solution 100 mL (100 mLs Intravenous Given 2/25/22 1203)   magnesium sulfate 2 g in water intermittent infusion (2 g Intravenous New Bag 2/25/22 1224)   LORazepam (ATIVAN) injection 2 mg (2 mg Intravenous Given 2/25/22 1224)       LAB  Labs Ordered and Resulted from Time of ED Arrival to Time of ED Departure   MAGNESIUM - Abnormal       Result Value    Magnesium 1.0 (*)    CBC WITH PLATELETS - Abnormal    WBC Count 13.8 (*)     RBC Count 3.97 (*)     Hemoglobin 12.9 (*)     Hematocrit 38.0 (*)     MCV 96      MCH 32.5      MCHC 33.9      RDW 13.6      Platelet Count 90 (*)    BASIC METABOLIC PANEL - Abnormal    Sodium 137      Potassium 3.0 (*)     Chloride 83 (*)     Carbon Dioxide (CO2) 19 (*)     Anion Gap 35 (*)     Urea Nitrogen 31 (*)     Creatinine 1.12      Calcium 8.6      Glucose 134 (*)     GFR Estimate 87     ETHYL ALCOHOL LEVEL - Abnormal    Alcohol, Blood 32 (*)    INR - Abnormal    INR 1.33 (*)    HEPATIC FUNCTION PANEL - Abnormal    Bilirubin  Total 6.4 (*)     Bilirubin Direct 3.4 (*)     Protein Total 9.1 (*)     Albumin 3.4 (*)     Alkaline Phosphatase 196 (*)      (*)     ALT 28     TROPONIN I - Normal    Troponin I <0.01     PARTIAL THROMBOPLASTIN TIME - Normal    aPTT 34     LIPASE - Normal    Lipase 16     COVID-19 VIRUS (CORONAVIRUS) BY PCR - Normal    SARS CoV2 PCR Negative     TYPE AND SCREEN, ADULT    ABO/RH(D) A NEG      Antibody Screen Negative      SPECIMEN EXPIRATION DATE [id number]     ABO/RH TYPE AND SCREEN           RADIOLOGY    CT Chest/Abdomen/Pelvis w Contrast   Final Result   IMPRESSION:    1.  Cirrhosis, moderate to severe steatosis and moderate hepatomegaly compatible with alcoholic liver disease and portal to caval collateral vein formation as detailed above consistent with portal hypertension.   2.  Cholelithiasis.   3.  Mild bronchial wall thickening throughout both lungs and mid and upper lung predominant faint micronodular peribronchiolar opacities compatible with mild nonspecific chronic active bronchial and bronchiolar inflammation/infection.          DISCHARGE MEDS  New Prescriptions    No medications on file     I, Nella Tirado, am serving as a scribe to document services personally performed by Nella Vargas MD, based on my observations and the provider's statements to me.  I, Nella Vargas MD, attest that Nella Tirado is acting in a scribe capacity, has observed my performance of the services and has documented them in accordance with my direction.       Nella Vargas MD  Emergency Medicine  [facility] EMERGENCY ROOM  [address] [address]  [phone]       Nella Vargas MD  02/25/22 3635

## 2022-02-25 NOTE — CONSULTS
Care Management Initial Consult    General Information  Assessment completed with: Patient,    Type of CM/SW Visit: Initial Assessment    Primary Care Provider verified and updated as needed:     Readmission within the last 30 days: no previous admission in last 30 days      Reason for Consult: discharge planning  Advance Care Planning: Advance Care Planning Reviewed: other (see comments) (Declined Honoring Choices information)          Communication Assessment  Patient's communication style: spoken language (English or Bilingual)    Hearing Difficulty or Deaf: no   Wear Glasses or Blind: no    Cognitive  Cognitive/Neuro/Behavioral: WDL           Mood/Behavior: flat affect, withdrawn          Living Environment:   People in home: alone     Current living Arrangements: apartment      Able to return to prior arrangements: yes  Living Arrangement Comments: Lives alone    Family/Social Support:  Care provided by: self  Provides care for: no one, unable/limited ability to care for self  Marital Status: Single  Parent(s), Sibling(s)          Description of Support System: Supportive    Support Assessment: Adequate family and caregiver support    Current Resources:   Patient receiving home care services: No     Community Resources: Financial/Insurance  Equipment currently used at home: none  Supplies currently used at home: None    Employment/Financial:  Employment Status: employed full-time        Financial Concerns: No concerns identified   Referral to Financial Worker: No       Lifestyle & Psychosocial Needs:  Social Determinants of Health     Tobacco Use: High Risk     Smoking Tobacco Use: Current Every Day Smoker     Smokeless Tobacco Use: Never Used   Alcohol Use: Not on file   Financial Resource Strain: Not on file   Food Insecurity: Not on file   Transportation Needs: Not on file   Physical Activity: Not on file   Stress: Not on file   Social Connections: Not on file   Intimate Partner Violence: Not on file    Depression: At risk     PHQ-2 Score: 6   Housing Stability: Not on file       Functional Status:  Prior to admission patient needed assistance:   Dependent ADLs:: Independent  Dependent IADLs:: Transportation  Assesssment of Functional Status: Not at  functional baseline, Not at baseline with mobility, Not at baseline with ADL Functioning    Mental Health Status:          Chemical Dependency Status:                Values/Beliefs:  Spiritual, Cultural Beliefs, Alevism Practices, Values that affect care:                 Additional Information:  Alert, oriented patient with slight word-finding delay, lives alone in an apartment. History of ETOH use disorder, unable to say last time he was in treatment. Per chart, patient had an inpatient hospital stay at [facility] on 05/16/2021. States is independent with I/ADLs except transportation. Has a  he talks with but unable to provide name, agency, or further information. Declined Honoring Choices information. Will have Unit Social Work CM follow patient. Mother Millie will transport patient on discharge.    ZOE ADDISON RN/CM

## 2022-02-25 NOTE — ED TRIAGE NOTES
Pt presents to ER with his father reporting bloody emesis. Pt drinks one liter of vodka daily. Pt reports last drink was Wednesday evening.

## 2022-02-25 NOTE — PLAN OF CARE
Arrived to [facility] around 1450 - triggered sepsis protocol upon arrival, lactic acid drawn by lab.  BP stable, tachycardic with HR's in the 110-120 range, maintaining sats on room air, RR = 14/min. Vomited 50 ml of brown mucous-like emesis upon arrival. Assisted up to commode to void. Urine orange in color. No current orders in place. Dr. Monson paged to inform of patient's arrival to see patient and place orders.

## 2022-02-25 NOTE — PROVIDER NOTIFICATION
"Provider Notified @ 1543: \"Critical Lactic- 5.9 Please advise.\"    @ 1600: Repeat page. Critical lactic- 5.9. Please advise.     @1607: Repeat page to lead hospitalist. Critical lactic- 5.9. Please advise.       Provider placed new orders for labs, bolus with maintenance fluid, magnesium replacement, and lactulose. Continue to monitor.   "

## 2022-02-26 NOTE — PLAN OF CARE
Problem: Plan of Care - These are the overarching goals to be used throughout the patient stay.    Goal: Plan of Care Review/Shift Note  Description: The Plan of Care Review/Shift note should be completed every shift.  The Outcome Evaluation is a brief statement about your assessment that the patient is improving, declining, or no change.  This information will be displayed automatically on your shift note.  Outcome: Ongoing, Progressing     On CIWA protocol. CIWA score 11 @ 0900. Provided Lorazepam IV to manage symptoms. Provided Compazine for nausea and vomiting this morning. Patient cooperative. Last CIWA was 4 @ 1400. Potasium 3.5. Potassium protocol initiated. Four bags of IV potassium provided to patient. Magnesium 4.0. Recheck value tomorrow. Hgb 8.2. Next recheck tonight at 1800. Multivitamin's given IV due to nausea. NS bolus provided per MD this morning. Octreotide infusing.Using urinal. Currrently NPO, but allowed ice chips. EGD procedure completed today.

## 2022-02-26 NOTE — PROGRESS NOTES
Clonidine held for soft Bps - resume when appropriate     Low platelet count - 33 thou, part of Chronic liver disease

## 2022-02-26 NOTE — ANESTHESIA PREPROCEDURE EVALUATION
Anesthesia Pre-Procedure Evaluation    Patient: Masoud Huddleston   MRN: [id number] : 1985        Procedure : Procedure(s):  ESOPHAGOGASTRODUODENOSCOPY (EGD)          Past Medical History:   Diagnosis Date     Acute alcoholic intoxication in alcoholism with complication (H)      Acute metabolic encephalopathy      Alcohol abuse      Alcoholic cirrhosis of liver without ascites (H)      Alcoholic ketoacidosis 2019     Chronic acquired lymphedema      Cirrhosis of liver (H)      Depression      ED (erectile dysfunction)      Elevated liver enzymes 2018     Esophageal varices without bleeding (H)      GI (gastrointestinal bleed)     hematemesis     Hematemesis 2018    Added automatically from request for surgery [id number]     History of transfusion      Hypokalemia 2019     Hypomagnesemia 2018     Liver disease      Aiyana-Vizcaino tear 2018     Nausea with vomiting      Neuropathy      Right patella fracture      Seizures (H)     withdrawal     Substance abuse (H)      Thrombocytopenia (H)       Past Surgical History:   Procedure Laterality Date     ESOPHAGOSCOPY, GASTROSCOPY, DUODENOSCOPY (EGD), COMBINED N/A 2018    Procedure: ESOPHAGOGASTRODUODENOSCOPY (EGD);  Surgeon: Lora Valencia MD;  Location: [facility];  Service:      HC OPEN RX PATELLA FX Right 2018    Procedure: OPEN REDUCTION INTERNAL FIXATION, FRACTURE, PATELLA;  Surgeon: Bertram Beltran MD;  Location: [facility] OR;  Service: Orthopedics     KNEE SURGERY Right 2018     [place] ESOPHAGOGASTRODUODENOSCOPY TRANSORAL DIAGNOSTIC N/A 5/3/2020    Procedure: ESOPHAGOGASTRODUODENOSCOPY (EGD);  Surgeon: Ricardo Barbosa MD;  Location: [facility] OR;  Service: Gastroenterology      PARACENTESIS  2020      PARACENTESIS  2020      No Known Allergies   Social History     Tobacco Use     Smoking status: Current Every Day Smoker     Packs/day: 1.00     Types: Cigarettes     Smokeless tobacco: Never  Used   Substance Use Topics     Alcohol use: Yes     Comment: a liter of Vodka everyday      Wt Readings from Last 1 Encounters:   02/26/22 65.5 kg (144 lb 4.8 oz)        Anesthesia Evaluation   Pt has had prior anesthetic. Type: MAC.    No history of anesthetic complications       ROS/MED HX  ENT/Pulmonary:  - neg pulmonary ROS     Neurologic:  - neg neurologic ROS     Cardiovascular:  - neg cardiovascular ROS     METS/Exercise Tolerance:     Hematologic:     (+) anemia,     Musculoskeletal:  - neg musculoskeletal ROS     GI/Hepatic:     (+) hepatitis type Alcoholic, liver disease,     Renal/Genitourinary:  - neg Renal ROS     Endo:  - neg endo ROS     Psychiatric/Substance Use:     (+) alcohol abuse     Infectious Disease:  - neg infectious disease ROS     Malignancy:  - neg malignancy ROS     Other:            Physical Exam    Airway  airway exam normal      Mallampati: II   TM distance: > 3 FB   Neck ROM: full     Respiratory Devices and Support         Dental       (+) missing      Cardiovascular   cardiovascular exam normal          Pulmonary   pulmonary exam normal                OUTSIDE LABS:  CBC:   Lab Results   Component Value Date    WBC 6.9 02/26/2022    WBC 13.8 (H) 02/25/2022    HGB 8.2 (L) 02/26/2022    HGB 8.9 (L) 02/26/2022    HCT 27.7 (L) 02/26/2022    HCT 38.0 (L) 02/25/2022    PLT 39 (LL) 02/26/2022    PLT 90 (L) 02/25/2022     BMP:   Lab Results   Component Value Date     (L) 02/26/2022     02/25/2022    POTASSIUM 3.4 (L) 02/26/2022    POTASSIUM 3.5 02/26/2022    CHLORIDE 92 (L) 02/26/2022    CHLORIDE 83 (L) 02/25/2022    CO2 29 02/26/2022    CO2 19 (L) 02/25/2022    BUN 27 (H) 02/26/2022    BUN 31 (H) 02/25/2022    CR 0.75 02/26/2022    CR 1.12 02/25/2022     (H) 02/26/2022     (H) 02/25/2022     COAGS:   Lab Results   Component Value Date    PTT 34 02/25/2022    INR 1.33 (H) 02/25/2022     POC: No results found for: BGM, HCG, HCGS  HEPATIC:   Lab Results   Component  Value Date    ALBUMIN 2.5 (L) 02/26/2022    PROTTOTAL 6.4 02/26/2022    ALT 17 02/26/2022    AST 83 (H) 02/26/2022     (H) 11/25/2020    ALKPHOS 130 (H) 02/26/2022    BILITOTAL 5.6 (H) 02/26/2022    SHIMON 38 (H) 01/12/2020     OTHER:   Lab Results   Component Value Date    LACT 3.0 (H) 02/25/2022    FÉLIX 7.0 (L) 02/26/2022    PHOS 3.5 07/06/2020    MAG 4.0 (H) 02/26/2022    LIPASE 16 02/25/2022    AMYLASE 83 07/05/2020    TSH 1.79 11/25/2020    CRP 2.9 (H) 01/14/2020       Anesthesia Plan    ASA Status:  3, emergent       Anesthesia Type: MAC.              Consents    Anesthesia Plan(s) and associated risks, benefits, and realistic alternatives discussed. Questions answered and patient/representative(s) expressed understanding.    - Discussed:     - Discussed with:  Patient         Postoperative Care       PONV prophylaxis: Ondansetron (or other 5HT-3)     Comments:    Other Comments: Spoke with patient, no longer having nausea at this time.  Will discuss with Dr. Michael regarding plan.   Consented for both Mac and GA.  Pt voices agreement and understanding.             REBEKAH WOLF MD

## 2022-02-26 NOTE — ANESTHESIA PROCEDURE NOTES
Airway       Patient location during procedure: OR       Procedure Start/Stop Times: 2/26/2022 2:26 PM  Staff -        Anesthesiologist:  Courtney Salomon MD       CRNA: Jessi Ballard APRN CRNA       Performed By: CRNA  Consent for Airway        Urgency: emergent  Indications and Patient Condition       Indications for airway management: elie-procedural and airway protection       Induction type:intravenous       Mask difficulty assessment: 0 - not attempted    Final Airway Details       Final airway type: endotracheal airway       Successful airway: ETT - single  Endotracheal Airway Details        ETT size (mm): 7.5       Cuffed: yes       Successful intubation technique: direct laryngoscopy       DL Blade Type: Shepherd 2       Grade View of Cords: 1       Adjucts: stylet and tooth guard       Position: Right       Measured from: lips       Secured at (cm): 23       Bite block used: None    Post intubation assessment        Placement verified by: capnometry, equal breath sounds and chest rise        Number of attempts at approach: 1       Number of other approaches attempted: 0       Secured with: silk tape       Ease of procedure: easy       Dentition: Intact

## 2022-02-26 NOTE — PROVIDER NOTIFICATION
Spoke with Dr Fraire in regards to crititcal high magnesium of 6.1. Patient had just finished a magnesium replacement IV thirty minutes prior to the lab draw. Dr Fraire will recheck the magnesium at 0300, order placed by Dr Fraire.

## 2022-02-26 NOTE — H&P
ENDOSCOPY PRE-PROCEDURE HISTORY & PHYSICAL    CHIEF COMPLAINT / REASON FOR PROCEDURE:  Hematemesis    PERTINENT HISTORY   Past Medical History:   Diagnosis Date     Acute alcoholic intoxication in alcoholism with complication (H)      Acute metabolic encephalopathy      Alcohol abuse      Alcoholic cirrhosis of liver without ascites (H)      Alcoholic ketoacidosis 9/19/2019     Chronic acquired lymphedema      Cirrhosis of liver (H)      Depression      ED (erectile dysfunction)      Elevated liver enzymes 4/27/2018     Esophageal varices without bleeding (H)      GI (gastrointestinal bleed)     hematemesis     Hematemesis 4/25/2018    Added automatically from request for surgery [id number]     History of transfusion      Hypokalemia 9/19/2019     Hypomagnesemia 4/27/2018     Liver disease      Aiyana-Vizcaino tear 4/27/2018     Nausea with vomiting      Neuropathy      Right patella fracture      Seizures (H)     withdrawal     Substance abuse (H)      Thrombocytopenia (H)      Past Surgical History:   Procedure Laterality Date     ESOPHAGOSCOPY, GASTROSCOPY, DUODENOSCOPY (EGD), COMBINED N/A 4/25/2018    Procedure: ESOPHAGOGASTRODUODENOSCOPY (EGD);  Surgeon: Lora Valencia MD;  Location: [facility] GI;  Service:      HC OPEN RX PATELLA FX Right 4/28/2018    Procedure: OPEN REDUCTION INTERNAL FIXATION, FRACTURE, PATELLA;  Surgeon: Bertram Beltran MD;  Location: [facility] OR;  Service: Orthopedics     KNEE SURGERY Right 2018     [place] ESOPHAGOGASTRODUODENOSCOPY TRANSORAL DIAGNOSTIC N/A 5/3/2020    Procedure: ESOPHAGOGASTRODUODENOSCOPY (EGD);  Surgeon: Ricardo Barbosa MD;  Location: [facility] OR;  Service: Gastroenterology      PARACENTESIS  1/16/2020      PARACENTESIS  1/23/2020     Other:  None  Bleeding tendencies:  None    Relevant Family History:  None    Relevant Social History:  None    A relevant Review of Systems was performed and was negative. .    ALLERGIES/SENSITIVITIES: No Known Allergies  "    CURRENT MEDICATIONS:     Medications Prior to Admission   Medication Sig Dispense Refill Last Dose     furosemide (LASIX) 40 MG tablet TAKE 1 TABLET BY MOUTH TWICE A DAY (09:00AM & 06:00PM) 60 tablet 1 Past Week at Unknown time     mirtazapine (REMERON) 15 MG tablet Take 1 tablet (15 mg) by mouth At Bedtime 30 tablet 0 Past Week at Unknown time     gabapentin (NEURONTIN) 300 MG capsule Take 1 capsule (300 mg) by mouth 3 times daily (Patient not taking: Reported on 2/25/2022) 90 capsule 0 Not Taking at Unknown time     thiamine (B-1) 100 MG tablet Take 1 tablet (100 mg) by mouth daily (Patient not taking: Reported on 2/25/2022) 30 tablet 0 Not Taking at Unknown time           LABORATORY: Hgb 8.2. K 3.4. Plt 39.     RELEVANT EXAM:     /58 (BP Location: Left arm, Cuff Size: Adult Regular)   Pulse 90   Temp 97.8  F (36.6  C) (Oral)   Resp 12   Ht 1.803 m (5' 11\")   Wt 65.5 kg (144 lb 4.8 oz)   SpO2 95%   BMI 20.13 kg/m     Lung Exam:   Normal  Heart Exam:  Normal  Airway Exam:  Normal  Mental Status:  Patient understands indications, risks and benefits and wishes to proceed with EGD.  Mallampati:II, blood tinged mouth.   Previous reaction to anesthesia/sedation:   None  Sedation plan based on assessment:  Moderate  Comment(s):  None    Consent signed by:  The patient.     ASA Classification:  3    IMPRESSION:      Decompensated alcohol cirrhosis with hematemesis.     PLAN:      EGD    Moises Michael DO  [facility]  [phone]    "

## 2022-02-26 NOTE — PROGRESS NOTES
"CLINICAL NUTRITION SERVICES - ASSESSMENT NOTE     Nutrition Prescription    RECOMMENDATIONS FOR MDs/PROVIDERS TO ORDER:  Recommend thiamine, MVI, and folic acid per alcohol withdrawal protocols   When diet advances rec:  Ensure Clear tid if on CL  Ensure Enlive tid if on FL or greater     Malnutrition Status:    % Weight Loss:  > 5% in 1 month (severe malnutrition)  % Intake:  </= 75% for >/= 1 month (severe malnutrition)  Subcutaneous Fat Loss:  Orbital region moderate depletion  Muscle Loss:  None observed  Fluid Retention:  None noted    Malnutrition Diagnosis: Severe malnutrition  In Context of:  Chronic illness or disease      Recommendations  by Registered Dietitian (RD):  Ensure Clear tid if on CL  Ensure Enlive tid if on FL or greater     Future/Additional Recommendations:  None at this time      REASON FOR ASSESSMENT  Masoud Huddleston is a/an 36 year old male assessed by the dietitian for Admission Nutrition Risk Screen for wt loss, reduced po       Pt admitted with GI Bleed/hemetemissi, alcohol abuse, cirrhosis, esophageal varices     NUTRITION HISTORY  NKFA  He states appetite has been poor for approx a month   Was mainly drinking and not eating   Last solid food was last Wednesday     CURRENT NUTRITION ORDERS  Diet: NPO for procedure/surgery       LABS  Labs reviewed, Na-132, BUN-27, Ca-7.0, Mg-4.0    MEDICATIONS  Medications reviewed, lactulose, thiamine    ANTHROPOMETRICS  Height: 180.3 cm (5' 11\")  Most Recent Weight: 65.5 kg (144 lb 4.8 oz)    IBW: 78 kg  BMI: Normal BMI  Weight History:   Wt Readings from Last 5 Encounters:   02/26/22 65.5 kg (144 lb 4.8 oz)   01/04/21 84.2 kg (185 lb 11.2 oz)   12/01/20 76.7 kg (169 lb 1.6 oz)   05/04/20 88 kg (194 lb 1.6 oz)   05/01/20 92.9 kg (204 lb 14.4 oz)   pt is down 60# in the past year and 41# in the past 6 weeks (22%)     Dosing Weight: 65 kg    ASSESSED NUTRITION NEEDS  Estimated Energy Needs: 9546-0840 kcals/day (30 - 35 kcals/kg )  Justification: " Repletion and Underweight  Estimated Protein Needs: 65-78grams protein/day (1 - 1.2 grams of pro/kg)  Justification: Maintenance  Estimated Fluid Needs: 0136-7732 mL/day (25 - 30 mL/kg)   Justification: Maintenance    PHYSICAL FINDINGS  See malnutrition section below.  Per flowsheet:   Edema-none charted  GI-nausea, emesis, no BM noted   Skin-none noted       MALNUTRITION:  % Weight Loss:  > 5% in 1 month (severe malnutrition)  % Intake:  </= 75% for >/= 1 month (severe malnutrition)  Subcutaneous Fat Loss:  Orbital region moderate depletion  Muscle Loss:  None observed  Fluid Retention:  None noted    Malnutrition Diagnosis: Severe malnutrition  In Context of:  Chronic illness or disease    NUTRITION DIAGNOSIS  Malnutrition related to chronic as evidenced by severe wt loss, reduced po, fat loss      INTERVENTIONS  Implementation  When diet advances recommend:  Ensure Clear tid if on CL  Ensure Enlive tid if on FL or greater     Also recommend thiamine, folic acid, and MVI supplement per alcohol withdrawal protocol    Goals  Diet advancement vs nutrition support within 2-3 days.  Patient to consume % of nutritionally adequate meals three times per day, or the equivalent with supplements/snacks.  Promote appropriate wt gain      Monitoring/Evaluation  Progress toward goals will be monitored and evaluated per protocol.

## 2022-02-26 NOTE — PROGRESS NOTES
[facility] MEDICINE PROGRESS NOTE      Length of stay: Day # 1    Identification/Summary: Masoud Huddleston is a 36 year old male with a past medical history of alcoholic cirrhosis, esophageal varices, ongoing alcoholic dependency with relapse and drinking alcohol is almost a year ago and has been drinking 1 L of both daily.  who was admitted with chief complaint of nausea and vomiting and hematemesis on 2/25/2022.    Admitting impression of hematemesis    Brief history: As above.  Admitted for hematemesis.  Had multiple episodes of vomiting.  CT of the abdomen and pelvis showed cirrhosis, moderate liver steatosis and moderate hepatomegaly compatible with alcohol liver disease and portocaval collateral vein formation consistent with portal hypertension.    ER course: Admitted and for hemoglobin to be monitored and to keep it above 7-8.  GI was consulted.  Placed on IV Protonix.  Octreotide infusion.  Placed on [facility] protocol    Assessment and Plan:    Nausea vomiting and hematemesis secondary to alcohol cirrhosis with portal hypertension  Likely variceal bleed  Nosebleed  Marginal blood pressure  Follow hemoglobin and transfuse accordingly  Placed on [facility] protocol.  GI consulted: Recommended iron replacement, ENT evaluation if nosebleed recurs, ongoing strict alcohol cessation, recommended follow-up at the hepatology clinic, favor outpatient EGD and colonoscopy in 4 weeks  IV Protonix, octreotide infusion  If blood pressure is low, will give bolus of NS    Alcohol dependence  History of withdrawal seizures in the past  [facility] protocol - talked to the RN and they are about to reassess his [facility]  Outpatient follow-up and treatment   consult for Candida.  Patient has been committed in the past the patient said he is willing to go to treatment willingly.    Hypokalemia, replaced    Hepatic steatosis seen on imaging    Thrombocytopenia secondary to chronic alcoholic liver  disease  Avoid any antiplatelets  This predisposes him to more bleeding    CT showing mild bronchial wall thickening throughout both lungs and mid and upper lung  Treat symptomatically.  Oxygen as needed    History of depression  Review and resume psych meds if any  Consider psych consult if indicated    History of MW tear  Continue PPI    Past Medical History:   Diagnosis Date     Acute alcoholic intoxication in alcoholism with complication (H)      Acute metabolic encephalopathy      Alcohol abuse      Alcoholic cirrhosis of liver without ascites (H)      Alcoholic ketoacidosis 9/19/2019     Chronic acquired lymphedema      Cirrhosis of liver (H)      Depression      ED (erectile dysfunction)      Elevated liver enzymes 4/27/2018     Esophageal varices without bleeding (H)      GI (gastrointestinal bleed)     hematemesis     Hematemesis 4/25/2018    Added automatically from request for surgery [id number]     History of transfusion      Hypokalemia 9/19/2019     Hypomagnesemia 4/27/2018     Liver disease      Aiyana-Vizcaino tear 4/27/2018     Nausea with vomiting      Neuropathy      Right patella fracture      Seizures (H)     withdrawal     Substance abuse (H)      Thrombocytopenia (H)          Consults for this case:  Gastroenterology        --------------------------------------------------------------------    Discharge disposition: To be determined  Living situation: --  Diet: Clear Liquid Diet--> GI wanted NPO  DVT Prophylaxis: No anticoagulant in the light of ongoing risk for bleed.  Mechanical DVT prophylaxis preferred  Code Status: Full Code    ----------------------------------------------------------------------    Cumulative essential/pertinent data reviewed:  Labs:  Sodium 132  Potassium low, replaced  BUN and creatinine are normal  LFTs within normal limits except for AST slightly elevated and glucose in the 130s  CBC shows hemoglobin dropping from 12.9 down to 9.2 and being followed  serially.  Platelets in the 30s  Drug screen positive for cannabis  Blood alcohol level is 32  Troponin negative x 1  lactic     Imaging:  CT of the chest abdomen and pelvis:  IMPRESSION:   1.  Cirrhosis, moderate to severe steatosis and moderate hepatomegaly compatible with alcoholic liver disease and portal to caval collateral vein formation as detailed above consistent with portal hypertension.  2.  Cholelithiasis.  3.  Mild bronchial wall thickening throughout both lungs and mid and upper lung predominant faint micronodular peribronchiolar opacities compatible with mild nonspecific chronic active bronchial and bronchiolar inflammation/infection.    EKG: --    ----------------------------------------------------------------------    Interval History/Subjective:  Father is present in the room.  Patient is looking comfortable not in distress.  Patient has had treatment in the past inpatient.  He was committed by the court in the past.  He said that he is willing to go through treatment voluntarily.  Denies any chest pain or shortness of breath.  No significant melena or hematochezia or hematemesis at this time.  No abdominal pain.  He is on clear liquids.  Rest of review of systems unremarkable.    Physical Exam/Objective:  Temp:  [97.2  F (36.2  C)-98.4  F (36.9  C)] 98  F (36.7  C)  Pulse:  [] 89  Resp:  [14-18] 16  BP: ()/(52-81) 89/55  Cuff Mean (mmHg):  [90] 90  SpO2:  [80 %-100 %] 96 %    Body mass index is 20.13 kg/m .    GENERAL:   Awake and coherent and conversant and pleasant;  appears comfortable, in no acute distress, there are acne lesions on his face   HEAD:  Normocephalic, without obvious abnormality   EYES:  Grossly normal;   NOSE: Grossly normal   THROAT: Lips and mouth external: grossly normal,    NECK: No mass noted; no stiffness   BACK:      LUNGS:   Auscultation: Negative for wheezing; No crackles, symmetric chest rise on inhalation, respirations unlabored    CHEST WALL:  No  tenderness or deformity   HEART:  Regular rate and rhythm, significant murmurs: Negative,   ABDOMEN:   Soft, non-tender, no masses, no peritoneal signs   EXTREMITIES:   No tenderness of the calves.  No significant ankle edema   SKIN:  No hematoma.  Some abrasions and acne present in different parts of his body see extremities   NEURO: no signs of acute stroke or change from prior state   PSYCH: Behavior is appropriate     Medications:   Personally Reviewed.    [Held by provider] cloNIDine  0.1 mg Oral Q8H     lactulose  10 g Oral Daily     pantoprazole (PROTONIX) IV  40 mg Intravenous Daily with breakfast     sodium chloride (PF)  3 mL Intracatheter Q8H     IV Fluid with vitamins   Intravenous Q24H     Current Facility-Administered Medications   Medication Dose Route Frequency     acetaminophen  650 mg Oral Q6H PRN    Or     acetaminophen  650 mg Rectal Q6H PRN     flumazenil  0.2 mg Intravenous q1 min prn     OLANZapine zydis  5-10 mg Oral Q6H PRN    Or     haloperidol lactate  2.5-5 mg Intravenous Q6H PRN     lidocaine 4%   Topical Q1H PRN     lidocaine (buffered or not buffered)  0.1-1 mL Other Q1H PRN     LORazepam  1-2 mg Oral Q30 Min PRN    Or     LORazepam  1-2 mg Intravenous Q30 Min PRN     melatonin  1 mg Oral At Bedtime PRN     melatonin  5 mg Oral QPM PRN     prochlorperazine  10 mg Intravenous Q6H PRN    Or     prochlorperazine  10 mg Oral Q6H PRN    Or     prochlorperazine  25 mg Rectal Q12H PRN     sodium chloride (PF)  3 mL Intracatheter q1 min prn           Francisco Rowland MD  [facility]  [facility]  [facility]  Phone: #[phone]

## 2022-02-26 NOTE — PROGRESS NOTES
"GASTROENTEROLOGY PROGRESS NOTE     SUBJECTIVE:  Had intermittent nausea, vomiting documented as brown on evening shift. No vomiting overnight. Had one black emesis around 8:30am. No stools. Denies abdominal pain. Ongoing nausea. Conversant and oriented on my exam. Slightly tremulous.      OBJECTIVE:    /56 (BP Location: Left arm, Cuff Size: Adult Small)   Pulse 91   Temp 97.8  F (36.6  C) (Oral)   Resp 12   Ht 1.803 m (5' 11\")   Wt 65.5 kg (144 lb 4.8 oz)   SpO2 95%   BMI 20.13 kg/m    Temp (24hrs), Av  F (36.7  C), Min:97.2  F (36.2  C), Max:98.4  F (36.9  C)    Patient Vitals for the past 72 hrs:   Weight   22 0353 65.5 kg (144 lb 4.8 oz)   22 1110 65 kg (143 lb 4.8 oz)       Intake/Output Summary (Last 24 hours) at 2022 1004  Last data filed at 2022 0900  Gross per 24 hour   Intake 1646 ml   Output 900 ml   Net 746 ml        PHYSICAL EXAM:  Gen: alert, oriented, NAD, +jaundice/scleral icterus  Abd: soft, ND/NT, +BS  Ext: no edema.  Neuro: no asterixis. Slightly tremulous.     Additional Comments:  ROS, FH, SH: See initial GI consult for details.     I have reviewed the patient's new clinical lab results:     Recent Labs   Lab Test 22  0908 22  0503 22  0007 22  1654 22  1115 21  0834 20  0808 20  0808 20  0921   WBC  --  6.9  --   --  13.8* 7.3 4.7   < > 3.6*   HGB 8.9* 9.2* 9.6*   < > 12.9* 16.1 14.3   < > 13.4*   MCV  --  97  --   --  96 84 89   < > 85   PLT  --  39*  --   --  90* 113* 135*   < > 41*   INR  --   --   --   --  1.33*  --  1.21*  --  1.27*    < > = values in this interval not displayed.     Recent Labs   Lab Test 22  0908 22  0503 22  0007 22  1115 21  0834   POTASSIUM 3.4* 3.5 2.7* 3.0* 3.8   CHLORIDE  --  92*  --  83* 105   CO2  --  29  --  19* 29   BUN  --  27*  --  31* 12   ANIONGAP  --  11  --  35* 8     Recent Labs   Lab Test 22  0503 22  1115 21  0834 " 12/20/20  0808 12/15/20  1718 12/15/20  1304 11/24/20  2236 07/10/20  0751 07/05/20  0728 05/01/20  0857 02/28/20  0954 01/09/20  0542 01/08/20  1644 01/06/20  0638 01/05/20  2106   ALBUMIN 2.5* 3.4* 4.1   < >  --  3.8   < > 2.9* 2.6*   < > 2.4*   < >  --    < >  --    BILITOTAL 5.6* 6.4* 0.9   < >  --  0.8   < > 5.1* 3.8*   < > 1.5*   < >  --    < >  --    ALT 17 28 48   < >  --  104*   < > 38 54*   < > 30   < >  --    < >  --    AST 83* 124* 79*   < >  --  181*   < > 99* 141*   < > 52*   < >  --    < >  --    PROTEIN  --   --   --   --  30 mg/dL*  --   --   --   --   --   --   --  Negative  --  30 mg/dL*   LIPASE  --  16  --   --   --  55*  --  73* 77*   < > 71*   < >  --   --   --    AMYLASE  --   --   --   --   --   --   --   --  83  --  85  --   --   --   --     < > = values in this interval not displayed.      CT chest/abdomen/pelvis 2/25/22:  IMPRESSION:   1.  Cirrhosis, moderate to severe steatosis and moderate hepatomegaly compatible with alcoholic liver disease and portal to caval collateral vein formation as detailed above consistent with portal hypertension.  2.  Cholelithiasis.  3.  Mild bronchial wall thickening throughout both lungs and mid and upper lung predominant faint micronodular peribronchiolar opacities compatible with mild nonspecific chronic active bronchial and bronchiolar inflammation/infection.     Procedure results:  EGD 5/3/20 ([place]):  FINDINGS / CONCLUSIONS:    Esophagus: Three columns of varices distal esophagus, few punctate red markings but varices flatten with insufflation).    Stomach: Portal hypertensive gastropathy.    Duodenum: Small amount of heme in duodenal bulb but no lesion seen post irrigation (multiple passes of scope performed across this region).       IMPRESSION:  1. Small distal esophageal varices (few punctate red markings but varices flatten with insufflation)  2. Portal hypertensive gastropathy  3. Small amount of heme duodenal bulb but no lesion seen  post-irrigation.      Assessment/Plan:  This is a 37 y/o male with PMH alcoholic cirrhosis with esophageal varices with ongoing alcohol use, alcohol withdrawal, seizure admitted 2/25 for hematemesis and melena. Reportedly started having hematemesis 2/23 and stopped drinking since, now with alcohol withdrawal. Hemoglobin 12.9 on admit, down from 14-16 range in the past.     1. Hematemesis. He has known varices so bleeding could be variceal vs Aiyana Vizcaino tear from vomiting, portal hypertensive gastropathy/PUD, esophagitis. EGD was deferred yesterday due to active ETOH withdrawal. On octreotide, PPI drip. Bloody emesis had resolved last evening, but now with black emesis this morning (possibly old blood). Has been having sips of clear liquids. No further melena since admit. CIWA scores down, 2-4 this am. HGB has dmitri drifting down since admission. Last night 10.8-->9.6-->8.9 this am.   --NPO.   --Continue octreotide/PPI drip.   --Will add ceftriaxone 1g q 24 hrs for SBP prophylaxis.  --Monitor HGB and transfuse prn.   --Monitor emesis/stools for bleeding.  --Will need eventual EGD. Need to discuss with GI attending.    2. ETOH cirrhosis. Last ETOH intake 2/23. No history of ascites or encephalopathy. CT without ascites this admit. MELD-na 18. Associated thrombocytopenia - platelets declining since admission, 90-->39K. Total bili 6.4 on admit and improving. AST trending down.   --Thiamine/folate.  --Trend LFTs/bili/INR.   --Will need outpatient liver clinic follow up. Our office will arrange on discharge.    3. ETOH withdrawal.   --CIWA.  --Chem dep consult when appropriate.    Araceli Garcia, [facility] ([facility])        Addendum: Spoke with Dr. Michael. Plan for EGD this afternoon. Updated nursing staff.

## 2022-02-26 NOTE — H&P
Assessment            GI bleed/ hemetemesis     Alcohol w/d : on CIWA protocol    Chronic alcohol dependece    Hypokalemia, mild    hypomagnsemia    Lactic acidosis : unlikely sepsis, related to CLD and dehydration, iv fluid resuscitation    Abnormal LFT's    Leucocytosis    Anemia    thrombcytopenia     Alcoholic cirrhosis : on lasix 40 mg bid    Anxiety/Depression : on remeron 15 mg at bedtime, gabapentin 300 mg tid    Prolonged QTc : monitor, avoid QTc prolonging medications.            Disposition/Plan :      Masoud Huddleston is a 36 year old male admitted on 2/25/2022.  He has  h/o chronic alcohol dependency and abuse.  He has been admitted with hemetemesis. Will monitor hb and transfuse prn to keep hb > 7-8. Consult GI, on iv protonix, octreotide infusion. Will keep him on CIWA protocol.        CODE STATUS DISCUSSED  : Patient has opted for  Full    Code status.        ANAPHYLAXIS  TO ANY DRUGS :  NKDA                        REVIEWED :             Physical Exam:          HEENT : no distended veins, no lymphadenopathy, thyroid is normal  LUNGS : b/l air entry present, no significant crackles/wheezing.  ABDOMEN : soft, non-tender, non-distended, BS present.  HEART :  Regular rate & rhythm, S1 & S2 normal, no murmur, clicks/rubs, no ankle edema,  NEURO : conscious, oriented, responds to commands, no obvious focal deficit.  MUSCULOSKELETAL : EXTREMITIES :  no calf tenderness.  SKIN : no rashes  BACK : wnl          DETAILED H & P is Pending at this time      DR. ABDULAZIZ CESPEDES  HOSPITALIST  [facility]

## 2022-02-26 NOTE — PROVIDER NOTIFICATION
"Provider notified: \"Lactic trending down. 4.1.\"     Provider notified: \"Previous platelet from 1100 was 90,000. Currently 45,000 from last hgb. Would you like to order this? Please advise.\"      Provider repeated lactic acid. Not concern for platelets at the moment. Continue to monitor.   "

## 2022-02-26 NOTE — PLAN OF CARE
Problem: Alcohol Withdrawal  Goal: Alcohol Withdrawal Symptom Control  Outcome: Ongoing, Progressing     Problem: Acute Neurologic Deterioration (Alcohol Withdrawal)  Goal: Optimal Neurologic Function  Outcome: Ongoing, Progressing   Goal Outcome Evaluation:    Pt lethargic and disoriented to time. Orientation improving through shift. Having intermittent N/V; brown emesis. PRN compazine given. On Mg and K protocol, replaced via IV; rechecks in am. Scoring on CIWA protocol; pleasant, compliant and non aggressive. Up with assist of 2 to bedside commode. Had 1 occurrence of black loose bm. Bed alarms on for safety.

## 2022-02-26 NOTE — PLAN OF CARE
Problem: Alcohol Withdrawal  Goal: Alcohol Withdrawal Symptom Control  Outcome: Ongoing, Progressing     CIWA scoring 5 then 11. Reports nausea. Frequent coughing and dry heaving at times. Critical K at start of shift. K replaced IV. Recheck scheduled for 1 hour after last dose. Continues on Tele. NS with prolonged QT. Continues on Sandostatin IV. Continues on IVF. Tolerating clear liquid diet. Bed alarms on for safety.

## 2022-02-26 NOTE — ANESTHESIA POSTPROCEDURE EVALUATION
Patient: Masoud Huddleston    Procedure: Procedure(s):  ESOPHAGOGASTRODUODENOSCOPY (EGD) WITH ESOPHAGEAL VARICEAL BANDING       Anesthesia Type:  MAC    Note:  Disposition: Inpatient   Postop Pain Control: Uneventful            Sign Out: Well controlled pain   PONV: No   Neuro/Psych: Uneventful            Sign Out: Acceptable/Baseline neuro status   Airway/Respiratory: Uneventful            Sign Out: AIRWAY IN SITU/Resp. Support   CV/Hemodynamics: Uneventful            Sign Out: Acceptable CV status; No obvious hypovolemia; No obvious fluid overload   Other NRE: NONE   DID A NON-ROUTINE EVENT OCCUR? No           Last vitals:  Vitals Value Taken Time   BP 99/58 02/26/22 1530   Temp 36.9  C (98.4  F) 02/26/22 1520   Pulse 76 02/26/22 1530   Resp 18 02/26/22 1530   SpO2 95 % 02/26/22 1530   Vitals shown include unvalidated device data.    Electronically Signed By: REBEKAH WOLF MD  February 26, 2022  3:31 PM

## 2022-02-26 NOTE — PROVIDER NOTIFICATION
"Pt stated this is the same pain he always has. When asked what he does to make it feel better, he stated \"I drink\". This isn't new pain, per pt from procedure today.   "

## 2022-02-26 NOTE — OR NURSING
"Pt stated he was having some pain in his sternal area, and stated that is the same pain he always has. When asked what he does to help it, he stated \"I drink\". The pain was no different in PACU than what he has at home, so pt denied need for any further interventions.   "

## 2022-02-26 NOTE — ANESTHESIA CARE TRANSFER NOTE
Patient: Masoud Huddleston    Procedure: Procedure(s):  ESOPHAGOGASTRODUODENOSCOPY (EGD) WITH ESOPHAGEAL VARICEAL BANDING       Diagnosis: Hematemesis with nausea [K92.0]  Diagnosis Additional Information: No value filed.    Anesthesia Type:   MAC     Note:    Oropharynx: oropharynx clear of all foreign objects  Level of Consciousness: awake  Oxygen Supplementation: face mask  Level of Supplemental Oxygen (L/min / FiO2): 8  Independent Airway: airway patency satisfactory and stable  Dentition: dentition unchanged  Vital Signs Stable: post-procedure vital signs reviewed and stable  Report to RN Given: handoff report given  Patient transferred to: PACU    Handoff Report: Identifed the Patient, Identified the Reponsible Provider, Reviewed the pertinent medical history, Discussed the surgical course, Reviewed Intra-OP anesthesia mangement and issues during anesthesia, Set expectations for post-procedure period and Allowed opportunity for questions and acknowledgement of understanding      Vitals:  Vitals Value Taken Time   /61 02/26/22 1500   Temp 36.9  C (98.5  F) 02/26/22 1500   Pulse 87 02/26/22 1509   Resp 22 02/26/22 1509   SpO2 93 % 02/26/22 1509   Vitals shown include unvalidated device data.    Electronically Signed By: ERUM WALLS CRNA  February 26, 2022  3:10 PM

## 2022-02-27 PROBLEM — I85.11 SECONDARY ESOPHAGEAL VARICES WITH BLEEDING (H): Status: ACTIVE | Noted: 2022-01-01

## 2022-02-27 NOTE — PROGRESS NOTES
"GASTROENTEROLOGY PROGRESS NOTE     SUBJECTIVE:  No further nausea, vomiting. No stools. Denies abdominal pain. Hungry and wants to eat. No confusion.     OBJECTIVE:    /54 (BP Location: Right arm)   Pulse 72   Temp 98.8  F (37.1  C) (Oral)   Resp 18   Ht 1.803 m (5' 11\")   Wt 67 kg (147 lb 12.8 oz)   SpO2 96%   BMI 20.61 kg/m    Temp (24hrs), Av  F (36.7  C), Min:97.2  F (36.2  C), Max:98.4  F (36.9  C)    Patient Vitals for the past 72 hrs:   Weight   22 0400 67 kg (147 lb 12.8 oz)   22 0353 65.5 kg (144 lb 4.8 oz)   22 1110 65 kg (143 lb 4.8 oz)       Intake/Output Summary (Last 24 hours) at 2022 1004  Last data filed at 2022 0900  Gross per 24 hour   Intake 1646 ml   Output 900 ml   Net 746 ml        PHYSICAL EXAM:  Gen: alert, oriented, NAD, +jaundice/scleral icterus  Abd: soft, ND/NT, +BS  Ext: no edema.  Neuro: no asterixis.     Additional Comments:  ROS, FH, SH: See initial GI consult for details.     I have reviewed the patient's new clinical lab results:     Recent Labs   Lab Test 22  0658 22  0010 22  1613 22  0908 22  0503 22  1654 22  1115 21  0834 20  0808 20  0808 20  0921   WBC 4.0  --   --   --  6.9  --  13.8*   < > 4.7   < > 3.6*   HGB 7.8* 8.4* 8.4*   < > 9.2*   < > 12.9*   < > 14.3   < > 13.4*   *  --   --   --  97  --  96   < > 89   < > 85   PLT 36*  --   --   --  39*  --  90*   < > 135*   < > 41*   INR  --   --   --   --   --   --  1.33*  --  1.21*  --  1.27*    < > = values in this interval not displayed.     Recent Labs   Lab Test 22  0658 22  1613 22  0908 22  0503 22  0007 22  1115   POTASSIUM 4.1 4.4 3.4* 3.5   < > 3.0*   CHLORIDE 97*  --   --  92*  --  83*   CO2 24  --   --  29  --  19*   BUN 10  --   --  27*  --  31*   ANIONGAP 9  --   --  11  --  35*    < > = values in this interval not displayed.     Recent Labs   Lab Test " 02/27/22  0658 02/26/22  0503 02/25/22  1115 12/20/20  0808 12/15/20  1718 12/15/20  1304 11/24/20  2236 07/10/20  0751 07/05/20  0728 05/01/20  0857 02/28/20  0954 01/09/20  0542 01/08/20  1644 01/06/20  0638 01/05/20  2106   ALBUMIN 2.1* 2.5* 3.4*   < >  --  3.8   < > 2.9* 2.6*   < > 2.4*   < >  --    < >  --    BILITOTAL 6.0* 5.6* 6.4*   < >  --  0.8   < > 5.1* 3.8*   < > 1.5*   < >  --    < >  --    ALT 21 17 28   < >  --  104*   < > 38 54*   < > 30   < >  --    < >  --    * 83* 124*   < >  --  181*   < > 99* 141*   < > 52*   < >  --    < >  --    PROTEIN  --   --   --   --  30 mg/dL*  --   --   --   --   --   --   --  Negative  --  30 mg/dL*   LIPASE  --   --  16  --   --  55*  --  73* 77*   < > 71*   < >  --   --   --    AMYLASE  --   --   --   --   --   --   --   --  83  --  85  --   --   --   --     < > = values in this interval not displayed.      CT chest/abdomen/pelvis 2/25/22:  IMPRESSION:   1.  Cirrhosis, moderate to severe steatosis and moderate hepatomegaly compatible with alcoholic liver disease and portal to caval collateral vein formation as detailed above consistent with portal hypertension.  2.  Cholelithiasis.  3.  Mild bronchial wall thickening throughout both lungs and mid and upper lung predominant faint micronodular peribronchiolar opacities compatible with mild nonspecific chronic active bronchial and bronchiolar inflammation/infection.     Procedure results:  EGD 5/3/20 ([facility]):  FINDINGS / CONCLUSIONS:    Esophagus: Three columns of varices distal esophagus, few punctate red markings but varices flatten with insufflation).    Stomach: Portal hypertensive gastropathy.    Duodenum: Small amount of heme in duodenal bulb but no lesion seen post irrigation (multiple passes of scope performed across this region).       IMPRESSION:  1. Small distal esophageal varices (few punctate red markings but varices flatten with insufflation)  2. Portal hypertensive gastropathy  3. Small amount of  heme duodenal bulb but no lesion seen post-irrigation.      Assessment/Plan:  This is a 35 y/o male with PMH alcoholic cirrhosis with esophageal varices with ongoing alcohol use, alcohol withdrawal, seizure admitted 2/25 for hematemesis and melena. Reportedly started having hematemesis 2/23 and stopped drinking since, now with alcohol withdrawal. Hemoglobin 12.9 on admit, down from 14-16 range in the past.     1. Hematemesis. Variceal bleed vs bleeding from portal gastropathy. On octreotide, PPI drip, ceftriaxone. Underwent EGD 2/26 showing nonbleeding esophageal varices s/p banding x 3, portal gastropathy, bilious fluid in stomach, no stigmata of recent bleeding. Overt bleeding has resolved. HGB down slightly today but overall stable, 8.4-->7.8.  --Full liquids today and tomorrow, then soft diet for 3 days.  --Continue octreotide x 1-2 more days.  --Continue antibiotic prophylaxis with ceftriaxone (reduces risk for infection, decreases mortality, and decreases risk of rebleeding in cirrhotics with variceal bleeding).   --IV PPI BID 1-2 more days, then BID until repeat EGD.   --Monitor HGB and transfuse prn.   --Monitor emesis/stools for bleeding.  --Repeat EGD in 4 weeks. Our office will arrange.     2. ETOH cirrhosis. Last ETOH intake 2/23. No history of ascites or encephalopathy. CT without ascites this admit. MELD-na 18. Associated thrombocytopenia - platelets declining since admission, 90-->39K, stable today. Total bili 6.4 on admit/stable. AST trending down.   --Thiamine/folate.  --Trend LFTs/bili/INR.   --Will need outpatient liver clinic follow up. Our office will arrange on discharge.    3. ETOH withdrawal.   --CIWA.  --Chem dep consult when appropriate.    Will update Dr. Michael.     Araceli Garcia, PAC  [facility] ([facility])

## 2022-02-27 NOTE — PROGRESS NOTES
02/27/22 1500   Quick Adds   Type of Visit Initial PT Evaluation   Living Environment   People in Home alone   Current Living Arrangements apartment   Home Accessibility stairs to enter home   Number of Stairs, Main Entrance 3   Stair Railings, Main Entrance railings on both sides of stairs   Transportation Anticipated family or friend will provide   Living Environment Comments Patient lives on 1st floor of apartment. Patient ambulated with no AD at baseline. Patient owns FWW however, never uses.    Self-Care   Usual Activity Tolerance good   Current Activity Tolerance moderate   Regular Exercise Yes   Activity/Exercise Type walking   Exercise Amount/Frequency daily   Equipment Currently Used at Home none   Fall history within last six months no   General Information   Onset of Illness/Injury or Date of Surgery 02/25/22   Referring Physician Atul Valadez   Patient/Family Therapy Goals Statement (PT) Return home and find a job   Pertinent History of Current Problem (include personal factors and/or comorbidities that impact the POC) Alcohol withdrawal syndrome w/o complication, hypokalemia, hypmagnesemia, acute chest pain, abdominal pain, hematemesis with nausea   Weight-Bearing Status - LLE full weight-bearing   Weight-Bearing Status - RLE full weight-bearing   Range of Motion (ROM)   Range of Motion ROM is WFL   Strength (Manual Muscle Testing)   Strength (Manual Muscle Testing) strength is WFL   Bed Mobility   Bed Mobility supine-sit;sit-supine   Supine-Sit [place] (Bed Mobility) modified independence   Sit-Supine [place] (Bed Mobility) modified independence   Assistive Device (Bed Mobility) bed rails   Transfers   Transfers sit-stand transfer;toilet transfer   Sit-Stand Transfer   Sit-Stand [place] (Transfers) contact guard;verbal cues   Assistive Device (Sit-Stand Transfers) walker, front-wheeled   Toilet Transfer   Type (Toilet Transfer) sit-stand   [place] Level (Toilet Transfer)  contact guard;verbal cues   Assistive Device (Toilet Transfer) walker, front-wheeled   Gait/Stairs (Locomotion)   [place] Level (Gait) contact guard;verbal cues   Assistive Device (Gait) walker, front-wheeled   Distance in Feet (Required for LE Total Joints) 325   Deviations/Abnormal Patterns (Gait) stride length decreased   Comment, (Gait/Stairs) verbal cueing for keeping FWW closer to body for reduced forward lean on walker   Clinical Impression   Criteria for Skilled Therapeutic Intervention Yes, treatment indicated   PT Diagnosis (PT) impaired functional mobility   Influenced by the following impairments weakness   Functional limitations due to impairments transfers, gait   Clinical Presentation (PT Evaluation Complexity) Evolving/Changing   Clinical Presentation Rationale Pt presents as medically diagnosed   Clinical Decision Making (Complexity) moderate complexity   Planned Therapy Interventions (PT) bed mobility training;gait training;home exercise program;patient/family education;strengthening;stair training;transfer training   Anticipated Equipment Needs at Discharge (PT) walker, rolling   Risk & Benefits of therapy have been explained patient;evaluation/treatment results reviewed;care plan/treatment goals reviewed   PT Discharge Planning   PT Discharge Recommendation (DC Rec)   (Inpatient CD treatment once medically stable.)   PT Rationale for DC Rec ETOH abuse/w/d affecting functional mobility and employment.   Plan of Care Review   Plan of Care Reviewed With patient   Total Evaluation Time   Total Evaluation Time (Minutes) 10   Physical Therapy Goals   PT Frequency Daily   PT Predicated Duration/Target Date for Goal Attainment 03/03/22   PT Goals Transfers;Gait;Stairs   PT: Transfers Modified independent;Sit to/from stand;Assistive device   PT: Gait Modified independent;Assistive device;Rolling walker;Greater than 200 feet   PT: Stairs Modified independent;3 stairs;Rail on both sides

## 2022-02-27 NOTE — PLAN OF CARE
Goal: Optimal Comfort and Wellbeing  Outcome: Ongoing, Progressing    Patients vital signs stable, denies pain, denies nausea. No emesis or stool this shift. Patient appears to be resting comfortably in between cares. Up w/ assist of 2, very unsteady. CIWA scoring 4 for tremors. Patient very pleasant and cooperative. Tele NSR w/ 1st degree AV block. Octreotide running continuously at 10ml/hr. NS running at 100ml/hr.

## 2022-02-27 NOTE — PROGRESS NOTES
[facility] MEDICINE PROGRESS NOTE      Identification/Summary: Masoud Huddleston is a 36 year old male with a past medical history of alcohol abuse/dependence/withdrawal and cirrhosis now with known esophageal varices presented with hematemesis.  Found to have an acute blood loss anemia and admitted.  Hospital course notable so far for EGD revealing esophageal varices which were banded.  Patient is experiencing mild alcohol withdrawal 2/27 and starting to advance diet.    Assessment and Plan:  Esophageal varices/portal hypertension/cirrhosis.  Varices banded 2/26  Patient had been ordered a soft diet.  Reviewing GI EGD note should only be on full liquids.  Fortunately has not eaten yet.  Full liquid diet for 2 days.  Then advance to soft diet for 3 days.  Continue octreotide.  Continue twice daily PPI IV.  See procedure note for details of transition to orals.  Currently on ceftriaxone.  Defer to GI but not sure if he needs to continue this as no mention of ascites on imaging.    Nausea and vomiting.  Resolved.    Alcohol dependence/alcohol withdrawal.  History of seizures in the past.  Was scoring high 2/26 but now only 4 on CIWA protocol 2/27.  Patient declined treatment.  He states he is already tied into AA and will get back into group meetings.   to give resources.    Hypokalemia/hypomagnesemia.  Repleted/protocol.    Acute blood loss anemia/thrombocytopenia.  No overt signs of bleeding currently.  Continue to trend.  Octreotide/PPI as above.    CT with bronchial thickening.  Suspect a component of aspiration with vomiting.  Asymptomatic.  No indication for antibiotics currently.    History of Aiyana-Vizcaino tear.  As above.    Depression.    Hyponatremia/hypocalcemia.  Fluids.  Add in oral calcium carbonate.           # Severe Malnutrition: based on nutrition assessment   Greater than 5% weight loss in 1 month.  Less than 75% intake for greater than 1  month  Subcutaneous fat loss in the orbital region.      Diet: Full Liquid Diet  DVT Prophylaxis:  Pneumatic Compression Devices and Ambulate every shift  Code Status: Full Code    Anticipated possible discharge in 2-3 days.    Disposition Plan   Expected Discharge: 03/01/2022     Anticipated discharge location: home         The patient's care was discussed with the Bedside Nurse   .  Atul Valadez MD  [facility]  Phone: #[phone]    Interval History/Subjective:  Patient is pretty upset as he had a tray with soft solids in his room but I explained that he is not to advance past full liquids today or tomorrow from reading GIs note.  He verbalizes understanding but was not pleased.  He is not having any vomiting today.  Minimal anxiety and minimal tremor.  No seizure activity.  Has not stooled since admission but did have some dark stools prior to coming in.  Small amount of blood in emesis before his EGD yesterday.  He has not vomited since.  Wants to get sober.  Does not want to go to treatment.  He has been through treatment 3 times in the past.  States that he does go to AA and would be willing to resume.    Physical Exam/Objective:  Temp:  [97.8  F (36.6  C)-99.3  F (37.4  C)] 98.5  F (36.9  C)  Pulse:  [76-95] 77  Resp:  [12-21] 18  BP: ()/(55-65) 97/57  SpO2:  [94 %-100 %] 97 %  Body mass index is 20.61 kg/m .    GENERAL:  Alert, appears comfortable, in no acute distress, appears stated age   HEAD:  Normocephalic, without obvious abnormality, atraumatic   EYES:  PERRL, conjunctiva/corneas clear, no scleral icterus, EOM's intact   NECK: Supple, symmetrical, trachea midline   BACK:   Symmetric, no curvature, ROM normal   LUNGS:   Clear to auscultation bilaterally, no rales, rhonchi, or wheezing, symmetric chest rise on inhalation, respirations unlabored   CHEST WALL:  No tenderness or deformity   HEART:  Regular rate and rhythm, S1 and S2  normal, no murmur, rub, or gallop    ABDOMEN:   Soft, non-tender, bowel sounds active all four quadrants, no masses, no organomegaly, no rebound or guarding   EXTREMITIES: Extremities normal, atraumatic, no cyanosis or edema    SKIN: Dry to touch, no exanthems in the visualized areas   NEURO: Alert, oriented x3, moves all four extremities freely   PSYCH: Cooperative, behavior is appropriate      Data reviewed today: I personally reviewed all new medications, labs, imaging/diagnostics reports over the past 24 hours. Pertinent findings include:    Imaging:   No results found for this or any previous visit (from the past 24 hour(s)).    Labs:  Most Recent 3 CBC's:Recent Labs   Lab Test 02/27/22  0658 02/27/22  0010 02/26/22  1613 02/26/22  0908 02/26/22  0503 02/25/22  1654 02/25/22  1115   WBC 4.0  --   --   --  6.9  --  13.8*   HGB 7.8* 8.4* 8.4*   < > 9.2*   < > 12.9*   *  --   --   --  97  --  96   PLT 36*  --   --   --  39*  --  90*    < > = values in this interval not displayed.     Most Recent 3 BMP's:Recent Labs   Lab Test 02/27/22  0658 02/26/22  1613 02/26/22  0908 02/26/22  0503 02/26/22  0007 02/25/22  1115   *  --   --  132*  --  137   POTASSIUM 4.1 4.4 3.4* 3.5   < > 3.0*   CHLORIDE 97*  --   --  92*  --  83*   CO2 24  --   --  29  --  19*   BUN 10  --   --  27*  --  31*   CR 0.59*  --   --  0.75  --  1.12   ANIONGAP 9  --   --  11  --  35*   FÉLIX 7.3*  --   --  7.0*  --  8.6   GLC 97  --   --  139*  --  134*    < > = values in this interval not displayed.     Most Recent 2 LFT's:Recent Labs   Lab Test 02/27/22  0658 02/26/22  0503   * 83*   ALT 21 17   ALKPHOS 116 130*   BILITOTAL 6.0* 5.6*     Most Recent 3 INR's:Recent Labs   Lab Test 02/25/22  1115 12/30/20  0808 12/17/20  0921   INR 1.33* 1.21* 1.27*       Medications:   Personally Reviewed.  Medications     octreotide (sandoSTATIN) infusion ADULT 50 mcg/hr (02/26/22 1641)     sodium chloride 100 mL/hr at 02/26/22 8474        cefTRIAXone  1 g Intravenous Q24H     [Held by provider] cloNIDine  0.1 mg Oral Q8H     lactulose  10 g Oral Daily     mirtazapine  15 mg Oral At Bedtime     pantoprazole (PROTONIX) IV  40 mg Intravenous BID AC     sodium chloride (PF)  3 mL Intracatheter Q8H     IV Fluid with vitamins   Intravenous Q24H

## 2022-02-27 NOTE — PLAN OF CARE
Problem: Alcohol Withdrawal  Goal: Alcohol Withdrawal Symptom Control  Outcome: Ongoing, Progressing   Pt drowsy but easily aroused, report intermittent nausea but decline interventions, CIWA score 5, no medication needed, telemetry NSR, tolerating mechanical soft diet, IVF infusing, pt calm and cooperative, continue to monitor pt status.

## 2022-02-27 NOTE — CONSULTS
Care Management Follow Up    Length of Stay (days): 2    Expected Discharge Date: 03/01/2022     Concerns to be Addressed:     Discharge planning, CD resources  Patient plan of care discussed at interdisciplinary rounds: Yes    Anticipated Discharge Disposition: Outpatient Chemical Dependency     Anticipated Discharge Services: Chemical Dependency Resources  Anticipated Discharge DME: None  Education Provided on the Discharge Plan:    Patient/Family in Agreement with the Plan: yes    Referrals Placed by CM/SW:    Private pay costs discussed: Not applicable    Additional Information:  Chart reviewed. SW met with patient in his room to introduce self,CM role and complete initial assessment for services.  Patient lives alone in an apartment. He is currently unemployed, had been working as a  at a restaurant. He states his primary goal at this time is to obtain a job and pay his bills.   Patient has a history of civil commitment, has been to [facility] for inpatient treatment for a short time. Pt reports his civil commitment county  still keeps in contact with him from time to time and he states he feels he can reach out to her for support. He reports he has completed 9 months of outpatient treatment.   Longest period of sobriety reported by pt is 1 year about 8 years ago.   Patient reports recent triggers for alcohol use has been financial stresses. Pt has 2 school aged children and states he has been paying child support. Pt reports he does get to see his children.   SW provided motivational interviewing; discussed his goals and steps to take action to begin that. Discussed supportive factors; he states his father is former  and his mother is supportive; both support his recovery. Pt states he has a very close group of friends who are sober. Encouraged pt to seek professional help for recovery. Pt reports he has been to AA in the past, encouraged pt to start attending AA meetings if  that worked for him in the past.   SW placed contact information for [place], [facility] and [facility] services in [facility].   SW will follow and re-address if pt would like a Rule 25 scheduled.     GISELA Guy

## 2022-02-27 NOTE — DISCHARGE INSTRUCTIONS
Chemical Dependency Resources:  [place]  Phone: [phone]    [facility]:  [phone]    [facility]  [phone].

## 2022-02-27 NOTE — PLAN OF CARE
Problem: Plan of Care - These are the overarching goals to be used throughout the patient stay.    Goal: Plan of Care Review/Shift Note  Description: The Plan of Care Review/Shift note should be completed every shift.  The Outcome Evaluation is a brief statement about your assessment that the patient is improving, declining, or no change.  This information will be displayed automatically on your shift note.  Outcome: Ongoing, Progressing     A & O. On full liquid diet x 2 days. BP stable. Tele normal sinus with first degree AV block. Octreotide IV continued, per GI.Potassium 4.4 to 4.. Magnesium 4.0 to 1.7. Hgb 7.8 to 8.1. Assist of 1 with bed alarm in use. Last CIWA 4 @ 1200. PT/OT completed.

## 2022-02-27 NOTE — PROGRESS NOTES
02/27/22 1300   Quick Adds   Type of Visit Initial Occupational Therapy Evaluation   Living Environment   People in Home alone   Current Living Arrangements apartment   Living Environment Comments Tub/shower with GB ,  Std toilet with bilat GB   Self-Care   Usual Activity Tolerance good   Current Activity Tolerance moderate   General Information   Onset of Illness/Injury or Date of Surgery 02/25/22   Referring Physician Dr. Atul Valadez   Patient/Family Therapy Goal Statement (OT) To return home and find a job.   Cognitive Status Examination   Orientation Status orientation to person, place and time   Follows Commands WFL   Range of Motion Comprehensive   General Range of Motion no range of motion deficits identified   Coordination   Coordination Comments slight tremors from w/d   Bed Mobility   Bed Mobility supine-sit   Supine-Sit [place] (Bed Mobility) modified independence   Assistive Device (Bed Mobility) bed rails;other (see comments)  (HOB elevated)   Transfers   Transfers bed-chair transfer;sit-stand transfer;toilet transfer   Transfer Skill: Bed to Chair/Chair to Bed   Bed-Chair [place] (Transfers) minimum assist (75% patient effort)   Assistive Device (Bed-Chair Transfers) rolling walker   Sit-Stand Transfer   Sit-Stand [place] (Transfers) minimum assist (75% patient effort)   Assistive Device (Sit-Stand Transfers) walker, front-wheeled   Toilet Transfer   Type (Toilet Transfer) sit-stand;stand-sit   [place] Level (Toilet Transfer) minimum assist (75% patient effort)   Assistive Device (Toilet Transfer) walker, front-wheeled   Balance   Balance Assessment standing balance: dynamic   Balance Comments Min assist.   Activities of Daily Living   BADL Assessment/Intervention lower body dressing;grooming;toileting   Lower Body Dressing Assessment/Training   [place] Level (Lower Body Dressing) supervision   Position (Lower Body Dressing) supported sitting   Grooming  Assessment/Training   [place] Level (Grooming) supervision   Position (Grooming) sink side   Toileting   [place] Level (Toileting) supervision   Assistive Devices (Toileting) grab bar, toilet   Position (Toileting) supported standing;supported sitting   Clinical Impression   Criteria for Skilled Therapeutic Interventions Met (OT) Yes, treatment indicated   OT Diagnosis decreased indep with ADLs due to Acute chest pain, h/o ETOH abuse/w/d   OT Problem List-Impairments impacting ADL balance;mobility;cognition   Assessment of Occupational Performance 3-5 Performance Deficits   Identified Performance Deficits Cognition, toileting, G/H, trsfs.   Planned Therapy Interventions (OT) ADL retraining;cognition   Clinical Decision Making Complexity (OT) moderate complexity   Risk & Benefits of therapy have been explained patient   OT Discharge Planning   OT Discharge Recommendation (DC Rec)   (Inpatient CD treatment once medically stable.)   OT Rationale for DC Rec ETOH abuse/w/d affecting ADLs and employment.   Total Evaluation Time (Minutes)   Total Evaluation Time (Minutes) 15   OT Goals   Therapy Frequency (OT) Daily   OT Predicated Duration/Target Date for Goal Attainment 03/04/22   OT Goals Hygiene/Grooming;Transfers;Cognition   OT: Hygiene/Grooming modified independent  (standing at the sink)   OT: Transfer Modified independent  (baseline is no walking device )   OT: Cognitive Patient/caregiver will verbalize understanding of cognitive assessment results/recommendations as needed for safe discharge planning

## 2022-02-28 NOTE — PLAN OF CARE
Problem: Alcohol Withdrawal  Goal: Alcohol Withdrawal Symptom Control  Outcome: Ongoing, Progressing       Goal Outcome Evaluation:    Plan of Care Reviewed With: patient     Overall Patient Progress: improving    Outcome Evaluation: CIWA score 3. Ambulated in mason with PT. On full liquid diet. Tele - NS w/ 1st deg AV block. Alarms on for safety. NS and Ocreotide infusing. K and mag protocols with recheck in AM.

## 2022-02-28 NOTE — PROGRESS NOTES
GI PROGRESS NOTE  2/28/2022  Masoud Huddleston  1985  /-84    Subjective:  He denies having any complaints.  No pain with swallowing liquids.  No current hematemesis, no melena or hematochezia.  No abdominal pain.  He wants to quit drinking.     Objective:    Patient Vitals for the past 24 hrs:   BP Temp Temp src Pulse Resp SpO2 Weight   02/28/22 0800 109/60 98.7  F (37.1  C) Oral 84 16 96 % --   02/28/22 0400 104/59 99.2  F (37.3  C) Oral 76 16 97 % 68 kg (149 lb 14.4 oz)   02/28/22 0000 99/56 98.6  F (37  C) Oral 75 17 98 % --   02/27/22 2000 98/57 98.3  F (36.8  C) Oral 70 18 97 % --   02/27/22 1626 99/54 98.8  F (37.1  C) Oral 69 18 97 % --   02/27/22 1208 104/55 98.8  F (37.1  C) Oral 81 -- -- --   02/27/22 1104 97/56 98.8  F (37.1  C) Oral 77 18 97 % --     Body mass index is 20.91 kg/m .  Gen: NAD  GI: Non-distended, BS positive, soft, non-tender    Laboratory  Recent Labs   Lab Test 02/28/22  0535 02/27/22  2147 02/27/22  1332 02/27/22  0658 02/26/22  0908 02/26/22  0503 02/25/22  1654 02/25/22  1115 05/16/21  0834 12/30/20  0808 12/20/20  0808 12/17/20  0921   WBC 4.3  --   --  4.0  --  6.9  --  13.8*   < > 4.7   < > 3.6*   HGB 8.2*  8.2* 7.9* 8.1* 7.8*   < > 9.2*   < > 12.9*   < > 14.3   < > 13.4*   MCV 99  --   --  101*  --  97  --  96   < > 89   < > 85   PLT 47*  --   --  36*  --  39*  --  90*   < > 135*   < > 41*   INR  --   --   --   --   --   --   --  1.33*  --  1.21*  --  1.27*    < > = values in this interval not displayed.     Recent Labs   Lab Test 02/28/22  0535 02/27/22  0658 02/26/22  1613 02/26/22  0908 02/26/22  0503   * 130*  --   --  132*   POTASSIUM 4.0 4.1 4.4   < > 3.5   CHLORIDE 101 97*  --   --  92*   CO2 22 24  --   --  29   BUN 4* 10  --   --  27*   CR 0.60* 0.59*  --   --  0.75   ANIONGAP 5 9  --   --  11   FÉLIX 7.5* 7.3*  --   --  7.0*    97  --   --  139*    < > = values in this interval not displayed.     Recent Labs   Lab Test 02/28/22  0535  02/27/22  0658 02/26/22  0503 02/25/22  1115 12/20/20  0808 12/15/20  1718 12/15/20  1304 11/24/20  2236 07/10/20  0751 07/05/20  0728 05/01/20  0857 02/28/20  0954 01/09/20  0542 01/08/20  1644 01/06/20  0638 01/05/20  2106   ALBUMIN 2.1* 2.1* 2.5* 3.4*   < >  --  3.8   < > 2.9* 2.6*   < > 2.4*   < >  --    < >  --    BILITOTAL 7.7* 6.0* 5.6* 6.4*   < >  --  0.8   < > 5.1* 3.8*   < > 1.5*   < >  --    < >  --    ALT 27 21 17 28   < >  --  104*   < > 38 54*   < > 30   < >  --    < >  --    * 155* 83* 124*   < >  --  181*   < > 99* 141*   < > 52*   < >  --    < >  --    ALKPHOS 127* 116 130* 196*   < >  --  187*   < > 168* 188*   < > 100   < >  --    < >  --    PROTEIN  --   --   --   --   --  30 mg/dL*  --   --   --   --   --   --   --  Negative  --  30 mg/dL*   LIPASE  --   --   --  16  --   --  55*  --  73* 77*   < > 71*   < >  --   --   --    AMYLASE  --   --   --   --   --   --   --   --   --  83  --  85  --   --   --   --     < > = values in this interval not displayed.     CT chest/abdomen/pelvis 2/25/22:  IMPRESSION:   1.  Cirrhosis, moderate to severe steatosis and moderate hepatomegaly compatible with alcoholic liver disease and portal to caval collateral vein formation as detailed above consistent with portal hypertension.  2.  Cholelithiasis.  3.  Mild bronchial wall thickening throughout both lungs and mid and upper lung predominant faint micronodular peribronchiolar opacities compatible with mild nonspecific chronic active bronchial and bronchiolar inflammation/infection.     EGD 2/26/2022  Impression:            - Large (> 5 mm) esophageal varices with no stigmata                          of recent bleeding. Completely eradicated. Banded.                          - Portal hypertensive gastropathy.                          - Bilious gastric fluid.                          - Erythematous duodenopathy.                          - No specimens collected.   Recommendation:        - [facility].                          - Clear liquid diet today. Could go to full liquid                          diet tomorrow if no further evidence of bleeding.                          Should stay on that for 2 days, followed by soft diet                          for 3 days. Can then advance diet as tolerated.                          - Continue octreotide at 50 micrograms per hour for                          another 2-3 days.                          - Use pantoprazole 40 mg IV BID for 2-3 days, then can                          change to pantoprazole/omeprazole 40 mg BID until                          discharge. Should stay on PPI 40 mg PO daily for 4                          weeks.                          - Can use acetaminophen up to 2 grams per day for post                          banding pain.                          - Repeat upper endoscopy in 4 weeks for retreatment.     EGD 5/3/20 ([facility]):  FINDINGS / CONCLUSIONS:    Esophagus: Three columns of varices distal esophagus, few punctate red markings but varices flatten with insufflation).    Stomach: Portal hypertensive gastropathy.    Duodenum: Small amount of heme in duodenal bulb but no lesion seen post irrigation (multiple passes of scope performed across this region).       IMPRESSION:  1. Small distal esophageal varices (few punctate red markings but varices flatten with insufflation)  2. Portal hypertensive gastropathy  3. Small amount of heme duodenal bulb but no lesion seen post-irrigation.    Assessment:  1.  Hematemesis- had EGD 2/26 with large non bleeding esophageal varices which were banded as well as portal gastropathy.  His hemoglobin is relatively stable without ongoing bleeding.  2.  Alcoholic cirrhosis with last alcohol intake 2/23, having mild withdrawal.  MELD NA 18.  No evidence for ascites on imaging.    Patient Active Problem List   Diagnosis     Alcoholic intoxication without complication (H)     Alcohol  withdrawal syndrome without complication (H)     Hypokalemia     Hypomagnesemia     Acute chest pain     Scleral icterus     Diffuse abdominal pain     Hematemesis with nausea     Secondary esophageal varices with bleeding (H)        Plan:  1. Continue full liquid diet today.  2.  If stable, advance to soft diet tomorrow (3/1) for 3 days then ADAT.  3.  Continue Octreotide and Ceftriaxone until tomorrow.  4.  Continue IV BID PPI until tomorrow.  5.  If stable, ok to discharge tomorrow on daily PPI until f/u EGD planned for 4 weeks.  Our office will call him to arrange this.  6.  Recommend complete sobriety.  7.  We'll also arrange outpatient Hepatology clinic f/u.    Kirsten Jimenez PA-C  [facility]  [phone]

## 2022-02-28 NOTE — PLAN OF CARE
Problem: Alcohol Withdrawal  Goal: Alcohol Withdrawal Symptom Control  Outcome: Ongoing, Progressing       Goal Outcome Evaluation:        Pt remains on CIWA, scoring 3-4. Several scattered bruises noted throughout body, on bottom of foot, right side of mid-back, etc. Rash/scabs noted on face and upper chest. IV maintenance fluids running at 75mL/hr. Octreotide running at 10mL/hr. On full liquid diet, wanting diet advancement, understands not recommended at this time. Page sent to MD requesting nicotine patch per patient request. On alarms for safety. Tele reading NSR.

## 2022-02-28 NOTE — PROGRESS NOTES
[facility] MEDICINE PROGRESS NOTE      Identification/Summary: Masoud Huddleston is a 36 year old male with a past medical history of alcohol abuse/dependence/withdrawal and cirrhosis now with known esophageal varices presented with hematemesis.  Found to have an acute blood loss anemia and admitted.  Hospital course notable so far for EGD revealing esophageal varices which were banded.  Patient is experiencing mild alcohol withdrawal 2/27 and starting to advance diet.      2/28 :     Conscious oriented  Hb stable at 8.2    GI following  Continue octreotide for 1 more  day  Iv PPI for 1 more day  Continue Iv ceftriaxone, iv fluids  Full liquids today , then soft diet for 3 days.    Likely discharge in am    Addendum : At 5 pm got message about :   Blood cultures 1/2 from 2/25 : [facility] in pairs and chains  Send repeat BC   Start iv vancomycin from 2/25  Seems likely contaminant with no fevers, normal wbc        Assessment and Plan:      Esophageal varices/portal hypertension/cirrhosis.  Varices banded 2/26  Patient had been ordered a soft diet.  Reviewing GI EGD note should only be on full liquids.  Fortunately has not eaten yet.  Full liquid diet for 2 days.  Then advance to soft diet for 3 days.  Continue octreotide.  Continue twice daily PPI IV.  See procedure note for details of transition to orals.  Currently on ceftriaxone.  Defer to GI but not sure if he needs to continue this as no mention of ascites on imaging.    Nausea and vomiting.  Resolved.    Alcohol dependence/alcohol withdrawal.  History of seizures in the past.  Was scoring high 2/26 but now only 4 on CIWA protocol 2/27.  Patient declined treatment.  He states he is already tied into AA and will get back into group meetings.   to give resources.    Hypokalemia/hypomagnesemia.  Repleted/protocol.    Acute blood loss anemia/thrombocytopenia.  No overt signs of bleeding currently.  Continue to  trend.  Octreotide/PPI as above.    CT with bronchial thickening.  Suspect a component of aspiration with vomiting.  Asymptomatic.  No indication for antibiotics currently.    History of Aiyana-Vizcaino tear.  As above.    Depression.    Hyponatremia/hypocalcemia.  Fluids.  Add in oral calcium carbonate.           # Severe Malnutrition: based on nutrition assessment   Greater than 5% weight loss in 1 month.  Less than 75% intake for greater than 1 month  Subcutaneous fat loss in the orbital region.      Diet: Full Liquid Diet  Snacks/Supplements Adult: Ensure Enlive; With Meals  DVT Prophylaxis:  Pneumatic Compression Devices and Ambulate every shift  Code Status: Full Code    Anticipated possible discharge in am    Disposition Plan   Expected Discharge: 03/02/2022     Anticipated discharge location: home         The patient's care was discussed with the Bedside Nurse   .          Physical Exam/Objective:  Temp:  [98.2  F (36.8  C)-99.2  F (37.3  C)] 98.2  F (36.8  C)  Pulse:  [69-84] 74  Resp:  [16-18] 16  BP: ()/(54-60) 105/58  SpO2:  [96 %-98 %] 96 %  Body mass index is 20.91 kg/m .    GENERAL:  Alert, appears comfortable, in no acute distress, appears stated age   HEAD:  Normocephalic, without obvious abnormality, atraumatic   EYES:  PERRL, conjunctiva/corneas clear, no scleral icterus, EOM's intact   NECK: Supple, symmetrical, trachea midline   BACK:   Symmetric, no curvature, ROM normal   LUNGS:   Clear to auscultation bilaterally, no rales, rhonchi, or wheezing, symmetric chest rise on inhalation, respirations unlabored   CHEST WALL:  No tenderness or deformity   HEART:  Regular rate and rhythm, S1 and S2 normal, no murmur, rub, or gallop    ABDOMEN:   Soft, non-tender, bowel sounds active all four quadrants, no masses, no organomegaly, no rebound or guarding   EXTREMITIES: Extremities normal, atraumatic, no cyanosis or edema    SKIN: Dry to touch, no exanthems in the visualized areas   NEURO: Alert,  oriented x3, moves all four extremities freely   PSYCH: Cooperative, behavior is appropriate      Data reviewed today: I personally reviewed all new medications, labs, imaging/diagnostics reports over the past 24 hours. Pertinent findings include:    Imaging:   No results found for this or any previous visit (from the past 24 hour(s)).    Labs:  Most Recent 3 CBC's:  Recent Labs   Lab Test 02/28/22  0535 02/27/22  2147 02/27/22  1332 02/27/22  0658 02/26/22  0908 02/26/22  0503   WBC 4.3  --   --  4.0  --  6.9   HGB 8.2*  8.2* 7.9* 8.1* 7.8*   < > 9.2*   MCV 99  --   --  101*  --  97   PLT 47*  --   --  36*  --  39*    < > = values in this interval not displayed.     Most Recent 3 BMP's:  Recent Labs   Lab Test 02/28/22  0535 02/27/22  0658 02/26/22  1613 02/26/22  0908 02/26/22  0503   * 130*  --   --  132*   POTASSIUM 4.0 4.1 4.4   < > 3.5   CHLORIDE 101 97*  --   --  92*   CO2 22 24  --   --  29   BUN 4* 10  --   --  27*   CR 0.60* 0.59*  --   --  0.75   ANIONGAP 5 9  --   --  11   FÉLIX 7.5* 7.3*  --   --  7.0*    97  --   --  139*    < > = values in this interval not displayed.     Most Recent 2 LFT's:  Recent Labs   Lab Test 02/28/22  0535 02/27/22  0658   * 155*   ALT 27 21   ALKPHOS 127* 116   BILITOTAL 7.7* 6.0*     Most Recent 3 INR's:  Recent Labs   Lab Test 02/25/22  1115 12/30/20  0808 12/17/20  0921   INR 1.33* 1.21* 1.27*       Medications:   Personally Reviewed.  Medications     octreotide (sandoSTATIN) infusion ADULT 50 mcg/hr (02/27/22 2042)     sodium chloride 75 mL/hr at 02/28/22 0018       calcium carbonate  500 mg Oral TID     cefTRIAXone  1 g Intravenous Q24H     [Held by provider] cloNIDine  0.1 mg Oral Q8H     folic acid  1 mg Oral Daily     lactulose  10 g Oral Daily     mirtazapine  15 mg Oral At Bedtime     multivitamin w/minerals  1 tablet Oral Daily     pantoprazole (PROTONIX) IV  40 mg Intravenous BID AC     sodium chloride (PF)  3 mL Intracatheter Q8H     thiamine   100 mg Oral Daily

## 2022-02-28 NOTE — PLAN OF CARE
Problem: Plan of Care - These are the overarching goals to be used throughout the patient stay.    Goal: Plan of Care Review/Shift Note    Problem: Alcohol Withdrawal  Goal: Alcohol Withdrawal Symptom Control  Outcome: Ongoing, Progressing       Patient denies pain.  CIWA scores 4, no treatment indicated.  Tele: NSR with First Degree AV Block.   Patient on full liquid diet and voices his frustration that he cannot have a regular diet.    Educated patient this is due to procedure yesterday.  Patient will be able to advance to soft diet starting tomorrow, according to GI note.

## 2022-02-28 NOTE — PHARMACY-VANCOMYCIN DOSING SERVICE
Pharmacy Vancomycin Initial Note  Date of Service 2022  Patient's  1985  36 year old, male    Indication: Sepsis    Current estimated CrCl = Estimated Creatinine Clearance: 163.7 mL/min (A) (based on SCr of 0.6 mg/dL (L)).    Creatinine for last 3 days  2022:  5:03 AM Creatinine 0.75 mg/dL  2022:  6:58 AM Creatinine 0.59 mg/dL  2022:  5:35 AM Creatinine 0.60 mg/dL    Recent Vancomycin Level(s) for last 3 days  No results found for requested labs within last 72 hours.      Vancomycin IV Administrations (past 72 hours)      No vancomycin orders with administrations in past 72 hours.                Nephrotoxins and other renal medications (From now, onward)    Start     Dose/Rate Route Frequency Ordered Stop    22 1800  vancomycin 1500 mg in 0.9% NaCl 250 ml intermittent infusion 1,500 mg         1,500 mg  over 90 Minutes Intravenous EVERY 12 HOURS 22 1723            Contrast Orders - past 72 hours (72h ago, onward)            None          Insight[employer] Prediction of Planned Initial Vancomycin Regimen          Plan:  1. Start vancomycin  1500 mg IV q12h.   2. Vancomycin monitoring method: AUC  3. Vancomycin therapeutic monitoring goal: 400-600 mg*h/L  4. Pharmacy will check vancomycin levels as appropriate in 1-3 Days.    5. Serum creatinine levels will be ordered daily for the first week of therapy and at least twice weekly for subsequent weeks.      Ash Ayala [facility]

## 2022-02-28 NOTE — PROGRESS NOTES
02/28/22 0830   Cognitive Assessments   Cognitive Assessments Completed [facility] Mental Status Exam (UMS):  Total Score out of /30 27/30   Norms 27-30 equals normal   Domains assessed: orientation, memory, attention, executive functions       Pt cooperative with UMS test.  Score indicates normal cognitive screen.  However, Pt has difficulty understanding his medical status and what needs to be done to recover and return home.  Continue to monitor cognition.    Nikki Castillo, OTR/L  2/28/2022

## 2022-02-28 NOTE — SIGNIFICANT EVENT
At 5 pm got message about :   Blood cultures 1/2 from 2/25 : [facility] in pairs and chains  Send repeat BC   Start iv vancomycin from 2/25  Seems likely contaminant with no fevers, normal wbc

## 2022-03-01 NOTE — PROGRESS NOTES
GI PROGRESS NOTE  3/1/2022  Masoud Huddleston  1985  /-64    Subjective:  He has been more agitated with withdrawals.  Having normal brown stools.  No current nausea/vomiting.  No abdominal pain or trouble swallowing.  He's excited to try a soft diet.     Objective:    Patient Vitals for the past 24 hrs:   BP Temp Temp src Pulse Resp SpO2 Weight   03/01/22 0840 112/60 98.5  F (36.9  C) Oral 80 18 98 % --   03/01/22 0745 110/67 98.6  F (37  C) Oral 88 18 97 % --   03/01/22 0645 108/57 98.9  F (37.2  C) Oral 77 18 98 % --   03/01/22 0458 -- -- -- -- -- -- 66.6 kg (146 lb 14.4 oz)   03/01/22 0330 118/59 99  F (37.2  C) Oral -- 16 97 % --   03/01/22 0020 106/56 98.1  F (36.7  C) Oral -- 18 97 % --   02/28/22 1156 105/58 98.2  F (36.8  C) Oral 74 16 96 % --     Body mass index is 20.49 kg/m .  Gen: NAD  GI: Non-distended, BS positive, soft, non-tender    Laboratory  Recent Labs   Lab Test 02/28/22  0535 02/27/22  2147 02/27/22  1332 02/27/22  0658 02/26/22  0908 02/26/22  0503 02/25/22  1654 02/25/22  1115 05/16/21  0834 12/30/20  0808 12/20/20  0808 12/17/20  0921   WBC 4.3  --   --  4.0  --  6.9  --  13.8*   < > 4.7   < > 3.6*   HGB 8.2*  8.2* 7.9* 8.1* 7.8*   < > 9.2*   < > 12.9*   < > 14.3   < > 13.4*   MCV 99  --   --  101*  --  97  --  96   < > 89   < > 85   PLT 47*  --   --  36*  --  39*  --  90*   < > 135*   < > 41*   INR  --   --   --   --   --   --   --  1.33*  --  1.21*  --  1.27*    < > = values in this interval not displayed.     Recent Labs   Lab Test 03/01/22  0433 02/28/22  0535 02/27/22  0658 02/26/22  0908 02/26/22  0503   NA  --  128* 130*  --  132*   POTASSIUM 3.8 4.0 4.1   < > 3.5   CHLORIDE  --  101 97*  --  92*   CO2  --  22 24  --  29   BUN  --  4* 10  --  27*   CR  --  0.60* 0.59*  --  0.75   ANIONGAP  --  5 9  --  11   FÉLIX  --  7.5* 7.3*  --  7.0*   GLC  --  111 97  --  139*    < > = values in this interval not displayed.     Recent Labs   Lab Test 02/28/22 0535 02/27/22  0658  02/26/22  0503 02/25/22  1115 12/20/20  0808 12/15/20  1718 12/15/20  1304 11/24/20  2236 07/10/20  0751 07/05/20  0728 05/01/20  0857 02/28/20  0954 01/09/20  0542 01/08/20  1644 01/06/20  0638 01/05/20  2106   ALBUMIN 2.1* 2.1* 2.5* 3.4*   < >  --  3.8   < > 2.9* 2.6*   < > 2.4*   < >  --    < >  --    BILITOTAL 7.7* 6.0* 5.6* 6.4*   < >  --  0.8   < > 5.1* 3.8*   < > 1.5*   < >  --    < >  --    ALT 27 21 17 28   < >  --  104*   < > 38 54*   < > 30   < >  --    < >  --    * 155* 83* 124*   < >  --  181*   < > 99* 141*   < > 52*   < >  --    < >  --    ALKPHOS 127* 116 130* 196*   < >  --  187*   < > 168* 188*   < > 100   < >  --    < >  --    PROTEIN  --   --   --   --   --  30 mg/dL*  --   --   --   --   --   --   --  Negative  --  30 mg/dL*   LIPASE  --   --   --  16  --   --  55*  --  73* 77*   < > 71*   < >  --   --   --    AMYLASE  --   --   --   --   --   --   --   --   --  83  --  85  --   --   --   --     < > = values in this interval not displayed.     CT chest/abdomen/pelvis 2/25/22:  IMPRESSION:   1.  Cirrhosis, moderate to severe steatosis and moderate hepatomegaly compatible with alcoholic liver disease and portal to caval collateral vein formation as detailed above consistent with portal hypertension.  2.  Cholelithiasis.  3.  Mild bronchial wall thickening throughout both lungs and mid and upper lung predominant faint micronodular peribronchiolar opacities compatible with mild nonspecific chronic active bronchial and bronchiolar inflammation/infection.     EGD 2/26/2022  Impression:            - Large (> 5 mm) esophageal varices with no stigmata                          of recent bleeding. Completely eradicated. Banded.                          - Portal hypertensive gastropathy.                          - Bilious gastric fluid.                          - Erythematous duodenopathy.                          - No specimens collected.   Recommendation:        - [facility]  for [facility] care.                          - Clear liquid diet today. Could go to full liquid                          diet tomorrow if no further evidence of bleeding.                          Should stay on that for 2 days, followed by soft diet                          for 3 days. Can then advance diet as tolerated.                          - Continue octreotide at 50 micrograms per hour for                          another 2-3 days.                          - Use pantoprazole 40 mg IV BID for 2-3 days, then can                          change to pantoprazole/omeprazole 40 mg BID until                          discharge. Should stay on PPI 40 mg PO daily for 4                          weeks.                          - Can use acetaminophen up to 2 grams per day for post                          banding pain.                          - Repeat upper endoscopy in 4 weeks for retreatment.         Assessment:  1.  Hematemesis- had EGD 2/26 with large non bleeding esophageal varices which were banded as well as portal gastropathy.  His hemoglobin is relatively stable without ongoing bleeding.  2.  Alcoholic cirrhosis with last alcohol intake 2/23, having withdrawal.  MELD NA 18.  No evidence for ascites on imaging.    Patient Active Problem List   Diagnosis     Alcoholic intoxication without complication (H)     Alcohol withdrawal syndrome without complication (H)     Hypokalemia     Hypomagnesemia     Acute chest pain     Scleral icterus     Diffuse abdominal pain     Hematemesis with nausea     Secondary esophageal varices with bleeding (H)        Plan:    1.  Ok to advance to soft diet today x 3 days, then regular diet as tolerated.  2.  Discontinue octreotide and ceftriaxone today.  3.  Change PPI to oral once daily.  Continue until EGD in 4 wks.  4.  [facility] will contact pt for outpatient EGD in 4 week and Hepatology clinic f/u.  5.  Complete sobriety is recommended.  6.  GI will sign off.    CORNEL Carmona  [facility]  [phone]

## 2022-03-01 NOTE — PROGRESS NOTES
[facility] MEDICINE PROGRESS NOTE      Identification/Summary:   Masoud Huddleston is a 36 year old male with a past medical history of alcohol abuse/dependence/withdrawal and cirrhosis now with known esophageal varices presented with hematemesis.  Found to have an acute blood loss anemia and admitted.  Hospital course notable so far for EGD revealing esophageal varices which were banded.  Patient is experiencing mild alcohol withdrawal 2/27 and starting to advance diet.  On to soft diet 3/1.  Worsening withdrawal with hallucinations 3/1.  Added gabapentin and continue protocol.  Ceftriaxone and octreotide discontinued 3 1 per GI.        Addendum : At 5 pm 2/28 message about :   Blood cultures 1/2 from 2/25 : GPC in pairs and chains  Send repeat BC   Start iv vancomycin from 2/25  Seems likely contaminant with no fevers, normal wbc  Await final ID        Assessment and Plan:       Esophageal varices/portal hypertension/cirrhosis.  Varices banded 2/26  Patient had been ordered a soft diet.  Reviewing GI EGD note can now advance to soft diet  Fortunately has not eaten yet.  Completed full liquid diet for 2 days.    Discontinue octreotide 3/1.  Continue twice daily PPI IV.  See procedure note for details of transition to orals.  Discontinue ceftriaxone 3/1.       Nausea and vomiting.  Resolved.     Alcohol dependence/alcohol withdrawal/acute metabolic encephalopathy.  History of seizures in the past.  Was scoring high 2/26 but now only 4 on CIWA protocol 2/27.  Patient declined treatment.  He states he is already tied into AA and will get back into group meetings.   to give resources.  Scoring high again on 3 1.  Added scheduled gabapentin.     Hypokalemia/hypomagnesemia.  Repleted/protocol.     Acute blood loss anemia/thrombocytopenia.  No overt signs of bleeding currently.  Continue to trend.    CT with bronchial thickening.  Suspect a component of aspiration with  vomiting.  Asymptomatic.  No indication for antibiotics currently.     History of Aiyana-Vizcaino tear.  As above.     Depression.     Hyponatremia/hypocalcemia.  Fluids.  Add in oral calcium carbonate.     # Severe Malnutrition: based on nutrition assessment   Greater than 5% weight loss in 1 month.  Less than 75% intake for greater than 1 month  Subcutaneous fat loss in the orbital region.      Clinically Significant Risk Factors Present on Admission                    Diet: Snacks/Supplements Adult: Ensure Enlive; With Meals  Combination Diet Regular Diet; Mechanical/Dental Soft Diet; 2 gm NA Diet  DVT Prophylaxis: Ambulation  Code Status: Full Code    Anticipated possible discharge in 2 days once alcohol withdrawal resolved  Disposition Plan   Expected Discharge: 03/02/2022     Anticipated discharge location: home            The patient's care was discussed with the Bedside Nurse, Care Coordinator/ and Patient.  Atul Valadez MD  [facility]  Phone: #[phone]    Interval History/Subjective:  Doing okay.  Very sleepy but arousable.  No complaints of abdominal pain.  Pleased that he is able to eat.  Does endorse some hallucinations.    Physical Exam/Objective:  Temp:  [98.1  F (36.7  C)-99  F (37.2  C)] 98.3  F (36.8  C)  Pulse:  [77-95] 90  Resp:  [16-22] 18  BP: (104-118)/(56-67) 104/62  SpO2:  [97 %-98 %] 98 %  Body mass index is 20.49 kg/m .    GENERAL:  Alert, appears comfortable, in no acute distress, appears stated age   HEAD:  Normocephalic, without obvious abnormality, atraumatic   NOSE: Nares normal, septum midline, mucosa normal, no drainage   NECK: Supple, symmetrical, trachea midline   BACK:   Symmetric, no curvature, ROM normal   LUNGS:   Clear to auscultation bilaterally, no rales, rhonchi, or wheezing, symmetric chest rise on inhalation, respirations unlabored   CHEST WALL:  No tenderness or deformity   HEART:  Regular rate  and rhythm, S1 and S2 normal, no murmur, rub, or gallop    ABDOMEN:   Soft, non-tender, bowel sounds active all four quadrants, no masses, no organomegaly, no rebound or guarding   EXTREMITIES: Extremities normal, atraumatic, no cyanosis or edema    SKIN: Dry to touch, no exanthems in the visualized areas   NEURO: Alert, oriented x1, moves all four extremities freely   PSYCH: Cooperative, behavior is appropriate      Data reviewed today: I personally reviewed all new medications, labs, imaging/diagnostics reports over the past 24 hours. Pertinent findings include:    Imaging:   No results found for this or any previous visit (from the past 24 hour(s)).    Labs:  Most Recent 3 CBC's:Recent Labs   Lab Test 02/28/22  0535 02/27/22  2147 02/27/22  1332 02/27/22  0658 02/26/22  0908 02/26/22  0503   WBC 4.3  --   --  4.0  --  6.9   HGB 8.2*  8.2* 7.9* 8.1* 7.8*   < > 9.2*   MCV 99  --   --  101*  --  97   PLT 47*  --   --  36*  --  39*    < > = values in this interval not displayed.     Most Recent 3 BMP's:Recent Labs   Lab Test 03/01/22  0433 02/28/22  0535 02/27/22  0658 02/26/22  0908 02/26/22  0503   NA  --  128* 130*  --  132*   POTASSIUM 3.8 4.0 4.1   < > 3.5   CHLORIDE  --  101 97*  --  92*   CO2  --  22 24  --  29   BUN  --  4* 10  --  27*   CR  --  0.60* 0.59*  --  0.75   ANIONGAP  --  5 9  --  11   FÉLIX  --  7.5* 7.3*  --  7.0*   GLC  --  111 97  --  139*    < > = values in this interval not displayed.     Most Recent 2 LFT's:Recent Labs   Lab Test 02/28/22  0535 02/27/22  0658   * 155*   ALT 27 21   ALKPHOS 127* 116   BILITOTAL 7.7* 6.0*     Most Recent 3 INR's:Recent Labs   Lab Test 02/25/22  1115 12/30/20  0808 12/17/20  0921   INR 1.33* 1.21* 1.27*       Medications:   Personally Reviewed.  Medications       calcium carbonate  500 mg Oral TID     [Held by provider] cloNIDine  0.1 mg Oral Q8H     folic acid  1 mg Oral Daily     gabapentin  300 mg Oral TID     lactulose  10 g Oral Daily     mirtazapine   15 mg Oral At Bedtime     multivitamin w/minerals  1 tablet Oral Daily     nicotine  1 patch Transdermal Q24H     nicotine   Transdermal Q8H     [START ON 3/2/2022] pantoprazole  40 mg Oral QAM AC     sodium chloride (PF)  3 mL Intracatheter Q8H     thiamine  100 mg Oral Daily     vancomycin  1,500 mg Intravenous Q12H

## 2022-03-01 NOTE — PLAN OF CARE
"  Problem: Alcohol Withdrawal  Goal: Alcohol Withdrawal Symptom Control  Outcome: Ongoing, Progressing   Goal Outcome Evaluation:    Patient is on CIWA protocol, scored 4, 4, and then 9 in the morning. Patient was more anxious and agitated this morning because he wanted to, \"go home and see his family,\" and that he was sick of the bed alarms. Patient frequently forgets to use his call light to ask for help before getting up. IV vancomycin was given this morning. Patient is anxious to eat and advance his diet.   "

## 2022-03-01 NOTE — PLAN OF CARE
Problem: Plan of Care - These are the overarching goals to be used throughout the patient stay.    Goal: Absence of Hospital-Acquired Illness or Injury  Intervention: Identify and Manage Fall Risk  Recent Flowsheet Documentation  Taken 3/1/2022 0840 by Claire Kuhn RN  Safety Promotion/Fall Prevention:   activity supervised   assistive device/personal items within reach   bed alarm on   chair alarm on   fall prevention program maintained   increase visualization of patient   mobility aid in reach   nonskid shoes/slippers when out of bed   patient and family education   room door open   room near nurse's station   safety round/check completed   supervised activity  Intervention: Prevent Skin Injury  Recent Flowsheet Documentation  Taken 3/1/2022 0840 by Claire Kuhn RN  Body Position: position changed independently   Patient encouraged to call for assistance, CIWA scores varied throughout the day.  Patient resting comfortably at this time.   Problem: Plan of Care - These are the overarching goals to be used throughout the patient stay.    Goal: Absence of Hospital-Acquired Illness or Injury  Intervention: Prevent Skin Injury  Recent Flowsheet Documentation  Taken 3/1/2022 0840 by Claire Kuhn RN  Body Position: position changed independently   Encourage patient to reposition as needed and encouraged to use the toilet or urinal as needed due to fluids earlier today.

## 2022-03-02 NOTE — PLAN OF CARE
Problem: Plan of Care - These are the overarching goals to be used throughout the patient stay.    Goal: Absence of Hospital-Acquired Illness or Injury  Intervention: Identify and Manage Fall Risk  Recent Flowsheet Documentation  Taken 3/2/2022 1000 by Claire Kuhn RN  Safety Promotion/Fall Prevention:   activity supervised   bed alarm on   bedside attendant   nonskid shoes/slippers when out of bed   room near nurse's station   Patient on a 1:1 today, CIWA scores improved somewhat today.  Patient denies pain.  Less distressing visual/audio hallucinations today.  Patient follows commands well.

## 2022-03-02 NOTE — PROGRESS NOTES
Paged Dr. Valadez at this time regarding patient's blood culture results, Parvimonas micra .  Awaiting call back for further instruction.

## 2022-03-02 NOTE — PROGRESS NOTES
[facility] MEDICINE PROGRESS NOTE      Identification/Summary:   Masoud Huddleston is a 36 year old male with a past medical history of alcohol abuse/dependence/withdrawal and cirrhosis now with known esophageal varices presented with hematemesis.  Found to have an acute blood loss anemia and admitted.  Hospital course notable so far for EGD revealing esophageal varices which were banded.  Patient is experiencing mild alcohol withdrawal 2/27 and starting to advance diet. On to soft diet 3/1 for 3 days per GI and then ADAT.  Worsening withdrawal with hallucinations 3/1.  Added gabapentin and continue protocol.  Ceftriaxone and octreotide discontinued 3/1 per GI and PPI switched to PO.    03/02 Patient continues to be confused today. Per staff he continues to get out of bed without assistance and needs 1-2 people to get him to the bathroom. He has also urinated on the floor of the bathroom twice today. He feels his abdominal discomfort is improved, however history is limited secondary to patient's mental status. His speech is slurred and he continuously falls asleep during evaluation.  Ammonia pending 3/2.     Addendum : At 5 pm 2/28 message about :   Blood cultures 1/2 from 2/25 : GPC in pairs and chains  Send repeat BC   Start iv vancomycin from 2/25  Seems likely contaminant with no fevers, normal wbc  Await final ID     Assessment and Plan:    Esophageal varices/portal hypertension/cirrhosis.  Varices banded 2/26  Completed full liquid diet for 2 days.  Now advanced to soft diet per GI recommendation.     Discontinue octreotide 3/1.  PPI switched from IV to PO. See procedure note for details of transition to orals.  Discontinue ceftriaxone 3/1.       Nausea and vomiting.  Resolved.     Alcohol dependence/alcohol withdrawal/acute metabolic encephalopathy/jaundice.  History of seizures in the past.  Was scoring high 2/26 but now only 4 on CIWA protocol 2/27.  Patient declined  treatment.  He states he is already tied into AA and will get back into group meetings.   to give resources.  Scoring high again on 3/1.  Added scheduled gabapentin.  Continues to score high on 3/2. Ongoing confusion but no obvious hallucinations.  Check ammonia level.     Hypokalemia/hypomagnesemia.  Repleted/protocol.     Acute blood loss anemia/thrombocytopenia.  No overt signs of bleeding currently.  Hemoglobin trending up since esophageal banding 2/26.     CT with bronchial thickening.  Suspect a component of aspiration with vomiting.  Asymptomatic.  No indication for antibiotics currently.     History of Aiyana-Vizcaino tear.  As above.     Depression.     Hyponatremia/hypocalcemia.  Fluids.  Add in oral calcium carbonate.     # Severe Malnutrition: based on nutrition assessment   Greater than 5% weight loss in 1 month.  Less than 75% intake for greater than 1 month  Subcutaneous fat loss in the orbital region.       Diet: Snacks/Supplements Adult: Ensure Enlive; With Meals  Combination Diet Regular Diet; Mechanical/Dental Soft Diet; 2 gm NA Diet  DVT Prophylaxis:  Ambulation  Code Status: Full Code      Disposition Plan   Expected Discharge: 03/05/2022     Anticipated discharge location: home        The patient's care was discussed with the Attending Physician, Dr. Valadez and Patient    MARY Puga  [facility]  Phone: #[phone]    Physical Exam/Objective:  Temp:  [97.1  F (36.2  C)-99.6  F (37.6  C)] 99.6  F (37.6  C)  Pulse:  [] 89  Resp:  [16-18] 16  BP: ()/(56-70) 98/56  SpO2:  [97 %-99 %] 97 %  Body mass index is 20.96 kg/m .  GENERAL:  Alert, appears comfortable, in no acute distress, appears stated age   HEAD:  Normocephalic, without obvious abnormality, atraumatic   NOSE: Nares normal, septum midline, mucosa normal, no drainage   NECK: Supple, symmetrical, trachea midline   BACK:   Symmetric, no curvature, ROM normal    LUNGS:   Clear to auscultation bilaterally, no rales, rhonchi, or wheezing, symmetric chest rise on inhalation, respirations unlabored   CHEST WALL:  No tenderness or deformity   HEART:  Regular rate and rhythm, S1 and S2 normal, no murmur, rub, or gallop    ABDOMEN:   Soft, non-tender, bowel sounds active all four quadrants, no masses, no organomegaly, no rebound or guarding   EXTREMITIES: Extremities normal, atraumatic, no cyanosis or edema    SKIN: Dry to touch, no exanthems in the visualized areas.    NEURO: Alert, oriented x3, moves all four extremities freely   PSYCH: Cooperative, behavior is appropriate      Data reviewed today: I personally reviewed all new medications, labs, imaging/diagnostics reports over the past 24 hours. Pertinent findings include:    Imaging:   No results found for this or any previous visit (from the past 24 hour(s)).    Labs:  Most Recent 3 CBC's:Recent Labs   Lab Test 03/02/22  0436 02/28/22  0535 02/27/22  2147 02/27/22  1332 02/27/22  0658   WBC 4.5 4.3  --   --  4.0   HGB 9.0* 8.2*  8.2* 7.9*   < > 7.8*   * 99  --   --  101*   PLT 78* 47*  --   --  36*    < > = values in this interval not displayed.     Most Recent 3 BMP's:Recent Labs   Lab Test 03/02/22  1053 03/02/22  0436 03/01/22  0433 02/28/22  0535 02/27/22  0658   NA  --  134*  --  128* 130*   POTASSIUM 3.8 3.4* 3.8 4.0 4.1   CHLORIDE  --  105  --  101 97*   CO2  --  21*  --  22 24   BUN  --  4*  --  4* 10   CR  --  0.70  --  0.60* 0.59*   ANIONGAP  --  8  --  5 9   FÉLIX  --  7.5*  --  7.5* 7.3*   GLC  --  117  --  111 97     Most Recent 2 LFT's:Recent Labs   Lab Test 02/28/22  0535 02/27/22  0658   * 155*   ALT 27 21   ALKPHOS 127* 116   BILITOTAL 7.7* 6.0*     Most Recent 3 INR's:Recent Labs   Lab Test 02/25/22  1115 12/30/20  0808 12/17/20  0921   INR 1.33* 1.21* 1.27*       Medications:   Personally Reviewed.  Medications     sodium chloride 75 mL/hr at 03/02/22 0505       calcium carbonate  500 mg  Oral TID     [Held by provider] cloNIDine  0.1 mg Oral Q8H     gabapentin  300 mg Oral TID     lactulose  10 g Oral Daily     mirtazapine  15 mg Oral At Bedtime     nicotine  1 patch Transdermal Q24H     nicotine   Transdermal Q8H     pantoprazole  40 mg Oral QAM AC     sodium chloride (PF)  3 mL Intracatheter Q8H     vancomycin  1,500 mg Intravenous Q12H       3/2/2022   [facility] :Faculty Attestation   I have seen and examined the patient.   I have discussed the case with the PA student Suzanne Anglin.  I agree with the findings, assessment and plan.   Atul Valadez MD

## 2022-03-02 NOTE — PHARMACY-VANCOMYCIN DOSING SERVICE
Pharmacy Vancomycin Note  Date of Service 2022  Patient's  1985   36 year old, male    Indication: Sepsis  Day of Therapy: 3  Current vancomycin regimen:  1500 mg IV q12h  Current vancomycin monitoring method: AUC  Current vancomycin therapeutic monitoring goal: 400-600 mg*h/L    InsightRX Prediction of Current Vancomycin Regimen  Loading dose: N/A  Regimen: 1500 mg IV every 12 hours.  Start time: 18:45 on 2022  Exposure target: AUC24 (range)400-600 mg/L.hr   AUC24,ss: 361 mg/L.hr  Probability of AUC24 > 400: 38 %  Ctrough,ss: 9.9 mg/L  Probability of Ctrough,ss > 20: 14 %  Probability of nephrotoxicity (Lodise LYNN ): 6 %      Current estimated CrCl = Estimated Creatinine Clearance: 140.7 mL/min (based on SCr of 0.7 mg/dL).    Creatinine for last 3 days  2022:  5:35 AM Creatinine 0.60 mg/dL  3/2/2022:  4:36 AM Creatinine 0.70 mg/dL    Recent Vancomycin Levels (past 3 days)  3/2/2022: 10:53 AM Vancomycin 7.9 mg/L    Vancomycin IV Administrations (past 72 hours)                   vancomycin 1500 mg in 0.9% NaCl 250 ml intermittent infusion 1,500 mg (mg) 1,500 mg New Bag 22 0641     1,500 mg New Bag 22 1750     1,500 mg New Bag  0645     1,500 mg New Bag 22 1845                Nephrotoxins and other renal medications (From now, onward)    Start     Dose/Rate Route Frequency Ordered Stop    22 1600  vancomycin 1250 mg in 0.9% NaCl 250 mL intermittent infusion 1,250 mg         1,250 mg  over 90 Minutes Intravenous EVERY 8 HOURS 22 1457               Contrast Orders - past 72 hours (72h ago, onward)            None          Interpretation of levels and current regimen:  Vancomycin level is reflective of -600    Has serum creatinine changed greater than 50% in last 72 hours: No    Urine output:  good urine output    Renal Function: Stable    InsightRX Prediction of Planned New Vancomycin Regimen  Loading dose: N/A  Regimen: 1250 mg IV every 8 hours.  Start  time: 16:00 on 03/02/2022  Exposure target: AUC24 (range)400-600 mg/L.hr   AUC24,ss: 451 mg/L.hr  Probability of AUC24 > 400: 65 %  Ctrough,ss: 14.3 mg/L  Probability of Ctrough,ss > 20: 28 %  Probability of nephrotoxicity (Lodise LYNN 2009): 9 %  }    Plan:  1. Increase Dose to 1250 mg every 8 hours.  2. Vancomycin monitoring method: AUC  3. Vancomycin therapeutic monitoring goal: 400-600 mg*h/L  4. Pharmacy will check vancomycin levels as appropriate in 1-3 Days.  5. Serum creatinine levels will be ordered daily for the first week of therapy and at least twice weekly for subsequent weeks.    Marita Carrion RPH

## 2022-03-02 NOTE — PLAN OF CARE
Problem: Alcohol Withdrawal  Goal: Alcohol Withdrawal Symptom Control  Outcome: Ongoing, Not Progressing     Problem: Acute Neurologic Deterioration (Alcohol Withdrawal)  Goal: Optimal Neurologic Function  Outcome: Ongoing, Not Progressing   Goal Outcome Evaluation:      Pt scored 8, 7, and 1 on CIWA overnight. Ativan given x1. On 1:1. Is restless and intermittently agitated. Confused, alert only to self and occasionally time. Mechanical soft diet. Pt denies pain. Up with heavy assist of 1 to bathroom. K and Mg protocol.

## 2022-03-02 NOTE — PLAN OF CARE
"  Problem: Acute Neurologic Deterioration (Alcohol Withdrawal)  Goal: Optimal Neurologic Function  Outcome: Ongoing, Not Progressing     Problem: Alcohol Withdrawal  Goal: Alcohol Withdrawal Symptom Control  Outcome: Ongoing, Progressing     Problem: Plan of Care - These are the overarching goals to be used throughout the patient stay.    Goal: Absence of Hospital-Acquired Illness or Injury  Intervention: Identify and Manage Fall Risk  Recent Flowsheet Documentation  Taken 3/1/2022 1700 by Flako Umanzor RN  Safety Promotion/Fall Prevention:   activity supervised   bed alarm on   fall prevention program maintained   increased rounding and observation   nonskid shoes/slippers when out of bed   mobility aid in reach   patient video monitoring   safety round/check completed   Goal Outcome Evaluation: The patient is on the CIWA protocol, and on the evening shift he scored: 4, 9, 1, 11. The patient is disoriented to time and location, and situation. At times, the patient told the writer he was talking to \"Sven,\" but could not tell the writer who Sven is. He also kept telling staff that he wanted to get dressed so he could \"get ready to go.\" The patient is receiving IV fluids and antibiotics, but can tolerate PO medications.                      "

## 2022-03-03 NOTE — PLAN OF CARE
Problem: Plan of Care - These are the overarching goals to be used throughout the patient stay.    Goal: Absence of Hospital-Acquired Illness or Injury  Intervention: Identify and Manage Fall Risk  Recent Flowsheet Documentation  Taken 3/3/2022 0900 by Claire Kuhn RN  Safety Promotion/Fall Prevention:    activity supervised    bed alarm on    bedside attendant    fall prevention program maintained    increase visualization of patient    nonskid shoes/slippers when out of bed    room near nurse's station    sitter at bedside    supervised activity   Patient remains on 1:1 throughout shift.  CIWA scores 1, 10, 7, medicated x1.  Increased lactulose to TID.  Patient had XL formed BM provider notified.  K 3.7, Mag 1.6, recheck 03.04.2022.  Observed some confusion and agitation throughout shift, decreased when food was delivered.  IV abx vanco.

## 2022-03-03 NOTE — PROGRESS NOTES
Care Management Follow Up    Length of Stay (days): 6    Expected Discharge Date: 03/05/2022     Concerns to be Addressed:     Remains on CIWA protocol; Medical mgmt of anemia / abdominal pain  Patient plan of care discussed at interdisciplinary rounds: Yes    Anticipated Discharge Disposition: Home  Anticipated Discharge Services:  OP CD trx  Referrals Placed by CM/SW:  CD resources provided previously - contact information for [facility], [facility] and [facility] services in [facility].     Additional Information:  Anticipate that pt will return home with family/friend transport once medically cleared.      Assessment Hx: Pt lives alone in an apartment. He is currently unemployed, had been working as a  at a restaurant. Seeks new job to reduce financial stress from paying child support. Pt has a hx of civil commitment, has been to [facility] for short IP treatment. Former [facility] still supportive. He has completed 9 months of OP treatment. Encouraged to return to . Supportive family and sober friends.       JUAN DIEGO Hubbard

## 2022-03-03 NOTE — PLAN OF CARE
"Goal Outcome Evaluation:      Pt A/O x3, CIWA 5-9 this shift. Medicated per protocol with ativan x1. Neuro checks remains WNL. Shifts from being drowsy to agitation and increased anxiety. Sitter in room , A2/GB/W, speech remains slurred. IVF @75mL/hr. No acute changes reported this shift.     /68 (BP Location: Left arm)   Pulse 90   Temp 98.4  F (36.9  C) (Oral)   Resp 18   Ht 1.803 m (5' 11\")   Wt 68.2 kg (150 lb 4.8 oz)   SpO2 98%   BMI 20.96 kg/m      Matti Lai RN                  "

## 2022-03-03 NOTE — PLAN OF CARE
"Problem: Alcohol Withdrawal  Goal: Alcohol Withdrawal Symptom Control  Outcome: Ongoing, Not Progressing     [id number]:    A&Ox2; disoriented to time and situation. Neuro checks WNL; does follow commands, but movements are slow/deliberate. Speech slurred @ times. VSS, on RA. Lethargic/drowsy, but arousable. Patient agitated @ beginning of shift wanting to go home. Able to re-direct patient. CIWA scoring on shift, was a \"10.\" PRN  1 mg Ativan given. CIWA re-score was 7. Up A2 and pivot to BSC. Patient very unsteady. Sitter in place. No c/o nausea. PIV infusing NS @75 mL/hr. Discharge pending.                 Goal Outcome Evaluation:          "

## 2022-03-03 NOTE — PROGRESS NOTES
[facility] MEDICINE PROGRESS NOTE      Identification/Summary:   Masoud Huddleston is a 36 year old male with a past medical history of alcohol abuse/dependence/withdrawal and cirrhosis now with known esophageal varices presented with hematemesis.  Found to have an acute blood loss anemia and admitted.  Hospital course notable so far for EGD revealing esophageal varices which were banded.  Patient is experiencing mild alcohol withdrawal 2/27 and starting to advance diet. On to soft diet 3/1 for 3 days per GI and then ADAT.  Ceftriaxone and octreotide discontinued 3/1 per GI and PPI switched to PO. Worsening withdrawal with hallucinations 3/1.  Added gabapentin and continue protocol. CIWA continued to be high prompting additional labs for hepatic encephalopathy. Ammonia elevated to 66 on 3/2 and INR is trending up, last 2.01 on 3/3. Bilirubin trending up, last 11.9 on 3/3. MELD score of 24pt and alcoholic hepatitis Maddrey score of 54.22. He continues to have intermittent confusion. Course complicated by 1 of 2 blood cultures being positive for parvimonas micra. Initially treated with Vancomycin but no infectious symptoms. Dicussed with ID and could be contaminant but recommending Augmentin or Metronidazole for coverage if indicated. Metronidazole started 3/3.    Assessment and Plan:  Esophageal varices/portal hypertension/cirrhosis.  Varices banded 2/26  Completed full liquid diet for 2 days.  Now advanced to soft diet per GI recommendation, should be ADVANCE DIET AS TOLERATED(regular) as of 3/4.      Discontinue octreotide 3/1.  PPI switched from IV to PO. See procedure note for details of transition to orals.  Discontinue ceftriaxone 3/1.        Nausea and vomiting.  Resolved.     Alcohol dependence/alcohol withdrawal/acute metabolic encephalopathy/elevated ammonia  History of seizures in the past.  Was scoring high 2/26 but now only 4 on CIWA protocol 2/27.  Patient declined treatment.  He states he is already tied into AA and will get back into group meetings.  to give resources.  Scoring high again on 3/1.  Added scheduled gabapentin.  Continues to score high on 3/2. Ongoing confusion but no obvious hallucinations. Ammonia level check for concerns for hepatic encephalopathy and elevated 3/2. No bowel movement since 2/28. Lactulose increased from once daily to TID. BM per nursing - titrate to 3 loose stools daily  INR trending up, last 2.01 on 3/3.   Bilirubin trending up, last 11.9 on 3/3.  MELD score of 24 pt (19.6% mortality in 3 months)  Alcoholic hepatitis Maddrey Score 54.22, recommending steroids based on calculation although with recent GI bleed and possible parvimonas micra infection would hold steroids at this time. Plan for Metronidazole for ? parvimonas micra treatment.     Bacteremia.   1 of 2 cultures positive for parvimonas micra. ? Contamination as subsequent cultures negative.  Discussed with ID and will start metronidazole 3/3 given history.  7-day course.     Hypokalemia/hypomagnesemia.  Repleted/protocol.     Acute blood loss anemia/thrombocytopenia.  No overt signs of bleeding currently.  Hemoglobin trending up since esophageal banding 2/26. Stable around 9.0 as of 3/3.     CT with bronchial thickening.  Suspect a component of aspiration with vomiting.  Asymptomatic.  No indication for antibiotics currently.     History of Aiyana-Vizcaino tear.  As above.     Depression.     Hyponatremia/hypocalcemia.  Fluids.  Add in oral calcium carbonate.     # Severe Malnutrition: based on nutrition assessment   Greater than 5% weight loss in 1 month.  Less than 75% intake for greater than 1 month  Subcutaneous fat loss in the orbital region.    Diet: Snacks/Supplements Adult: Ensure Enlive; With Meals  Combination Diet Regular Diet; Mechanical/Dental Soft Diet; 2 gm NA Diet  DVT Prophylaxis: Ambulation  Code Status: Full Code      Disposition Plan   Expected Discharge:  03/05/2022     Anticipated discharge location: home        The patient's care was discussed with the Attending Physician, Dr. Valadez, Patient and Infectious Disease     MARQUITA PugaS  [facility]  Phone: #[phone]    Interval History/Subjective:  Confusion seems somewhat improved today, patient answering questions appropriately. He does not feel that he is confused, but per nursing staff and 1:1 the patient is still intermittently confused. No bowel movement since 2/28, lactulose dose increased from once daily to TID. Patient is requesting to go home, but discussed with him that it will likely be a few more days in the hospital with his bilirubin and INR trending up, as well as the elevated ammonia level. He has no shortness of breath, abdominal pain, chest pain or other acute concerns.    Physical Exam/Objective:  Temp:  [98.1  F (36.7  C)-98.9  F (37.2  C)] 98.9  F (37.2  C)  Pulse:  [] 89  Resp:  [16-18] 18  BP: (105-121)/(53-70) 110/61  SpO2:  [98 %-99 %] 98 %  Body mass index is 20.96 kg/m .    GENERAL:  Alert, appears comfortable, in no acute distress, appears stated age   HEAD:  Appears less confused today and answering questions appropriately.   EYES:  PERRL, conjunctiva/corneas clear, scleral icterus, EOM's intact   NOSE: Nares normal, septum midline, mucosa normal, no drainage   NECK: Supple, symmetrical, trachea midline   BACK:   Symmetric, no curvature, ROM normal   LUNGS:   Clear to auscultation bilaterally, no rales, rhonchi, or wheezing, symmetric chest rise on inhalation, respirations unlabored   CHEST WALL:  No tenderness or deformity   HEART:  Regular rate and rhythm, S1 and S2 normal, no murmur, rub, or gallop    ABDOMEN:   Soft, non-tender, bowel sounds active all four quadrants, no masses, no organomegaly, no rebound or guarding   EXTREMITIES: Extremities normal, atraumatic, no cyanosis or edema, jaundice of skin on face, upper  extremities and anterior chest.   SKIN: Dry to touch, no exanthems in the visualized areas   NEURO: Alert, oriented x3, moves all four extremities freely   PSYCH: Cooperative, mild ongoing confusion but answering questions more appropriately today and less slurred speech.     Data reviewed today: I personally reviewed all new medications, labs, imaging/diagnostics reports over the past 24 hours. Pertinent findings include:    Imaging:   No results found for this or any previous visit (from the past 24 hour(s)).    Labs:  Most Recent 3 CBC's:Recent Labs   Lab Test 03/03/22  0635 03/02/22  0436 02/28/22  0535   WBC 4.5 4.5 4.3   HGB 8.9* 9.0* 8.2*  8.2*   * 103* 99   * 78* 47*     Most Recent 3 BMP's:Recent Labs   Lab Test 03/03/22  0635 03/02/22  1053 03/02/22  0436 03/01/22  0433 02/28/22  0535     --  134*  --  128*   POTASSIUM 3.7 3.8 3.4*   < > 4.0   CHLORIDE 107  --  105  --  101   CO2 22  --  21*  --  22   BUN 5*  --  4*  --  4*   CR 0.69*  --  0.70  --  0.60*   ANIONGAP 8  --  8  --  5   FÉLIX 7.6*  --  7.5*  --  7.5*     --  117  --  111    < > = values in this interval not displayed.     Most Recent 2 LFT's:Recent Labs   Lab Test 03/03/22  0635 02/28/22  0535   AST 67* 146*   ALT 26 27   ALKPHOS 143* 127*   BILITOTAL 11.9* 7.7*     Most Recent 3 INR's:Recent Labs   Lab Test 03/03/22  0635 02/25/22  1115 12/30/20  0808   INR 2.01* 1.33* 1.21*     Medications:   Personally Reviewed.  Medications     sodium chloride 75 mL/hr at 03/03/22 0917       calcium carbonate  500 mg Oral TID     [Held by provider] cloNIDine  0.1 mg Oral Q8H     gabapentin  300 mg Oral TID     lactulose  10 g Oral TID     metroNIDAZOLE  500 mg Oral TID     mirtazapine  15 mg Oral At Bedtime     nicotine  1 patch Transdermal Q24H     nicotine   Transdermal Q8H     pantoprazole  40 mg Oral QAM AC     sodium chloride (PF)  3 mL Intracatheter Q8H     3/3/2022   [facility] :Faculty Attestation   I have seen and examined the  patient.   I have discussed the case with the PA student Suzanne Anglin.  I agree with the findings, assessment and plan.   Atul Valadez MD

## 2022-03-03 NOTE — PROGRESS NOTES
CLINICAL NUTRITION SERVICES - REASSESSMENT NOTE     Nutrition Prescription    RECOMMENDATIONS FOR MDs/PROVIDERS TO ORDER:  None at this time    Malnutrition Status:    Severe malnutrition previously noted    Recommendations already ordered by Registered Dietitian (RD):  Continue Ensure Enlive with meals    Future/Additional Recommendations:  None at this time     EVALUATION OF THE PROGRESS TOWARD GOALS   Diet: Soft, 2gm Na  Nutrition Supplement/Support: Ensure Enlive 1x/day  Intake: 50-75%     NEW FINDINGS   Patient reports eating most of his meals. Has stockpile of 4 Ensure at bedside but likes them and wants to keep getting them. Nursing assistant present as 1:1 sitter and will offer them to him cold or on ice. Tolerating soft texture without issue.    ANTHROPOMETRICS  Admission wt: 65.5 kg  Most Recent Weight: 68.2 kg      PHYSICAL FINDINGS  Per flowsheet  GI: last BM documented 2/28  Edema: +1 to ankles and feet  Skin: WDL     LABS  Reviewed: Mg 1.6, albumin 2.0, Ca 7.6 (corrected to 9.2); on Mg protocol    MEDICATIONS  Reviewed: calcium carbonate, lactulose, pantoprazole      NUTRITION DIAGNOSIS  Malnutrition related to inadequate oral intake as evidenced by wt loss of 22% in past 6 weeks, intake <75% for >/=1 month, pt reports of replacing food with alcohol, and visual evidence of fat loss    --Evaluation: ongoing    Goals:  Diet advancement vs nutrition support within 2-3 days. --MET, discontinue  Patient to consume % of nutritionally adequate meals three times per day, or the equivalent with supplements/snacks. --PART MET, ongoing  Promote appropriate wt gain -MEETING, ongoing    INTERVENTIONS  Implementation  Continue current nutrition plan  Encouraged pt to continue to eat meals for healing      Monitoring/Evaluation  Progress toward goals will be monitored and evaluated per protocol.

## 2022-03-03 NOTE — PLAN OF CARE
Problem: Plan of Care - These are the overarching goals to be used throughout the patient stay.    Goal: Plan of Care Review/Shift Note  Description: The Plan of Care Review/Shift note should be completed every shift.  The Outcome Evaluation is a brief statement about your assessment that the patient is improving, declining, or no change.  This information will be displayed automatically on your shift note.  Outcome: Ongoing, Progressing     Patient disoriented to time. Lethargic this shift. Tolerating soft diet; no N/V. Denies pain. CIWA score 5; no  medication given. Pt continues on 1:1 for safety.

## 2022-03-03 NOTE — PLAN OF CARE
Problem: Plan of Care - These are the overarching goals to be used throughout the patient stay.    Goal: Absence of Hospital-Acquired Illness or Injury  Outcome: Ongoing, Progressing  Intervention: Identify and Manage Fall Risk  Recent Flowsheet Documentation  Taken 3/3/2022 0000 by Matti Lai RN  Safety Promotion/Fall Prevention:   activity supervised   assistive device/personal items within reach   bed alarm on   clutter free environment maintained   fall prevention program maintained   increase visualization of patient   room near nurse's station   room organization consistent   safety round/check completed   treat underlying cause   treat reversible contributory factors   sitter at bedside  Intervention: Prevent Skin Injury  Recent Flowsheet Documentation  Taken 3/3/2022 0000 by Matti Lai RN  Body Position: position changed independently  Intervention: Prevent and Manage VTE (Venous Thromboembolism) Risk  Recent Flowsheet Documentation  Taken 3/3/2022 0000 by Matti Lai RN  Activity Management:   activity adjusted per tolerance   activity encouraged   up to bedside commode     Problem: Alcohol Withdrawal  Goal: Alcohol Withdrawal Symptom Control  Outcome: Ongoing, Progressing     Problem: Suicide Risk  Goal: Absence of Self-Harm  Outcome: Ongoing, Progressing

## 2022-03-04 PROBLEM — D69.6 THROMBOCYTOPENIA (H): Chronic | Status: ACTIVE | Noted: 2022-01-01

## 2022-03-04 PROBLEM — D69.6 THROMBOCYTOPENIA (H): Status: ACTIVE | Noted: 2022-01-01

## 2022-03-04 PROBLEM — K76.9 LIVER DISEASE, CHRONIC, WITH CIRRHOSIS (H): Status: ACTIVE | Noted: 2022-01-01

## 2022-03-04 PROBLEM — D50.0 IRON DEFICIENCY ANEMIA DUE TO CHRONIC BLOOD LOSS: Status: ACTIVE | Noted: 2020-05-02

## 2022-03-04 PROBLEM — F17.200 TOBACCO USE DISORDER: Status: ACTIVE | Noted: 2022-01-01

## 2022-03-04 PROBLEM — E43 SEVERE PROTEIN-CALORIE MALNUTRITION (H): Status: ACTIVE | Noted: 2022-01-01

## 2022-03-04 PROBLEM — F10.20 ALCOHOL USE DISORDER, SEVERE, DEPENDENCE (H): Status: ACTIVE | Noted: 2019-05-10

## 2022-03-04 PROBLEM — R78.81 BACTEREMIA: Chronic | Status: ACTIVE | Noted: 2022-01-01

## 2022-03-04 PROBLEM — F10.930 ALCOHOL WITHDRAWAL SYNDROME WITHOUT COMPLICATION (H): Chronic | Status: ACTIVE | Noted: 2021-05-16

## 2022-03-04 PROBLEM — K74.60 LIVER DISEASE, CHRONIC, WITH CIRRHOSIS (H): Status: ACTIVE | Noted: 2022-01-01

## 2022-03-04 PROBLEM — R78.81 BACTEREMIA: Status: ACTIVE | Noted: 2022-01-01

## 2022-03-04 PROBLEM — K76.9 LIVER DISEASE, CHRONIC, WITH CIRRHOSIS (H): Chronic | Status: ACTIVE | Noted: 2022-01-01

## 2022-03-04 PROBLEM — K92.0 HEMATEMESIS WITH NAUSEA: Chronic | Status: ACTIVE | Noted: 2022-01-01

## 2022-03-04 PROBLEM — I85.11 SECONDARY ESOPHAGEAL VARICES WITH BLEEDING (H): Chronic | Status: ACTIVE | Noted: 2022-01-01

## 2022-03-04 PROBLEM — K74.60 LIVER DISEASE, CHRONIC, WITH CIRRHOSIS (H): Chronic | Status: ACTIVE | Noted: 2022-01-01

## 2022-03-04 NOTE — PROGRESS NOTES
Summary/Update  38-year-old man day 7 of hospitalization for alcohol related problems including GI bleed varices thrombocytopenia cirrhosis.  Blood culture showed bacteremia possibly not significant but is being treated (parvimonas micra) with metronidazole    Discharge planning is pending he may need some type of TCU currently needs one-to-one supervision  Diet has been advanced labs are being monitored medications reviewed      Assessment/Plan/Narrative    Active Hospital Problems    Hematemesis with nausea      Liver disease, chronic, with cirrhosis (H)      Severe protein-calorie malnutrition (H)      Secondary esophageal varices with bleeding (H)      Alcohol withdrawal syndrome without complication (H)      Iron deficiency anemia due to chronic blood loss      Alcohol use disorder, severe, dependence (H)      Thrombocytopenia (H)      Bacteremia          Code Status full code    Discharge Planning pending he may need TCU    Subjective:  Sleeping today one-on-one    Objective:  Less than 10 mL of urine   Vital signs in last 24 hours  Temp:  [97.3  F (36.3  C)-100.2  F (37.9  C)] 98.9  F (37.2  C)  Pulse:  [100-121] 100  Resp:  [18-22] 18  BP: ()/(47-66) 105/60  SpO2:  [94 %-99 %] 98 %  Weight:       Intake/Output last 3 shifts  I/O last 3 completed shifts:  In: 5728 [P.O.:2560; I.V.:3168]  Out: -   Intake/Output this shift:  No intake/output data recorded.      Physical Exam  General: He is sleeping  VS-see above  HEENT:  Lungs:  Cor: Regular  ABD: Benign  Ext : Negative  Neuro:    Pertinent Labs   Lab Results   Component Value Date    WBC 5.7 03/04/2022    HGB 8.0 (L) 03/04/2022    HCT 25.1 (L) 03/04/2022     (H) 03/04/2022     (L) 03/04/2022     Lab Results   Component Value Date    BUN 5 (L) 03/03/2022     03/03/2022    CO2 22 03/03/2022         Rm Sparks MD.

## 2022-03-04 NOTE — PLAN OF CARE
Problem: Plan of Care - These are the overarching goals to be used throughout the patient stay.    Goal: Optimal Comfort and Wellbeing  Outcome: Ongoing, Progressing   Pt denies need for pain meds this shift. Stated no pain only a little abd bloating.    Problem: Plan of Care - These are the overarching goals to be used throughout the patient stay.    Goal: Readiness for Transition of Care  Outcome: Ongoing, Progressing   Pt cont on CIWA scored 6/4 this shift. Did not need any additional doses for CIWA. Cont on 1:1, is impulsive. Has been mostly inc this shift with a lot of urgency when needs to go. Has had 3 soft stools this shift already. 1400 dose of lactulose held. Pt has been quiet sleepy this shift. Worked with PT/OT after reapproach x1.

## 2022-03-04 NOTE — PLAN OF CARE
Problem: Pain Acute  Goal: Acceptable Pain Control and Functional Ability  Outcome: Ongoing, Not Progressing  Intervention: Prevent or Manage Pain  Recent Flowsheet Documentation  Taken 3/3/2022 1600 by Kirsten Chatterjee RN  Medication Review/Management: medications reviewed   Goal Outcome Evaluation:     Pt has denied pain this shift.      Problem: Fall Injury Risk  Goal: Absence of Fall and Fall-Related Injury  Outcome: Ongoing, Not Progressing  Intervention: Identify and Manage Contributors  Recent Flowsheet Documentation  Taken 3/3/2022 1600 by Kirsten Chatterjee RN  Medication Review/Management: medications reviewed  Intervention: Promote Injury-Free Environment  Recent Flowsheet Documentation  Taken 3/3/2022 1600 by Kirsten Chatterjee, RN  Safety Promotion/Fall Prevention:    activity supervised    bed alarm on    fall prevention program maintained    room door open    room near nurse's station    safety round/check completed   Pt on alarms. 1:1 sitter. Pt worked successfully with PT and OT today.       Problem: Alcohol Withdrawal  Goal: Alcohol Withdrawal Symptom Control  Outcome: Ongoing, Not Progressing   CIWA @ 1600 was 6. No PRN meds administered.     Problem: Acute Neurologic Deterioration (Alcohol Withdrawal)  Goal: Optimal Neurologic Function  Outcome: Ongoing, Not Progressing     Pt confusion and agitation fluctuates. 1:1 sitter.    Lactulose increased to 3 x day. Pt ammonia 66. Bilirubin 7.3. Pt had 2 xl large BM. IV fluids NS @ 75 ml/hr. K and Mg protocol. K 3.7 and Mg 1.6, both with a recheck in the morning. IV abx transitioned to oral flagyl. Pt angry at times. Increased agitation when mom here. Mom upset, crying and apologetic. Time spent with mom and pt explaining pt status.

## 2022-03-04 NOTE — PROGRESS NOTES
Care Management Follow Up    Length of Stay (days): 7    Expected Discharge Date: 03/05/2022     Concerns to be Addressed:     Remains on CIWA protocol; Medical mgmt of anemia / abdominal pain; 1:1  Patient plan of care discussed at interdisciplinary rounds: Yes    Anticipated Discharge Disposition: Home vs. TCU    Anticipated Discharge Services:  OP CD trx - Therapy REC is TCU    Referrals Placed by CM/SW:  CD resources provided previously - contact information for [facility], [facility] and [facility] services in [facility].     Additional Information:    Therapy recommendation is TCU. Pt remains on a 1:1 at this time (needs to be off for 24 hours for TCU). Referral to [facility] for joint CD/physical rehab screening. CM following for final discharge plan.       Assessment Hx: Pt lives alone in an apartment. He is currently unemployed, had been working as a  at a restaurant. Seeks new job to reduce financial stress from paying child support. Pt has a hx of civil commitment, has been to [facility] for short IP treatment. Former [facility] CM still supportive. He has completed 9 months of OP treatment. Encouraged to return to . Supportive family and sober friends.       JUAN DIEGO Hubbard

## 2022-03-04 NOTE — PLAN OF CARE
Pt orientation fluctuated overnight. Is intermittently confused. Continues to have tremors. Not clear if he continues to withdraw as he might instead be experiencing encephalopathy symptoms. Is having soft Bms. Denies pain. Was tachy overnight- 100-140s. Highest temp overnight 100.2. MD aware.     Problem: Pain Acute  Goal: Acceptable Pain Control and Functional Ability  Outcome: Ongoing, Progressing     Problem: Fall Injury Risk  Goal: Absence of Fall and Fall-Related Injury  Outcome: Ongoing, Progressing  Intervention: Promote Injury-Free Environment  Recent Flowsheet Documentation  Taken 3/3/2022 2330 by Anup Shepherd, RN  Safety Promotion/Fall Prevention:    activity supervised    chair alarm on    bed alarm on    clutter free environment maintained    fall prevention program maintained

## 2022-03-05 NOTE — PROGRESS NOTES
Summary/Update  36-year-old man with advanced alcoholic liver cirrhosis and esophageal varices admitted with hematemesis and underwent a variceal eradication and banding on 2/26/2022  He was been treated for withdrawal which is improving although he still very somnolent and oral intake is borderline  Pain does not seem to be an issue  Bacteremia of questionable significance with unusual organism parvimonas micra; and currently being treated with metronidazole  Hemoglobin reasonably stable at 8    Plan progress diet as tolerated monitor GI status psychiatry has been requested continue with the metronidazole he may need some type of senior care placement given his very low functional status despite young age of only 36  With increasing diarrhea that is getting a bit intolerable will decrease the lactulose to twice a day his mental status seems to be improving no overt changes of alcoholic encephalopathy      Assessment/Plan/Narrative    Active Hospital Problems    Hematemesis with nausea      Liver disease, chronic, with cirrhosis (H)      Severe protein-calorie malnutrition (H)      Secondary esophageal varices with bleeding (H)      Alcohol withdrawal syndrome without complication (H)      Iron deficiency anemia due to chronic blood loss      Alcohol use disorder, severe, dependence (H)      Thrombocytopenia (H)      Bacteremia          Code Status full code    Discharge Planning uncertain may need senior care care    Subjective: He is sleepy man chronically ill and a bit irritated in general but pain is not a major issue at least today he is taking some fluids p.o.      Objective:  Less than 10 mL of urine   Vital signs in last 24 hours  Temp:  [98  F (36.7  C)-100.9  F (38.3  C)] 100.9  F (38.3  C)  Pulse:  [] 99  Resp:  [16-20] 20  BP: ()/(51-65) 102/56  SpO2:  [96 %-98 %] 97 %  Weight:       Intake/Output last 3 shifts  I/O last 3 completed shifts:  In: 3522 [P.O.:1680; I.V.:1842]  Out: -   Intake/Output  this shift:  No intake/output data recorded.      Physical Exam  General: Chronically ill-appearing man looking older than stated age  VS-see above  HEENT:  Lungs: Clear  Cor: Regular  ABD: Benign  Ext :  Neuro:    Pertinent Labs   Lab Results   Component Value Date    WBC 5.7 03/04/2022    HGB 8.0 (L) 03/04/2022    HCT 25.1 (L) 03/04/2022     (H) 03/04/2022     (L) 03/04/2022     Lab Results   Component Value Date    BUN 5 (L) 03/03/2022     03/03/2022    CO2 22 03/03/2022         Rm Sparks MD.

## 2022-03-05 NOTE — PLAN OF CARE
Problem: Plan of Care - These are the overarching goals to be used throughout the patient stay.    Goal: Optimal Comfort and Wellbeing  Outcome: Ongoing, Progressing  Intervention: Monitor Pain and Promote Comfort  Recent Flowsheet Documentation  Taken 3/5/2022 5565 by Tere Campos RN  Pain Management Interventions:    declines    rest   Pt c/o of some abd pain this morning. Had been having quiet a few loose stools in a row and pt stated it was more cramping feeling. Declined need for pain meds.     Problem: Plan of Care - These are the overarching goals to be used throughout the patient stay.    Goal: Readiness for Transition of Care  Outcome: Ongoing, Progressing  Pt is more alert and oriented today. No fluctuations like yesterday. Ammonia level is decreased slightly and has been having freq loose stools today. Has been running a lot grade fever today. 100.8 and 100.9

## 2022-03-05 NOTE — PLAN OF CARE
Pt alert and oriented. CIWA scoring between 0-3. Multiple bowl movements throughout the night. Fall precaution maintained. No sitter required.        Problem: Plan of Care - These are the overarching goals to be used throughout the patient stay.    Goal: Absence of Hospital-Acquired Illness or Injury  Intervention: Prevent Skin Injury  Recent Flowsheet Documentation  Taken 3/5/2022 0014 by Maciej Vann, RN  Body Position: position changed independently  Intervention: Prevent and Manage VTE (Venous Thromboembolism) Risk  Recent Flowsheet Documentation  Taken 3/5/2022 0014 by Maciej Vann, RN  Activity Management: ambulated to bathroom  Taken 3/4/2022 2200 by Maciej Vann, RN  Activity Management: ambulated to bathroom

## 2022-03-05 NOTE — PLAN OF CARE
Patient lethargic throughout the duration of this shift. Oriented and responds appropriately when awaken. Scoring between 0-3 on CIWA. VSS. Had 3 BM this shift. 2100 lactulose dose held. 1 urine incontinence occurrence this shift. Sitter discontinued at 1900. Calls appropriately. Fall precaution maintained. Will continue with plan of care.     Problem: Alcohol Withdrawal  Goal: Alcohol Withdrawal Symptom Control  Outcome: Ongoing, Progressing     Problem: Fall Injury Risk  Goal: Absence of Fall and Fall-Related Injury  Outcome: Ongoing, Progressing   Goal Outcome Evaluation:

## 2022-03-06 NOTE — PLAN OF CARE
Patient is still somnolent and sleeping in between cares for majority of this shift. Oriented and cooperative with cares when awake. Score between 0-5 on CIWA. Had multiple BM this shift. Abdomen is distended and round. Complains of abdominal discomfort but tolerable at this time. No nausea/vomiting. NS infusing at 75 ml/hr.   Problem: Alcohol Withdrawal  Goal: Alcohol Withdrawal Symptom Control  Outcome: Ongoing, Progressing  Intervention: Minimize or Manage Alcohol Withdrawal Symptoms  Flowsheets (Taken 3/5/2022 2131)  Sensory Stimulation Regulation:    care clustered    quiet environment promoted  Aspiration Precautions: awake/alert before oral intake  Seizure Precautions:    activity supervised    clutter-free environment maintained     Problem: Acute Neurologic Deterioration (Alcohol Withdrawal)  Goal: Optimal Neurologic Function  Outcome: Ongoing, Progressing  Intervention: Minimize or Manage Acute Neurologic Symptoms  Recent Flowsheet Documentation  Taken 3/5/2022 2131 by Milad Dyer RN  Sensory Stimulation Regulation:    care clustered    quiet environment promoted  Intervention: Prevent Seizure-Related Injury  Recent Flowsheet Documentation  Taken 3/5/2022 2131 by Milad Dyer RN  Seizure Precautions:    activity supervised    clutter-free environment maintained     Problem: Substance Misuse (Alcohol Withdrawal)  Goal: Readiness for Change Identified  Outcome: Ongoing, Progressing  Intervention: Promote Psychosocial Wellbeing  Flowsheets (Taken 3/5/2022 2131)  Family/Support System Care:    self-care encouraged    support provided  Intervention: Partner to Facilitate Behavior Change  Flowsheets (Taken 3/5/2022 2131)  Supportive Measures:    verbalization of feelings encouraged    active listening utilized    self-care encouraged    positive reinforcement provided     Problem: Suicide Risk  Goal: Absence of Self-Harm  Outcome: Ongoing, Progressing  Intervention: Promote Psychosocial  Wellbeing  Recent Flowsheet Documentation  Taken 3/5/2022 2131 by Milad Dyer, RN  Supportive Measures:    verbalization of feelings encouraged    active listening utilized    self-care encouraged    positive reinforcement provided  Family/Support System Care:    self-care encouraged    support provided   Goal Outcome Evaluation:    Plan of Care Reviewed With: patient, parent     Overall Patient Progress: improving

## 2022-03-06 NOTE — PLAN OF CARE
Problem: Plan of Care - These are the overarching goals to be used throughout the patient stay.    Goal: Optimal Comfort and Wellbeing  Outcome: Ongoing, Progressing  Pt states some abd pain. Declined need for pain meds. Positioned for comfort.     Problem: Plan of Care - These are the overarching goals to be used throughout the patient stay.    Goal: Readiness for Transition of Care  Outcome: Ongoing, Progressing  Cont to run low grade fevers today. Pt did have rigors this afternoon although temp was 100.2. pt having Paracentesis this afternoon. Working with PT/OT. Stool specimen sent, neg for C-diff. UA also sent. GAURANG Kelly

## 2022-03-06 NOTE — PROGRESS NOTES
[facility] MEDICINE PROGRESS NOTE      Identification/Summary:   Masoud Huddleston is a 36 year old male with a past medical history of alcohol abuse/dependence/withdrawal and cirrhosis now with known esophageal varices presented with hematemesis.  Found to have an acute blood loss anemia and admitted.  Hospital course notable so far for EGD revealing esophageal varices which were banded.  Patient is experiencing mild alcohol withdrawal 2/27 and starting to advance diet. On to soft diet 3/1 for 3 days per GI and then ADAT.  Ceftriaxone and octreotide discontinued 3/1 per GI and PPI switched to PO. Worsening withdrawal with hallucinations 3/1.  Added gabapentin and continue protocol. CIWA continued to be high prompting additional labs for hepatic encephalopathy. Ammonia elevated to 66 on 3/2 and INR is trending up, last 2.01 on 3/3. Bilirubin trending up, last 11.9 on 3/3. MELD score of 24pt and alcoholic hepatitis Maddrey score of 54.22. He continues to have intermittent confusion. Course complicated by 1 of 2 blood cultures being positive for parvimonas micra. Initially treated with Vancomycin but no infectious symptoms. Dicussed with ID and could be contaminant but recommending Augmentin or Metronidazole for coverage if indicated. Metronidazole started 3/3.    Assessment and Plan:  Fever/diarrhea  WBC normal, no significant tachycardia, BP stable, new diarrhea with some abdominal pain  Differential diagnosis: SBP, bacteremia, C. difficile  Investigations: Blood cultures x2, C. difficile PCR, urinalysis  Hold off on adding any antibiotics    Parvimonas Micra bacteremia  Blood cultures collected 2/25, resulted 3/4/2022  Currently on metronidazole, continue same  3/3 for 7 days     Esophageal varices/portal hypertension/cirrhosis.  Varices banded 2/26  Completed full liquid diet for 2 days.  Discontinue octreotide 3/1.  PPI switched from IV to PO. See procedure note for details of  transition to orals.  Discontinue ceftriaxone 3/1.        Nausea and vomiting.  Resolved.     Alcohol dependence/alcohol withdrawal/acute metabolic encephalopathy  History of alcohol withdrawal seizures  Withdrawals resolved  Discontinue CIWA protocol    Hepatic encephalopathy  Alcoholic liver disease with portal hypertension with the esophageal varices/ascites/thrombocytopenia/coagulopathy  Ammonia level was elevated to 66, encephalopathy now resolved  Now having severe diarrhea, lactulose on hold  Total bili 11.7, INR 1.6, albumin 1.9  MELD score of 24 pt (19.6% mortality in 3 months)  Alcoholic hepatitis Maddrey Score 54.22, recommending steroids based on calculation although with recent GI bleed and possible parvimonas micra infection would hold steroids at this time. Plan for Metronidazole for ? parvimonas micra treatment.      Hypokalemia/hypomagnesemia.  Repleted/protocol.     Acute blood loss anemia/thrombocytopenia.  H dropped to 7   hemoglobin q 6   Transfuse for hemoglobin less than 7, blood consent signed and placed on the chart     CT with bronchial thickening.  Suspect a component of aspiration with vomiting.  Asymptomatic.  No indication for antibiotics currently.     History of Aiyana-Vizcaino tear.  As above.     Depression.     Hyponatremia/hypocalcemia.  Fluids.  Add in oral calcium carbonate.     # Severe Malnutrition: based on nutrition assessment   Greater than 5% weight loss in 1 month.  Less than 75% intake for greater than 1 month  Subcutaneous fat loss in the orbital region.    Diet: Snacks/Supplements Adult: Ensure Enlive; With Meals  Combination Diet Regular Diet; 2 gm NA Diet  DVT Prophylaxis: Ambulation  Code Status: Full Code      Disposition Plan   Expected Discharge: 03/07/2022     Anticipated discharge location: home      Interval History/Subjective:  Reports feeling tired, having fevers, has occasional cough, no sputum production, no shortness of breath.,  Has mild abdominal  discomfort/cramping.  Multiple bowel movements, dark stools, denies black stools or blood in the stools.    Physical Exam/Objective:  Temp:  [98.9  F (37.2  C)-100.9  F (38.3  C)] 100.9  F (38.3  C)  Pulse:  [] 99  Resp:  [16-20] 16  BP: ()/(50-65) 112/58  SpO2:  [95 %-98 %] 95 %  Body mass index is 23.96 kg/m .    General: Awake alert and comfortable, + icterus   Lungs: CTA without rales or rhonchi  Heart: S1, S2 without murmurs  Abdomen: distended with mild tenderness, bs+    CNS: Nonfocal  Extremities: No edema        Data reviewed today: I personally reviewed all new medications, labs, imaging/diagnostics reports over the past 24 hours. Pertinent findings include:    Imaging:   No results found for this or any previous visit (from the past 24 hour(s)).    Labs:  Most Recent 3 CBC's:  Recent Labs   Lab Test 03/06/22  0655 03/04/22  0937 03/03/22  0635   WBC 6.8 5.7 4.5   HGB 7.1* 8.0* 8.9*   MCV 99 102* 101*    135* 108*     Most Recent 3 BMP's:  Recent Labs   Lab Test 03/06/22  0655 03/05/22  0601 03/04/22  0937 03/03/22  0635 03/02/22  1053 03/02/22  0436 03/01/22  0433 02/28/22  0535   NA  --   --   --  137  --  134*  --  128*   POTASSIUM 4.5 4.5 3.8 3.7   < > 3.4*   < > 4.0   CHLORIDE  --   --   --  107  --  105  --  101   CO2  --   --   --  22  --  21*  --  22   BUN  --   --   --  5*  --  4*  --  4*   CR  --   --   --  0.69*  --  0.70  --  0.60*   ANIONGAP  --   --   --  8  --  8  --  5   FÉLIX  --   --   --  7.6*  --  7.5*  --  7.5*   GLC  --   --   --  100  --  117  --  111    < > = values in this interval not displayed.     Most Recent 2 LFT's:  Recent Labs   Lab Test 03/04/22  0937 03/03/22  0635   AST 64* 67*   ALT 23 26   ALKPHOS 152* 143*   BILITOTAL 11.7* 11.9*     Most Recent 3 INR's:  Recent Labs   Lab Test 03/06/22  0655 03/05/22  0601 03/04/22  0937   INR 1.96* 2.05* 2.11*     Medications:   Personally Reviewed.  Medications     sodium chloride 75 mL/hr at 03/06/22 0035        calcium carbonate  500 mg Oral TID     [Held by provider] cloNIDine  0.1 mg Oral Q8H     gabapentin  300 mg Oral TID     lactulose  10 g Oral BID 09 12     metroNIDAZOLE  500 mg Oral TID     mirtazapine  15 mg Oral At Bedtime     nicotine  1 patch Transdermal Q24H     nicotine   Transdermal Q8H     pantoprazole  40 mg Oral QAM AC     sodium chloride (PF)  3 mL Intracatheter Q8H       Nayeli Gaspar MD  Internal Medicine/ Hospitalist  [facility]  Office # [phone]

## 2022-03-06 NOTE — PLAN OF CARE
Problem: Acute Neurologic Deterioration (Alcohol Withdrawal)  Goal: Optimal Neurologic Function  Outcome: Ongoing, Not Progressing     Problem: Alcohol Withdrawal  Goal: Alcohol Withdrawal Symptom Control  Outcome: Ongoing, Progressing   Goal Outcome Evaluation:           Tmax 100.9. D/o to time. CIWA scores: 4, 4. Impulsive, setting off bed alarm frequently. PIV infusing NS at 75ml/hr. Up w/ A1/W to BSC. Multiple loose stools overnight. Discharge is pending.

## 2022-03-07 PROBLEM — K70.11 ALCOHOLIC HEPATITIS WITH ASCITES (H): Status: ACTIVE | Noted: 2022-01-01

## 2022-03-07 PROBLEM — I85.01 BLEEDING ESOPHAGEAL VARICES (H): Status: ACTIVE | Noted: 2022-01-01

## 2022-03-07 NOTE — PROGRESS NOTES
Care Management Follow Up    Length of Stay (days): 10    Expected Discharge Date: 03/07/2022     Concerns to be Addressed:    Medical mgmt of anemia / abdominal pain; CIWA discontinued 3/6  Patient plan of care discussed at interdisciplinary rounds: Yes    Anticipated Discharge Disposition: Home     Anticipated Discharge Services:  OP CD trx    Referrals Placed by CM/SW:  CD resources provided previously - contact information for [place], [facility] and [facility] services in [facility].     Additional Information:    Fever has receded and CIWA discontinued. Therapy recommendation is home with assist. Anticipate pt to return home with OP CD treatment options. Family/friend to transport. CM following.       Assessment Hx: Pt lives alone in an apartment. He is currently unemployed, had been working as a  at a restaurant. Seeks new job to reduce financial stress from paying child support. Pt has a hx of civil commitment, has been to [facility] for short IP treatment. Former [facility] CM still supportive. He has completed 9 months of OP treatment. Encouraged to return to . Supportive family and sober friends.       JUAN DIEGO Hubbard

## 2022-03-07 NOTE — PLAN OF CARE
Patient continues to be somnolent this shift but easily aroused. Continue to have low grade fever of 100.3. Answers orientation questions appropriately. Had 3 BM this shift. Paracentesis completed this afternoon. Patient reports minimal discomfort relief to abdomen after paracentesis. Abdominal pain remain tolerable  and denies the need for intervention. NS infusing at 75 ml/hr. Fall precaution maintained.   Problem: Acute Neurologic Deterioration (Alcohol Withdrawal)  Goal: Optimal Neurologic Function  Outcome: Ongoing, Progressing     Problem: Substance Misuse (Alcohol Withdrawal)  Goal: Readiness for Change Identified  Outcome: Ongoing, Progressing  Intervention: Promote Psychosocial Wellbeing  Recent Flowsheet Documentation  Taken 3/6/2022 1652 by Milad Dyer, RN  Family/Support System Care: self-care encouraged

## 2022-03-07 NOTE — PROGRESS NOTES
"GASTROENTEROLOGY PROGRESS NOTE     SUBJECTIVE: We were asked to see Masoud again for worsening LFT, diarrhea and abdominal pain  Atul is a 36 year old male with alcoholic cirrhosis with varices, ongoing alcohol use, seizures, hepatic encephalopathy and ascites. He has been through chemical dependency treatment x 3    He presented  with hematemesis and melena. He underwent an EGD on  with findings of large esophageal varices without bleeding ( banded), portal hypertensive gastropathy, duodenitis. At the time on an octreotide and pantoprazole drip. He completed withdrawals, HGB remained stable and diet was advanced with plans to repeat EGD in 4 weeks.     Total bilirubin 7.7 with slight increase from admission. Over the last four days the bilirubin has risen from 11.9 to 15.2, lipase of 137 and rise in Alk phos ( was 100 on  now 151). WBC and plt normal, HGB stable at 7.8. He is now complaining of abdominal pain with distention across the mid abdomen. No alleviating or aggravating factors. He reports taking aldactone and lasix prior to admission but discontinued these several months ago due to concerns of using them with his alcohol use. The aldactone caused breast formation and hair loss.     He has not noted black stools for a few days. He is having diarrhea with several stools per day. C diff is negative. Lactulose has been stopped.He was given pepto bismol without benefit. US has been ordered. INR stable at 1.92     NA Meld score has gone from 18 on admission to 27.     OBJECTIVE:   /72 (BP Location: Right arm)   Pulse 117   Temp 99.6  F (37.6  C) (Oral)   Resp 18   Ht 1.803 m (5' 11\")   Wt 79.4 kg (175 lb)   SpO2 97%   BMI 24.41 kg/m     Temp (24hrs), Av.8  F (37.7  C), Min:98.7  F (37.1  C), Max:100.4  F (38  C)     Patient Vitals for the past 72 hrs:   Weight   22 0630 79.4 kg (175 lb)   22 0307 77.9 kg (171 lb 12.8 oz)   22 0344 74.4 kg (164 lb)    "     Intake/Output Summary (Last 24 hours) at 3/7/2022 1348  Last data filed at 3/7/2022 0444  Gross per 24 hour   Intake 2042 ml   Output 200 ml   Net 1842 ml      PHYSICAL EXAM   Constitutional: chronically ill appearing male, slightly confused with time lines of events. Skin with bruising and petechia.   Cardiovascular: Regular rate and rhythm.   Respiratory:respirations non labored.   Abdomen: rounded mildly distended with pain in the right upper quadrant with palpation.  I have reviewed the patient's new clinical lab results:   Recent Labs   Lab Test 03/07/22  0636 03/07/22  0005 03/06/22  1835 03/06/22  1201 03/06/22  0655 03/05/22  0601 03/04/22  0937   WBC 8.7  --   --   --  6.8  --  5.7   HGB 7.8*  7.8* 7.7* 7.4*   < > 7.1*  --  8.0*   MCV 99  --   --   --  99  --  102*     --   --   --  161  --  135*   INR 1.92*  --   --   --  1.96* 2.05* 2.11*    < > = values in this interval not displayed.      Recent Labs   Lab Test 03/07/22  0636 03/06/22  0655 03/05/22  0601 03/04/22  0937 03/03/22  0635 03/02/22  1053 03/02/22  0436   *  --   --   --  137  --  134*   POTASSIUM 4.5 4.5 4.5   < > 3.7   < > 3.4*   CHLORIDE 107  --   --   --  107  --  105   CO2 19*  --   --   --  22  --  21*   BUN 16  --   --   --  5*  --  4*   CR 1.38*  --   --   --  0.69*  --  0.70   ANIONGAP 6  --   --   --  8  --  8   FÉLIX 6.9*  --   --   --  7.6*  --  7.5*    < > = values in this interval not displayed.      Recent Labs   Lab Test 03/07/22  0636 03/06/22  1243 03/06/22  0655 03/04/22  0937 03/03/22  0635 02/26/22  0503 02/25/22  1115 12/20/20  0808 12/15/20  1718 12/15/20  1304 07/10/20  0751 07/05/20  0728 05/01/20  0857 02/28/20  0954 01/09/20  0542 01/08/20  1644   ALBUMIN 1.7*  --  1.6* 1.9* 2.0*   < > 3.4*   < >  --  3.8   < > 2.6*   < > 2.4*   < >  --    BILITOTAL 15.2*  --   --  11.7* 11.9*   < > 6.4*   < >  --  0.8   < > 3.8*   < > 1.5*   < >  --    ALT 18  --   --  23 26   < > 28   < >  --  104*   < > 54*   < >  30   < >  --    AST 63*  --   --  64* 67*   < > 124*   < >  --  181*   < > 141*   < > 52*   < >  --    ALKPHOS 151*  --   --  152* 143*   < > 196*   < >  --  187*   < > 188*   < > 100   < >  --    PROTEIN  --  Negative  --   --   --   --   --   --  30 mg/dL*  --   --   --   --   --   --  Negative   LIPASE 137*  --   --   --   --   --  16  --   --  55*   < > 77*   < > 71*   < >  --    AMYLASE  --   --   --   --   --   --   --   --   --   --   --  83  --  85  --   --     < > = values in this interval not displayed.      CT chest/abdomen/pelvis 2/25/22:  IMPRESSION:   1.  Cirrhosis, moderate to severe steatosis and moderate hepatomegaly compatible with alcoholic liver disease and portal to caval collateral vein formation as detailed above consistent with portal hypertension.  2.  Cholelithiasis.  3.  Mild bronchial wall thickening throughout both lungs and mid and upper lung predominant faint micronodular peribronchiolar opacities compatible with mild nonspecific chronic active bronchial and bronchiolar inflammation/infection.    Dr. Moises Michael 2/26/2022                                                                                 Findings:        Three columns of large (> 5 mm) varices with no stigmata of recent        bleeding were found in the lower third of the esophagus. No red babita        signs were present. Three bands were successfully placed with complete        eradication, resulting in deflation of varices.        Moderate portal hypertensive gastropathy was found in the gastric body.        There is no endoscopic evidence of varices in the cardia and in the        gastric fundus.        Bilious fluid was found in the gastric body.        Patchy moderately erythematous mucosa without active bleeding and with        no stigmata of bleeding was found in the duodenal bulb.                                                                                     Moderate Sedation:        Moderate (conscious)  sedation was administered by the endoscopy nurse        and supervised by the endoscopist. The patient's oxygen saturation,        heart rate, blood pressure and response to care were monitored.   Impression:            - Large (> 5 mm) esophageal varices with no stigmata                          of recent bleeding. Completely eradicated. Banded.                          - Portal hypertensive gastropathy.                          - Bilious gastric fluid.                          - Erythematous duodenopathy.                          - No specimens collected.   Recommendation:        - [facility].                          - Clear liquid diet today. Could go to full liquid                          diet tomorrow if no further evidence of bleeding.                          Should stay on that for 2 days, followed by soft diet                          for 3 days. Can then advance diet as tolerated.                          - Continue octreotide at 50 micrograms per hour for                          another 2-3 days.                          - Use pantoprazole 40 mg IV BID for 2-3 days, then can                          change to pantoprazole/omeprazole 40 mg BID until                          discharge. Should stay on PPI 40 mg PO daily for 4                          weeks.                          - Can use acetaminophen up to 2 grams per day for post                          banding pain.                          - Repeat upper endoscopy in 4 weeks for retreatment.   Assessment: Atul is a 36 year old male with alcoholic cirrhosis with varices, ongoing alcohol use, seizures, hepatic encephalopathy and ascites. He has been through chemical dependency treatment x 3 who were were asked to see again due to onset of abdominal pain, worsening LFT and diarrhea.     1. Diarrhea - C diff has been ruled out. I do not see any potential contributing medications. Lactulose has been stopped with  ongoing incidence. May be related to biliary colic. Will await US    2. Abnormal LFT's worsening since presentation. The INR is stable. Differentials would include biliary obstruction, worsening liver function, drug reaction. Will await ultrasound     3. Hepatic encephalopathy - off lactulose- will start rifaximin. Asterixis noted.     4. Ascites previously on aldactone ( untoward reaction of breast engorgement and hair loss) and furosemide. Will await US, if ascites present will have to address diuretic therapy. Potassium 4.5. He is on potassium protocol.      5. Blood cultures positive or Parvimonas Micra bacteremia currently on metronidazole     6. Esophageal varices s/p banding 2/26- repeat 3/26.    Plan:    Await US  Trend LFT  Diuretics if indicated  EGD 3/26 - our clinic will arrange  If diarrhea persists consider low dose imodium.     GI will follow  Patient discussed with Dr. Greer.     Kayleigh Land [facility]   Office

## 2022-03-07 NOTE — PLAN OF CARE
Problem: Plan of Care - These are the overarching goals to be used throughout the patient stay.    Goal: Readiness for Transition of Care  Outcome: Ongoing, Not Progressing   A&O. No fevers overnight. NS running at 75mL/hr. K and Mg protocols. On alarms for safety.

## 2022-03-07 NOTE — PLAN OF CARE
Problem: Substance Misuse (Alcohol Withdrawal)  Goal: Readiness for Change Identified  Outcome: Ongoing, Not Progressing     Problem: Pain Acute  Goal: Acceptable Pain Control and Functional Ability  Outcome: Ongoing, Not Progressing     Problem: Alcohol Withdrawal  Goal: Alcohol Withdrawal Symptom Control  Outcome: Ongoing, Progressing     Problem: Acute Neurologic Deterioration (Alcohol Withdrawal)  Goal: Optimal Neurologic Function  Outcome: Ongoing, Progressing     Problem: Fall Injury Risk  Goal: Absence of Fall and Fall-Related Injury  Outcome: Ongoing, Progressing    Goal Outcome Evaluation:      Patient is A&Ox4 and is currently resting. Patient stated he is experiencing intermittent abdominal pain, and was provided with medications to intervene. Planning for US this afternoon and has been NPO since 0800 with the exception of sips for meds. Patient is febrile and other VSS. Plan of care to continue today per providers and care teams.

## 2022-03-07 NOTE — PROGRESS NOTES
[facility]    Medicine Progress Note - Hospitalist Service    Date of Admission:  2/25/2022    Assessment & Plan          46-year-old male with past medical history of chronic alcoholism and cirrhosis with known esophageal varices presented with hematemesis.    GI bleed  --- EGD done 2/26 revealing esophageal varices, banded  --- Treated with ceftriaxone and octreotide however these discontinued 6 days ago 3/1 Per GI  --- On oral PPI  --- Hemoglobin has been stable  --- Stop IV fluids    Alcoholic hepatitis  --- temps up to 100.9 the last two days; has diarrhea, question fever worsening to acute alcoholic hepatitis  ---Bilirubin continues to worsen  --- Total bili up to 15 this morning.  ---MELD today 29; 19.6% three month mortality  ---reconsult GI today re: start steroids?, they signed off 3/1  ---other infection work up includes; bc NGTD 3/6; paracentesis cx negative, covid and c diff negative  ---s/p small volume ascites done 3/6, 0.6 liters; not clearly SBP  ---check RUQ us today as well with fevers  --- Add Pepcid for dyspepsia.  Not clearly painful just bloated.  I suspect bloating from alcoholic hepatitis.    Acute encephalopathy  --- improved, cancel psych consult  ---Toxic related to alcohol withdrawal versus hepatic encephalopathy  ---  holding lactulose due to diarrhea.  --- GI adding rifaximin today    Alcohol withdrawal  --- Now resolving. Previous alcohol withdrawal seizures  --- CIWA discontinued    Bacteremia  --- 1/2 blood cultures positive for Parvimonas micra  --- Initially treated with vancomycin however felt to be possible contaminant; no clear infectious symptoms  --- ID felt metronidazole treatment indicated. Started 3/3 to complete 7 days    Acute blood loss anemia  ---stable.   ---stop serial hgb checks    Hyponatremia  --- Euvolemic.  On IV fluids which I am going to discontinue.  Albumin low  --- Continue to monitor.    Insomnia  --- Increase Remeron and added  "trazodone tonight.          Diet: Snacks/Supplements Adult: Ensure Enlive; With Meals  NPO for Medical/Clinical Reasons Except for: Meds    DVT Prophylaxis: Pneumatic Compression Devices  Casanvoa Catheter: Not present  Central Lines: None  Cardiac Monitoring: None  Code Status: Full Code      Disposition Plan   Expected Discharge: 03/10/2022     Anticipated discharge location: home    Delays: NA           The patient's care was discussed with the Bedside Nurse, Patient and GI Consultant.    Lala Parker MD  Hospitalist Service  [facility]  Text page via [employer] Paging/Directory         Clinically Significant Risk Factors Present on Admission                    ______________________________________________________________________    Interval History   --- Patient seen and chart reviewed and care discussed with nursing.  --- Also care discussed with GI team today.  --- He feels bloated and he feels \"like I am pregnant.\"  No change with paracentesis yesterday.  Pain is affecting his sleep and is not sleeping well.  He denies any vomiting.  No withdrawal noted  --- He thinks he was up to the bathroom at least 30 times yesterday.  Loose stools noted.  Mentation has overall been fairly clear    Data reviewed today: I reviewed all medications, new labs and imaging results over the last 24 hours.     Physical Exam   Vital Signs: Temp: 99.6  F (37.6  C) Temp src: Oral BP: 117/72 Pulse: 117   Resp: 18 SpO2: 97 % O2 Device: None (Room air)    Weight: 175 lbs 0 oz  General Appearance: Jaundiced.  Frail in appearance.  Respiratory: Clear to auscultation bilaterally  Cardiovascular: Regular rate and rhythm with a 2 out of 6 to 3 out of 6 systolic murmur heard.  GI: Somewhat firm, no clear fluid shift appreciated.  No masses.  Mild diffuse tenderness without rebound or guarding.  Skin: Mild telangiectasias noted.  Other: Nontremulous.  Calm, cooperative.  No focal deficits appreciated.  Diffusely mildly " weak.    Data   Recent Labs   Lab 03/07/22  0636 03/07/22  0005 03/06/22  1835 03/06/22  1201 03/06/22  0655 03/05/22  0601 03/04/22  0937 03/03/22  0635 03/02/22  1053 03/02/22  0436   WBC 8.7  --   --   --  6.8  --  5.7 4.5  --  4.5   HGB 7.8*  7.8* 7.7* 7.4*   < > 7.1*  --  8.0* 8.9*  --  9.0*   MCV 99  --   --   --  99  --  102* 101*  --  103*     --   --   --  161  --  135* 108*  --  78*   INR 1.92*  --   --   --  1.96* 2.05* 2.11* 2.01*   < >  --    *  --   --   --   --   --   --  137  --  134*   POTASSIUM 4.5  --   --   --  4.5 4.5 3.8 3.7   < > 3.4*   CHLORIDE 107  --   --   --   --   --   --  107  --  105   CO2 19*  --   --   --   --   --   --  22  --  21*   BUN 16  --   --   --   --   --   --  5*  --  4*   CR 1.38*  --   --   --   --   --   --  0.69*  --  0.70   ANIONGAP 6  --   --   --   --   --   --  8  --  8   FÉLIX 6.9*  --   --   --   --   --   --  7.6*  --  7.5*     --   --   --   --   --   --  100  --  117   ALBUMIN 1.7*  --   --   --  1.6*  --  1.9* 2.0*   < >  --    PROTTOTAL 5.7*  --   --   --   --   --  5.4* 5.9*   < >  --    BILITOTAL 15.2*  --   --   --   --   --  11.7* 11.9*   < >  --    ALKPHOS 151*  --   --   --   --   --  152* 143*   < >  --    ALT 18  --   --   --   --   --  23 26   < >  --    AST 63*  --   --   --   --   --  64* 67*   < >  --    LIPASE 137*  --   --   --   --   --   --   --   --   --     < > = values in this interval not displayed.     Recent Results (from the past 24 hour(s))   US Paracentesis    Narrative    EXAM:  1. PARACENTESIS  2. ULTRASOUND GUIDANCE  LOCATION: [facility]  DATE/TIME: 3/6/2022    INDICATION: Ascites. Fevers.    PROCEDURE: Informed consent obtained. Time out performed. The abdomen was prepped and draped in a sterile fashion. 10 mL of 1% lidocaine was infused into local soft tissues. A 5 Belgian catheter system was introduced into the abdominal ascites under   ultrasound guidance.    0.6 liters of clear  yellow fluid were removed and sent to lab if requested.    Patient tolerated procedure well.    Ultrasound imaging was obtained and placed in the patient's permanent medical record.      Impression    IMPRESSION:  1.  Small volume ascites isolated to the pelvis. Status post ultrasound-guided paracentesis.    Reference CPT Code: 09324

## 2022-03-08 NOTE — PROGRESS NOTES
"GASTROENTEROLOGY PROGRESS NOTE     SUBJECTIVE: still with abdominal pain and diarrhea. The Ultrasound consistent with cholelithiasis with stone noted in gallbladder and gallbladder wall thickening. He was told he needed his gallbladder out two years ago. Bilirubin remains elevated.  He also has concerns of fluid accumulation previously on furosemide 40 mg two times daily. Creatinine elevated (normal on the 3rd now 1.32).  He is developing some lower extremity edema. Diarrhea ongoing c diff negative.     OBJECTIVE:   /73 (BP Location: Right arm, Patient Position: Sitting, Cuff Size: Adult Regular)   Pulse 93   Temp 100.1  F (37.8  C) (Oral)   Resp 18   Ht 1.803 m (5' 11\")   Wt 79.4 kg (175 lb)   SpO2 97%   BMI 24.41 kg/m     Temp (24hrs), Av.4  F (37.4  C), Min:97.6  F (36.4  C), Max:100.2  F (37.9  C)     Patient Vitals for the past 72 hrs:   Weight   22 0630 79.4 kg (175 lb)   22 0307 77.9 kg (171 lb 12.8 oz)        Intake/Output Summary (Last 24 hours) at 3/8/2022 1022  Last data filed at 3/8/2022 0840  Gross per 24 hour   Intake 1440 ml   Output --   Net 1440 ml      PHYSICAL EXAM   Constitutional:chronically ill appearing male with discoloration of face from bruising.   Cardiovascular: Regular rate and rhythm.   Respiratory: Clear to auscultation bilaterally, respirations non labored.   Abdomen: abdomen rounded and painful in the right upper quadrant.     I have reviewed the patient's new clinical lab results:   Recent Labs   Lab Test 22  0634 22  0636 22  0005 22  1201 22  0655 22  0601 22  0937   WBC  --  8.7  --   --  6.8  --  5.7   HGB 7.1* 7.8*  7.8* 7.7*   < > 7.1*  --  8.0*   MCV  --  99  --   --  99  --  102*   PLT  --  178  --   --  161  --  135*   INR 2.04* 1.92*  --   --  1.96*   < > 2.11*    < > = values in this interval not displayed.      Recent Labs   Lab Test 22  0634 22  0636 22  0655 22  0937 " 03/03/22  0635   * 132*  --   --  137   POTASSIUM 4.1 4.5 4.5   < > 3.7   CHLORIDE 108* 107  --   --  107   CO2 20* 19*  --   --  22   BUN 17 16  --   --  5*   CR 1.32* 1.38*  --   --  0.69*   ANIONGAP 4* 6  --   --  8   FÉLIX 6.7* 6.9*  --   --  7.6*    < > = values in this interval not displayed.      Recent Labs   Lab Test 03/08/22  0634 03/07/22  0636 03/06/22  1243 03/06/22  0655 03/04/22  0937 02/26/22  0503 02/25/22  1115 12/20/20  0808 12/15/20  1718 12/15/20  1304 07/10/20  0751 07/05/20  0728 05/01/20  0857 02/28/20  0954 01/09/20  0542 01/08/20  1644   ALBUMIN 1.6* 1.7*  --  1.6* 1.9*   < > 3.4*   < >  --  3.8   < > 2.6*   < > 2.4*   < >  --    BILITOTAL 15.0* 15.2*  --   --  11.7*   < > 6.4*   < >  --  0.8   < > 3.8*   < > 1.5*   < >  --    ALT 17 18  --   --  23   < > 28   < >  --  104*   < > 54*   < > 30   < >  --    AST 49* 63*  --   --  64*   < > 124*   < >  --  181*   < > 141*   < > 52*   < >  --    ALKPHOS 128* 151*  --   --  152*   < > 196*   < >  --  187*   < > 188*   < > 100   < >  --    PROTEIN  --   --  Negative  --   --   --   --   --  30 mg/dL*  --   --   --   --   --   --  Negative   LIPASE  --  137*  --   --   --   --  16  --   --  55*   < > 77*   < > 71*   < >  --    AMYLASE  --   --   --   --   --   --   --   --   --   --   --  83  --  85  --   --     < > = values in this interval not displayed.    EXAM: US ABDOMEN LIMITED  LOCATION: [facility]  DATE/TIME: 3/7/2022 4:00 PM   INDICATION: Worsening liver function tests.  COMPARISON: CT 2/25/2022  TECHNIQUE: Limited abdominal ultrasound.   FINDINGS:   GALLBLADDER: Cholelithiasis with stone in the gallbladder neck. Diffuse gallbladder wall thickening could be secondary to liver disease.   BILE DUCTS: No biliary dilatation. The common duct measures 3 mm.   LIVER: Coarsened echotexture, suggesting underlying fibrosis. Nodular contour. No focal mass.   RIGHT KIDNEY: No hydronephrosis. 1.6 cm anechoic cortical cyst  right lower pole.   PANCREAS: The visualized portions are normal.   Mild ascites.                                                                 IMPRESSION:  1.  Cirrhotic appearing liver without focal hepatic mass.  2.  Mild ascites.  3.  Cholelithiasis with no bile duct dilatation. Diffuse gallbladder wall thickening likely secondary to chronic liver disease  CT chest/abdomen/pelvis 2/25/22:  IMPRESSION:   1.  Cirrhosis, moderate to severe steatosis and moderate hepatomegaly compatible with alcoholic liver disease and portal to caval collateral vein formation as detailed above consistent with portal hypertension.  2.  Cholelithiasis.  3.  Mild bronchial wall thickening throughout both lungs and mid and upper lung predominant faint micronodular peribronchiolar opacities compatible with mild nonspecific chronic active bronchial and bronchiolar inflammation/infection.     Dr. Moises Michael 2/26/2022                                                                                 Findings:        Three columns of large (> 5 mm) varices with no stigmata of recent        bleeding were found in the lower third of the esophagus. No red babita        signs were present. Three bands were successfully placed with complete        eradication, resulting in deflation of varices.        Moderate portal hypertensive gastropathy was found in the gastric body.        There is no endoscopic evidence of varices in the cardia and in the        gastric fundus.        Bilious fluid was found in the gastric body.        Patchy moderately erythematous mucosa without active bleeding and with        no stigmata of bleeding was found in the duodenal bulb.                                                                                     Moderate Sedation:        Moderate (conscious) sedation was administered by the endoscopy nurse        and supervised by the endoscopist. The patient's oxygen saturation,        heart rate, blood pressure and  response to care were monitored.   Impression:            - Large (> 5 mm) esophageal varices with no stigmata                          of recent bleeding. Completely eradicated. Banded.                          - Portal hypertensive gastropathy.                          - Bilious gastric fluid.                          - Erythematous duodenopathy.                          - No specimens collected.     Ultrasound paracentesis 3/6/2022 with 0.6 l of noninfected fluid removed.     Assessment: Atul is a 36 year old male with alcoholic cirrhosis with varices, ongoing alcohol use, seizures, hepatic encephalopathy and ascites. He has been through chemical dependency treatment x 3 who were were asked to see again due to onset of abdominal pain, worsening LFT and diarrhea.      1. Diarrhea - C diff has been ruled out. I do not see any potential contributing medications. Lactulose has been stopped with ongoing incidence. May be related to biliary colic. Discussed with Dr. Parker will start imordium.      2. Abnormal LFT's worsening since presentation. The INR is stable. Differentials would include biliary obstruction, worsening liver function, drug reaction. Ultrasound abnormal, discussed with Dr. Parker, will proceed with HIDA with EF- if abnormal then surgery consult.      3. Hepatic encephalopathy - off lactulose- will start rifaximin. Asterixis improved.      4. Ascites previously on aldactone ( untoward reaction of breast engorgement and hair loss) and furosemide. He has recurrenct fluid accumulation but worsening renal function over the last couple of days. Potassium 4.5. He is on potassium protocol.  SAAG of 1.0 on 3/6. Will check urine sodium and I will discuss nephrology consult with Dr. Greer.      5. Blood cultures positive or Parvimonas Micra bacteremia currently on metronidazole      6. Esophageal varices s/p banding 2/26- repeat 3/26.    7. Alcoholic liver disease- hepatology follow up, I recommend chemical  dependence which was discussed with the patient.     8. Abnormal creatinine- ? HRS will check urine sodium      Plan:    Await HIDA scan  Trend LFT  EGD 3/26 - our clinic will arrange  Low dose imodium  Will discuss nephrology consult with Dr. Greer.     Kayleigh Land [facility]   Office

## 2022-03-08 NOTE — CONSULTS
RENAL CONSULT NOTE    REQUESTING PROVIDER:  [facility]    REASON FOR CONSULT: rising creatinine.     ASSESSMENT/PLAN:  Acute Kidney Injury:  Likely multifactorial with intravasc volume deficit d/t third spacing with severe hypoalbuminemia, GI  Losses with por intake, and severe anemia.  Difficult to discern if evolving ATN vs HRS in this setting, urine Na pending.   With preserved baseline RF, normal CR on 3/2 admission, now 1.3 (though seems to have plateaued)  Imaging neg for hydro  Unlikely that contrast from 2/25 a factor here, Vanco last dose 3/3, also unlikely contributor  PLAN:  Agree with Albumin 50gm  3 doses (ordered by GI)   discontinue IVF's   Add prn Midodrine for syst BP <95  Add nutritional protein supp between meals  Would hold off on diuretics for now   Stop IVF's  Urine Na pending (<10-20 would support dx of HRS)  Shahram iron studies   Trend RF daily    Volume;  intrasvasc contraction, excess third spacing, very low Alb <2, excess ADH. Wt ramping up, and mostly in peritoneum and LE's, agree with Alb x 3 doses, hold off on diuretics for now, may need para in the near future with worsening ascites    Hyponatremia:  Stable low 132, +ADH with liver failure. Reflected of ADH and free water excess, would cont low Na diet for now, would hold off on FW restriction just yet, but may need to consider     Anemia:  D/t acute blood losses from bleeding varices, poor nutritional intake with EtoH abuse, hgb in the 7-8 range, has not yet needed to transfuse, would ck iron studies, transfuse if Hgb <7     Hypok/HypoMagHypoCa:  Stable, protocols in place, but not With poor intake and GI losses in the setting of chronic ETOH abuse. Ca correct normal for hypoAlb.     Acid/base: low serum c02 likely compensatory MA for resp Alk in the setting of severe liver failure.     Bacteremia: noted on 1/2 BCx (parvimonas), ID has seen, on Flagyl as of 3/3, ascites fluid neg for SBP    ETOH cirrhosis: with acute liver failure,  "with encephalopathy (was off lactulose), Portal HTN, esoph varices banded 2/26) , coagulopathy, GI bleed, on octreotide,  PPI, Rifaximin,  GI following , worsening LFT's (HIDA pending),  MELD near 30, infrequent low volume para's, mild ascites noted on 3/7 US(though quite distended today)    will need chem dep program on discharge, inpt vs outpt    Cholelithiasis/worsening LFTs:  HIDA scan pending.     Diarrhea; GI managing , on immodium , c-diff neg , lactulose on hold     Seizures; d/t ETOH withdrawal, stable      HPI: Masoud Huddleston is a 36 year old male for whom we have been asked to see for worsening renal function in the setting of ETOH liver, ? HRS vs ATN.  Admitted with hematemesis.  RF normal on admit (0.7 CR), now 1.3--though has plateaued over the last 24 hrs.  Last IV contrast was 2/25, nothing more recent. BP's has been sl soft at times, but not sig HoTNive.  He is not currently on diuretic or HTNive agents, and was not taking PTA (admits to non-compliance with PTA meds . Renal imaging unremarkable for obstructive or acute pathology. NO NSAID use of obvious nephrotoxins outside of remote contrast, timing too late to be contributory.    Pert h/o cirrhosis dx 2020, ETOH with chronic abuse dx 2021 and relapse since March 2021 (drinking 1 L vodka daily), known esoph varices banded in 2021 and again this admit. Severely anemic though hgb stable in the 7.8 range and has not yet req pRBCs.  GI was consulted, started on IV PPI, octreotide, scoped/banded.  LFt's worsening over the last few days. He has been on IVF's d/t inc gI losses, but held today.  Wt is up from 143-->  175 since admit, with excessive third spacing. Alb <2.     He denies SOB, no CP.  Admits to feeling more full (abd/ascites) over the last 48 hrs. Last para 3/6 noted on small amount of ascites <1 L (nothing to suggest SBP).  He does have ongoing low grade fevers . Poor appetite over the last few days \"force myself to order food because I " "know I need to eat.\"  NO n/v.  Modest uO. Says edema much worse today     REVIEW OF SYSTEMS:  See HPI, no new neuro changes, no recent seziures, gen fatigue noted.  Legs feel heavy.  Denies SOB.  Feels \"cold\" all the time.     Past Medical History:   Diagnosis Date     Acute alcoholic intoxication in alcoholism with complication (H)      Acute metabolic encephalopathy      Alcohol abuse      Alcoholic cirrhosis of liver without ascites (H)      Alcoholic ketoacidosis 9/19/2019     Chronic acquired lymphedema      Cirrhosis of liver (H)      Depression      ED (erectile dysfunction)      Elevated liver enzymes 4/27/2018     Esophageal varices without bleeding (H)      GI (gastrointestinal bleed)     hematemesis     Hematemesis 4/25/2018    Added automatically from request for surgery [id number]     History of transfusion      Hypokalemia 9/19/2019     Hypomagnesemia 4/27/2018     Liver disease      Aiyana-Vizcaino tear 4/27/2018     Nausea with vomiting      Neuropathy      Right patella fracture      Seizures (H)     withdrawal     Substance abuse (H)      Thrombocytopenia (H)              Social History     Tobacco Use     Smoking status: Current Every Day Smoker     Packs/day: 1.00     Types: Cigarettes     Smokeless tobacco: Never Used   Substance Use Topics     Alcohol use: Yes     Comment: a liter of Vodka everyday     Drug use: Yes     Types: Marijuana          No current facility-administered medications on file prior to encounter.  furosemide (LASIX) 40 MG tablet, TAKE 1 TABLET BY MOUTH TWICE A DAY (09:00AM & 06:00PM)  mirtazapine (REMERON) 15 MG tablet, Take 1 tablet (15 mg) by mouth At Bedtime  gabapentin (NEURONTIN) 300 MG capsule, Take 1 capsule (300 mg) by mouth 3 times daily (Patient not taking: Reported on 2/25/2022)  thiamine (B-1) 100 MG tablet, Take 1 tablet (100 mg) by mouth daily (Patient not taking: Reported on 2/25/2022)        ALLERGIES/SENSITIVITIES:  No Known Allergies       PHYSICAL EXAM:  BP " "108/53 (BP Location: Right arm)   Pulse 97   Temp 99.7  F (37.6  C) (Oral)   Resp 16   Ht 1.803 m (5' 11\")   Wt 79.4 kg (175 lb)   SpO2 95%   BMI 24.41 kg/m      Patient Vitals for the past 72 hrs:   Weight   03/07/22 0630 79.4 kg (175 lb)   03/06/22 0307 77.9 kg (171 lb 12.8 oz)     Body mass index is 24.41 kg/m .    Intake/Output Summary (Last 24 hours) at 3/8/2022 1338  Last data filed at 3/8/2022 0840  Gross per 24 hour   Intake 1440 ml   Output --   Net 1440 ml         General - A & O x 3, NAD, was side lying in bed, pleasant and interactive, able toengage in conversation about recent pmhx and events , jaundiced, petechiae on face, arms   Respiratory - sats good on RA  Cardiovascular - BP soft , rate controlled   Abdomen - ++ ascites  Extremities -2-3+ LE pitting edema  Integumentary -petechiae on face, some noted on abd/arms  Neurologic - grossly intact bu cursory bed exam , no focal deficits   Psych:  Judgement intact, affect WNL  :  Modest UO  Wt 143--->179 since admit, mostly ascites/LE edema presumably based on exam     Laboratory:  Recent Labs   Lab Test 03/08/22  1142 03/08/22  0634 03/07/22  0636 03/06/22  1201 03/06/22  0655   WBC  --   --  8.7  --  6.8   HGB 7.8* 7.1* 7.8*  7.8*   < > 7.1*   MCV  --   --  99  --  99   PLT  --   --  178  --  161   INR  --  2.04* 1.92*  --  1.96*    < > = values in this interval not displayed.      Recent Labs   Lab Test 03/08/22  0634 03/07/22  0636   POTASSIUM 4.1 4.5   CHLORIDE 108* 107   BUN 17 16      Recent Labs   Lab Test 03/08/22  0634 03/07/22  0636 03/06/22  1243 12/20/20  0808 12/15/20  1718   ALBUMIN 1.6* 1.7*  --    < >  --    BILITOTAL 15.0* 15.2*  --    < >  --    ALT 17 18  --    < >  --    AST 49* 63*  --    < >  --    PROTEIN  --   --  Negative  --  30 mg/dL*    < > = values in this interval not displayed.       Personally reviewed today's laboratory studies      Thank you for involving us in the care of this patient. We will continue " to follow along with you.      Dee Snyder, P-BC  [facility]  [phone]

## 2022-03-08 NOTE — PLAN OF CARE
Problem: Plan of Care - These are the overarching goals to be used throughout the patient stay.    Goal: Plan of Care Review/Shift Note  Description: The Plan of Care Review/Shift note should be completed every shift.  The Outcome Evaluation is a brief statement about your assessment that the patient is improving, declining, or no change.  This information will be displayed automatically on your shift note.  Outcome: Ongoing, Progressing     Patient A & O. Ultrasound completed. No complaints of abdominal pain throughout shift. Patient back on regular diet. NS 75mL/h on hold. Loose stools continue. Patient mobile in room without assistance, but staff in room. Patient on Mg and K protocol with planned rechecks in the morning.

## 2022-03-08 NOTE — PROGRESS NOTES
[facility]    Medicine Progress Note - Hospitalist Service    Date of Admission:  2/25/2022    Assessment & Plan          46-year-old male with past medical history of chronic alcoholism and cirrhosis with known esophageal varices presented with hematemesis.    GI bleed  --- EGD done 2/26 revealing esophageal varices, banded  --- Treated with ceftriaxone and octreotide however these discontinued 6 days ago 3/1 Per GI  --- On oral PPI  --- Hemoglobin has been stable  --- Stopped IV fluids  ---repeat EGD scheduled for 3/26    Alcoholic hepatitis  ---low grade temps up to 100.9, has diarrhea, question fever worsening to acute alcoholic hepatitis  ---Bilirubin continues to worsen, INR trending up  ---MELD 3/7 29; 19.6% three month mortality  ---3/7 did reconsult GI; discussed with them possibly sarting steroids  ---other infection work up includes; bc NGTD 3/6; paracentesis cx negative, covid and c diff negative  ---s/p small volume ascites done 3/6, 0.6 liters; not clearly SBP  ---3/7  RUQ us showed only cirrhosis, diffuse gb wall thickening,  ---plan hida scan today; patient somehow ate so this will be put off until 3/9  --- Added Pepcid for dyspepsia.  Not clearly painful just bloated.  I suspect bloating from alcoholic hepatitis. Stop pepcid  ---stopped IVF 3/7  --- Today added Imodium for diarrhea, C. difficile negative    TRAV  --- Trending up slightly.  Patient at risk for hepatorenal syndrome.  --- GI consulted nephrology today  --- Checking urine sodium    Hepatic encephalopathy  --- improved, cancelled psych consult  ---Toxic related to alcohol withdrawal versus hepatic encephalopathy  ---  holding lactulose due to diarrhea.  --- GI added rifaximin 3/7    Alcohol withdrawal  --- Now resolving. Previous alcohol withdrawal seizures  --- CIWA discontinued  --- Everyone's discussed cessation with him.  Refuses to go to treatment.  Wants to go home.    Bacteremia  --- 1/2 blood cultures  positive for Parvimonas micra  --- Initially treated with vancomycin however felt to be possible contaminant; no clear infectious symptoms  --- ID felt metronidazole treatment indicated. Started 3/3 to complete 7 days (3/10)    Acute blood loss anemia  ---stable. Repeat today improved  ---stopped serial hgb checks     Hyponatremia  --- Euvolemic.  On IV fluids which I am going to discontinue.  Albumin low  --- Continue to monitor.    Insomnia  --- Increase Remeron and added trazodone 3/8.          Diet: Snacks/Supplements Adult: Ensure Enlive; With Meals  Snacks/Supplements Adult: Ensure Enlive; Between Meals  NPO for Medical/Clinical Reasons Except for: Other; Specify: Sips of water  2 Gram Sodium Diet  NPO per Anesthesia Guidelines for Procedure/Surgery Except for: Meds    DVT Prophylaxis: Pneumatic Compression Devices  Casanova Catheter: Not present  Central Lines: None  Cardiac Monitoring: None  Code Status: Full Code      Disposition Plan   Expected Discharge: 03/10/2022     Anticipated discharge location: home    Delays: NA           The patient's care was discussed with the Bedside Nurse, Patient and GI Consultant.    Lala Parker MD  Hospitalist Service  [facility]  Text page via [employer] Paging/Directory         Clinically Significant Risk Factors Present on Admission                    ______________________________________________________________________    Interval History   --- Tm 100.1 yesterday  ---loose stools still  ---wants to go home but doesn't feel well enough = still bloated with some abdominal pain  --- not Sleeping well and irritable  --- Denies vomiting but not eating well.    Data reviewed today: I reviewed all medications, new labs and imaging results over the last 24 hours.     Physical Exam   Vital Signs: Temp: 99.7  F (37.6  C) Temp src: Oral BP: 108/53 Pulse: 97   Resp: 16 SpO2: 95 % O2 Device: None (Room air)    Weight: 175 lbs 0 oz  General Appearance: Jaundiced.   Frail in appearance.non-tremulous  Respiratory: Clear to auscultation bilaterally  Cardiovascular: Regular rate and rhythm with a 2 out of 6 to 3 out of 6 systolic murmur heard.  GI: Somewhat firm, no clear fluid shift appreciated.  No masses.  Slightly more epigastric tenderness  Skin: Mild telangiectasias noted.  Other: Nontremulous.  Calm, cooperative.  No focal deficits appreciated.  Diffusely mildly weak.    Data   Recent Labs   Lab 03/08/22  1142 03/08/22  0634 03/07/22  0636 03/06/22  1201 03/06/22  0655 03/05/22  0601 03/04/22  0937 03/03/22  0635   WBC  --   --  8.7  --  6.8  --  5.7 4.5   HGB 7.8* 7.1* 7.8*  7.8*   < > 7.1*  --  8.0* 8.9*   MCV  --   --  99  --  99  --  102* 101*   PLT  --   --  178  --  161  --  135* 108*   INR  --  2.04* 1.92*  --  1.96*   < > 2.11* 2.01*   NA  --  132* 132*  --   --   --   --  137   POTASSIUM  --  4.1 4.5  --  4.5   < > 3.8 3.7   CHLORIDE  --  108* 107  --   --   --   --  107   CO2  --  20* 19*  --   --   --   --  22   BUN  --  17 16  --   --   --   --  5*   CR  --  1.32* 1.38*  --   --   --   --  0.69*   ANIONGAP  --  4* 6  --   --   --   --  8   FÉLIX  --  6.7* 6.9*  --   --   --   --  7.6*   GLC  --  106 110  --   --   --   --  100   ALBUMIN  --  1.6* 1.7*  --  1.6*  --  1.9* 2.0*   PROTTOTAL  --  5.4* 5.7*  --   --   --  5.4* 5.9*   BILITOTAL  --  15.0* 15.2*  --   --   --  11.7* 11.9*   ALKPHOS  --  128* 151*  --   --   --  152* 143*   ALT  --  17 18  --   --   --  23 26   AST  --  49* 63*  --   --   --  64* 67*   LIPASE  --   --  137*  --   --   --   --   --     < > = values in this interval not displayed.     Recent Results (from the past 24 hour(s))   US Abdomen Limited    Narrative    EXAM: US ABDOMEN LIMITED  LOCATION: [facility]  DATE/TIME: 3/7/2022 4:00 PM    INDICATION: Worsening liver function tests.  COMPARISON: CT 2/25/2022  TECHNIQUE: Limited abdominal ultrasound.    FINDINGS:    GALLBLADDER: Cholelithiasis with stone in the  gallbladder neck. Diffuse gallbladder wall thickening could be secondary to liver disease.    BILE DUCTS: No biliary dilatation. The common duct measures 3 mm.    LIVER: Coarsened echotexture, suggesting underlying fibrosis. Nodular contour. No focal mass.    RIGHT KIDNEY: No hydronephrosis. 1.6 cm anechoic cortical cyst right lower pole.    PANCREAS: The visualized portions are normal.    Mild ascites.      Impression    IMPRESSION:  1.  Cirrhotic appearing liver without focal hepatic mass.  2.  Mild ascites.  3.  Cholelithiasis with no bile duct dilatation. Diffuse gallbladder wall thickening likely secondary to chronic liver disease.

## 2022-03-08 NOTE — PLAN OF CARE
Problem: Plan of Care - These are the overarching goals to be used throughout the patient stay.    Goal: Plan of Care Review/Shift Note  Description: The Plan of Care Review/Shift note should be completed every shift.  The Outcome Evaluation is a brief statement about your assessment that the patient is improving, declining, or no change.  This information will be displayed automatically on your shift note.  Outcome: Ongoing, Not Progressing     Problem: Pain Acute  Goal: Acceptable Pain Control and Functional Ability  Outcome: Ongoing, Not Progressing   Goal Outcome Evaluation:      Patient is alert and orientated times four. C/O abdominal discomfort this morning, he declined pain medication intervention at the time and instead requested something to eat. Given applesauce and crackers per his request. Continues to have loose, per patient he was up to the restroom multiple times. Patient is febrile with last temp. 100.1. Continues on K+ and mag protocols with rechecks scheduled for this AM. Able to communicate needs, uses call light appropriately.

## 2022-03-08 NOTE — PROGRESS NOTES
Jon did well throughout the day. Up independent in his room. Patient reminded to let nursing staff know when he uses the restroom so we can record accurate I&O's, started on Imodium. He was supposed to have a HIDA scan today but he ate so plan is for that to be completed tomorrow, he will be NPO at midnight and an order for this was placed. On K+ and Mg+ protocols, both are rechecks in the morning. Hgb recheck stable at 7.8.     His Mom was at the bedside and updated. Discharge plans pending medical clearance.

## 2022-03-09 NOTE — PROGRESS NOTES
[facility] MEDICINE PROGRESS NOTE      Identification/Summary:   Masoud Huddleston is a 36 year old male with a past medical history of alcohol dependence, withdrawal seizures, known esophageal varices in 2021 and alcohol related liver cirrhosis who presented to the ED on 2/25 with hematemesis. Found to have an acute blood loss anemia and admitted. Hospital course notable so far for EGD revealing esophageal varices which were banded x 3. Patient experienced mild alcohol withdrawal 2/27 and starting to advance diet per GI guidance. Ceftriaxone and octreotide discontinued 3/1 per GI and PPI switched to PO. Worsening withdrawal with hallucinations 3/1.  Added gabapentin and continue protocol. CIWA continued to be high prompting additional labs for hepatic encephalopathy. Ammonia elevated to 66 on 3/2 and INR elevated around 2. Bilirubin also trending up. Course complicated by 1 of 2 blood cultures being positive for parvimonas micra. Initially treated with Vancomycin but no infectious symptoms. Dicussed with ID and could be contaminant but recommending Augmentin or Metronidazole for coverage if indicated. Metronidazole started 3/3. He developed intermittent low grade fevers starting 3/4 with normal WBC and normal vital signs. Only new symptom was diarrhea and abdominal pain. C diff negative. UA without evidence of infection. Blood cultures negative. Patient currently still on Metronidazole through 3/10. RUQ US 3/7 with cirrhosis, mild ascites, cholelithiasis prompting HIDA scan, results pending. Nephrology consulted 3/8 with new creatinine elevation.     Assessment and Plan:  Elevated Creatinine  -- Nephrology consulted 3/8  -- Creatinine on 3/9 was 1.44, up from 1.32.  -- Per Nephrology will continue Albumin x 3 more doses, add scheduled midodrine 10 mg TID (with hold parameters for SBP > 150).  -- Plan: Recheck creatinine tomorrow. Ongoing management with Nephrology    Esophageal  varices/portal hypertension/cirrhosis.  -- EGD done 2/26 and esophageal varices banded x 3  -- Treated with Ceftriaxone and octreotide which was discontinued by GI on 3/1.   -- On oral PPI  -- Hemoglobin (see anemia below).   -- Repeat EGD scheduled for 3/26    Acute blood loss anemia/thrombocytopenia.  -- Likely d/t acute blood losses from bleeding varices, poor nutritional intake with alcohol abuse.   -- Hemoglobin 12.9 initially on presentation, dropped to 11.0 with hematemesis prior to admission.  -- Hemoglobin has continued to drop despite esophageal banding.   -- No overt signs of ongoing bleeding. No hematemesis, black stools or other abnormal bleeding.   -- Hemoglobin has steadily declined. Was stable around 8 for several days then dropped to 7 range on 3/6. Today (3/9) hemoglobin is 6.8.  -- 1 unit PRBCs given for hgb 6.8.  -- Iron binding panel performed 3/8 consistent with mixed anemia of chronic disease and iron deficiency.  -- Continue hemoglobin checks q6hr.   -- Transfuse for hemoglobin less than 7, blood consent signed and placed in the chart  -- Consider IV Venofer if no further transfusion needed.     Alcoholic hepatitis  --Bilirubin continues to be elevated, 14.5 today.  -- MELD was 18 at time of admission, 28 today; 19.6% three month mortality  -- GI consulted regarding steroid initiation. There was initial concern with steroid use given UGIB and positive culture for parvimonas micra. Will defer to GI for initiation of steroids.  -- Paracentesis performed 3/6 with 0.6 liters removed; not clearly SBP. Cultures negative.   -- RUQ US with cirrhosis, mild ascites, and cholelithiasis.  -- HIDA scan with ef performed 3/9 and showed no evidence of cholecystitis with patent cystic duct, but there is markedly decreased gallbladder ef and slow hepatocellular radiotracer uptake and prolonged retention c/w hepatocellular disease.  -- Bloating likely secondary to alcoholic hepatitis.   -- Lactulose on hold  due to severe diarrhea.  -- Coagulopathy with INR ~2.   -- B12 elevated to >2,000 on 3/9    Acute encephalopathy   -- Improved, psych consult cancelled.   -- Toxic related to alcohol withdrawal vs hepatic encephalopathy  -- Holding lactulose due to diarrhea (see below).  -- GI added Rifaximin 3/6.     Alcohol Withdrawal  -- History of alcohol withdrawal seizures  -- Withdrawals resolved  -- Discontinue CIWA protocol 3/4    Fever/diarrhea  -- WBC normal, no significant tachycardia, BP stable, new diarrhea with some abdominal pain.   -- C diff PCR negative.   -- UA negative.  -- Blood cultures x 2 without growth.   -- GI consulted and felt possible related to biliary colic.  -- Imodium given for diarrhea with some relief, currently dosed twice daily.  -- Plan: Will increase frequency from twice daily to three times daily until diarrhea improves.      Bacteremia  -- 1 of 2 blood cultures positive for Parvimonas micra  -- Initially treated with Vanocmycin however felt to be possible contaminant; no clear infectious symptoms.  -- ID consulted and felt metronidazole treatment indicated. Started 3/3 to complete 7 days.   -- Plan: Continue Metronidazole, will be completed tomorrow 3/10.    Insomnia  -- Remeron increased 3/6 and Trazodone added 3/6.  -- Patient reporting ongoing insomnia despite medications, states he is on a much higher dose of Trazodone at home for sleep. Reviewed med list prior to admission and do not see Trazodone listed.  -- Plan: Increase Trazodone from 25 mg to 50 mg at night.     Hypokalemia/hypomagnesemia.  -- Repleted/protocol.     Hyponatremia  -- Initially treated with IVF.   -- Likely secondary to liver cirrhosis.   -- Fluids discontinued 3/6.   -- IV Albumin added.  -- Plan: Continue IV albumin, continue to monitor.      # Severe Malnutrition: based on nutrition assessment   Greater than 5% weight loss in 1 month.  Less than 75% intake for greater than 1 month  Subcutaneous fat loss in the  orbital region.    Diet: Snacks/Supplements Adult: Ensure Enlive; With Meals  Snacks/Supplements Adult: Ensure Enlive; Between Meals  NPO per Anesthesia Guidelines for Procedure/Surgery Except for: Meds  DVT Prophylaxis: Ambulation  Code Status: Full Code    Disposition Plan   Expected Discharge: 03/10/2022     Anticipated discharge location: home      The patient's care was discussed with the Attending Physician, Dr. Steve Del Rio, Bedside Nurse and Patient.    MARY Puga  [facility]  Phone: #[phone]    Patient seen and examined with his physician assistant student  Plan was discussed with physician assistant student  Agree with assessment and plan as outlined above     Steve Del Rio MD  [facility] Medicine Service    Interval History/Subjective:  Mr. Huddleston continues to have abdominal discomfort today, abdominal distention and frequent stools. He also notes his lower extremity edema has worsened. He denies any shortness of breath, chest pain, nausea or vomiting. His mentation overall appears intact and he is answering questions appropriately. He is scheduled for HIDA this morning, is currently NPO. He does report ongoing insomnia. He was started on Remeron 30 mg and Trazodone 25 mg which he has taken the last two nights. Feels there has been some improvement in his sleep but he is wondering if the Trazodone can be increased as he typically takes a much higher dose at home. No other acute concerns at this time.    Physical Exam/Objective:  Temp:  [98.1  F (36.7  C)-98.9  F (37.2  C)] 98.1  F (36.7  C)  Pulse:  [90-95] 90  Resp:  [16] 16  BP: ()/(58-61) 99/58  SpO2:  [95 %-97 %] 96 %  Body mass index is 24.41 kg/m .    GENERAL:  Alert, in no acute distress.   HEAD:  Normocephalic, without obvious abnormality, atraumatic   EYES:  PERRL, scleral icterus, EOM's intact   NECK: Supple, symmetrical, trachea midline   LUNGS:   Clear to  auscultation bilaterally, no rales, rhonchi, or wheezing, symmetric chest rise on inhalation, respirations unlabored   HEART:  Regular rate. 3/6 systolic murmur heard best at the left upper sternal border.   ABDOMEN:   Somewhat firm abdomen. Moderately distended. No obvious masses. No notable tenderness on exam, no rebound or guarding.   EXTREMITIES: Extremities normal, atraumatic, no cyanosis or edema    SKIN: Dry to touch, mild telangiectasias noted. Moderate yellow color to skin   NEURO: Alert, mild asterixis noted.   PSYCH: Cooperative, behavior is appropriate, no overt confusion, answering question appropriately.     Data reviewed today: I personally reviewed all new medications, labs, imaging/diagnostics reports over the past 24 hours. Pertinent findings include:    Imaging:   Recent Results (from the past 24 hour(s))   NM Hepatobiliary Scan w GB EF    Narrative    EXAM: NM HEPATOBILIARY SCAN WITH GB EF  LOCATION: [facility]  DATE/TIME: 3/9/2022 10:53 AM    INDICATION: Right upper quadrant abdominal pain. Alcoholic hepatitis and cirrhosis. Gallbladder wall thickening and cholelithiasis.    COMPARISON: RUQ US 3/7/2022 and CT CAP 2/25/2022.    TECHNIQUE: 8.4 mCi of technetium-99m mebrofenin, IV. Anterior planar imaging of the abdomen. 1.6 mcg of cholecystokinin analog, IV. Gallbladder imaging for 30-60 minutes.    FINDINGS: Slow hepatocellular radiotracer uptake and prolonged radiotracer retention. Otherwise normal radionuclide activity in liver, gallbladder, bile ducts, and small bowel. No evidence of cystic or common duct obstruction or intrinsic liver disease.   Gallbladder ejection fraction measures 2%, which is well below the normal range of 35% or greater.      Impression    IMPRESSION:   1. No evidence of cholecystitis with patent cystic duct.  2. Markedly decreased gallbladder ejection fracture.  3. Slow hepatocellular radiotracer uptake and prolonged radiotracer retention  consistent with hepatocellular disease.         Labs:  Most Recent 3 CBC's:Recent Labs   Lab Test 03/09/22  0538 03/08/22  1142 03/08/22  0634 03/07/22  0636 03/06/22  1201 03/06/22  0655   WBC 8.1  --   --  8.7  --  6.8   HGB 6.8* 7.8* 7.1* 7.8*  7.8*   < > 7.1*   MCV 99  --   --  99  --  99     --   --  178  --  161    < > = values in this interval not displayed.     Most Recent 3 BMP's:Recent Labs   Lab Test 03/09/22  0538 03/08/22  0634 03/07/22  0636   * 132* 132*   POTASSIUM 4.2 4.1 4.5   CHLORIDE 107 108* 107   CO2 19* 20* 19*   BUN 18 17 16   CR 1.44* 1.32* 1.38*   ANIONGAP 8 4* 6   FÉLIX 6.9* 6.7* 6.9*    106 110     Most Recent 2 LFT's:Recent Labs   Lab Test 03/09/22  0538 03/08/22  0634   AST 48* 49*   ALT 14 17   ALKPHOS 114 128*   BILITOTAL 14.5* 15.0*     Most Recent 3 INR's:Recent Labs   Lab Test 03/09/22  0538 03/08/22  0634 03/07/22  0636   INR 1.93* 2.04* 1.92*     Most Recent 3 Creatinines:Recent Labs   Lab Test 03/09/22  0538 03/08/22  0634 03/07/22  0636   CR 1.44* 1.32* 1.38*     Most Recent 3 Hemoglobins:Recent Labs   Lab Test 03/09/22  0538 03/08/22  1142 03/08/22  0634   HGB 6.8* 7.8* 7.1*     Medications:   Personally Reviewed.  Medications       albumin human  50 g Intravenous BID     calcium carbonate  500 mg Oral TID     gabapentin  300 mg Oral BID     [Held by provider] lactulose  10 g Oral BID 09 12     loperamide  2 mg Oral BID     metroNIDAZOLE  500 mg Oral TID     mirtazapine  30 mg Oral At Bedtime     nicotine  1 patch Transdermal Q24H     nicotine   Transdermal Q8H     pantoprazole  40 mg Oral QAM AC     rifaximin  550 mg Oral BID     sodium chloride (PF)  3 mL Intracatheter Q8H     traZODone  25 mg Oral At Bedtime

## 2022-03-09 NOTE — PROGRESS NOTES
"GASTROENTEROLOGY PROGRESS NOTE     SUBJECTIVE: LFT's/INR stable. Anxious negative asterixis. Lactulose stopped for diarrhea ( 2 stools today) now on rifaximin HGB dropped and he has received a unit of blood. Albumin infusions, creatinine worsening. Nephrology following. Urine sodium less than 20 on a 2 gm sodium diet. Still with abdominal pain.     Worsening abdominal tightness and lower extremity edema. Lasix on hold due to renal function.     HIDA scan with low EF. WBC normal.     OBJECTIVE:   /59 (BP Location: Right arm)   Pulse 90   Temp 98.4  F (36.9  C) (Oral)   Resp 16   Ht 1.803 m (5' 11\")   Wt 79.4 kg (175 lb)   SpO2 97%   BMI 24.41 kg/m     Temp (24hrs), Av.1  F (36.7  C), Min:97.4  F (36.3  C), Max:98.9  F (37.2  C)     Patient Vitals for the past 72 hrs:   Weight   22 0630 79.4 kg (175 lb)        Intake/Output Summary (Last 24 hours) at 3/9/2022 1700  Last data filed at 3/9/2022 1630  Gross per 24 hour   Intake 350 ml   Output --   Net 350 ml      PHYSICAL EXAM   Constitutional: Chronically ill appearing male with jaundice and icterus. Anxious.   Cardiovascular: Regular rate and rhythm.   Respiratory:  respirations non labored.   Abdomen: Abdomen rounded firm with tenderness in right upper quadrant.     I have reviewed the patient's new clinical lab results:   Recent Labs   Lab Test 22  0538 22  1142 22  0634 22  0636 22  1201 22  0655   WBC 8.1  --   --  8.7  --  6.8   HGB 6.8* 7.8* 7.1* 7.8*  7.8*   < > 7.1*   MCV 99  --   --  99  --  99     --   --  178  --  161   INR 1.93*  --  2.04* 1.92*  --  1.96*    < > = values in this interval not displayed.      Recent Labs   Lab Test 22  0538 22  0634 22  0636   * 132* 132*   POTASSIUM 4.2 4.1 4.5   CHLORIDE 107 108* 107   CO2 19* 20* 19*   BUN 18 17 16   CR 1.44* 1.32* 1.38*   ANIONGAP 8 4* 6   FÉLIX 6.9* 6.7* 6.9*      Recent Labs   Lab Test 22  0538 " 03/08/22  0634 03/07/22  0636 03/06/22  1243 02/26/22  0503 02/25/22  1115 12/20/20  0808 12/15/20  1718 12/15/20  1304 07/10/20  0751 07/05/20  0728 05/01/20  0857 02/28/20  0954 01/09/20  0542 01/08/20  1644   ALBUMIN 2.1* 1.6* 1.7*  --    < > 3.4*   < >  --  3.8   < > 2.6*   < > 2.4*   < >  --    BILITOTAL 14.5* 15.0* 15.2*  --    < > 6.4*   < >  --  0.8   < > 3.8*   < > 1.5*   < >  --    ALT 14 17 18  --    < > 28   < >  --  104*   < > 54*   < > 30   < >  --    AST 48* 49* 63*  --    < > 124*   < >  --  181*   < > 141*   < > 52*   < >  --    ALKPHOS 114 128* 151*  --    < > 196*   < >  --  187*   < > 188*   < > 100   < >  --    PROTEIN  --   --   --  Negative  --   --   --  30 mg/dL*  --   --   --   --   --   --  Negative   LIPASE  --   --  137*  --   --  16  --   --  55*   < > 77*   < > 71*   < >  --    AMYLASE  --   --   --   --   --   --   --   --   --   --  83  --  85  --   --     < > = values in this interval not displayed.      3/9/2022 HIDA scan EF.   IMPRESSION:   1. No evidence of cholecystitis with patent cystic duct.  2. Markedly decreased gallbladder ejection fracture.  3. Slow hepatocellular radiotracer uptake and prolonged radiotracer retention consistent with hepatocellular disease.      EXAM: US ABDOMEN LIMITED  LOCATION: [facility]  DATE/TIME: 3/7/2022 4:00 PM   INDICATION: Worsening liver function tests.  COMPARISON: CT 2/25/2022  TECHNIQUE: Limited abdominal ultrasound.   FINDINGS:   GALLBLADDER: Cholelithiasis with stone in the gallbladder neck. Diffuse gallbladder wall thickening could be secondary to liver disease.   BILE DUCTS: No biliary dilatation. The common duct measures 3 mm.   LIVER: Coarsened echotexture, suggesting underlying fibrosis. Nodular contour. No focal mass.   RIGHT KIDNEY: No hydronephrosis. 1.6 cm anechoic cortical cyst right lower pole.   PANCREAS: The visualized portions are normal.   Mild  ascites.                                                                 IMPRESSION:  1.  Cirrhotic appearing liver without focal hepatic mass.  2.  Mild ascites.  3.  Cholelithiasis with no bile duct dilatation. Diffuse gallbladder wall thickening likely secondary to chronic liver disease  CT chest/abdomen/pelvis 2/25/22:  IMPRESSION:   1.  Cirrhosis, moderate to severe steatosis and moderate hepatomegaly compatible with alcoholic liver disease and portal to caval collateral vein formation as detailed above consistent with portal hypertension.  2.  Cholelithiasis.  3.  Mild bronchial wall thickening throughout both lungs and mid and upper lung predominant faint micronodular peribronchiolar opacities compatible with mild nonspecific chronic active bronchial and bronchiolar inflammation/infection.     Dr. Moises Michael 2/26/2022                                                                                 Findings:        Three columns of large (> 5 mm) varices with no stigmata of recent        bleeding were found in the lower third of the esophagus. No red babita        signs were present. Three bands were successfully placed with complete        eradication, resulting in deflation of varices.        Moderate portal hypertensive gastropathy was found in the gastric body.        There is no endoscopic evidence of varices in the cardia and in the        gastric fundus.        Bilious fluid was found in the gastric body.        Patchy moderately erythematous mucosa without active bleeding and with        no stigmata of bleeding was found in the duodenal bulb.                                                                                     Moderate Sedation:        Moderate (conscious) sedation was administered by the endoscopy nurse        and supervised by the endoscopist. The patient's oxygen saturation,        heart rate, blood pressure and response to care were monitored.   Impression:            - Large (> 5  mm) esophageal varices with no stigmata                          of recent bleeding. Completely eradicated. Banded.                          - Portal hypertensive gastropathy.                          - Bilious gastric fluid.                          - Erythematous duodenopathy.                          - No specimens collected.      Ultrasound paracentesis 3/6/2022 with 0.6 l of noninfected fluid removed.      Assessment: Atul is a 36 year old male with alcoholic cirrhosis with varices, ongoing alcohol use, seizures, hepatic encephalopathy and ascites. He has been through chemical dependency treatment x 3 who were were asked to see again due to onset of abdominal pain, worsening LFT and diarrhea.      1. Diarrhea - C diff has been ruled out. I do not see any potential contributing medications. Lactulose has been stopped with ongoing incidence. May be related to biliary colic. Imodium has been started with ? Some improvement- less frequency.      2. Abnormal LFT's worsening since presentation. The INR is stable. Differentials would include biliary obstruction, worsening liver function, drug reaction. Ultrasound abnormal, HIDA scan abnormal- consider surgery consult    3. Hepatic encephalopathy - off lactulose- continue rifaximin. Asterixis improved.      4. Ascites previously on aldactone ( untoward reaction of breast engorgement and hair loss) and furosemide. He has recurrenct fluid accumulation but worsening renal function over the last couple of days. Nephrology following. Urine sodium less than 20 with worsening creatinine today. Has received albumin infusions.      5. Blood cultures positive or Parvimonas Micra bacteremia currently on metronidazole      6. Esophageal varices s/p banding 2/26- repeat 3/26.     7. Alcoholic liver disease- hepatology follow up, I recommend chemical dependence which was discussed with the patient.      8. Abnormal creatinine- nephrology following.      Plan:      Trend LFT/INR  watch for worsening encephalopathy.   EGD 3/26 - our clinic will arrange  Continue lLow dose imodium  Appreciate Nephrology assistance  Consider surgical consult.     Kayleigh Land [facility]   Office

## 2022-03-09 NOTE — PLAN OF CARE
Problem: Plan of Care - These are the overarching goals to be used throughout the patient stay.    Goal: Plan of Care Review/Shift Note  Outcome: Ongoing, Progressing         Problem: Plan of Care - These are the overarching goals to be used throughout the patient stay.    Goal: Absence of Hospital-Acquired Illness or Injury  Intervention: Prevent and Manage VTE (Venous Thromboembolism) Risk  Recent Flowsheet Documentation  Taken 3/9/2022 0830 by Celi Hernandez, RN  Activity Management:   activity adjusted per tolerance   dorsiflexion, plantar flexion encouraged     Pt A&Ox4. Pt does have some weakness and tremors in bilateral arms otherwise neuros are intact. Pt complains of pain in right upper quadrant of abdomen. Pt refused PRN pain medication. Pt went for hida scan in the morning. Pt's hemoglobin was 6.8 this morning. 1 unit of RBC given. Pt tolerating well. Pt is independent in room. RN frequently making safety checks. Will continue to monitor. Safety precautions maintained.

## 2022-03-09 NOTE — PROVIDER NOTIFICATION
RN paged MD about pt's hemoglobin results and if MD wants to give blood before pt's hepatic biliary scan at 1030.

## 2022-03-09 NOTE — PROGRESS NOTES
RENAL CONSULT NOTE    REQUESTING PROVIDER:  [facility]    REASON FOR CONSULT: rising creatinine.     ASSESSMENT/PLAN:  Acute Kidney Injury:  Likely multifactorial with intravasc volume deficit d/t third spacing with severe hypoalbuminemia, GI  Losses with por intake, and severe anemia.  Difficult to discern if evolving ATN vs HRS in this setting, urine Na pending.   With preserved baseline RF, normal CR on 3/2 admission, now 1.4 , ongoing steady rise  Urine Na  <20 would support dx of HRS (urine osmol >400, ++ADH)   Imaging neg for hydro  Unlikely that contrast from 2/25 a factor here, Vanco last dose 3/3, also unlikely contributor  PLAN:  Albumin 50gm  X 3 more doses   Add scheduled Midodrine 10mg TID (with hold parameters for syst >150)  nutritional protein supp between meals  Continue to hold off on diuretics  Trend RF daily    Volume;  intrasvasc contraction, excess third spacing, very low Alb <2, excess ADH. Wt ramping up, and mostly in peritoneum and LE's, agree with Alb x 3 doses, hold off on diuretics for now, may need para in the near future with worsening ascites    Hyponatremia:  Stable low 132, +ADH with liver failure. Reflected of ADH and free water excess, would cont low Na diet for now, would hold off on FW restriction just yet, but may need to consider     Anemia:  D/t acute blood losses from bleeding varices, poor nutritional intake with ETOH abuse, hgb in the 6's today, transfusing today, iron stores low, transfuse if Hgb <7 , if no further transfusions, would given 4 day course of IV Venofer.      Hypok/HypoMagHypoCa:  Stable, protocols in place, but not With poor intake and GI losses in the setting of chronic ETOH abuse. Ca correct normal for hypoAlb.     Acid/base: low serum c02 likely compensatory MA for resp Alk in the setting of severe liver failure.     Bacteremia: noted on 1/2 BCx (parvimonas), ID has seen, on Flagyl as of 3/3, ascites fluid neg for SBP    ETOH cirrhosis: with acute  "liver failure, with encephalopathy, wasn't on lactulose PTA, and now held ), Portal HTN, esoph varices banded 2/26) , coagulopathy, GI bleed, on octreotide,  PPI, Rifaximin,  GI following , worsening LFT's (HIDA pending),  MELD near 30, infrequent low volume para's, mild ascites noted on 3/7 US(though quite distended today)    will need chem dep program on discharge, inpt vs outpt    Cholelithiasis/worsening LFTs:  HIDA scan pending.     Diarrhea; GI managing , on immodium , c-diff neg , lactulose on hold     Seizures; d/t ETOH withdrawal, stable    REVIEW OF SYSTEMS:  Kary, has been NPO for HIDA scan this a.m..  Hgb dropped and he is getting transfused this a.m.   Having diarrhea. No SOB, no CP, abd fullness and LE edema \"worse\".    Past Medical History:   Diagnosis Date     Acute alcoholic intoxication in alcoholism with complication (H)      Acute metabolic encephalopathy      Alcohol abuse      Alcoholic cirrhosis of liver without ascites (H)      Alcoholic ketoacidosis 9/19/2019     Chronic acquired lymphedema      Cirrhosis of liver (H)      Depression      ED (erectile dysfunction)      Elevated liver enzymes 4/27/2018     Esophageal varices without bleeding (H)      GI (gastrointestinal bleed)     hematemesis     Hematemesis 4/25/2018    Added automatically from request for surgery [id number]     History of transfusion      Hypokalemia 9/19/2019     Hypomagnesemia 4/27/2018     Liver disease      Aiyana-Vizcaino tear 4/27/2018     Nausea with vomiting      Neuropathy      Right patella fracture      Seizures (H)     withdrawal     Substance abuse (H)      Thrombocytopenia (H)        Social History     Tobacco Use     Smoking status: Current Every Day Smoker     Packs/day: 1.00     Types: Cigarettes     Smokeless tobacco: Never Used   Substance Use Topics     Alcohol use: Yes     Comment: a liter of Vodka everyday     Drug use: Yes     Types: Marijuana          No current facility-administered medications on file " "prior to encounter.  furosemide (LASIX) 40 MG tablet, TAKE 1 TABLET BY MOUTH TWICE A DAY (09:00AM & 06:00PM)  mirtazapine (REMERON) 15 MG tablet, Take 1 tablet (15 mg) by mouth At Bedtime  gabapentin (NEURONTIN) 300 MG capsule, Take 1 capsule (300 mg) by mouth 3 times daily (Patient not taking: Reported on 2/25/2022)  thiamine (B-1) 100 MG tablet, Take 1 tablet (100 mg) by mouth daily (Patient not taking: Reported on 2/25/2022)        ALLERGIES/SENSITIVITIES:  No Known Allergies       PHYSICAL EXAM:  BP 99/58 (BP Location: Right arm)   Pulse 90   Temp 98.1  F (36.7  C) (Oral)   Resp 16   Ht 1.803 m (5' 11\")   Wt 79.4 kg (175 lb)   SpO2 96%   BMI 24.41 kg/m      Patient Vitals for the past 72 hrs:   Weight   03/07/22 0630 79.4 kg (175 lb)     Body mass index is 24.41 kg/m .    Intake/Output Summary (Last 24 hours) at 3/8/2022 1338  Last data filed at 3/8/2022 0840  Gross per 24 hour   Intake 1440 ml   Output --   Net 1440 ml         General - A & O x 3, NAD, supine in bed, jaundiced, petechiae on face, arms   Respiratory - sats good on RA  Cardiovascular - BP soft 90  Abdomen - ++ ascites  Extremities -2-3+ LE pitting edema  Integumentary -petechiae on face, some noted on abd/arms  Neurologic - grossly intact bu cursory bed exam , no focal deficits   Psych:  Judgement intact, affect WNL  :  Modest UO  Wt 143--->175 since admit, mostly ascites/LE edema presumably based on exam     Laboratory:  Recent Labs   Lab Test 03/08/22  1142 03/08/22  0634 03/07/22  0636 03/06/22  1201 03/06/22  0655   WBC  --   --  8.7  --  6.8   HGB 7.8* 7.1* 7.8*  7.8*   < > 7.1*   MCV  --   --  99  --  99   PLT  --   --  178  --  161   INR  --  2.04* 1.92*  --  1.96*    < > = values in this interval not displayed.      Recent Labs   Lab Test 03/08/22  0634 03/07/22  0636   POTASSIUM 4.1 4.5   CHLORIDE 108* 107   BUN 17 16      Recent Labs   Lab Test 03/08/22  0634 03/07/22  0636 03/06/22  1243 12/20/20  0808 12/15/20  1718 "   ALBUMIN 1.6* 1.7*  --    < >  --    BILITOTAL 15.0* 15.2*  --    < >  --    ALT 17 18  --    < >  --    AST 49* 63*  --    < >  --    PROTEIN  --   --  Negative  --  30 mg/dL*    < > = values in this interval not displayed.       Personally reviewed today's laboratory studies      Thank you for involving us in the care of this patient. We will continue to follow along with you.      FRANCIA Reeder-BC  [facility]  [phone]

## 2022-03-10 NOTE — PROGRESS NOTES
Physical Therapy Discharge Summary    Reason for therapy discharge:    Discharged to home.    Progress towards therapy goal(s). See goals on Care Plan in [facility] electronic health record for goal details.  Goals met    Therapy recommendation(s):    No further therapy is recommended.

## 2022-03-10 NOTE — PROGRESS NOTES
Gastroenterology progress note    SUBJECTIVE:    Masoud continues to have abdominal bloating and generalized discomfort. Feels abdomen more distended.  No nausea/emesis  No Gi bleed; did receive PRBC yesterday though  Diarrhea improved, but still present.  Tolerating diet without issues  No fever/chills      OBJECTIVE:    Vitals last 24 hours    Temp:  [98  F (36.7  C)-98.8  F (37.1  C)] 98.5  F (36.9  C)  Pulse:  [90-97] 96  Resp:  [16-18] 16  BP: (110-116)/(59-73) 112/65  SpO2:  [96 %-99 %] 99 % O2 Device: None (Room air)      Body mass index is 25.26 kg/m .    Constitutional: healthy, alert and no distress   Abdomen: Abdomen tense, distended, Non tender, no rebound/guarding/rigidty  NEURO: no asterexis  JOINT/EXTREMITIES: b/l  ankle edema    LABS:    CBC:    Recent Labs   Lab Test 03/10/22  0543   WBC 7.5   RBC 2.34*   HGB 7.6*   HCT 23.1*   MCV 99   MCH 32.5   MCHC 32.9   RDW 20.6*   *       BMP:    Recent Labs   Lab 03/10/22  0543 03/09/22  0538 03/08/22  0634   *  135* 134* 132*   POTASSIUM 3.9  3.9  3.9 4.2 4.1   CHLORIDE 108*  108* 107 108*   CO2 19*  19* 19* 20*     111 110 106   CR 1.33*  1.33* 1.44* 1.32*   BUN 16  16 18 17       Liver/Pancreas:    Recent Labs   Lab 03/10/22  0543 03/09/22  0538 03/08/22  0634 03/07/22  0636   AST 53* 48* 49* 63*   ALT 12 14 17 18   ALKPHOS 100 114 128* 151*   BILITOTAL 15.0* 14.5* 15.0* 15.2*   LIPASE  --   --   --  137*       Recent Labs   Lab Test 03/10/22  0543   INR 2.10*       IMAGING:      US Abdomen Limited    Result Date: 3/7/2022  EXAM: US ABDOMEN LIMITED LOCATION: [facility] DATE/TIME: 3/7/2022 4:00 PM INDICATION: Worsening liver function tests. COMPARISON: CT 2/25/2022 TECHNIQUE: Limited abdominal ultrasound. FINDINGS: GALLBLADDER: Cholelithiasis with stone in the gallbladder neck. Diffuse gallbladder wall thickening could be secondary to liver disease. BILE DUCTS: No biliary dilatation. The common duct  measures 3 mm. LIVER: Coarsened echotexture, suggesting underlying fibrosis. Nodular contour. No focal mass. RIGHT KIDNEY: No hydronephrosis. 1.6 cm anechoic cortical cyst right lower pole. PANCREAS: The visualized portions are normal. Mild ascites.     IMPRESSION: 1.  Cirrhotic appearing liver without focal hepatic mass. 2.  Mild ascites. 3.  Cholelithiasis with no bile duct dilatation. Diffuse gallbladder wall thickening likely secondary to chronic liver disease.     NM Hepatobiliary Scan w GB EF    Result Date: 3/9/2022  EXAM: NM HEPATOBILIARY SCAN WITH GB EF LOCATION: [facility] DATE/TIME: 3/9/2022 10:53 AM INDICATION: Right upper quadrant abdominal pain. Alcoholic hepatitis and cirrhosis. Gallbladder wall thickening and cholelithiasis. COMPARISON: RUQ US 3/7/2022 and CT CAP 2/25/2022. TECHNIQUE: 8.4 mCi of technetium-99m mebrofenin, IV. Anterior planar imaging of the abdomen. 1.6 mcg of cholecystokinin analog, IV. Gallbladder imaging for 30-60 minutes. FINDINGS: Slow hepatocellular radiotracer uptake and prolonged radiotracer retention. Otherwise normal radionuclide activity in liver, gallbladder, bile ducts, and small bowel. No evidence of cystic or common duct obstruction or intrinsic liver disease. Gallbladder ejection fraction measures 2%, which is well below the normal range of 35% or greater.     IMPRESSION: 1. No evidence of cholecystitis with patent cystic duct. 2. Markedly decreased gallbladder ejection fracture. 3. Slow hepatocellular radiotracer uptake and prolonged radiotracer retention consistent with hepatocellular disease.       ASSESSMENT/plan:    Masoud Huddleston is a 36 yr old male with alcoholic cirrhosis with portal hypertension, esophageal varices, hepatic encephalopathy and ascites. Now with acute kidney injury.    Esophageal varices/portal hypertensive gastropathy/anemia: Underwent banding on 2/26/22. No overt GI bleeding; planned repeat EGD in 4-6 weeks from  recent exam.    Hepatic encephalopathy: Currently no encephalopathy. Will continue rifaximin. Lactulose on hold due to diarrhea    Diarrhea: No c diff; off lactulose; somewhat improved    Ascites: May need repeat paracentesis, consider repeat US abd tomorrow    Cirrhosis: Needs absolute ETOH abstinence. LFT's now stable.    TRAV: management per Nephrology team; slightly better after albumin and midodrine    Abd discomfort: May represent tense ascites; also documented to have severe gallbladder dyskinesia on HIDA scan and could represent symptomatic gallstone disease; awaiting surgery eval      Patient Active Problem List   Diagnosis     Alcoholic intoxication without complication (H)     Alcohol withdrawal syndrome without complication (H)     Hypokalemia     Hypomagnesemia     Scleral icterus     Hematemesis with nausea     Bleeding esophageal varices (H)     Alcohol use disorder, severe, dependence (H)     Iron deficiency anemia due to chronic blood loss     Liver disease, chronic, with cirrhosis (H)     Severe protein-calorie malnutrition (H)     Thrombocytopenia (H)     Tobacco use disorder     Bacteremia     Alcoholic hepatitis with ascites       Approximately 20 minutes of total time was spent providing patient care, including patient evaluation, reviewing documentation/test results, and .    Francisco Javier Greer MD on 3/10/2022 at 2:33 PM   Thank you for the opportunity to participate in the care of this patient. Please feel free to call with any questions or concerns. [phone]

## 2022-03-10 NOTE — PLAN OF CARE
Problem: Plan of Care - These are the overarching goals to be used throughout the patient stay.    Goal: Plan of Care Review/Shift Note  Description: The Plan of Care Review/Shift note should be completed every shift.  The Outcome Evaluation is a brief statement about your assessment that the patient is improving, declining, or no change.  This information will be displayed automatically on your shift note.  Outcome: Ongoing, Progressing     Patient A & O throughout shift. Mood good. No complaints of pain. Left IV saline locked. Iron and albumin infused, as prescribed. Hgb stable at 7.6. On Mg and K protocol. Normal today. Recheck tomorrow planned. Per nephrology, for ongoing cr and fluid issues midodrine. Venofer, and Ph.NA.K prescribed, which is a change.

## 2022-03-10 NOTE — PROGRESS NOTES
RENAL CONSULT NOTE    REQUESTING PROVIDER:  [facility]    REASON FOR CONSULT: rising creatinine.     ASSESSMENT/PLAN:  Acute Kidney Injury:  RF sl better today after Alb, transfusion and scheduled Midodrine.  Likely multifactorial with intravasc volume deficit d/t third spacing with severe hypoalbuminemia, GI  Losses with por intake, and severe anemia.  Difficult to discern if evolving ATN vs HRS in this setting, urine Na pending.   With preserved baseline RF, normal CR on 3/2 admission, now 1.4 , ongoing steady rise  Urine Na  <20 would support dx of HRS (urine osmol >400, ++ADH)   Imaging neg for hydro  Unlikely that contrast from 2/25 a factor here, Vanco last dose 3/3, also unlikely contributor  PLAN:  Cont scheduled Midodrine 10mg TID (with hold parameters for syst >150)  nutritional protein supp between meals  Continue to hold off on diuretics today  Course of Venofer x 4 post transfusion   Phos NaK x 2 doses today (hypophos)  May need to consider diuretic attempt again soon, will be challenging if BP remains soft    Trend RF daily    Volume; ongoing uptic in wt, excess third spacing, very low Alb 2.7 post replacement, excess ADH, s/p Alb x 48 hrs, hold off on diuretics for now, may need para in the near future with worsening ascites    Hyponatremia:  Stable low 132-135 range, +ADH with liver failure, improving with Alb and BP support, would cont low Na diet for now, would hold off on FW restriction just yet, but may need to consider     Anemia:  D/t acute blood losses from bleeding varices, poor nutritional intake with ETOH abuse, hgb in the 6's today, transfusing today, iron stores low, transfuse if Hgb <7 , if no further transfusions, would given 4 day course of IV Venofer.      Lytes/minerals:  Shruti CA low normal for hypoalb, K controlled, phos Low (likely d/dt resp Alk + diarrhea+ poor intake ,chronic ETOH abuse. Ca correct normal for hypoAlb.     Acid/base: low serum c02 likely compensatory MA for  resp Alk in the setting of severe liver failure.     Bacteremia: noted on 1/2 BCx (parvimonas), ID has seen, on Flagyl as of 3/3, ascites fluid neg for SBP    ETOH cirrhosis: with acute liver failure, with encephalopathy, wasn't on lactulose PTA, and now held ), Portal HTN, esoph varices banded 2/26) , coagulopathy, GI bleed, on octreotide,  PPI, Rifaximin,  GI following , worsening LFT's (HIDA pending),  MELD near 30, infrequent low volume para's, mild ascites noted on 3/7 US(though quite distended)   will need chem dep program on discharge, inpt vs outpt (pt reportedly refusin)     Cholelithiasis/worsening LFTs: LFt's stable today,  s/p  HIDA scan, sluggish GB, surg to see (pt having abd pain)     Diarrhea; seems to be sl better, GI managing , was on immodium , c-diff neg , lactulose on hold     Seizures; d/t ETOH withdrawal, stable    REVIEW OF SYSTEMS:  Tired, very distended, though not painful (had reported pain to GI NP).  Reviewed RF at least stable today, BP better with Midodrine support.  Edema seems sl worse to his today and distention worse again.  His wt is going up.  Discussed with GI NP and MD.  MELD not bad enough to refer to transplant, + failed sobriety recently.  Prognosis quite guarded.      Past Medical History:   Diagnosis Date     Acute alcoholic intoxication in alcoholism with complication (H)      Acute metabolic encephalopathy      Alcohol abuse      Alcoholic cirrhosis of liver without ascites (H)      Alcoholic ketoacidosis 9/19/2019     Chronic acquired lymphedema      Cirrhosis of liver (H)      Depression      ED (erectile dysfunction)      Elevated liver enzymes 4/27/2018     Esophageal varices without bleeding (H)      GI (gastrointestinal bleed)     hematemesis     Hematemesis 4/25/2018    Added automatically from request for surgery [id number]     History of transfusion      Hypokalemia 9/19/2019     Hypomagnesemia 4/27/2018     Liver disease      Aiyana-Vizcaino tear 4/27/2018     Nausea  "with vomiting      Neuropathy      Right patella fracture      Seizures (H)     withdrawal     Substance abuse (H)      Thrombocytopenia (H)        Social History     Tobacco Use     Smoking status: Current Every Day Smoker     Packs/day: 1.00     Types: Cigarettes     Smokeless tobacco: Never Used   Substance Use Topics     Alcohol use: Yes     Comment: a liter of Vodka everyday     Drug use: Yes     Types: Marijuana          No current facility-administered medications on file prior to encounter.  furosemide (LASIX) 40 MG tablet, TAKE 1 TABLET BY MOUTH TWICE A DAY (09:00AM & 06:00PM)  mirtazapine (REMERON) 15 MG tablet, Take 1 tablet (15 mg) by mouth At Bedtime  gabapentin (NEURONTIN) 300 MG capsule, Take 1 capsule (300 mg) by mouth 3 times daily (Patient not taking: Reported on 2/25/2022)  thiamine (B-1) 100 MG tablet, Take 1 tablet (100 mg) by mouth daily (Patient not taking: Reported on 2/25/2022)        ALLERGIES/SENSITIVITIES:  No Known Allergies       PHYSICAL EXAM:  /65 (BP Location: Left arm)   Pulse 96   Temp 98.5  F (36.9  C) (Oral)   Resp 16   Ht 1.803 m (5' 11\")   Wt 82.1 kg (181 lb 1.6 oz)   SpO2 99%   BMI 25.26 kg/m      Patient Vitals for the past 72 hrs:   Weight   03/10/22 0336 82.1 kg (181 lb 1.6 oz)     Body mass index is 25.26 kg/m .    Intake/Output Summary (Last 24 hours) at 3/8/2022 1338  Last data filed at 3/8/2022 0840  Gross per 24 hour   Intake 1440 ml   Output --   Net 1440 ml         General - A & O x 3, NAD, supine in bed, jaundiced, petechiae on face, arms unchanged  Respiratory - sats good on RA  Cardiovascular - BP sl better 110  Abdomen - +++ ascites  Extremities - 3+ LE pitting edema  Integumentary -petechiae on face, some noted on abd/arms  Neurologic - grossly intact bu cursory bed exam , no focal deficits   Psych:  Judgement intact, affect WNL  :  Modest UO  Wt 143--->181 since admit, mostly ascites/LE edema presumably based on exam     Laboratory:  Recent Labs "   Lab Test 03/08/22  1142 03/08/22  0634 03/07/22  0636 03/06/22  1201 03/06/22  0655   WBC  --   --  8.7  --  6.8   HGB 7.8* 7.1* 7.8*  7.8*   < > 7.1*   MCV  --   --  99  --  99   PLT  --   --  178  --  161   INR  --  2.04* 1.92*  --  1.96*    < > = values in this interval not displayed.      Recent Labs   Lab Test 03/08/22  0634 03/07/22  0636   POTASSIUM 4.1 4.5   CHLORIDE 108* 107   BUN 17 16      Recent Labs   Lab Test 03/08/22  0634 03/07/22  0636 03/06/22  1243 12/20/20  0808 12/15/20  1718   ALBUMIN 1.6* 1.7*  --    < >  --    BILITOTAL 15.0* 15.2*  --    < >  --    ALT 17 18  --    < >  --    AST 49* 63*  --    < >  --    PROTEIN  --   --  Negative  --  30 mg/dL*    < > = values in this interval not displayed.       Personally reviewed today's laboratory studies      Thank you for involving us in the care of this patient. We will continue to follow along with you.      Dee Snyder, [facility]  [facility]  [phone]

## 2022-03-10 NOTE — PROGRESS NOTES
[facility] MEDICINE PROGRESS NOTE      Identification/Summary:   Masoud Huddleston is a 36 year old male with a past medical history of alcohol dependence, withdrawal seizures, known esophageal varices in 2021 and alcohol related liver cirrhosis who presented to the ED on 2/25 with hematemesis. Found to have an acute blood loss anemia and admitted. Hospital course notable so far for EGD revealing esophageal varices which were banded x 3. Patient experienced mild alcohol withdrawal 2/27 and starting to advance diet per GI guidance. Ceftriaxone and octreotide discontinued 3/1 per GI and PPI switched to PO. Worsening withdrawal with hallucinations 3/1.  Added gabapentin and continue protocol. CIWA continued to be high prompting additional labs for hepatic encephalopathy. Ammonia elevated to 66 on 3/2 and INR elevated around 2. Bilirubin also trending up. Course complicated by 1 of 2 blood cultures being positive for parvimonas micra. Initially treated with Vancomycin but no infectious symptoms. Dicussed with ID and could be contaminant but recommending Augmentin or Metronidazole for coverage if indicated. Metronidazole started 3/3. He developed intermittent low grade fevers starting 3/4 with normal WBC and normal vital signs. Only new symptom was diarrhea and abdominal pain. C diff negative. UA without evidence of infection. Blood cultures negative. Patient currently still on Metronidazole through 3/10. RUQ US 3/7 with cirrhosis, mild ascites, cholelithiasis prompting HIDA scan, results pending. Nephrology consulted 3/8 with new creatinine elevation.     Assessment and Plan:  Elevated Creatinine  -- Nephrology consulted 3/8  -- Creatinine on 3/10 slightly improved from 1.44 to 1.33  -- Plan: Recheck creatinine tomorrow. Ongoing management with Nephrology     Esophageal varices/portal hypertension/cirrhosis.  -- EGD done 2/26 and esophageal varices banded x 3  -- Treated with Ceftriaxone and  octreotide which was discontinued by GI on 3/1.   -- On oral PPI  -- Hemoglobin (see anemia below).   -- Repeat EGD scheduled for 3/26     Acute blood loss anemia/thrombocytopenia.  -- Likely d/t acute blood losses from bleeding varices, poor nutritional intake with alcohol abuse.   -- Hemoglobin 12.9 initially on presentation, dropped to 11.0 with hematemesis prior to admission.  -- Hemoglobin has continued to drop despite esophageal banding.   -- No overt signs of ongoing bleeding. No hematemesis, black stools or other abnormal bleeding.   -- Hemoglobin has steadily declined. Was stable around 8 for several days then dropped to 7 range on 3/6. Today (3/9) hemoglobin is 6.8.  -- 1 unit PRBCs given for hgb 6.8 on 3/9.   -- Iron binding panel performed 3/8 consistent with mixed anemia of chronic disease and iron deficiency.  -- Transfuse for hemoglobin less than 7, blood consent signed and placed in the chart  -- Patient started on IV Venofer x 4 doses 03/10.      Alcoholic hepatitis  --Bilirubin continues to be elevated, 15.0 today.  -- MELD was 18 at time of admission, 29 today; 19.6% three month mortality  -- GI consulted regarding steroid initiation. There was initial concern with steroid use given UGIB and positive culture for parvimonas micra. Will defer to GI for initiation of steroids.  -- Paracentesis performed 3/6 with 0.6 liters removed; not clearly SBP. Cultures negative.   -- RUQ US with cirrhosis, mild ascites, and cholelithiasis.  -- HIDA scan with ef performed 3/9 and showed no evidence of cholecystitis with patent cystic duct, but there is markedly decreased gallbladder ef and slow hepatocellular radiotracer uptake and prolonged retention c/w hepatocellular disease.  -- Bloating likely secondary to alcoholic hepatitis.   -- Lactulose on hold due to severe diarrhea.  -- Coagulopathy with INR ~2.   -- B12 elevated to >2,000 on 3/9    Cardiac Murmur  -- Patient reports history of cardiac murmur as a  child but thought this had resolved.  -- 3/6 systolic murmur heard on exam.  -- Last echocardiogram was in January 2020 and was normal at that time. Given unknown duration of this murmur will repeat echocardiogram today.  -- Echocardiogram 3/10 with EF 60-65% and normal LV/RV functioning. 1+ tricuspid regurgitation, otherwise valves appeared normal.  -- No further workup indicated at this time. Will continue to follow.      Acute encephalopathy   -- Improved, psych consult cancelled.   -- Toxic related to alcohol withdrawal vs hepatic encephalopathy  -- Holding lactulose due to diarrhea (see below).  -- GI added Rifaximin 3/6.      Alcohol Withdrawal  -- History of alcohol withdrawal seizures  -- Withdrawals resolved  -- Discontinue CIWA protocol 3/4     Fever/diarrhea  -- WBC normal, no significant tachycardia, BP stable, new diarrhea with some abdominal pain.   -- C diff PCR negative.   -- UA negative.  -- Blood cultures x 2 without growth.   -- GI consulted and felt possible related to biliary colic.  -- Imodium given for diarrhea with some relief, currently dosed twice daily.   -- Plan: Continue Imodium twice daily. Will continue to monitor.     Bacteremia  -- 1 of 2 blood cultures positive for Parvimonas micra  -- Initially treated with Vanocmycin however felt to be possible contaminant; no clear infectious symptoms.  -- ID consulted and felt metronidazole treatment indicated, treated 3/3-3/10.      Insomnia  -- Improved with Remeron 30 mg and Trazodone 50 mg at night.      Hypokalemia/hypomagnesemia.  -- Repleted/protocol.     Hyponatremia  -- Initially treated with IVF.   -- Likely secondary to liver cirrhosis.   -- Fluids discontinued 3/6.   -- IV Albumin added.  -- Plan: Continue IV albumin, continue to monitor.      # Severe Malnutrition: based on nutrition assessment   Greater than 5% weight loss in 1 month.  Less than 75% intake for greater than 1 month  Subcutaneous fat loss in the orbital  "region.    Diet: Snacks/Supplements Adult: Ensure Enlive; With Meals  Snacks/Supplements Adult: Ensure Enlive; Between Meals  Regular Diet Adult  DVT Prophylaxis:  Ambulation  Code Status: Full Code    Disposition Plan   Expected Discharge: 03/11/2022     Anticipated discharge location: home      The patient's care was discussed with the Attending Physician, Dr. Steve Del Rio, Bedside Nurse and Patient.    ASHLEY Puga-S  [facility]  Phone: #[phone]    Patient seen and examined  Patient discussed with physician assistant student  Agree with assessment and plan as outlined above    Steve Del Rio MD  [facility] Medicine Service    Interval History/Subjective:  He appears to be doing better today. States he was urinating more yesterday and it felt more \"normal\". He continues to have diarrhea but the Imodium has helped significantly and he only had 6 stools since yesterday. He continues to have abdominal distention and diffuse abdominal discomfort, primarily RUQ. He denies any fevers or chills. He also feels maybe slightly short of breath but he also has some shortness of breath at baseline from his tobacco use. He denies chest pain. Bilateral lower extremity swelling still present, he uses compression stockings at home for his swelling. Denies any other symptomatic concerns. He has been eating well.    Physical Exam/Objective:  Temp:  [97.4  F (36.3  C)-98.8  F (37.1  C)] 98.8  F (37.1  C)  Pulse:  [88-97] 94  Resp:  [16-18] 18  BP: ()/(58-73) 110/60  SpO2:  [96 %-98 %] 97 %  Body mass index is 25.26 kg/m .    GENERAL:  Alert, appears comfortable, in no acute distress, appears stated age   HEAD:  Normocephalic, without obvious abnormality, atraumatic   EYES:  PERRL, scleral icterus, EOM's intact   NECK: Supple, symmetrical, trachea midline   LUNGS:   Clear to auscultation bilaterally, no rales, rhonchi, or wheezing, symmetric chest rise on " inhalation, respirations unlabored   HEART:  Regular rate, 3/6 systolic murmur heard best at the left upper sternal border.   ABDOMEN:   Moderately distended, no obvious masses or organomegaly, no rebound or guarding   EXTREMITIES: Extremities normal, atraumatic, no cyanosis, 2+ pitting edema in bilateral lower extremities.   SKIN: Dry to touch, mild telangiectasias noted. Moderate yellow color to skin.   NEURO: Alert, oriented x3, moves all four extremities freely   PSYCH: Cooperative, behavior is appropriate, no overt confusion, answering questions appropriately.     Data reviewed today: I personally reviewed all new medications, labs, imaging/diagnostics reports over the past 24 hours. Pertinent findings include:    Imaging:   Recent Results (from the past 24 hour(s))   NM Hepatobiliary Scan w GB EF    Narrative    EXAM: NM HEPATOBILIARY SCAN WITH GB EF  LOCATION: [facility]  DATE/TIME: 3/9/2022 10:53 AM    INDICATION: Right upper quadrant abdominal pain. Alcoholic hepatitis and cirrhosis. Gallbladder wall thickening and cholelithiasis.    COMPARISON: RUQ US 3/7/2022 and CT CAP 2/25/2022.    TECHNIQUE: 8.4 mCi of technetium-99m mebrofenin, IV. Anterior planar imaging of the abdomen. 1.6 mcg of cholecystokinin analog, IV. Gallbladder imaging for 30-60 minutes.    FINDINGS: Slow hepatocellular radiotracer uptake and prolonged radiotracer retention. Otherwise normal radionuclide activity in liver, gallbladder, bile ducts, and small bowel. No evidence of cystic or common duct obstruction or intrinsic liver disease.   Gallbladder ejection fraction measures 2%, which is well below the normal range of 35% or greater.      Impression    IMPRESSION:   1. No evidence of cholecystitis with patent cystic duct.  2. Markedly decreased gallbladder ejection fracture.  3. Slow hepatocellular radiotracer uptake and prolonged radiotracer retention consistent with hepatocellular disease.         Labs:  Most  Recent 3 CBC's:Recent Labs   Lab Test 03/10/22  0543 03/09/22  0538 03/08/22  1142 03/08/22  0634 03/07/22  0636   WBC 7.5 8.1  --   --  8.7   HGB 7.6* 6.8* 7.8*   < > 7.8*  7.8*   MCV 99 99  --   --  99   * 153  --   --  178    < > = values in this interval not displayed.     Most Recent 3 BMP's:Recent Labs   Lab Test 03/10/22  0543 03/09/22  0538 03/08/22  0634   *  135* 134* 132*   POTASSIUM 3.9  3.9  3.9 4.2 4.1   CHLORIDE 108*  108* 107 108*   CO2 19*  19* 19* 20*   BUN 16  16 18 17   CR 1.33*  1.33* 1.44* 1.32*   ANIONGAP 8  8 8 4*   FÉLIX 7.2*  7.2* 6.9* 6.7*     111 110 106     Most Recent 2 LFT's:Recent Labs   Lab Test 03/10/22  0543 03/09/22  0538   AST 53* 48*   ALT 12 14   ALKPHOS 100 114   BILITOTAL 15.0* 14.5*     Most Recent 3 INR's:Recent Labs   Lab Test 03/10/22  0543 03/09/22  0538 03/08/22  0634   INR 2.10* 1.93* 2.04*     Most Recent 3 Creatinines:  Recent Labs   Lab Test 03/10/22  0543 03/09/22  0538 03/08/22  0634   CR 1.33*  1.33* 1.44* 1.32*     Most Recent 3 Hemoglobins:  Recent Labs   Lab Test 03/10/22  0543 03/09/22  0538 03/08/22  1142   HGB 7.6* 6.8* 7.8*       Medications:   Personally Reviewed.  Medications       calcium carbonate  500 mg Oral TID     gabapentin  300 mg Oral BID     iron sucrose  200 mg Intravenous Daily     [Held by provider] lactulose  10 g Oral BID 09 12     mirtazapine  30 mg Oral At Bedtime     nicotine  1 patch Transdermal Q24H     nicotine   Transdermal Q8H     pantoprazole  40 mg Oral QAM AC     potassium & sodium phosphates  1 packet Oral BID     rifaximin  550 mg Oral BID     sodium chloride (PF)  3 mL Intracatheter Q8H     traZODone  50 mg Oral At Bedtime

## 2022-03-10 NOTE — PROGRESS NOTES
Care Management Follow Up    Length of Stay (days): 13    Expected Discharge Date: 3/10 or 3/11/2022 pending labs     Concerns to be Addressed:    Medical management    Patient plan of care discussed at interdisciplinary rounds: Yes    Anticipated Discharge Disposition: Home     Anticipated Discharge Services:  OP CD trx    Referrals Placed by CM/SW:  CD resources provided previously by CM- contact information for [place], [facility] and [employer] services included in AVS.     Additional Information:  3/10/2022 Patient admitted for alcohol withdrawal with thrombocytopenia. Hospitalist and Nephrology following. Monitoring labs, blood transfusion given yesterday.       Assessment Hx: Pt lives alone in an apartment. He is currently unemployed, had been working as a  at a restaurant. Seeks new job to reduce financial stress from paying child support. Pt has a hx of civil commitment, has been to [facility] for short IP treatment. Former [facility] CM still supportive. He has completed 9 months of OP treatment. [facility] CM (previously) encouraged to return to  for support. Supportive family and sober friends.

## 2022-03-10 NOTE — PROGRESS NOTES
"CLINICAL NUTRITION SERVICES - REASSESSMENT NOTE     Nutrition Prescription    RECOMMENDATIONS FOR MDs/PROVIDERS TO ORDER:  Currently on Regular diet; consider changing to low sodium due to fluid status    Malnutrition Status:    Severe malnutrition noted previously    Recommendations already ordered by Registered Dietitian (RD):  Reduced Ensure to 2x/day due to good intake at meals    Future/Additional Recommendations:  None at this time     EVALUATION OF THE PROGRESS TOWARD GOALS   Diet: Regular  Nutrition Supplement/Support: Ensure Enlive 3x/day  Intake: % of adequately sized meals     NEW FINDINGS   Patient reports good appetite though feels full early with increasing fluid accumulation. Has not been drinking Ensure because he was receiving vanilla instead of chocolate. Struggles with getting enough protein at home because \"it's hard to cook for just me.\"    ANTHROPOMETRICS  Admission wt: 65.5 kg  Most Recent Weight: 82.1 kg  Weight up 14.1 kg in 1 week with increasing edema and ascites      PHYSICAL FINDINGS  Per flowsheet  GI: abdomen rounded, loose BM 3/10  Skin: WDL  Edema: +2 to BLE, ascites    LABS  Reviewed: Na 135, Cr 1.33    MEDICATIONS  Reviewed: calcium carbonate, iron sucrose, flagyl, mirtazapine, pantoprazole      NUTRITION DIAGNOSIS  Malnutrition related to inadequate oral intake as evidenced by wt loss of 22% in past 6 weeks, intake <75% for >/=1 month, pt reports of replacing food with alcohol, and visual evidence of fat loss    --Evaluation: ongoing but improving with better PO intake while inpatient      Goals:  Patient to consume % of nutritionally adequate meals three times per day, or the equivalent with supplements/snacks. --MET, ongoing  Promote appropriate wt gain -NOT MET (fluid), ongoing  NEW GOAL: Achieve fluid balance     INTERVENTIONS  Implementation  Educated on importance of adequate protein for liver health. Suggested easy protein options like peanut butter, canned tuna " that require minimal prep.  Discussed empty calories of alcohol and role nutrition can play in recovery.  Renal notes recommend low-sodium diet but patient on Regular; left sticky note for provider to change if agrees  Reduced Ensure to chocolate 2x/day due to adequate intake at meals    Monitoring/Evaluation  Progress toward goals will be monitored and evaluated per protocol.

## 2022-03-10 NOTE — PROGRESS NOTES
"SPIRITUAL HEALTH SERVICES Progress Note  [facility] [place]    Saw pt Masoud Huddleston per referral.       Illness Narrative - Masoud described a long history of alcohol use, including having been through several treatment programs, which he described with some despair as he reflected on his experiences of relapse and his current condition. He asserts that he has had some periods of sobriety in the last several years.       Distress - Masoud described a sense of hopelessness or despair as he reflects on the power alcohol has exerted in his life. He noted that several extended family members have  with alcohol use as part of their life, including a beloved uncle whose burial is coming up in the near future. He also reported struggling to enjoy life without alcohol, which contributes to his despair.       Coping - Asked to reflect on what stood out about his sober periods, Masoud reflected on enjoying time with his children and noted that they are his primary motivator to stop drinking. He also describes supportive parents, asserting that his father, in particular, works hard to care for him.      Meaning-Making - Invited Masoud to explore what he might want or hope for in a life without alcohol. He explored his concern about it being \"too late\" and reflected on the prospect of \"still having time\" to allow his body to mend.       Plan -  will continue to follow per LOS.     Brayden Alcantara MDiv    & CPE Educator    "

## 2022-03-10 NOTE — PLAN OF CARE
Problem: Anemia  Goal: Anemia Symptom Improvement  Outcome: Ongoing, Progressing  Intervention: Monitor and Manage Anemia  Recent Flowsheet Documentation  Taken 3/9/2022 1628 by Flako Umanzor RN  Safety Promotion/Fall Prevention:   clutter free environment maintained   fall prevention program maintained   increased rounding and observation   nonskid shoes/slippers when out of bed   patient and family education   room near nurse's station     Problem: Pain Acute  Goal: Acceptable Pain Control and Functional Ability  Intervention: Prevent or Manage Pain  Recent Flowsheet Documentation  Taken 3/9/2022 1628 by Flako Umanzor RN  Medication Review/Management: medications reviewed     Problem: Acute Neurologic Deterioration (Alcohol Withdrawal)  Goal: Optimal Neurologic Function  Outcome: Ongoing, Progressing   Goal Outcome Evaluation:  The patient finished a unit of PRBCs on the evening shift, and he was asymptomatic for anemia. The patient was alert and oriented, and other than a slight tremor, he did not have any neurological deficits noted. The patient has been independent in his room and is able to make his needs known.

## 2022-03-11 NOTE — PROGRESS NOTES
"GASTROENTEROLOGY PROGRESS NOTE     SUBJECTIVE: Feels unwell. Creatinine has improved, INR has risen, LFT remain stable. More abdominal distention and lower extremity distention. He continues to have abdominal pain. Diarrhea is responsive to imodium ow having pudding type stools. He has some tremors on rifaximin 550 mcg two times daily ( lactulose stopped due to diarrhea). Nephrology following now on midodrine but diuretics still on hold. HGB low receiving IV iron, retic count elevated. Received a unit of PRBC two days ago.  EGD  with small varices banded with recommendations to repeat in one month.   OBJECTIVE:   /72 (BP Location: Right arm)   Pulse 102   Temp 98.7  F (37.1  C) (Oral)   Resp 16   Ht 1.803 m (5' 11\")   Wt 82 kg (180 lb 12.8 oz)   SpO2 96%   BMI 25.22 kg/m     Temp (24hrs), Av.1  F (37.3  C), Min:98.7  F (37.1  C), Max:99.6  F (37.6  C)     Patient Vitals for the past 72 hrs:   Weight   22 0506 82 kg (180 lb 12.8 oz)   03/10/22 0336 82.1 kg (181 lb 1.6 oz)        Intake/Output Summary (Last 24 hours) at 3/11/2022 1023  Last data filed at 3/11/2022 0805  Gross per 24 hour   Intake 1370 ml   Output 150 ml   Net 1220 ml      PHYSICAL EXAM   Constitutional: chronically ill appearing meal with jaundice and icterus, bruising on his face.   Cardiovascular: Regular rate and rhythm.   Respiratory:respirations non labored.   Abdomen: rounded firm tense distended. Diffusely tender.     I have reviewed the patient's new clinical lab results:   Recent Labs   Lab Test 22  0822  0504 03/10/22  0543 22  0538   WBC 8.6  --  7.5 8.1   HGB 7.7*  --  7.6* 6.8*   MCV 99  --  99 99   *  --  148* 153   INR  --  2.37* 2.10* 1.93*      Recent Labs   Lab Test 22  0504 03/10/22  0543 22  0538    135*  135* 134*   POTASSIUM 3.8  3.9 3.9  3.9  3.9 4.2   CHLORIDE 108* 108*  108* 107   CO2 22 19*  19* 19*   BUN 13 16  16 18   CR 1.15 1.33*  1.33* 1.44* "   ANIONGAP 6 8  8 8   FÉLIX 7.1* 7.2*  7.2* 6.9*      Recent Labs   Lab Test 03/11/22  0504 03/10/22  0543 03/09/22  0538 03/08/22  0634 03/07/22  0636 03/06/22  1243 02/26/22  0503 02/25/22  1115 12/20/20  0808 12/15/20  1718 12/15/20  1304 07/10/20  0751 07/05/20  0728 05/01/20  0857 02/28/20  0954 01/09/20  0542 01/08/20  1644   ALBUMIN 2.7* 2.7*  2.7* 2.1*   < > 1.7*  --    < > 3.4*   < >  --  3.8   < > 2.6*   < > 2.4*   < >  --    BILITOTAL 14.2* 15.0* 14.5*   < > 15.2*  --    < > 6.4*   < >  --  0.8   < > 3.8*   < > 1.5*   < >  --    ALT 11 12 14   < > 18  --    < > 28   < >  --  104*   < > 54*   < > 30   < >  --    AST 55* 53* 48*   < > 63*  --    < > 124*   < >  --  181*   < > 141*   < > 52*   < >  --    ALKPHOS 104 100 114   < > 151*  --    < > 196*   < >  --  187*   < > 188*   < > 100   < >  --    PROTEIN  --   --   --   --   --  Negative  --   --   --  30 mg/dL*  --   --   --   --   --   --  Negative   LIPASE  --   --   --   --  137*  --   --  16  --   --  55*   < > 77*   < > 71*   < >  --    AMYLASE  --   --   --   --   --   --   --   --   --   --   --   --  83  --  85  --   --     < > = values in this interval not displayed.        Masoud Huddleston is a 36 yr old male with alcoholic cirrhosis with portal hypertension, esophageal varices, hepatic encephalopathy and ascites. Now with acute kidney injury.     Esophageal varices/portal hypertensive gastropathy/anemia: Underwent banding on 2/26/22. No overt GI bleeding; planned repeat EGD in 4-6 weeks from recent exam.     Hepatic encephalopathy: Currently no encephalopathy. Will continue rifaximin. Lactulose on hold due to diarrhea     Diarrhea: No c diff; off lactulose; somewhat improved on imodium ? If cholestatic.      Ascites: May need repeat paracentesis, US with albumin, cell count with diff, culture, cytology     Cirrhosis: Needs absolute ETOH abstinence, no plans for chemical dependency treatment. LFT's now stable.Will check AFP    Liver  decompensation - potentially ongoing related to bilary disease. Will consult surgery.      TRAV: management per Nephrology team;  better after albumin and midodrine     Abd discomfort: May represent tense ascites; also documented to have severe gallbladder dyskinesia on HIDA scan and could represent symptomatic gallstone disease;     Plan:  Continue rifaximin and PPI therapy  EGD in 2 to 4 weeks to monitor varices, sooner if overt signs of bleeding  US paracentesis with albumin and labs  Continue imodium as scheduled  Follow HGB transfuse PRN- currently receiving iron therapy  Appreciate Nephrology management  Will continue to monitor for worsening liver failure  Consider surgery consult on Monday.   Will check AFP to complete hepatoma screening.     Discussed with Nephrology team, will start diuretics tomorrow     Gabapentin has been used to treat neuropathy is being decreased to help with hypotension.      Kayleigh Land [facility]   Office

## 2022-03-11 NOTE — CONSULTS
[facility] Surgery Consultation    Masoud Huddleston MRN# [id number]   Age: 36 year old YOB: 1985     Date of Admission:  2/25/2022    Reason for consult: Abdominal pain, epigastric  Nausea and vomiting       Requesting physician:  Hospitalist       Level of consult: Consult, follow and place orders           Assessment and Plan:   Assessment:   End-stage liver disease with ascites  Patient has a poorly functioning gallbladder on hepatobiliary scan  The specific question I was asked is of biliary dyskinesia can cause liver failure issues the answer is no liver failure issues are causing him to have major other problems medical but dyskinesia is not a major issue.  Means gallbladder does not function well when given stimulus.  [place] if you have severe abdominal pain issues with eating fatty foods and they have dyskinesia gallbladder surgery is offered but is successful less than 50% of the time.  [place] does not recognize biliary dyskinesia as a diagnosis for gallbladder issues.  Therefore with a high risk this patient presents as with ascites liver failure, a total bili of 14 his risks for having surgery would put him at a mortality of greater than 60 to 70% by having an operation.  Therefore with this diagnosis I would recommend any further treatment of biliary dyskinesia.        Plan:   I left consult there any questions or concerns feel free to contact us we will sign off at this time               Chief Complaint:   Abdominal pain     History is obtained from the patient         History of Present Illness:   This patient is a 36 year old  male with a significant past medical history of liver failure who presents with Who has abdominal distention and ascites his liver function tests are quite elevated and he appears to become an end-stage liver disease issue.  Some got a hepatobiliary scan as an outpatient and states that he has dyskinesia and I been asked to address that in  the consult.  He is getting paracentesis secondary to the large amount of ascites that he has.             Past Medical History:   I have reviewed this patient's past medical history  Past Medical History:   Diagnosis Date     Acute alcoholic intoxication in alcoholism with complication (H)      Acute metabolic encephalopathy      Alcohol abuse      Alcoholic cirrhosis of liver without ascites (H)      Alcoholic ketoacidosis 9/19/2019     Chronic acquired lymphedema      Cirrhosis of liver (H)      Depression      ED (erectile dysfunction)      Elevated liver enzymes 4/27/2018     Esophageal varices without bleeding (H)      GI (gastrointestinal bleed)     hematemesis     Hematemesis 4/25/2018    Added automatically from request for surgery [id number]     History of transfusion      Hypokalemia 9/19/2019     Hypomagnesemia 4/27/2018     Liver disease      Aiyana-Vizcaino tear 4/27/2018     Nausea with vomiting      Neuropathy      Right patella fracture      Seizures (H)     withdrawal     Substance abuse (H)      Thrombocytopenia (H)              Past Surgical History:     Past Surgical History:   Procedure Laterality Date     ESOPHAGOSCOPY, GASTROSCOPY, DUODENOSCOPY (EGD), COMBINED N/A 4/25/2018    Procedure: ESOPHAGOGASTRODUODENOSCOPY (EGD);  Surgeon: Lora Valencia MD;  Location: [facility] GI;  Service:      ESOPHAGOSCOPY, GASTROSCOPY, DUODENOSCOPY (EGD), COMBINED N/A 2/26/2022    Procedure: ESOPHAGOGASTRODUODENOSCOPY (EGD) WITH ESOPHAGEAL VARICEAL BANDING;  Surgeon: Moises Michael DO;  Location: [facility] Main OR     HC OPEN RX PATELLA FX Right 4/28/2018    Procedure: OPEN REDUCTION INTERNAL FIXATION, FRACTURE, PATELLA;  Surgeon: Bertram Beltran MD;  Location: [facility] OR;  Service: Orthopedics     KNEE SURGERY Right 2018     ME ESOPHAGOGASTRODUODENOSCOPY TRANSORAL DIAGNOSTIC N/A 5/3/2020    Procedure: ESOPHAGOGASTRODUODENOSCOPY (EGD);  Surgeon: Ricardo Barbosa MD;  Location: [facility] OR;   Service: Gastroenterology      PARACENTESIS  1/16/2020      PARACENTESIS  1/23/2020             Social History:     Social History     Tobacco Use     Smoking status: Current Every Day Smoker     Packs/day: 1.00     Types: Cigarettes     Smokeless tobacco: Never Used   Substance Use Topics     Alcohol use: Yes     Comment: a liter of Vodka everyday             Family History:   History reviewed. No pertinent family history.  Family history reviewed and updated in [facility]          Immunizations:   Immunization status is unknown          Allergies:   All allergies reviewed and addressed  No Known Allergies          Medications:     Current Facility-Administered Medications   Medication     0.9% sodium chloride BOLUS     albumin human 25 % injection 50 g     alum & mag hydroxide-simethicone (MAALOX) suspension 30 mL     calcium carbonate (TUMS) chewable tablet 500 mg     [START ON 3/12/2022] eplerenone (INSPRA) tablet 25 mg     flumazenil (ROMAZICON) injection 0.2 mg     gabapentin (NEURONTIN) capsule 100 mg     iron sucrose (VENOFER) 200 mg in sodium chloride 0.9 % 110 mL intermittent infusion     [Held by provider] lactulose (CHRONULAC) solution 10 g     lidocaine (LMX4) cream     lidocaine 1 % 0.1-1 mL     melatonin tablet 5 mg     midodrine (PROAMATINE) tablet 10 mg     mirtazapine (REMERON) tablet 30 mg     naloxone (NARCAN) injection 0.2 mg     naloxone (NARCAN) injection 0.2 mg     nicotine (NICODERM CQ) 7 MG/24HR 24 hr patch 1 patch     nicotine Patch in Place     oxymetazoline (AFRIN) 0.05 % spray 2 spray     pantoprazole (PROTONIX) EC tablet 40 mg     potassium & sodium phosphates (NEUTRA-PHOS) Packet 1 packet     prochlorperazine (COMPAZINE) injection 10 mg    Or     prochlorperazine (COMPAZINE) tablet 10 mg    Or     prochlorperazine (COMPAZINE) suppository 25 mg     rifaximin (XIFAXAN) tablet 550 mg     sodium chloride (PF) 0.9% PF flush 3 mL     sodium chloride (PF) 0.9% PF flush 3 mL     traZODone  (DESYREL) tablet 50 mg             Review of Systems:   The Review of Systems is negative other than noted in the HPI          Physical Exam:     Vitals were reviewed  Temp: 98.7  F (37.1  C) Temp src: Oral BP: 117/72 Pulse: 102   Resp: 16 SpO2: 96 % O2 Device: None (Room air)    Wt Readings from Last 4 Encounters:   03/11/22 82 kg (180 lb 12.8 oz)   05/16/21 73.5 kg (162 lb)   01/04/21 84.2 kg (185 lb 11.2 oz)   12/01/20 76.7 kg (169 lb 1.6 oz)     I/O last 3 completed shifts:  In: 1330 [P.O.:1330]  Out: 150 [Urine:150]  Constitutional:   awake, alert, cooperative, no apparent distress, and appears stated age and jaundiced and facial punctate lesions      Eyes:   Lids and lashes normal, pupils equal, round and reactive to light, extra ocular muscles intact, sclera clear, conjunctiva jaundiced     ENT:   Normocephalic, without obvious abnormality, atraumatic, sinuses nontender on palpation, external ears without lesions, oral pharynx with moist mucous membranes, tonsils without erythema or exudates, gums normal and good dentition.     Neck:   Supple, symmetrical, trachea midline, no adenopathy, thyroid symmetric, not enlarged and no tenderness, skin normal     Back:   Symmetric, no curvature, spinous processes are non-tender on palpation, paraspinous muscles are non-tender on palpation, no costal vertebral tenderness     Lungs:   No increased work of breathing, good air exchange, clear to auscultation bilaterally, no crackles or wheezing     Cardiovascular:   Normal apical impulse, regular rate and rhythm, normal S1 and S2, no S3 or S4, and no murmur noted     Abdomen:   Fluid wave, some tenderness, distended     Musculoskeletal:   Moving all ext.     Neurologic:   Awake, alert, oriented to name, place and time.  Cranial nerves II-XII are grossly intact.  Motor is 5 out of 5 bilaterally.  Cerebellar finger to nose, heel to shin intact.  Sensory is intact.  Babinski down going, Romberg negative, and gait is normal.              Data:   All laboratory data reviewed  Results for orders placed or performed during the hospital encounter of 02/25/22 (from the past 24 hour(s))   INR   Result Value Ref Range    INR 2.37 (H) 0.85 - 1.15   Potassium   Result Value Ref Range    Potassium 3.9 3.5 - 5.0 mmol/L   Magnesium   Result Value Ref Range    Magnesium 2.1 1.8 - 2.6 mg/dL   Comprehensive metabolic panel   Result Value Ref Range    Sodium 136 136 - 145 mmol/L    Potassium 3.8 3.5 - 5.0 mmol/L    Chloride 108 (H) 98 - 107 mmol/L    Carbon Dioxide (CO2) 22 22 - 31 mmol/L    Anion Gap 6 5 - 18 mmol/L    Urea Nitrogen 13 8 - 22 mg/dL    Creatinine 1.15 0.70 - 1.30 mg/dL    Calcium 7.1 (L) 8.5 - 10.5 mg/dL    Glucose 96 70 - 125 mg/dL    Alkaline Phosphatase 104 45 - 120 U/L    AST 55 (H) 0 - 40 U/L    ALT 11 0 - 45 U/L    Protein Total 5.7 (L) 6.0 - 8.0 g/dL    Albumin 2.7 (L) 3.5 - 5.0 g/dL    Bilirubin Total 14.2 (H) 0.0 - 1.0 mg/dL    GFR Estimate 85 >60 mL/min/1.73m2   Phosphorus   Result Value Ref Range    Phosphorus 1.8 (L) 2.5 - 4.5 mg/dL   AFP tumor marker   Result Value Ref Range    AFP tumor marker <2.0 <=8.5 ng/mL    Narrative    Effective April 22, 2013 the AFP-Tumor Marker is the Abbott Alpha-Fetoprotein (AFP)  On the  platform which continues to be calculated from the WHO 1st International Standard (72:225). Reference values are for the adult males and non-pregnant females only; no reference ranges are established for infants and children up to age 2.   CBC with platelets   Result Value Ref Range    WBC Count 8.6 4.0 - 11.0 10e3/uL    RBC Count 2.41 (L) 4.40 - 5.90 10e6/uL    Hemoglobin 7.7 (L) 13.3 - 17.7 g/dL    Hematocrit 23.9 (L) 40.0 - 53.0 %    MCV 99 78 - 100 fL    MCH 32.0 26.5 - 33.0 pg    MCHC 32.2 31.5 - 36.5 g/dL    RDW 20.7 (H) 10.0 - 15.0 %    Platelet Count 144 (L) 150 - 450 10e3/uL   US Abdomen Limited    Narrative    EXAM: US ABDOMEN LIMITED  LOCATION: [facility]  HOSPITAL  DATE/TIME: 3/11/2022 12:13 PM    INDICATION: to assess for ascites  if positive then paracentesis  COMPARISON: None.  TECHNIQUE: Limited abdominal ultrasound.    FINDINGS:  There is a small amount fluid in the right lateral abdomen which could be obtained for diagnostic testing.    The patient will not be charged for this limited ultrasound.   US Paracentesis    Narrative    EXAM:  1. PARACENTESIS  2. ULTRASOUND GUIDANCE  LOCATION: [facility]  DATE/TIME: 3/11/2022 12:32 PM    INDICATION: Ascites.    PROCEDURE: Informed consent obtained. Time out performed. The abdomen was prepped and draped in a sterile fashion. 10 mL of 1% lidocaine was infused into local soft tissues. A 5 Kosovan catheter system was introduced into the abdominal ascites under   ultrasound guidance.    0.9 liters of bilious fluid were removed and sent to lab if requested.    Patient tolerated procedure well.    Ultrasound imaging was obtained and placed in the patient's permanent medical record.      Impression    IMPRESSION:  1.  Status post ultrasound-guided paracentesis.    Reference CPT Code: 70768   Cell count with differential fluid    Narrative    The following orders were created for panel order Cell count with differential fluid.  Procedure                               Abnormality         Status                     ---------                               -----------         ------                     Cell Count Body Fluid[[id number]]                            Final result               Differential Body Fluid[[id number]]                          Final result                 Please view results for these tests on the individual orders.   Cell Count Body Fluid   Result Value Ref Range    Color Yellow Colorless, Yellow    Clarity Clear Clear, Bloody    Total Nucleated Cells 103 /uL    RBC Count 0 /uL    Narrative    No reference ranges have been established.  This result  should be interpreted in the context  of the patient's clinical condition and   compared to simultaneous measurement in the patient's blood.         Differential Body Fluid   Result Value Ref Range    % Neutrophils 2 %    % Lymphocytes 5 %    % Monocyte/Macrophages 92 %    % Lining Cells 1 %    Narrative    No reference ranges have been established. This result should be interpreted in the context of the patient's clinical condition and compared to simultaneous measurement in the patient's blood.     All imaging studies reviewed by me.     Attestation:  I have reviewed today's vital signs, notes, medications, labs and imaging.    Syed Jack MD

## 2022-03-11 NOTE — PROGRESS NOTES
No reports of pain. A&Ox4. No reports of SOB due to distended abdomen, no tenderness either. Up to bathroom independently. Slept almost the entirety of shift.    [id number]

## 2022-03-11 NOTE — PROGRESS NOTES
RENAL CONSULT NOTE    REQUESTING PROVIDER:  [facility]    REASON FOR CONSULT: rising creatinine.     ADDM:  Fairly low volume para today, so will attempt to diurese today with Alb support given sig volume surplus/edema.     ASSESSMENT/PLAN:  Acute Kidney Injury:  RF improved after Alb, pRBC's, and scheduled Midodrine.  Likely multifactorial with intravasc volume deficit d/t third spacing with severe hypoalbuminemia, GI  Losses with por intake, and severe anemia.   With preserved baseline RF, normal CR on 3/2 admission, now 1.4 , ongoing steady rise  Urine Na  <20 would support dx of HRS (urine osmol >400, ++ADH)   Imaging neg for hydro  PLAN:  Cont scheduled Midodrine 10mg TID (with hold parameters for syst >150)  Cont nutritional protein supp between meals  Alb/Furosemide x 2 today  adding Eplerenone tomorrow with hold parameters (pt had gynecomastia s/e with Spironolactone)  Course of Venofer in progress   Phos NaK x 2 more doses  May need to consider diuretic attempt again soon, will be challenging if BP remains soft    Trend RF daily    Hypotension:  Now on scheduled Midodrine TID, reduced Kristina dosing as this can contribute to HoTN, not clear why he is taking this med, need clarification    Volume; ongoing uptic in wt, excess third spacing, very low Alb 2.7 post replacement, hold off on diuretics today d/t planned para, concern for HoTN, add Eplerenone tomorrow, low salt diet.     Hyponatremia:  Stable low 132-135 range, +ADH with liver failure/HoTN, improving with Alb and BP support, would cont low Na diet for now    Anemia:  Stable 7's post transfusion, etiol acute blood losses from bleeding varices, poor nutritional intake with ETOH abuse,, iron stores low and on course of Venofer, transfuse if Hgb <7     Lytes/minerals:  Shruti CA low normal for hypoalb, K controlled, phos Low (likely d/dt resp Alk + diarrhea+ poor intake ,chronic ETOH abuse), replacing     Acid/base: improving, low serum c02 likely  compensatory MA for resp Alk in the setting of severe liver failure.     Bacteremia: noted on 1/2 BCx (parvimonas), ID has seen, completed course of Flagyl, ascites fluid neg for SBP    ETOH cirrhosis: with acute liver failure, with encephalopathy, wasn't on lactulose PTA, and now held ), Portal HTN, esoph varices banded 2/26) , coagulopathy, GI bleed, on octreotide,  PPI, Rifaximin,  GI following , worsening LFT's (HIDA pending),  MELD near 30, infrequent low volume para's, mild ascites noted on 3/7 US(though quite distended)   will need chem dep program on discharge, inpt vs outpt (pt reportedly refusin)     Cholelithiasis/worsening LFTs: LFt's stable today,  s/p  HIDA scan, sluggish GB, surg to see (pt having abd pain), ? Potential for Contributing to worsening liver dz    Diarrhea; seems to be sl better, GI managing , was on immodium , c-diff neg , lactulose on hold     Seizures; d/t ETOH withdrawal, stable    REVIEW OF SYSTEMS:  Tired, very distended, plans for para again today, hasn't seen surg yet.  Edema persists, would like to initiate diuretics, but plans for para today.    PT had issues with Spironolactone in the past, will try Eplerenone.     Past Medical History:   Diagnosis Date     Acute alcoholic intoxication in alcoholism with complication (H)      Acute metabolic encephalopathy      Alcohol abuse      Alcoholic cirrhosis of liver without ascites (H)      Alcoholic ketoacidosis 9/19/2019     Chronic acquired lymphedema      Cirrhosis of liver (H)      Depression      ED (erectile dysfunction)      Elevated liver enzymes 4/27/2018     Esophageal varices without bleeding (H)      GI (gastrointestinal bleed)     hematemesis     Hematemesis 4/25/2018    Added automatically from request for surgery [id number]     History of transfusion      Hypokalemia 9/19/2019     Hypomagnesemia 4/27/2018     Liver disease      Aiyana-Vizcaino tear 4/27/2018     Nausea with vomiting      Neuropathy      Right patella  "fracture      Seizures (H)     withdrawal     Substance abuse (H)      Thrombocytopenia (H)        Social History     Tobacco Use     Smoking status: Current Every Day Smoker     Packs/day: 1.00     Types: Cigarettes     Smokeless tobacco: Never Used   Substance Use Topics     Alcohol use: Yes     Comment: a liter of Vodka everyday     Drug use: Yes     Types: Marijuana          No current facility-administered medications on file prior to encounter.  furosemide (LASIX) 40 MG tablet, TAKE 1 TABLET BY MOUTH TWICE A DAY (09:00AM & 06:00PM)  mirtazapine (REMERON) 15 MG tablet, Take 1 tablet (15 mg) by mouth At Bedtime  gabapentin (NEURONTIN) 300 MG capsule, Take 1 capsule (300 mg) by mouth 3 times daily (Patient not taking: Reported on 2/25/2022)  thiamine (B-1) 100 MG tablet, Take 1 tablet (100 mg) by mouth daily (Patient not taking: Reported on 2/25/2022)        ALLERGIES/SENSITIVITIES:  No Known Allergies       PHYSICAL EXAM:  /72 (BP Location: Right arm)   Pulse 102   Temp 98.7  F (37.1  C) (Oral)   Resp 16   Ht 1.803 m (5' 11\")   Wt 82 kg (180 lb 12.8 oz)   SpO2 96%   BMI 25.22 kg/m      Patient Vitals for the past 72 hrs:   Weight   03/11/22 0506 82 kg (180 lb 12.8 oz)   03/10/22 0336 82.1 kg (181 lb 1.6 oz)     Body mass index is 25.22 kg/m .    Intake/Output Summary (Last 24 hours) at 3/8/2022 1338  Last data filed at 3/8/2022 0840  Gross per 24 hour   Intake 1440 ml   Output --   Net 1440 ml         General - A & O x 3, NAD, supine in bed, jaundiced, petechiae on face, arms unchanged  Respiratory - sats good on RA  Cardiovascular - BP sl better 110  Abdomen - +++ ascites  Extremities - 3+ LE pitting edema  Integumentary -petechiae on face, some noted on abd/arms  Neurologic - grossly intact bu cursory bed exam , no focal deficits   Psych:  Judgement intact, affect WNL  :  Modest UO  Wt 143--->181 since admit, mostly ascites/LE edema presumably based on exam     Laboratory:  Recent Labs   Lab " Test 03/08/22  1142 03/08/22  0634 03/07/22  0636 03/06/22  1201 03/06/22  0655   WBC  --   --  8.7  --  6.8   HGB 7.8* 7.1* 7.8*  7.8*   < > 7.1*   MCV  --   --  99  --  99   PLT  --   --  178  --  161   INR  --  2.04* 1.92*  --  1.96*    < > = values in this interval not displayed.      Recent Labs   Lab Test 03/08/22  0634 03/07/22  0636   POTASSIUM 4.1 4.5   CHLORIDE 108* 107   BUN 17 16      Recent Labs   Lab Test 03/08/22  0634 03/07/22  0636 03/06/22  1243 12/20/20  0808 12/15/20  1718   ALBUMIN 1.6* 1.7*  --    < >  --    BILITOTAL 15.0* 15.2*  --    < >  --    ALT 17 18  --    < >  --    AST 49* 63*  --    < >  --    PROTEIN  --   --  Negative  --  30 mg/dL*    < > = values in this interval not displayed.       Personally reviewed today's laboratory studies      Thank you for involving us in the care of this patient. We will continue to follow along with you.      Dee Snyder, CHARMAINE-BC  [facility]  [phone]

## 2022-03-11 NOTE — PLAN OF CARE
Problem: Plan of Care - These are the overarching goals to be used throughout the patient stay.    Goal: Plan of Care Review/Shift Note  Description: The Plan of Care Review/Shift note should be completed every shift.  The Outcome Evaluation is a brief statement about your assessment that the patient is improving, declining, or no change.  This information will be displayed automatically on your shift note.  Outcome: Ongoing, Progressing     Patient A & O x 4. Diet currently to 2g low sodium. Paracentesis completed today with 0.9L removal. Iron and albumin administered, per MD orders. No complaints of pain, just fullness in stomach. Mg and K protocols discontinued and will be MD managed via lab results. Hgb stable at 7.7.

## 2022-03-11 NOTE — PROGRESS NOTES
[facility] MEDICINE PROGRESS NOTE      Identification/Summary:   Masoud Huddleston is a 36 year old male with a past medical history of alcohol dependence, withdrawal seizures, known esophageal varices in 2021 and alcohol related liver cirrhosis who presented to the ED on 2/25 with hematemesis. Found to have an acute blood loss anemia and admitted. Hospital course notable so far for EGD on 2/26 revealing esophageal varices which were banded x 3. Patient experienced mild alcohol withdrawal 2/27 and starting to advance diet per GI guidance. Ceftriaxone and octreotide discontinued 3/1 per GI and PPI switched to PO. Worsening withdrawal with hallucinations 3/1. CIWA continued to be high prompting additional labs for hepatic encephalopathy. Ammonia elevated to 66 on 3/2 and INR elevated around 2. Bilirubin also trending up. Course complicated by 1 of 2 blood cultures being positive for parvimonas micra. Initially treated with Vancomycin but no infectious symptoms. Dicussed with ID and could be contaminant but recommending Augmentin or Metronidazole for coverage if indicated. Metronidazole given 3/3-3/10. He developed intermittent low grade fevers starting 3/4 with normal WBC and normal vital signs. Only new symptom was diarrhea and abdominal pain. C diff negative. UA without evidence of infection. Blood cultures negative. RUQ US 3/7 with cirrhosis, mild ascites, cholelithiasis prompting HIDA scan, results pending. Nephrology consulted 3/8 with new creatinine elevation.     Assessment and Plan:  Scrotal Swelling  -- Mild, likely secondary to diffuse edema.   -- Patient had a paracentesis today with 0.9 L removed.  -- Plan to start diuretics tomorrow per GI and Nephrology notes    Peripheral Edema  -- Likely multifactorial due to alcohol related hepatitis, severe hypoalbuminemia, GI losses and poor oral intake.  -- Compression stockings provided to the patient which he states he has used at home  with good relief.   -- Plan to start diuretics tomorrow per GI and Nephrology notes.    Esophageal varices/portal hypertension/cirrhosis.  -- EGD done 2/26 and esophageal varices banded x 3  -- Treated with Ceftriaxone and octreotide which was discontinued by GI on 3/1.   -- On oral PPI  -- Hemoglobin (see anemia below).   -- Repeat EGD scheduled for 3/26     Acute blood loss anemia/thrombocytopenia.  -- Likely d/t acute blood losses from bleeding varices, poor nutritional intake with alcohol abuse.   -- Hemoglobin 12.9 initially on presentation, dropped to 11.0 with hematemesis prior to admission.  -- Hemoglobin has continued to drop despite esophageal banding.   -- No overt signs of ongoing bleeding. No hematemesis, black stools or other abnormal bleeding.   -- Hemoglobin has steadily declined. Was stable around 8 for several days then dropped to 7 range on 3/6. Today (3/9) hemoglobin is 6.8.  -- 1 unit PRBCs given for hgb 6.8 on 3/9.   -- Iron binding panel performed 3/8 consistent with mixed anemia of chronic disease and iron deficiency.  -- Transfuse for hemoglobin less than 7, blood consent signed and placed in the chart  -- Patient started on IV Venofer x 4 doses 03/10.      Alcoholic hepatitis  --Bilirubin continues to be elevated, ~14.5-15  -- MELD was 18 at time of admission, 27 today; 19.6% three month mortality  -- GI consulted regarding steroid initiation. There was initial concern with steroid use given UGIB and positive culture for parvimonas micra. Will defer to GI for initiation of steroids.  -- Paracentesis performed 3/6 with 0.6 liters removed; not clearly SBP. Cultures negative. Repeat paracentesis performed 3/11 with 0.9 liters removed.  -- RUQ US 3/7 with cirrhosis, mild ascites, and cholelithiasis.  -- HIDA scan with ef performed 3/9 and showed no evidence of cholecystitis with patent cystic duct, but there is markedly decreased gallbladder ef and slow hepatocellular radiotracer uptake and  prolonged retention c/w hepatocellular disease.  -- Bloating likely secondary to alcoholic hepatitis.   -- Lactulose on hold due to severe diarrhea.  -- Coagulopathy with INR ~2.   -- B12 elevated to >2,000 on 3/9  -- Plan: Paracentesis performed today with 0.9 liters removed, diuretics tomorrow per Nephrology/GI, medications as directed by Nephrology, GI recommended surgery consultation Monday     Elevated Creatinine  -- Nephrology consulted 3/8  -- Creatinine improving after albumin and midodrine.  -- Plan: Recheck creatinine tomorrow. Ongoing management with Nephrology    Cardiac Murmur  -- Patient reports history of cardiac murmur as a child but thought this had resolved.  -- 3/6 systolic murmur heard on exam.  -- Last echocardiogram was in January 2020 and was normal at that time. Given unknown duration of this murmur will repeat echocardiogram today.  -- Echocardiogram 3/10 with EF 60-65% and normal LV/RV functioning. 1+ tricuspid regurgitation, otherwise valves appeared normal.  -- No further workup indicated at this time. Will continue to follow.      Acute encephalopathy   -- Improved, psych consult cancelled.   -- Toxic related to alcohol withdrawal vs hepatic encephalopathy  -- Holding lactulose due to diarrhea (see below).  -- GI added Rifaximin 3/6.      Alcohol Withdrawal  -- History of alcohol withdrawal seizures  -- Withdrawals resolved  -- Discontinue CIWA protocol 3/4     Fever/diarrhea  -- WBC normal, no significant tachycardia, BP stable, new diarrhea with some abdominal pain.   -- C diff PCR negative.   -- UA negative.  -- Blood cultures x 2 without growth.   -- GI consulted and felt possible related to biliary colic.  -- Imodium given for diarrhea with some relief, currently dosed twice daily.   -- Plan: Continue Imodium twice daily. Will continue to monitor.     Bacteremia  -- 1 of 2 blood cultures positive for Parvimonas micra  -- Initially treated with Vanocmycin however felt to be possible  "contaminant; no clear infectious symptoms.  -- ID consulted and felt metronidazole treatment indicated, treated 3/3-3/10.      Insomnia  -- Improved with Remeron 30 mg and Trazodone 50 mg at night.      Hypokalemia/hypomagnesemia.  -- Repleted/protocol.     Hyponatremia  -- Initially treated with IVF.   -- Likely secondary to liver cirrhosis.   -- Fluids discontinued 3/6.   -- IV Albumin added.  -- Plan: Continue IV albumin, continue to monitor.      # Severe Malnutrition: based on nutrition assessment   Greater than 5% weight loss in 1 month.  Less than 75% intake for greater than 1 month  Subcutaneous fat loss in the orbital region.    Diet: Snacks/Supplements Adult: Ensure Enlive; With Meals  2 Gram Sodium Diet  DVT Prophylaxis:  Ambulation  Code Status: Full Code    Disposition Plan   Expected Discharge: 03/11/2022     Anticipated discharge location: home      The patient's care was discussed with the Attending Physician, Dr. Steve Del Rio and Patient.    ASHLEY Puga-S  [facility]  Phone: #[phone]    Patient seen and examined  Patient discussed with physician assistant  Agree with assessment and plan as outlined above    Steve Del Rio MD  [facility] Medicine Service    Interval History/Subjective:  Mr. Huddleston feels overall better today. He did have a spontaneous nosebleed in the middle of the night which lasted approximately 10 minutes and resolved with direct pressure. Bleeding occurred out of the right nostril. He otherwise denies any bleeding. He continues to have several bowel movements a day but this has improved drastically on the Imodium and he feels the stools are becoming less black although they are still \"muddy\" in appearance. He also reports scrotal swelling without pain which he noticed a little bit yesterday morning but has increased today. His abdomen continues to be distended and seems more distended today. He continues to have " lower extremity edema. Denies fevers, chills, headaches, urinary symptoms, or other concerns at this time.    Physical Exam/Objective:  Temp:  [98.7  F (37.1  C)-99.6  F (37.6  C)] 98.7  F (37.1  C)  Pulse:  [] 102  Resp:  [16-17] 16  BP: (110-126)/(55-72) 117/72  Cuff Mean (mmHg):  [86] 86  SpO2:  [94 %-97 %] 96 %  Body mass index is 25.22 kg/m .    GENERAL:  Alert, in no acute distress, appears stated age   HEAD:  Normocephalic, without obvious abnormality, atraumatic   EYES:  PERRL, scleral icterus, EOM's intact   NOSE: Dried blood in the right nare. No active bleeding.   NECK: Supple, symmetrical, trachea midline   LUNGS:   Clear to auscultation bilaterally, no rales, rhonchi, or wheezing, symmetric chest rise on inhalation, respirations unlabored   HEART:  Regular rate, 3/6 systolic murmur.   ABDOMEN: Soft, distended, diffuse mild tenderness primarily in the RUQ, bowel sounds active, no obvious masses   :   Mild bilateral scrotal swelling without tenderness or erythema.    EXTREMITIES: Extremities normal, atraumatic, edema of bilateral lower extremities, moderate yellowing of skin.   SKIN: Dry to touch, no exanthems in the visualized areas   NEURO: Alert, oriented x3, moves all four extremities freely   PSYCH: Cooperative, behavior is appropriate      Data reviewed today: I personally reviewed all new medications, labs, imaging/diagnostics reports over the past 24 hours. Pertinent findings include:    Imaging:   No results found for this or any previous visit (from the past 24 hour(s)).    Labs:  Most Recent 3 CBC's:Recent Labs   Lab Test 03/11/22  0825 03/10/22  0543 03/09/22  0538   WBC 8.6 7.5 8.1   HGB 7.7* 7.6* 6.8*   MCV 99 99 99   * 148* 153     Most Recent 3 BMP's:Recent Labs   Lab Test 03/11/22  0504 03/10/22  0543 03/09/22  0538    135*  135* 134*   POTASSIUM 3.8  3.9 3.9  3.9  3.9 4.2   CHLORIDE 108* 108*  108* 107   CO2 22 19*  19* 19*   BUN 13 16  16 18   CR 1.15 1.33*   1.33* 1.44*   ANIONGAP 6 8  8 8   FÉLIX 7.1* 7.2*  7.2* 6.9*   GLC 96 111  111 110     Most Recent 2 LFT's:Recent Labs   Lab Test 03/11/22  0504 03/10/22  0543   AST 55* 53*   ALT 11 12   ALKPHOS 104 100   BILITOTAL 14.2* 15.0*     Most Recent 3 INR's:Recent Labs   Lab Test 03/11/22  0504 03/10/22  0543 03/09/22  0538   INR 2.37* 2.10* 1.93*     Most Recent 3 Creatinines:Recent Labs   Lab Test 03/11/22  0504 03/10/22  0543 03/09/22  0538   CR 1.15 1.33*  1.33* 1.44*     Most Recent 3 Hemoglobins:Recent Labs   Lab Test 03/11/22  0825 03/10/22  0543 03/09/22  0538   HGB 7.7* 7.6* 6.8*       Medications:   Personally Reviewed.  Medications       albumin human  50 g Intravenous BID     calcium carbonate  500 mg Oral TID     [START ON 3/12/2022] eplerenone  25 mg Oral Daily     gabapentin  100 mg Oral BID     iron sucrose  200 mg Intravenous Daily     [Held by provider] lactulose  10 g Oral BID 09 12     mirtazapine  30 mg Oral At Bedtime     nicotine  1 patch Transdermal Q24H     nicotine   Transdermal Q8H     pantoprazole  40 mg Oral QAM AC     potassium & sodium phosphates  1 packet Oral BID     rifaximin  550 mg Oral BID     sodium chloride (PF)  3 mL Intracatheter Q8H     traZODone  50 mg Oral At Bedtime

## 2022-03-11 NOTE — PLAN OF CARE
Cared for patient from [id number]. He reports his abdomen feels quite full. MD's aware. Reports with full abdomen he gets some chest tightness and shortness of breath - neither of which are new. No acute issues these last four hours. Nursing to continue with the plan of care.

## 2022-03-12 NOTE — PROGRESS NOTES
RENAL CONSULT NOTE    REQUESTING PROVIDER:  [facility]    REASON FOR CONSULT: rising creatinine.     ADDM:  Fairly low volume para today, so will attempt to diurese today with Alb support given sig volume surplus/edema.     ASSESSMENT/PLAN:  Acute Kidney Injury:  RF improved after Alb, pRBC's, and scheduled Midodrine. Likely multifactorial with intravasc volume deficit d/t third spacing with severe hypoalbuminemia, GI  Losses with por intake, and severe anemia.   --With preserved baseline RF, normal CR on 3/2 admission, now 1.4 , ongoing steady rise  --Urine Na  <20 would support dx of HRS (urine osmol >400, ++ADH)   --Imaging neg for hydro    PLAN:  -- Midodrine 10mg TID (with hold parameters for syst >150)  --nutritional protein supp between meals  --Alb/Furosemide restarted today s/p HOLDING with paracentesis yesterday 3/11/22  --strict I/O, daily wts  --added Eplerenone 25 mg  today with hold parameters (pt had gynecomastia s/e with Spironolactone)  --Course of Venofer in progress   --Phos NaK x 2 more doses  --May need to consider diuretic attempt again soon, will be challenging if BP remains soft    --BMP in AM    Hypotension:    --on scheduled Midodrine TID  --reduced Kristina dosing as this can contribute to HoTN, he tells me he is taking for neuropathy in feet  Volume;   --ongoing uptic in wt, excess third spacing, very low Alb 2.7 post replacement, hold off on diuretics today d/t planned para 3/11/22, concern for HoTN,   --Eplerenone added 3/12/22  --low salt diet.   --wearing compression stockings  --Paracentesis 0.9L removed 3/11/22  --restart IV lasix today coordinate with albumen doses    Hyponatremia:    --Stable low 132-135 range, +ADH with liver failure/HoTN  -- improving with Alb and BP support  -- cont low Na diet for now <2g    Hypokalemia:  --KCL 20 BID  --BMP in AM, may need to increase KCL dose or start replacement protocol    Anemia:    --Stable 7's post transfusion, etiol acute blood losses  from bleeding varices  -- poor nutritional intake with ETOH abuse  --iron stores low and on course of Venofer, transfuse if Hgb <7     Lytes/minerals:    Shruti CA  8.2 low normal for hypoalb,   --K controlled,   --phos Low (likely d/dt resp Alk + diarrhea+ poor intake ,chronic ETOH abuse), replacing     Acid/base: improving, low serum c02 likely compensatory MA for resp Alk in the setting of severe liver failure.     Bacteremia: noted on 1/2 BCx (parvimonas), ID has seen, completed course of Flagyl, ascites fluid neg for SBP    ETOH cirrhosis: with acute liver failure, with encephalopathy, wasn't on lactulose PTA, and now held ), Portal HTN, esoph varices banded 2/26) , coagulopathy, GI bleed, on octreotide,  PPI, Rifaximin,  GI following , worsening LFT's (HIDA pending),  MELD near 30, infrequent low volume para's, mild ascites noted on 3/7 US(though quite distended)   will need chem dep program on discharge, inpt vs outpt (pt reportedly refusin)     Cholelithiasis/worsening LFTs: LFt's stable today,  s/p  HIDA scan, sluggish GB, surg to see (pt having abd pain), ? Potential for Contributing to worsening liver dz    Diarrhea; seems to be sl better, GI managing , was on immodium , c-diff neg , lactulose on hold     Seizures; d/t ETOH withdrawal, stable    REVIEW OF SYSTEMS:  Tired, very distended, plans for para again today, hasn't seen surg yet.  Edema persists, would like to initiate diuretics, but plans for para today.    PT had issues with Spironolactone in the past, will try Eplerenone.     SUBJECTIVE:  Laying flat in bed, resting  Feeling better s/pparaqcxentisis 3/11/22, 0.9L removed  Resuming IV  Lasix/alb today  Denies n, v, constipation, SOB, fever, chills, of CP  Only having abdominal pain when applying pressure  No growth on peritoneal fluid to date  appetite stable  Sleep stable      Past Medical History:   Diagnosis Date     Acute alcoholic intoxication in alcoholism with complication (H)      Acute  "metabolic encephalopathy      Alcohol abuse      Alcoholic cirrhosis of liver without ascites (H)      Alcoholic ketoacidosis 9/19/2019     Chronic acquired lymphedema      Cirrhosis of liver (H)      Depression      ED (erectile dysfunction)      Elevated liver enzymes 4/27/2018     Esophageal varices without bleeding (H)      GI (gastrointestinal bleed)     hematemesis     Hematemesis 4/25/2018    Added automatically from request for surgery [id number]     History of transfusion      Hypokalemia 9/19/2019     Hypomagnesemia 4/27/2018     Liver disease      Aiyana-Vizcaino tear 4/27/2018     Nausea with vomiting      Neuropathy      Right patella fracture      Seizures (H)     withdrawal     Substance abuse (H)      Thrombocytopenia (H)        Social History     Tobacco Use     Smoking status: Current Every Day Smoker     Packs/day: 1.00     Types: Cigarettes     Smokeless tobacco: Never Used   Substance Use Topics     Alcohol use: Yes     Comment: a liter of Vodka everyday     Drug use: Yes     Types: Marijuana          No current facility-administered medications on file prior to encounter.  furosemide (LASIX) 40 MG tablet, TAKE 1 TABLET BY MOUTH TWICE A DAY (09:00AM & 06:00PM)  mirtazapine (REMERON) 15 MG tablet, Take 1 tablet (15 mg) by mouth At Bedtime  gabapentin (NEURONTIN) 300 MG capsule, Take 1 capsule (300 mg) by mouth 3 times daily (Patient not taking: Reported on 2/25/2022)  thiamine (B-1) 100 MG tablet, Take 1 tablet (100 mg) by mouth daily (Patient not taking: Reported on 2/25/2022)        ALLERGIES/SENSITIVITIES:  No Known Allergies       PHYSICAL EXAM:  /62 (BP Location: Left arm)   Pulse 102   Temp 98.4  F (36.9  C) (Oral)   Resp 16   Ht 1.803 m (5' 11\")   Wt 82 kg (180 lb 12.8 oz)   SpO2 98%   BMI 25.22 kg/m      Patient Vitals for the past 72 hrs:   Weight   03/11/22 0506 82 kg (180 lb 12.8 oz)   03/10/22 0336 82.1 kg (181 lb 1.6 oz)     Body mass index is 25.22 kg/m .    Intake/Output " Summary (Last 24 hours) at 3/8/2022 1338  Last data filed at 3/8/2022 0840  Gross per 24 hour   Intake 1440 ml   Output --   Net 1440 ml         General - A & O x 3, NAD, supine in bed, jaundiced, petechiae on face, arms unchanged, appears ill  Respiratory - sats good on RA  Cardiovascular - BP sl better 110  Abdomen -+ ascites  Extremities - 3+ LE pitting edema  Integumentary -petechiae on face, some noted on abd/arms  Neurologic - grossly intact bu cursory bed exam , no focal deficits   Psych:  Judgement intact, affect WNL  :  Modest UO  Wt 65>>>82 since admit, mostly ascites/LE edema presumably based on exam     Laboratory:  Recent Labs   Lab Test 03/08/22  1142 03/08/22  0634 03/07/22  0636 03/06/22  1201 03/06/22  0655   WBC  --   --  8.7  --  6.8   HGB 7.8* 7.1* 7.8*  7.8*   < > 7.1*   MCV  --   --  99  --  99   PLT  --   --  178  --  161   INR  --  2.04* 1.92*  --  1.96*    < > = values in this interval not displayed.      Recent Labs   Lab Test 03/08/22  0634 03/07/22  0636   POTASSIUM 4.1 4.5   CHLORIDE 108* 107   BUN 17 16      Recent Labs   Lab Test 03/08/22  0634 03/07/22  0636 03/06/22  1243 12/20/20  0808 12/15/20  1718   ALBUMIN 1.6* 1.7*  --    < >  --    BILITOTAL 15.0* 15.2*  --    < >  --    ALT 17 18  --    < >  --    AST 49* 63*  --    < >  --    PROTEIN  --   --  Negative  --  30 mg/dL*    < > = values in this interval not displayed.       Personally reviewed today's laboratory studies      Thank you for involving us in the care of this patient. We will continue to follow along with you.      Dee Snyder, [facility]  [facility]  [phone]

## 2022-03-12 NOTE — PLAN OF CARE
Problem: Plan of Care - These are the overarching goals to be used throughout the patient stay.    Goal: Absence of Hospital-Acquired Illness or Injury  Intervention: Identify and Manage Fall Risk  Recent Flowsheet Documentation  Taken 3/11/2022 2348 by Mat Perry RN  Safety Promotion/Fall Prevention:   nonskid shoes/slippers when out of bed   room near nurse's station  Taken 3/11/2022 2027 by Mat Perry RN  Safety Promotion/Fall Prevention:   nonskid shoes/slippers when out of bed   room near nurse's station     Problem: Alcohol Withdrawal  Goal: Alcohol Withdrawal Symptom Control  Outcome: Ongoing, Progressing     Problem: Pain Acute  Goal: Acceptable Pain Control and Functional Ability  Outcome: Ongoing, Progressing     Problem: Fall Injury Risk  Goal: Absence of Fall and Fall-Related Injury  Outcome: Ongoing, Progressing  Intervention: Promote Injury-Free Environment  Recent Flowsheet Documentation  Taken 3/11/2022 2348 by Mat Perry RN  Safety Promotion/Fall Prevention:   nonskid shoes/slippers when out of bed   room near nurse's station  Taken 3/11/2022 2027 by Mat Perry RN  Safety Promotion/Fall Prevention:   nonskid shoes/slippers when out of bed   room near nurse's station     Goal Outcome Evaluation:      Pt alert, oriented, and pleasant overnight. Pt has tremors, otherwise no signs of etoh withdrawal at this time. Jaundiced. Up independently to bathroom. Voiding. Anemia, hgb 7.7. Pt denies pain at this time.

## 2022-03-12 NOTE — TELEPHONE ENCOUNTER
"Routing refill request to provider for review/approval because:  Patient needs to be seen because it has been more than 1 year since last office visit. No upcoming visit on file.    Last Written Prescription Date:  01/17/2022  Last Fill Quantity: 60,  # refills: 1   Last office visit provider:  01/04/2021 with Dr Kelby Dumont.    Requested Prescriptions   Pending Prescriptions Disp Refills     furosemide (LASIX) 40 MG tablet [Pharmacy Med Name: Furosemide 40MG TABS] 60 tablet 1     Sig: TAKE 1 TABLET BY MOUTH TWICE A DAY (09:00AM & 06:00PM)       Diuretics (Including Combos) Protocol Failed - 3/11/2022  2:13 PM        Failed - Recent (12 mo) or future (30 days) visit within the authorizing provider's specialty     Patient has had an office visit with the authorizing provider or a provider within the authorizing providers department within the previous 12 mos or has a future within next 30 days. See \"Patient Info\" tab in inbasket, or \"Choose Columns\" in Meds & Orders section of the refill encounter.              Passed - Blood pressure under 140/90 in past 12 months     BP Readings from Last 3 Encounters:   03/11/22 133/75   06/08/21 135/58   01/04/21 110/56                 Passed - Medication is active on med list        Passed - Patient is age 18 or older        Passed - Normal serum creatinine on file in past 12 months     Recent Labs   Lab Test 03/11/22  0504   CR 1.15              Passed - Normal serum potassium on file in past 12 months     Recent Labs   Lab Test 03/11/22  0504   POTASSIUM 3.8  3.9                    Passed - Normal serum sodium on file in past 12 months     Recent Labs   Lab Test 03/11/22  0504                      Brook Avendano 03/12/22 12:30 AM  "

## 2022-03-12 NOTE — PLAN OF CARE
Problem: Pain Acute  Goal: Acceptable Pain Control and Functional Ability  Outcome: Ongoing, Progressing  Intervention: Prevent or Manage Pain  Recent Flowsheet Documentation  Taken 3/11/2022 1500 by Kirsten Chatterjee RN  Medication Review/Management: medications reviewed   Goal Outcome Evaluation:     Pt has complained of no pain this shift.         Problem: Fall Injury Risk  Goal: Absence of Fall and Fall-Related Injury  Outcome: Ongoing, Progressing  Intervention: Identify and Manage Contributors  Recent Flowsheet Documentation  Taken 3/11/2022 1500 by Kirsten Chatterjee RN  Medication Review/Management: medications reviewed  Intervention: Promote Injury-Free Environment  Recent Flowsheet Documentation  Taken 3/11/2022 1500 by Kirsten Chatterjee, RN  Safety Promotion/Fall Prevention:    nonskid shoes/slippers when out of bed    room near nurse's station     Pt is independent in room, calls appropriately and can make needs known. Call light within reach.       Problem: Plan of Care - These are the overarching goals to be used throughout the patient stay.    Goal: Plan of Care Review/Shift Note  Description: The Plan of Care Review/Shift note should be completed every shift.  The Outcome Evaluation is a brief statement about your assessment that the patient is improving, declining, or no change.  This information will be displayed automatically on your shift note.  Outcome: Ongoing, Progressing   trepeat US and para done. Paracentesis removed 0.9 L of fluid. Albumin administered. Neph consulted and new orders received. @gm low sodium diet ordered.  Surg consulted and surg too high risk with mortality rate greater than 70%. No further recommendations.  Hgb 7.7.

## 2022-03-12 NOTE — PLAN OF CARE
Problem: Plan of Care - These are the overarching goals to be used throughout the patient stay.    Goal: Plan of Care Review/Shift Note  Description: The Plan of Care Review/Shift note should be completed every shift.  The Outcome Evaluation is a brief statement about your assessment that the patient is improving, declining, or no change.  This information will be displayed automatically on your shift note.  Outcome: Ongoing, Progressing     Patient A & O. Somewhat mood decreased and slight agitation, but friendly on shift. Medications and IV antibiotics provided, as scheduled. IV in right arm and saline locked. K and Mg protocol removed and followed by MD to assess recheck need Hgb stable at 7.7. Slight ongoing tremors noted in hands. No assist with patient. New prescriptions from chlorhexidine and lasix IV.

## 2022-03-12 NOTE — PROGRESS NOTES
"GASTROENTEROLOGY PROGRESS NOTE        SUBJECTIVE:  No further abdominal pain, no nausea/ vomiting, BM 3-4 daily, no melena. LE edema improving. Tolerating low salt diet.      OBJECTIVE:    /62 (BP Location: Left arm)   Pulse 102   Temp 98.4  F (36.9  C) (Oral)   Resp 16   Ht 1.803 m (5' 11\")   Wt 82 kg (180 lb 12.8 oz)   SpO2 98%   BMI 25.22 kg/m    Temp (24hrs), Av.2  F (36.8  C), Min:97.4  F (36.3  C), Max:99  F (37.2  C)    Patient Vitals for the past 72 hrs:   Weight   22 0506 82 kg (180 lb 12.8 oz)   03/10/22 0336 82.1 kg (181 lb 1.6 oz)       Intake/Output Summary (Last 24 hours) at 3/12/2022 0956  Last data filed at 3/12/2022 0701  Gross per 24 hour   Intake 590 ml   Output --   Net 590 ml        PHYSICAL EXAM     Constitutional: Jaundice, chronically ill appearing  Abdomen: soft, non distended, non tender  No asterixis  1+ LE edema       Additional Comments:  ROS, FH, SH: See initial GI consult for details.     I have reviewed the patient's new clinical lab results:     Recent Labs   Lab Test 22  0825 22  0504 03/10/22  0543 22  0538   WBC  --  8.6  --  7.5 8.1   HGB  --  7.7*  --  7.6* 6.8*   MCV  --  99  --  99 99   PLT  --  144*  --  148* 153   INR 2.64*  --  2.37* 2.10* 1.93*     Recent Labs   Lab Test 22  0622  0504 03/10/22  0543   POTASSIUM 3.3*  3.4* 3.8  3.9 3.9  3.9  3.9   CHLORIDE 107 108* 108*  108*   CO2 24 22 19*  19*   BUN 11 13 16  16   ANIONGAP 7 6 8  8     Recent Labs   Lab Test 22  0635 22  0504 03/10/22  0543 22  0634 22  0636 22  1243 22  0503 22  1115 20  0808 12/15/20  1718 12/15/20  1304 07/10/20  0751 20  0728 20  0857 20  0954 20  0542 20  1644   ALBUMIN 3.2* 2.7* 2.7*  2.7*   < > 1.7*  --    < > 3.4*   < >  --  3.8   < > 2.6*   < > 2.4*   < >  --    BILITOTAL 12.5* 14.2* 15.0*   < > 15.2*  --    < > 6.4*   < >  --  0.8   < > " 3.8*   < > 1.5*   < >  --    ALT 9 11 12   < > 18  --    < > 28   < >  --  104*   < > 54*   < > 30   < >  --    AST 49* 55* 53*   < > 63*  --    < > 124*   < >  --  181*   < > 141*   < > 52*   < >  --    PROTEIN  --   --   --   --   --  Negative  --   --   --  30 mg/dL*  --   --   --   --   --   --  Negative   LIPASE  --   --   --   --  137*  --   --  16  --   --  55*   < > 77*   < > 71*   < >  --    AMYLASE  --   --   --   --   --   --   --   --   --   --   --   --  83  --  85  --   --     < > = values in this interval not displayed.       MELD-Na score: 28 at 3/12/2022  6:35 AM  MELD score: 28 at 3/12/2022  6:35 AM  Calculated from:  Serum Creatinine: 1.05 mg/dL at 3/12/2022  6:35 AM  Serum Sodium: 138 mmol/L (Using max of 137 mmol/L) at 3/12/2022  6:35 AM  Total Bilirubin: 12.5 mg/dL at 3/12/2022  6:35 AM  INR(ratio): 2.64 at 3/12/2022  6:35 AM  Age: 36 years       ASSESSMENT/ PLAN  Masoud Huddleston is a 35 yo  Male with medical history of alcoholic cirrhosis with portal hypertension, esophageal varices, hepatic encephalopathy and ascites who presented to the ER with hematemesis.     1.  Alcoholic cirrhosis/alcoholic hepatitis: Meld score today of 28.  Total bilirubin is 12.5 and appears to be improving.  2/25 liver ultrasound without focal mass.  AFP less than 2 on 3/11.    A.  Hematemesis: s/p EGD 2/26 revealing esophageal varices which were banded x3.  He was initially treated with ceftriaxone and octreotide.  These were discontinued 3/1.  He continues on oral PPI.  No further hematemesis.  --EGD in 4 weeks for retreatment of esophageal varices  --Continue PPI 40 mg twice daily until hospital discharge that once daily for 4 weeks.  --No current evidence of hematemesis or melena.  Hemoglobin is stable.  He did require 1 unit of packed red cells on 3/9.  He has been given IV iron and received a total of 4 doses.    B.  Ascites: Patient underwent paracentesis yesterday with removal of 0.9 L of fluid.  Fluid  analysis was negative for SBP.    C.  Encephalopathy: Patient to continue on rifaximin.  Lactulose was held due to significant diarrhea.  C. difficile was negative 3/6.  No further significant diarrhea.  -- Continue rifaximin and PPI therapy  --1 of 2 blood cultures were positive for parvimonas micra.  Infectious diseases following.  Additional blood cultures x2 (3/6)  with no growth.    2.  Elevated creatinine: Now resolved, thought to be multifactorial with volume depletion/third spacing, anemia and severe hypoalbuminemia.  Nephrology is following.  He is on scheduled midodrine.  3/11 he was given IV furosemide x2. Eplerenone to be started today per nephrology.  He is receiving albumin twice daily.      Discussed with Dr. Netta Durham PA-C  [facility] ( [facility])

## 2022-03-13 NOTE — DISCHARGE SUMMARY
[facility] MEDICINE  DISCHARGE SUMMARY     Primary Care Physician: System, Provider Not In  Admission Date: 2/25/2022   Discharge Provider: Steve Del Rio MD Discharge Date: 3/13/2022   Diet:   Active Diet and Nourishment Order   Procedures     Snacks/Supplements Adult: Ensure Enlive; With Meals     2 Gram Sodium Diet     Diet       Code Status: Full Code   Activity: DCACTIVITY: Activity as tolerated        Condition at Discharge: Good     REASON FOR PRESENTATION(See Admission Note for Details)     Hematemesis    PRINCIPAL & ACTIVE DISCHARGE DIAGNOSES      Esophageal varices/portal hypertension/cirrhosis.  -- EGD done 2/26 and esophageal varices banded x 3  -- Treated with Ceftriaxone and octreotide which was discontinued by GI on 3/1.   -- On oral PPI  -- Hemoglobin (see anemia below).   -- Repeat EGD scheduled for 3/26     Acute blood loss anemia/thrombocytopenia.  -- Likely d/t acute blood losses from bleeding varices, poor nutritional intake with alcohol abuse.   -- Hemoglobin 12.9 initially on presentation, dropped to 11.0 with hematemesis prior to admission.  -- Hemoglobin has continued to drop despite esophageal banding.   -- No overt signs of ongoing bleeding. No hematemesis, black stools or other abnormal bleeding.   -- Hemoglobin was steadily declining  -- 1 unit PRBCs given for hgb 6.8 on 3/9.  Now hemoglobin seems stable and 7.7 yesterday.   -- Iron binding panel performed 3/8 consistent with mixed anemia of chronic disease and iron deficiency.  -- Transfuse for hemoglobin less than 7, blood consent signed and placed in the chart  -- Patient started on IV Venofer x 4 doses 03/10.  Plan: Check hemoglobin follow-up visit with primary care within 5 days    Alcoholic hepatitis  --Bilirubin continues to be elevated, ~14.5-15 and stable but now improved to 12.5 today  -- MELD was 18 at time of admission, 27 today; 19.6% three month mortality  -- GI consulted  regarding steroid initiation. There was initial concern with steroid use given UGIB and positive culture for parvimonas micra. Will defer to GI for initiation of steroids.  -- Paracentesis performed 3/6 with 0.6 liters removed; not clearly SBP. Cultures negative. Repeat paracentesis performed 3/11 with 0.9 liters removed.  -- RUQ US 3/7 with cirrhosis, mild ascites, and cholelithiasis.  -- HIDA scan with ef performed 3/9 and showed no evidence of cholecystitis with patent cystic duct, but there is markedly decreased gallbladder ef and slow hepatocellular radiotracer uptake and prolonged retention c/w hepatocellular disease.  -- Bloating likely secondary to alcoholic hepatitis.   -- Lactulose on hold due to severe diarrhea.  -- Coagulopathy with INR ~2.   -- B12 elevated to >2,000 on 3/9  -- Plan: Paracentesis performed 3/12 with 0.9 liters removed, diuretics per Nephrology/GI, medications as directed by Nephrology, GI consulted surgery.  Appreciate their input in their recommending against surgery at this point as patient would be at such a high risk for complications and death      Elevated Creatinine, resolved  -- Nephrology consulted 3/8  -- Creatinine improving after albumin and midodrine  --Creatinine normal today.  -- Plan: Awaiting nephrology visit today and they can guide diuretics at discharge and follow-up     Cardiac Murmur  -- Patient reports history of cardiac murmur as a child but thought this had resolved.  -- 3/6 systolic murmur heard on exam.  -- Last echocardiogram was in January 2020 and was normal at that time. Given unknown duration of this murmur will repeat echocardiogram today.  -- Echocardiogram 3/10 with EF 60-65% and normal LV/RV functioning. 1+ tricuspid regurgitation, otherwise valves appeared normal.  -- No further workup indicated at this time.  Can be discussed with primary care at follow-up visit     Acute encephalopathy, resolved  -- Improved, psych consult cancelled.   -- Toxic  related to alcohol withdrawal vs hepatic encephalopathy  -- Holding lactulose due to diarrhea (see below).  -- GI added Rifaximin 3/6  Plan: Continue rifaximin.     Scrotal Swelling, mildly improved today  -- Mild, likely secondary to diffuse edema.   -- Patient had a paracentesis today with 0.9 L removed.  --Nephrology started furosemide 3/12.  Plan: Continue diuretics and discuss a follow-up with primary care within 5 days     Peripheral Edema, seems mild to moderately improved today  -- Likely multifactorial due to alcohol related hepatitis, severe hypoalbuminemia, GI losses and poor oral intake.  -- Compression stockings provided to the patient which he states he has used at home with good relief.   --Started furosemide 3/12  Plan: Continue diuretics     Alcohol Withdrawal, resolved  -- History of alcohol withdrawal seizures  -- Withdrawals resolved  -- Discontinue CIWA protocol 3/4     Fever/diarrhea  -- WBC normal, no significant tachycardia, BP stable, new diarrhea with some abdominal pain.   -- C diff PCR negative.   -- UA negative.  -- Blood cultures x 2 without growth.   -- GI consulted and felt possible related to biliary colic.  -- Imodium given for diarrhea with some relief, currently dosed twice daily.   -- Plan: Continue Imodium twice daily as needed     Bacteremia  -- 1 of 2 blood cultures positive for Parvimonas micra  -- Initially treated with Vanocmycin however felt to be possible contaminant; no clear infectious symptoms.  -- ID consulted and felt metronidazole treatment indicated, treated 3/3-3/10.      Insomnia  -- Improved with Remeron 30 mg and Trazodone 50 mg at night.    Plan: Discussed at follow-up with primary care     Depression, chronic and large situational component  --He states that when he is drinking he does not take his antidepressants as he does not want to mix those  Plan: Discussed with visit at primary care     Canker sores  Currently has a number of these  Plan: Chlorhexidine  oral rinse 4 times daily as needed     Hypokalemia/hypomagnesemia.  Plan: Check at primary care visit     Hyponatremia, resolved     Severe Malnutrition: based on nutrition assessment   Greater than 5% weight loss in 1 month.  Less than 75% intake for greater than 1 month  Subcutaneous fat loss in the orbital region.  Improved eating the last couple of days  Albumin improving  Plan: Healthy eating and low salt diet       PENDING LABS     Unresulted Labs Ordered in the Past 30 Days of this Admission     Date and Time Order Name Status Description    3/11/2022 10:20 AM Cytology non gyn In process     3/11/2022 10:20 AM Ascites Fluid Aerobic Bacterial Culture Routine Preliminary             PROCEDURES ( this hospitalization only)      Procedure(s):  ESOPHAGOGASTRODUODENOSCOPY (EGD) WITH ESOPHAGEAL VARICEAL BANDING    RECOMMENDATIONS TO OUTPATIENT PROVIDER FOR F/U VISIT     Follow-up Appointments     Follow-up and recommended labs and tests       Follow up with primary care provider, Provider Not In System, within 5   days to evaluate medication change and for hospital follow- up.  The   following labs/tests are recommended: Hepatic panel, basic metabolic   panel, magnesium and hemogram.    Follow-up with gastroenterology per their recommendations           Get home blood pressure cuff and check daily and also weigh yourself daily    DISPOSITION     Home    SUMMARY OF HOSPITAL COURSE:    Masoud Huddleston is a 36 year old male with a past medical history of alcohol dependence, withdrawal seizures, known esophageal varices in 2021 and alcohol related liver cirrhosis who presented to the ED on 2/25 with hematemesis. Found to have an acute blood loss anemia and admitted. Hospital course notable so far for EGD on 2/26 revealing esophageal varices which were banded x 3. Patient experienced mild alcohol withdrawal 2/27 and starting to advance diet per GI guidance. Ceftriaxone and octreotide discontinued 3/1 per GI and PPI  switched to PO. Worsening withdrawal with hallucinations 3/1. CIWA continued to be high prompting additional labs for hepatic encephalopathy. Ammonia elevated to 66 on 3/2 and INR elevated around 2. Bilirubin also trending up. Course complicated by 1 of 2 blood cultures being positive for parvimonas micra. Initially treated with Vancomycin but no infectious symptoms. Dicussed with ID and could be contaminant but recommending Augmentin or Metronidazole for coverage if indicated. Metronidazole given 3/3-3/10. He developed intermittent low grade fevers starting 3/4 with normal WBC and normal vital signs. Only new symptom was diarrhea and abdominal pain. C diff negative. UA without evidence of infection. Blood cultures negative. RUQ US 3/7 with cirrhosis, mild ascites, cholelithiasis prompting HIDA scan, results pending. Nephrology consulted 3/8 with new creatinine elevation which is now resolved.  Appreciate input from gastroenterology and nephrology.  Patient feels he is a bit better every day.  Frustrated by 2 g sodium diet.  Getting a bit tired at the hospital and wants to get out so he can see his kids.  Also feeling remorseful by 15 years of alcohol use and what is diagnosed by..    Today patient feels much improved.  He is eating well.  He feels the swelling is reduced.  Energy is better.  He appears to be cognitively intact.  Eager to go home this afternoon after being seen by nephrology.  Gastroenterology okay with discharge.    Discharge Medications with Med changes:     Current Discharge Medication List      START taking these medications    Details   chlorhexidine (PERIDEX) 0.12 % solution Swish and spit 15 mLs in mouth 4 times daily as needed (canker sores)  Qty: 473 mL, Refills: 0    Associated Diagnoses: Alcoholic hepatitis with ascites      eplerenone (INSPRA) 25 MG tablet Take 1 tablet (25 mg) by mouth daily  Qty: 30 tablet, Refills: 0    Associated Diagnoses: Alcoholic hepatitis with ascites       midodrine (PROAMATINE) 10 MG tablet Take 1 tablet (10 mg) by mouth 3 times daily as needed (systolic BP <100)  Qty: 30 tablet, Refills: 0    Associated Diagnoses: Alcoholic hepatitis with ascites      nicotine (NICODERM CQ) 7 MG/24HR 24 hr patch Place 1 patch onto the skin every 24 hours  Qty: 28 patch, Refills: 0    Associated Diagnoses: Tobacco use disorder      oxymetazoline (AFRIN) 0.05 % nasal spray Spray 2 sprays into both nostrils 2 times daily as needed for other (Nosebleeds)  Qty: 30 mL, Refills: 0    Associated Diagnoses: Thrombocytopenia (H)      pantoprazole (PROTONIX) 40 MG EC tablet Take 1 tablet (40 mg) by mouth 2 times daily (before meals)  Qty: 60 tablet, Refills: 0    Associated Diagnoses: Alcoholic hepatitis with ascites      rifaximin (XIFAXAN) 550 MG TABS tablet Take 1 tablet (550 mg) by mouth 2 times daily  Qty: 60 tablet, Refills: 0    Associated Diagnoses: Alcoholic hepatitis with ascites      traZODone (DESYREL) 50 MG tablet Take 1 tablet (50 mg) by mouth At Bedtime  Qty: 30 tablet, Refills: 0    Associated Diagnoses: Alcoholic hepatitis with ascites         CONTINUE these medications which have CHANGED    Details   gabapentin (NEURONTIN) 100 MG capsule Take 1 capsule (100 mg) by mouth 2 times daily  Qty: 60 capsule, Refills: 0    Associated Diagnoses: Alcoholic hepatitis with ascites      mirtazapine (REMERON) 30 MG tablet Take 1 tablet (30 mg) by mouth At Bedtime  Qty: 30 tablet, Refills: 0    Associated Diagnoses: Alcoholic hepatitis with ascites         CONTINUE these medications which have NOT CHANGED    Details   furosemide (LASIX) 40 MG tablet TAKE 1 TABLET BY MOUTH TWICE A DAY (09:00AM & 06:00PM)  Qty: 60 tablet, Refills: 1    Comments: Maximum Refills Reached  Associated Diagnoses: Leg swelling      melatonin 5 MG tablet Take 1 tablet (5 mg) by mouth every evening as needed for sleep    Associated Diagnoses: Alcoholic hepatitis with ascites      thiamine (B-1) 100 MG tablet Take 1  tablet (100 mg) by mouth daily  Qty: 30 tablet, Refills: 0    Associated Diagnoses: Cirrhosis of liver without ascites, unspecified hepatic cirrhosis type (H)                   Rationale for medication changes:      Eplerenone for edema and ascites    Chlorhexidine for canker sores    Rifaximin for hepatic encephalopathy    Nicotine patches to help quit smoking    Trazodone for insomnia and depression    Midodrine to support blood pressure    Oxymetazoline nasal spray as needed nosebleeds    Pantoprazole for GI protection        Consults       SOCIAL WORK IP CONSULT  CARE MANAGEMENT / SOCIAL WORK IP CONSULT  PHYSICAL THERAPY ADULT IP CONSULT  OCCUPATIONAL THERAPY ADULT IP CONSULT  PHARMACY TO DOSE VANCO  PSYCHIATRY IP CONSULT  GASTROENTEROLOGY IP CONSULT  NEPHROLOGY IP CONSULT  SURGERY GENERAL IP CONSULT    Immunizations given this encounter     Most Recent Immunizations   Administered Date(s) Administered     Influenza Vaccine IM > 6 months Valent IIV4 (Alfuria,Fluzone) 01/09/2020     MMR 04/28/1998     Td (Adult), Adsorbed 04/28/1998           Anticoagulation Information      Recent INR results:   Recent Labs   Lab 03/13/22  0709 03/12/22  0635 03/11/22  0504 03/10/22  0543 03/09/22  0538 03/08/22  0634 03/07/22  0636   INR 2.45* 2.64* 2.37* 2.10* 1.93* 2.04* 1.92*     NA      SIGNIFICANT IMAGING FINDINGS     Results for orders placed or performed during the hospital encounter of 02/25/22   CT Chest/Abdomen/Pelvis w Contrast    Impression    IMPRESSION:   1.  Cirrhosis, moderate to severe steatosis and moderate hepatomegaly compatible with alcoholic liver disease and portal to caval collateral vein formation as detailed above consistent with portal hypertension.  2.  Cholelithiasis.  3.  Mild bronchial wall thickening throughout both lungs and mid and upper lung predominant faint micronodular peribronchiolar opacities compatible with mild nonspecific chronic active bronchial and bronchiolar inflammation/infection.    US Paracentesis    Impression    IMPRESSION:  1.  Small volume ascites isolated to the pelvis. Status post ultrasound-guided paracentesis.    Reference CPT Code: 23731   US Abdomen Limited    Impression    IMPRESSION:  1.  Cirrhotic appearing liver without focal hepatic mass.  2.  Mild ascites.  3.  Cholelithiasis with no bile duct dilatation. Diffuse gallbladder wall thickening likely secondary to chronic liver disease.       NM Hepatobiliary Scan w GB EF    Impression    IMPRESSION:   1. No evidence of cholecystitis with patent cystic duct.  2. Markedly decreased gallbladder ejection fracture.  3. Slow hepatocellular radiotracer uptake and prolonged radiotracer retention consistent with hepatocellular disease.     US Paracentesis    Impression    IMPRESSION:  1.  Status post ultrasound-guided paracentesis.    Reference CPT Code: 54818   Echocardiogram Complete   Result Value Ref Range    LVEF  60-65%        SIGNIFICANT LABORATORY FINDINGS     Most Recent 3 CBC's:Recent Labs   Lab Test 03/13/22  0709 03/11/22  0825 03/10/22  0543   WBC 8.2 8.6 7.5   HGB 8.0* 7.7* 7.6*   MCV 98 99 99   * 144* 148*     Most Recent 3 BMP's:Recent Labs   Lab Test 03/13/22  0709 03/12/22  0635 03/11/22  0504    138 136   POTASSIUM 3.6 3.3*  3.4* 3.8  3.9   CHLORIDE 105 107 108*   CO2 23 24 22   BUN 10 11 13   CR 0.97 1.05 1.15   ANIONGAP 9 7 6   FÉLIX 7.9* 7.6* 7.1*    92 96     Most Recent 2 LFT's:Recent Labs   Lab Test 03/13/22  0709 03/12/22  0635   AST 56* 49*   ALT 12 9   ALKPHOS 105 93   BILITOTAL 13.7* 12.5*     Most Recent 3 INR's:Recent Labs   Lab Test 03/13/22  0709 03/12/22  0635 03/11/22  0504   INR 2.45* 2.64* 2.37*         Discharge Orders        Reason for your hospital stay    Patient presented with acute intoxication, hematemesis with acute blood loss anemia, acute on chronic liver disease with acute hepatic encephalopathy.  Ended up having upper GI endoscopy with variceal banding.  Lactulose gave  him frequent diarrhea so he was changed to rifaximin.  Has been followed by gastroenterology and then also nephrology for acute kidney injury.  With IV diuresis edema has improved and now he is trending in the right direction with normalization of his creatinine and improvement of his liver function tests.  Cognitively he seems at baseline.  His diarrhea is slowing down to a couple stools a day and had been up to 20 plus stools a day.  He is feeling markedly improved.  Planning to discharge to home and planning to quit alcohol.  I told him if he resumed drinking in the future he would likely die     Follow-up and recommended labs and tests     Follow up with primary care provider, Provider Not In System, within 5 days to evaluate medication change and for hospital follow- up.  The following labs/tests are recommended: Hepatic panel, basic metabolic panel, magnesium and hemogram.    Follow-up with gastroenterology per their recommendations     Activity    Your activity upon discharge: activity as tolerated     Monitor and record    blood pressure daily.  If he does not have a blood pressure cuff at home should get 1.  Omron a reliable brand.  Call if systolic blood pressure less than 100 or greater than 160.  weight every day and call if weight increased greater than 2 pounds from new baseline     Diet    Follow this diet upon discharge: Low salt       Examination   Physical Exam   Temp:  [99.2  F (37.3  C)-99.4  F (37.4  C)] 99.4  F (37.4  C)  Pulse:  [] 98  Resp:  [16-18] 16  BP: (111-127)/(56-76) 111/76  SpO2:  [95 %-97 %] 97 %  Wt Readings from Last 1 Encounters:   03/13/22 77.1 kg (169 lb 14.4 oz)       General Appearance: Alert and appears to be in no apparent distress.  Appears to be cognitively intact.  Mood is better with good eye contact  Respiratory: Clear throughout  Cardiovascular: Regular rate and rhythm with heart rate of 92  GI: Abdomen is moderately distended improved from yesterday.  There is  no tenderness  Skin: Scattered papular rash on his face with some mild to moderate scleral icterus both of which have improved for the past 2 to 3 days  Other: Neurologically seems intact      Please see EMR for more detailed significant labs, imaging, consultant notes etc.    I, Steve Del Rio MD, personally saw the patient today and spent greater than 30 minutes discharging this patient.    Steve Del Rio MD  [facility]    CC:System, Provider Not In

## 2022-03-13 NOTE — PROGRESS NOTES
Care Management Discharge Note    Discharge Date: 03/13/2022       Discharge Disposition: [facility]patient [facility]    Discharge Services: [facility]    Discharge DME: None    Discharge Transportation: [employer]     Education Provided on the Discharge Plan:AVS per bedside RN.      Persons Notified of Discharge Plans: patient    Patient/Family in Agreement with the Plan: yes    Handoff Referral Completed: Yes    Additional Information:  See prior CM  Note form 3/13/22.         Iva Dillon RN        Care Management Follow Up    Length of Stay (days): 16    Expected Discharge Date: 03/13/2022     Concerns to be Addressed:  discharge home after seen by  Nephrology       Patient plan of care discussed at interdisciplinary rounds: Yes    Anticipated Discharge Disposition: [facility]     Anticipated Discharge Services: [facility]    Anticipated Discharge DME: None      Education Provided on the Discharge Plan:CM met with patient. AVS per bedside RN.       Patient/Family in Agreement with the Plan: yes        Additional Information:  Chart reviewed. CM met with patient. Patient will discharge home self care/ no CM needs. Denies needs from CM. He is independent at baseline. He will take an [employer] home at hospital discharge.       Iva Dillon, RN

## 2022-03-13 NOTE — PROGRESS NOTES
RENAL CONSULT NOTE    REQUESTING PROVIDER:  [facility]    REASON FOR CONSULT: rising creatinine.     ADDM:  Fairly low volume para today, so will attempt to diurese today with Alb support given sig volume surplus/edema.     ASSESSMENT/PLAN:    Acute Kidney Injury:  RF improved after Alb, pRBC's, and scheduled Midodrine. Likely multifactorial with intravasc volume deficit d/t third spacing with severe hypoalbuminemia, GI  Losses with por intake, and severe anemia.   --With preserved baseline RF, normal CR on 3/2 admission, shilpa to 1.4 now 0.9 ,improvement with scheduled midodrine, albumen and diuresis.  --Urine Na  <20 would support dx of HRS (urine osmol >400, ++ADH)   --Imaging neg for hydro    PLAN:  -- continue Midodrine 10mg TID (with hold parameters for syst >150)  --nutritional protein supp between meals  --alb 3.3, wt down 77, continue furosemide 40 POQD with discharge  --strict I/O, daily wts  --continue Eplerenone 25 mg  And lasix 40 upon discharge today   --Okay to discharge from a renal standpoint.    Hypotension:    --on scheduled Midodrine TID  --reduced Kristina dosing as this can contribute to HoTN, he tells me he is taking for neuropathy in feet    Volume;   --ongoing uptic in wt, excess third spacing, very low Alb 2.7 post replacement, hold off on diuretics today d/t planned para 3/11/22, concern for HoTN,   --Eplerenone added 3/12/22  --low salt diet.   --wearing compression stockings  --Paracentesis 0.9L removed 3/11/22  --restart IV lasix today coordinate with albumen doses    Hyponatremia:    --Stable low 132-135 range, +ADH with liver failure/HoTN  -- improving with Alb and BP support  -- cont low Na diet for now <2g    Hypokalemia:  --KCL 20 BID  --BMP in AM, may need to increase KCL dose or start replacement protocol    Anemia:    --Stable 7's post transfusion, etiol acute blood losses from bleeding varices  -- poor nutritional intake with ETOH abuse  --iron stores low and on course of  Venofer, transfuse if Hgb <7     Lytes/minerals:    Shruti CA  8.2 low normal for hypoalb,   --K controlled,   --phos Low (likely d/dt resp Alk + diarrhea+ poor intake ,chronic ETOH abuse), replacing     Acid/base: improving, low serum c02 likely compensatory MA for resp Alk in the setting of severe liver failure.     Bacteremia: noted on 1/2 BCx (parvimonas), ID has seen, completed course of Flagyl, ascites fluid neg for SBP    ETOH cirrhosis: with acute liver failure, with encephalopathy, wasn't on lactulose PTA, and now held ), Portal HTN, esoph varices banded 2/26) , coagulopathy, GI bleed, on octreotide,  PPI, Rifaximin,  GI following , worsening LFT's (HIDA pending),  MELD near 30, infrequent low volume para's, mild ascites noted on 3/7 US(though quite distended)   will need chem dep program on discharge, inpt vs outpt (pt reportedly refusin)     Cholelithiasis/worsening LFTs: LFt's stable today,  s/p  HIDA scan, sluggish GB, surg to see (pt having abd pain), ? Potential for Contributing to worsening liver dz    Diarrhea; seems to be sl better, GI managing , was on immodium , c-diff neg , lactulose on hold     Seizures; d/t ETOH withdrawal, stable    REVIEW OF SYSTEMS:  Tired, very distended, plans for para again today, hasn't seen surg yet.  Edema persists, would like to initiate diuretics, but plans for para today.    PT had issues with Spironolactone in the past, will try Eplerenone.     SUBJECTIVE:  Sitting up at side of bed  Feeling better s/p para 3/11/22, 0.9L removed  Continue inspra/lasix at discharge  Denies n, v, constipation, SOB, fever, chills, of CP  Abdominal discomfort improved  No growth on peritoneal fluid to date  appetite stable  Sleep stable  Plan to discharge today , stable  Discussed plan with RN      Past Medical History:   Diagnosis Date     Acute alcoholic intoxication in alcoholism with complication (H)      Acute metabolic encephalopathy      Alcohol abuse      Alcoholic cirrhosis of  "liver without ascites (H)      Alcoholic ketoacidosis 9/19/2019     Chronic acquired lymphedema      Cirrhosis of liver (H)      Depression      ED (erectile dysfunction)      Elevated liver enzymes 4/27/2018     Esophageal varices without bleeding (H)      GI (gastrointestinal bleed)     hematemesis     Hematemesis 4/25/2018    Added automatically from request for surgery [id number]     History of transfusion      Hypokalemia 9/19/2019     Hypomagnesemia 4/27/2018     Liver disease      Aiyana-Vizcaino tear 4/27/2018     Nausea with vomiting      Neuropathy      Right patella fracture      Seizures (H)     withdrawal     Substance abuse (H)      Thrombocytopenia (H)        Social History     Tobacco Use     Smoking status: Current Every Day Smoker     Packs/day: 1.00     Types: Cigarettes     Smokeless tobacco: Never Used   Substance Use Topics     Alcohol use: Yes     Comment: a liter of Vodka everyday     Drug use: Yes     Types: Marijuana          No current facility-administered medications on file prior to encounter.  furosemide (LASIX) 40 MG tablet, TAKE 1 TABLET BY MOUTH TWICE A DAY (09:00AM & 06:00PM)  thiamine (B-1) 100 MG tablet, Take 1 tablet (100 mg) by mouth daily (Patient not taking: Reported on 2/25/2022)        ALLERGIES/SENSITIVITIES:  No Known Allergies       PHYSICAL EXAM:  /76 (BP Location: Right arm)   Pulse 98   Temp 99.4  F (37.4  C) (Oral)   Resp 16   Ht 1.803 m (5' 11\")   Wt 77.1 kg (169 lb 14.4 oz)   SpO2 97%   BMI 23.70 kg/m      Patient Vitals for the past 72 hrs:   Weight   03/13/22 0449 77.1 kg (169 lb 14.4 oz)   03/11/22 0506 82 kg (180 lb 12.8 oz)     Body mass index is 23.7 kg/m .    Intake/Output Summary (Last 24 hours) at 3/8/2022 1338  Last data filed at 3/8/2022 0840  Gross per 24 hour   Intake 1440 ml   Output --   Net 1440 ml         General - A & O x 3, NAD, supine in bed, jaundiced, petechiae on face, arms unchanged, appears ill  Respiratory - sats good on " RA  Cardiovascular - BP sl better 110  Abdomen -+ ascites  Extremities -trace LE pitting edema  Integumentary -petechiae on face, some noted on abd/arms  Neurologic - grossly intact bu cursory bed exam , no focal deficits   Psych:  Judgement intact, affect WNL  :  Modest UO  Wt 65>>>77, improving since admit, mostly ascites/LE edema presumably based on exam     Laboratory:  Recent Labs   Lab Test 03/08/22  1142 03/08/22  0634 03/07/22  0636 03/06/22  1201 03/06/22  0655   WBC  --   --  8.7  --  6.8   HGB 7.8* 7.1* 7.8*  7.8*   < > 7.1*   MCV  --   --  99  --  99   PLT  --   --  178  --  161   INR  --  2.04* 1.92*  --  1.96*    < > = values in this interval not displayed.      Recent Labs   Lab Test 03/08/22  0634 03/07/22  0636   POTASSIUM 4.1 4.5   CHLORIDE 108* 107   BUN 17 16      Recent Labs   Lab Test 03/08/22  0634 03/07/22  0636 03/06/22  1243 12/20/20  0808 12/15/20  1718   ALBUMIN 1.6* 1.7*  --    < >  --    BILITOTAL 15.0* 15.2*  --    < >  --    ALT 17 18  --    < >  --    AST 49* 63*  --    < >  --    PROTEIN  --   --  Negative  --  30 mg/dL*    < > = values in this interval not displayed.       Personally reviewed today's laboratory studies      Thank you for involving us in the care of this patient. We will continue to follow along with you.    Kirsten Araiza P-BC  [facility]  [phone]

## 2022-03-13 NOTE — PLAN OF CARE
Occupational Therapy Discharge Summary    Reason for therapy discharge:    Discharged to home.    Progress towards therapy goal(s). See goals on Care Plan in [facility] electronic health record for goal details.  Goals partially met.  Barriers to achieving goals:   discharge from facility.    Therapy recommendation(s):    No further therapy is recommended.

## 2022-03-13 NOTE — PROGRESS NOTES
"GASTROENTEROLOGY PROGRESS NOTE        SUBJECTIVE: No abdominal pain, nausea, vomiting, confusion or fever. No documented BMs yesterday.       OBJECTIVE:    /76 (BP Location: Right arm)   Pulse 98   Temp 99.4  F (37.4  C) (Oral)   Resp 16   Ht 1.803 m (5' 11\")   Wt 77.1 kg (169 lb 14.4 oz)   SpO2 97%   BMI 23.70 kg/m    Temp (24hrs), Av.2  F (36.8  C), Min:97.4  F (36.3  C), Max:99  F (37.2  C)    Patient Vitals for the past 72 hrs:   Weight   22 0449 77.1 kg (169 lb 14.4 oz)   22 0506 82 kg (180 lb 12.8 oz)       Intake/Output Summary (Last 24 hours) at 3/12/2022 0956  Last data filed at 3/12/2022 0701  Gross per 24 hour   Intake 590 ml   Output --   Net 590 ml        PHYSICAL EXAM     Constitutional: Jaundice, chronically ill appearing  Abdomen: soft, non distended, non tender  No asterixis  1+ LE edema       Additional Comments:  ROS, FH, SH: See initial GI consult for details.     I have reviewed the patient's new clinical lab results:     Recent Labs   Lab Test 22  0709 22  0635 22  0825 22  0504 03/10/22  0543   WBC 8.2  --  8.6  --  7.5   HGB 8.0*  --  7.7*  --  7.6*   MCV 98  --  99  --  99   *  --  144*  --  148*   INR 2.45* 2.64*  --  2.37* 2.10*     Recent Labs   Lab Test 22  0709 22  0635 22  0504   POTASSIUM 3.6 3.3*  3.4* 3.8  3.9   CHLORIDE 105 107 108*   CO2    BUN 10 11 13   ANIONGAP 9 7 6     Recent Labs   Lab Test 22  0709 22  0635 22  0504 22  0634 22  0636 22  1243 22  0503 22  1115 20  0808 12/15/20  1718 12/15/20  1304 07/10/20  0751 20  0728 20  0857 20  0954 20  0542 20  1644   ALBUMIN 3.3* 3.2* 2.7*   < > 1.7*  --    < > 3.4*   < >  --  3.8   < > 2.6*   < > 2.4*   < >  --    BILITOTAL 13.7* 12.5* 14.2*   < > 15.2*  --    < > 6.4*   < >  --  0.8   < > 3.8*   < > 1.5*   < >  --    ALT 12 9 11   < > 18  --    < > 28   < >  -- "  104*   < > 54*   < > 30   < >  --    AST 56* 49* 55*   < > 63*  --    < > 124*   < >  --  181*   < > 141*   < > 52*   < >  --    PROTEIN  --   --   --   --   --  Negative  --   --   --  30 mg/dL*  --   --   --   --   --   --  Negative   LIPASE  --   --   --   --  137*  --   --  16  --   --  55*   < > 77*   < > 71*   < >  --    AMYLASE  --   --   --   --   --   --   --   --   --   --   --   --  83  --  85  --   --     < > = values in this interval not displayed.       ASSESSMENT/ PLAN  Masoud Huddleston is a 37 yo  Male with medical history of alcoholic cirrhosis with portal hypertension, esophageal varices, hepatic encephalopathy and ascites who presented to the ER with hematemesis.     1.  Alcoholic cirrhosis/alcoholic hepatitis: Meld score 3/12 of 28.  Total bilirubin is 13.7 slight increase from yesterday. Fixed coagulopathy ( INR 2.45) and thrombocytopenia. 2/25 liver ultrasound without focal mass.  AFP less than 2 on 3/11.    A.  Hematemesis: s/p EGD 2/26 revealing esophageal varices which were banded x3.  He was initially treated with ceftriaxone and octreotide.  These were discontinued 3/1.  He continues on oral PPI.  No further hematemesis.  -- EGD in 4 weeks for retreatment of esophageal varices  --Continue PPI 40 mg twice daily until hospital discharge that once daily for 4 weeks.  --No current evidence of hematemesis or melena.  Hemoglobin is stable.  He did require 1 unit of packed red cells on 3/9.  He has been given IV iron and received a total of 4 doses.    B.  Ascites: Patient underwent paracentesis yesterday with removal of 0.9 L of fluid.  Fluid analysis was negative for SBP.    C.  Encephalopathy: Patient to continue on rifaximin.  Lactulose was held due to significant diarrhea.  C. difficile was negative 3/6.  No further significant diarrhea.  -- Continue rifaximin and PPI therapy  --1 of 2 blood cultures were positive for parvimonas micra.  Infectious diseases following.  Additional blood  cultures x2 (3/6)  with no growth.    2.  Elevated creatinine: Now resolved, thought to be multifactorial with volume depletion/third spacing, anemia and severe hypoalbuminemia.  Nephrology is following.  He is on scheduled midodrine.  3/11 he was given IV furosemide x2. Eplerenone was started per nephrology.  He is receiving albumin twice daily.      Discussed with Dr. Chadwick. Concha from a GI standpoint. Ongoing diuretic management management per nephrology.    Nadia Durham PA-C  [facility] ( [facility])

## 2022-03-13 NOTE — PROGRESS NOTES
[facility] MEDICINE PROGRESS NOTE      Identification/Summary:   Masoud Huddleston is a 36 year old male with a past medical history of alcohol dependence, withdrawal seizures, known esophageal varices in 2021 and alcohol related liver cirrhosis who presented to the ED on 2/25 with hematemesis. Found to have an acute blood loss anemia and admitted. Hospital course notable so far for EGD on 2/26 revealing esophageal varices which were banded x 3. Patient experienced mild alcohol withdrawal 2/27 and starting to advance diet per GI guidance. Ceftriaxone and octreotide discontinued 3/1 per GI and PPI switched to PO. Worsening withdrawal with hallucinations 3/1. CIWA continued to be high prompting additional labs for hepatic encephalopathy. Ammonia elevated to 66 on 3/2 and INR elevated around 2. Bilirubin also trending up. Course complicated by 1 of 2 blood cultures being positive for parvimonas micra. Initially treated with Vancomycin but no infectious symptoms. Dicussed with ID and could be contaminant but recommending Augmentin or Metronidazole for coverage if indicated. Metronidazole given 3/3-3/10. He developed intermittent low grade fevers starting 3/4 with normal WBC and normal vital signs. Only new symptom was diarrhea and abdominal pain. C diff negative. UA without evidence of infection. Blood cultures negative. RUQ US 3/7 with cirrhosis, mild ascites, cholelithiasis prompting HIDA scan, results pending. Nephrology consulted 3/8 with new creatinine elevation which is now resolved.  Appreciate input from gastroenterology and nephrology.  Patient feels he is a bit better every day.  Frustrated by 2 g sodium diet.  Getting a bit tired at the hospital and wants to get out so he can see his kids.  Also feeling remorseful by 15 years of alcohol use and what is diagnosed by..     Assessment and Plan:  Scrotal Swelling, he thinks it is the same today  -- Mild, likely secondary to diffuse  edema.   -- Patient had a paracentesis today with 0.9 L removed.  --Nephrology started furosemide today     Peripheral Edema  -- Likely multifactorial due to alcohol related hepatitis, severe hypoalbuminemia, GI losses and poor oral intake.  -- Compression stockings provided to the patient which he states he has used at home with good relief.   --Started furosemide today     Esophageal varices/portal hypertension/cirrhosis.  -- EGD done 2/26 and esophageal varices banded x 3  -- Treated with Ceftriaxone and octreotide which was discontinued by GI on 3/1.   -- On oral PPI  -- Hemoglobin (see anemia below).   -- Repeat EGD scheduled for 3/26     Acute blood loss anemia/thrombocytopenia.  -- Likely d/t acute blood losses from bleeding varices, poor nutritional intake with alcohol abuse.   -- Hemoglobin 12.9 initially on presentation, dropped to 11.0 with hematemesis prior to admission.  -- Hemoglobin has continued to drop despite esophageal banding.   -- No overt signs of ongoing bleeding. No hematemesis, black stools or other abnormal bleeding.   -- Hemoglobin was steadily declining  -- 1 unit PRBCs given for hgb 6.8 on 3/9.  Now hemoglobin seems stable and 7.7 yesterday.   -- Iron binding panel performed 3/8 consistent with mixed anemia of chronic disease and iron deficiency.  -- Transfuse for hemoglobin less than 7, blood consent signed and placed in the chart  -- Patient started on IV Venofer x 4 doses 03/10.  --Check hemoglobin in the morning   Alcoholic hepatitis  --Bilirubin continues to be elevated, ~14.5-15 and stable but now improved to 12.5 today  -- MELD was 18 at time of admission, 27 today; 19.6% three month mortality  -- GI consulted regarding steroid initiation. There was initial concern with steroid use given UGIB and positive culture for parvimonas micra. Will defer to GI for initiation of steroids.  -- Paracentesis performed 3/6 with 0.6 liters removed; not clearly SBP. Cultures negative. Repeat  paracentesis performed 3/11 with 0.9 liters removed.  -- RUQ US 3/7 with cirrhosis, mild ascites, and cholelithiasis.  -- HIDA scan with ef performed 3/9 and showed no evidence of cholecystitis with patent cystic duct, but there is markedly decreased gallbladder ef and slow hepatocellular radiotracer uptake and prolonged retention c/w hepatocellular disease.  -- Bloating likely secondary to alcoholic hepatitis.   -- Lactulose on hold due to severe diarrhea.  -- Coagulopathy with INR ~2.   -- B12 elevated to >2,000 on 3/9  -- Plan: Paracentesis performed today with 0.9 liters removed, diuretics tomorrow per Nephrology/GI, medications as directed by Nephrology, GI consulted surgery.  Appreciate their input in their recommending against surgery at this point as patient would be at such a high risk for complications and death      Elevated Creatinine, resolved  -- Nephrology consulted 3/8  -- Creatinine improving after albumin and midodrine.  -- Plan: Recheck creatinine tomorrow. Ongoing management with Nephrology     Cardiac Murmur  -- Patient reports history of cardiac murmur as a child but thought this had resolved.  -- 3/6 systolic murmur heard on exam.  -- Last echocardiogram was in January 2020 and was normal at that time. Given unknown duration of this murmur will repeat echocardiogram today.  -- Echocardiogram 3/10 with EF 60-65% and normal LV/RV functioning. 1+ tricuspid regurgitation, otherwise valves appeared normal.  -- No further workup indicated at this time. Will continue to follow.      Acute encephalopathy, resolved  -- Improved, psych consult cancelled.   -- Toxic related to alcohol withdrawal vs hepatic encephalopathy  -- Holding lactulose due to diarrhea (see below).  -- GI added Rifaximin 3/6.      Alcohol Withdrawal, resolved  -- History of alcohol withdrawal seizures  -- Withdrawals resolved  -- Discontinue CIWA protocol 3/4     Fever/diarrhea  -- WBC normal, no significant tachycardia, BP stable,  new diarrhea with some abdominal pain.   -- C diff PCR negative.   -- UA negative.  -- Blood cultures x 2 without growth.   -- GI consulted and felt possible related to biliary colic.  -- Imodium given for diarrhea with some relief, currently dosed twice daily.   -- Plan: Continue Imodium twice daily. Will continue to monitor.     Bacteremia  -- 1 of 2 blood cultures positive for Parvimonas micra  -- Initially treated with Vanocmycin however felt to be possible contaminant; no clear infectious symptoms.  -- ID consulted and felt metronidazole treatment indicated, treated 3/3-3/10.      Insomnia  -- Improved with Remeron 30 mg and Trazodone 50 mg at night.      Depression, chronic and large situational component  --He states that when he is drinking he does not take his antidepressants as he does not want to mix those    Canker sores  Currently has a number of these  Plan: Chlorhexidine oral rinse 4 times daily as needed     Hypokalemia/hypomagnesemia.  --Had not schedule potassium today and recheck in the morning     Hyponatremia, resolved     # Severe Malnutrition: based on nutrition assessment   Greater than 5% weight loss in 1 month.  Less than 75% intake for greater than 1 month  Subcutaneous fat loss in the orbital region.     Diet: Snacks/Supplements Adult: Ensure Enlive; With Meals  2 Gram Sodium Diet  DVT Prophylaxis:  Ambulation  Code Status: Full Code        Disposition Plan     Expected Discharge: 3/13  Anticipated discharge location: home        Steve Del Rio MD  Hospitalist  [facility]  [facility]  Phone: #[phone]    Interval History/Subjective:  Patient feels like he is slowly getting better.  Scrotal swelling still better.  Appetite is good.  Now has been sleeping well with increased trazodone.  No chest pain or shortness of breath.  A bit frustrated about having to be hospital so long.  Also remorseful over bad decisions in the past related to ongoing  drinking.  Reports that he did not start drinking until he was 21 years of age and then did a lot of social drinking with his friends but friends never seem to have a problem like he did.    Physical Exam/Objective:  Temp:  [98.1  F (36.7  C)-99.2  F (37.3  C)] 99.2  F (37.3  C)  Pulse:  [] 94  Resp:  [16-18] 18  BP: (108-133)/(62-75) 127/62  SpO2:  [93 %-98 %] 95 %  Body mass index is 25.22 kg/m .    GENERAL:  Alert, appears comfortable, in no acute distress, appears stated age   HEAD:  Normocephalic, without obvious abnormality, atraumatic   LUNGS:   Clear to auscultation bilaterally, no rales, rhonchi, or wheezing, symmetric chest rise on inhalation, respirations unlabored   CHEST WALL:  No tenderness or deformity   HEART:  Regular rate and rhythm, S1 and S2 normal, no murmur, rub, or gallop    ABDOMEN:   Soft, non-tender, abdomen seems moderately rotund, no masses, no organomegaly, no rebound or guarding   EXTREMITIES:  1-2+ edema both legs to his knees   SKIN:  Scattered small papules especially on his face   NEURO: Alert, appears cognitively intact, moves all four extremities freely   PSYCH: Cooperative, mildly flat affect and poor eye contact initially and then after 20 minutes of talking he seemed to be doing much better and was very engaged in shared lots of details of his life.     Data reviewed today: I personally reviewed all new medications, labs, imaging/diagnostics reports over the past 24 hours. Pertinent findings include:    Labs:  Most Recent 3 CBC's:Recent Labs   Lab Test 03/11/22  0825 03/10/22  0543 03/09/22  0538   WBC 8.6 7.5 8.1   HGB 7.7* 7.6* 6.8*   MCV 99 99 99   * 148* 153     Most Recent 3 BMP's:Recent Labs   Lab Test 03/12/22  0635 03/11/22  0504 03/10/22  0543    136 135*  135*   POTASSIUM 3.3*  3.4* 3.8  3.9 3.9  3.9  3.9   CHLORIDE 107 108* 108*  108*   CO2 24 22 19*  19*   BUN 11 13 16  16   CR 1.05 1.15 1.33*  1.33*   ANIONGAP 7 6 8  8   FÉLIX 7.6* 7.1*  7.2*  7.2*   GLC 92 96 111  111       Medications:   Personally Reviewed.  Medications       calcium carbonate  500 mg Oral TID     eplerenone  25 mg Oral Daily     furosemide  20 mg Intravenous BID     gabapentin  100 mg Oral BID     iron sucrose  200 mg Intravenous Daily     [Held by provider] lactulose  10 g Oral BID 09 12     mirtazapine  30 mg Oral At Bedtime     nicotine  1 patch Transdermal Q24H     nicotine   Transdermal Q8H     pantoprazole  40 mg Oral BID AC     potassium chloride  20 mEq Oral BID     rifaximin  550 mg Oral BID     sodium chloride (PF)  3 mL Intracatheter Q8H     traZODone  50 mg Oral At Bedtime

## 2022-03-13 NOTE — PROGRESS NOTES
Problem: Pain Acute  Goal: Acceptable Pain Control and Functional Ability  Outcome: Ongoing, Progressing  Intervention: Prevent or Manage Pain  Recent Flowsheet Documentation  Taken 3/11/2022 1500 by Kirsten Chatterjee RN  Medication Review/Management: medications reviewed   Goal Outcome Evaluation:      Pt has complained of no pain this shift.           Problem: Fall Injury Risk  Goal: Absence of Fall and Fall-Related Injury  Outcome: Ongoing, Progressing  Intervention: Identify and Manage Contributors  Recent Flowsheet Documentation  Taken 3/11/2022 1500 by Kirsten Chatterjee RN  Medication Review/Management: medications reviewed  Intervention: Promote Injury-Free Environment  Recent Flowsheet Documentation  Taken 3/11/2022 1500 by Kirsten Chatterjee, RN  Safety Promotion/Fall Prevention:    nonskid shoes/slippers when out of bed    room near nurse's station     Pt is independent in room, calls appropriately and can make needs known. Call light within reach.         Problem: Plan of Care - These are the overarching goals to be used throughout the patient stay.    Goal: Plan of Care Review/Shift Note  Description: The Plan of Care Review/Shift note should be completed every shift.  The Outcome Evaluation is a brief statement about your assessment that the patient is improving, declining, or no change.  This information will be displayed automatically on your shift note.  Outcome: Ongoing, Progressing    Neph consulted. @ low sodium diet ordered.  Surg consulted and surg too high risk with mortality rate greater than 70%. No further recommendations. K 3.3 replaced by provider. No protocols. INR 2.64. Order for compression socks in the morning.

## 2022-03-13 NOTE — PLAN OF CARE
Problem: Plan of Care - These are the overarching goals to be used throughout the patient stay.    Goal: Absence of Hospital-Acquired Illness or Injury  Intervention: Identify and Manage Fall Risk  Recent Flowsheet Documentation  Taken 3/13/2022 0030 by Mat Perry RN  Safety Promotion/Fall Prevention:   fall prevention program maintained   increased rounding and observation   nonskid shoes/slippers when out of bed   safety round/check completed  Taken 3/12/2022 2110 by Mat Perry RN  Safety Promotion/Fall Prevention:   fall prevention program maintained   increased rounding and observation   nonskid shoes/slippers when out of bed   safety round/check completed  Intervention: Prevent Skin Injury  Recent Flowsheet Documentation  Taken 3/13/2022 0030 by Mat Perry RN  Body Position: position changed independently  Taken 3/12/2022 2110 by Mat Perry RN  Body Position: position changed independently     Problem: Pain Acute  Goal: Acceptable Pain Control and Functional Ability  Outcome: Ongoing, Progressing   Goal Outcome Evaluation:    Pt admitted with abdominal pain, ETOH, cirrhosis, and anemia. Pt is jaundiced. Independent in the room. Pt denies pain. Will continue to monitor.

## 2022-03-14 NOTE — PROGRESS NOTES
Clinic Care Coordination Contact  [facility]: Post-Discharge Note  SITUATION                                                      Admission:    Admission Date: 02/25/22   Reason for Admission: Hematemesis  Discharge:   Discharge Date: 03/13/22  Discharge Diagnosis: Hematemesis    BACKGROUND                                                      Masoud Huddleston is a 36 year old male with a past medical history of alcohol dependence, withdrawal seizures, known esophageal varices in 2021 and alcohol related liver cirrhosis who presented to the ED on 2/25 with hematemesis. Found to have an acute blood loss anemia and admitted. Hospital course notable so far for EGD on 2/26 revealing esophageal varices which were banded x 3. Patient experienced mild alcohol withdrawal 2/27 and starting to advance diet per GI guidance. Ceftriaxone and octreotide discontinued 3/1 per GI and PPI switched to PO. Worsening withdrawal with hallucinations 3/1. CIWA continued to be high prompting additional labs for hepatic encephalopathy. Ammonia elevated to 66 on 3/2 and INR elevated around 2. Bilirubin also trending up. Course complicated by 1 of 2 blood cultures being positive for parvimonas micra. Initially treated with Vancomycin but no infectious symptoms. Dicussed with ID and could be contaminant but recommending Augmentin or Metronidazole for coverage if indicated. Metronidazole given 3/3-3/10. He developed intermittent low grade fevers starting 3/4 with normal WBC and normal vital signs. Only new symptom was diarrhea and abdominal pain. C diff negative. UA without evidence of infection. Blood cultures negative. RUQ US 3/7 with cirrhosis, mild ascites, cholelithiasis prompting HIDA scan, results pending. Nephrology consulted 3/8 with new creatinine elevation which is now resolved.  Appreciate input from gastroenterology and nephrology.  Patient feels he is a bit better every day.  Frustrated by 2 g sodium diet.  Getting a bit  tired at the hospital and wants to get out so he can see his kids.  Also feeling remorseful by 15 years of alcohol use and what is diagnosed by..     Today patient feels much improved.  He is eating well.  He feels the swelling is reduced.  Energy is better.  He appears to be cognitively intact.  Eager to go home this afternoon after being seen by nephrology.  Gastroenterology okay with discharge.    ASSESSMENT           Discharge Assessment  How are you doing now that you are home?: Patient shares that he is doing great and has alot of support and resources to stay sober.  How are your symptoms? (Red Flag symptoms escalate to triage hotline per guidelines): Improved  Do you feel your condition is stable enough to be safe at home until your provider visit?: Yes  Does the patient have their discharge instructions? : Yes  Does the patient have questions regarding their discharge instructions? : No  Were you started on any new medications or were there changes to any of your previous medications? : Yes  Does the patient have all of their medications?: Yes  Do you have questions regarding any of your medications? : No  Do you have all of your needed medical supplies or equipment (DME)?  (i.e. oxygen tank, CPAP, cane, etc.): Yes  Discharge follow-up appointment scheduled within 14 calendar days? : No  Discharge Follow Up Appointment Scheduled with?: Primary Care Provider  Is patient agreeable to assistance with scheduling? : No (Patient will be calling to schedule today)    Post-op ([facility] Only)  If the patient had a surgery or procedure, do they have any questions for a nurse?: No    Post-op (Clinicians Only)  Did the patient have surgery or a procedure: No  Fever: No  Chills: No        PLAN                                                      Outpatient Plan:  Follow up with primary care provider, Provider Not In System, within 5 days to evaluate medication change and for hospital follow- up.  The following labs/tests  are recommended: Hepatic panel, basic metabolic panel, magnesium and hemogram.     Follow-up with gastroenterology per their recommendations    No future appointments.      For any urgent concerns, please contact our 24 hour nurse triage line: [phone] ([phone])         RU Boykin

## 2022-03-22 PROBLEM — R78.81 BACTEREMIA: Chronic | Status: RESOLVED | Noted: 2022-01-01 | Resolved: 2022-01-01

## 2022-03-22 NOTE — PROGRESS NOTES
Received critical labs from [facility].  His Bilirubin was high and this potassium was low. He was advised to go to the ER today.

## 2022-03-22 NOTE — ED TRIAGE NOTES
The patient presents to the ED with concerns for abnormal K+ and Bilirubin levels. Reports canker sore in mouth is not getting any better.

## 2022-03-23 NOTE — ED PROVIDER NOTES
NAME: Masoud Huddleston  AGE: 36 year old male  YOB: 1985  MRN: [id number]  EVALUATION DATE & TIME: 3/22/2022  6:07 PM    PCP: System, Provider Not In    ED PROVIDER: Queta Asif MD.      Chief Complaint   Patient presents with     Abnormal Labs     abnormal K+ and Bilirubin         FINAL IMPRESSION:  1. Hypomagnesemia    2. Hypokalemia    3. Elevated bilirubin    4. Aphthous ulcer of tongue        MEDICAL DECISION MAKIN:01 PM I met with the patient, obtained history, performed an initial exam, and discussed options and plan for diagnostics and treatment here in the ED.   8:45 PM I rechecked the patient. We discussed the plan for discharge and the patient is agreeable. Reviewed supportive cares, symptomatic treatment, outpatient follow up, and reasons to return to the Emergency Department. All questions and concerns were addressed. Patient to be discharged by ED RN.     MDM: 37 y/o M with history of alcohol use and recent admission for esophageal varices s/p banding, alcoholic hepatitis who presents with abnormal labs. He was at clinic follow up today and told to come in for elevated bilirubin and low potassium. He is otherwise feeling well. He reports no alcohol in last 25 days, which I congratulated him on. He reports no fevers, abdominal pain, hematemesis, bloody or black stools or any other concerns. No findings to suggest SBP, sepsis, or other infections process. Labs show Bili of 13.7 today, which is not far form the levels he was at prior to hospitalization discharge. K here is 3.2 (was 2.8 at clinic), EKG with normal intervals. Mag also low. He was given replacement of both. We discussed options and he feels comfortable with discharge home. He has both GI and PCP follow up within the next week.     Pertinent Labs & Imaging studies reviewed. (See chart for details)     The importance of close follow up was discussed. We reviewed warning signs and symptoms, and I instructed Mr. Huddleston  to return to the emergency department immediately if he develops any new or worsening symptoms. I provided additional verbal discharge instructions. Mr. Huddleston expressed understanding and agreement with this plan of care, his questions were answered, and he was discharged in stable condition.       MEDICATIONS GIVEN IN THE EMERGENCY:  Medications   magnesium sulfate 2 g in water intermittent infusion (0 g Intravenous Stopped 3/22/22 2035)   potassium chloride ER (KLOR-CON M) CR tablet 40 mEq (40 mEq Oral Given 3/22/22 1930)       NEW PRESCRIPTIONS STARTED AT TODAY'S ER VISIT:  Discharge Medication List as of 3/22/2022  8:53 PM             =================================================================    HPI    Patient information was obtained from: patient     Use of : N/A       Masoud Huddleston is a 36 year old male with a past medical history of liver disease with ascites, alcohol dependence, who presents for evaluation of abnormal lab result.     On chart review patient was admitted 2/25/22-3/13/22 for esophageal varices (EGD done and was banded), alcoholic hepatitis (bilirubin continued to be elevated but stable around 14-15, was 12.5 on discharge), paracentesis w/o findings to suggest SBP, encephalopathy (on rifaximin).     The patient had labs at clinic today, notable for bilirubin 15 and potassium 2.8, and was instructed to come to the ED. Patient reports he is feeling well overall. No alcohol use for 25 days. Feels his eyes are less yellow than usual. He does report the canker sore on his tongue that has been ongoing for the past month is not improving. He has been using chlorhexidine rinse prescribed while hospitalized last month. Pain is now radiating to his throat and ears. Denies fever, vomiting, diarrhea, chest pain, abdominal pain, shortness of breath, blood in stool.       REVIEW OF SYSTEMS   Review of Systems   Constitutional: Negative for chills and fever.   HENT:        Positive for  canker sore (tongue).   Respiratory: Negative for shortness of breath.    Cardiovascular: Negative for chest pain.   Gastrointestinal: Negative for abdominal pain, blood in stool, diarrhea and vomiting.   Genitourinary: Negative for difficulty urinating.   All other systems reviewed and are negative.       PAST MEDICAL HISTORY:  Past Medical History:   Diagnosis Date     Acute alcoholic intoxication in alcoholism with complication (H)      Acute metabolic encephalopathy      Alcohol abuse      Alcoholic cirrhosis of liver without ascites (H)      Alcoholic ketoacidosis 9/19/2019     Bacteremia 3/4/2022    2/2522- parvimonas micra; ? Significance  3/3/22: Dicussed with ID and could be contaminant  Metronidazole started       Chronic acquired lymphedema      Cirrhosis of liver (H)      Depression      ED (erectile dysfunction)      Elevated liver enzymes 4/27/2018     Esophageal varices without bleeding (H)      GI (gastrointestinal bleed)     hematemesis     Hematemesis 4/25/2018    Added automatically from request for surgery [id number]     History of transfusion      Hypokalemia 9/19/2019     Hypomagnesemia 4/27/2018     Liver disease      Iayana-Vizcaino tear 4/27/2018     Nausea with vomiting      Neuropathy      Right patella fracture      Seizures (H)     withdrawal     Substance abuse (H)      Thrombocytopenia (H)        PAST SURGICAL HISTORY:  Past Surgical History:   Procedure Laterality Date     ESOPHAGOSCOPY, GASTROSCOPY, DUODENOSCOPY (EGD), COMBINED N/A 4/25/2018    Procedure: ESOPHAGOGASTRODUODENOSCOPY (EGD);  Surgeon: Lora Valencia MD;  Location: [facility] GI;  Service:      ESOPHAGOSCOPY, GASTROSCOPY, DUODENOSCOPY (EGD), COMBINED N/A 2/26/2022    Procedure: ESOPHAGOGASTRODUODENOSCOPY (EGD) WITH ESOPHAGEAL VARICEAL BANDING;  Surgeon: Moises Michael DO;  Location: [facility] Main OR      OPEN RX PATELLA FX Right 4/28/2018    Procedure: OPEN REDUCTION INTERNAL FIXATION, FRACTURE, PATELLA;  Surgeon:  Bertram Beltran MD;  Location: [facility];  Service: Orthopedics     KNEE SURGERY Right 2018     WV ESOPHAGOGASTRODUODENOSCOPY TRANSORAL DIAGNOSTIC N/A 5/3/2020    Procedure: ESOPHAGOGASTRODUODENOSCOPY (EGD);  Surgeon: Ricardo Barbosa MD;  Location: [facility];  Service: Gastroenterology      PARACENTESIS  1/16/2020      PARACENTESIS  1/23/2020       CURRENT MEDICATIONS:    No current facility-administered medications for this encounter.    Current Outpatient Medications:      chlorhexidine (PERIDEX) 0.12 % solution, Swish and spit 15 mLs in mouth 4 times daily as needed (canker sores), Disp: 473 mL, Rfl: 0     doxycycline hyclate (VIBRAMYCIN) 100 MG capsule, Take 1 capsule (100 mg) by mouth 2 times daily for 10 days, Disp: 20 capsule, Rfl: 0     eplerenone (INSPRA) 25 MG tablet, Take 1 tablet (25 mg) by mouth daily, Disp: 30 tablet, Rfl: 0     furosemide (LASIX) 40 MG tablet, TAKE 1 TABLET BY MOUTH TWICE A DAY (09:00AM & 06:00PM), Disp: 60 tablet, Rfl: 1     gabapentin (NEURONTIN) 100 MG capsule, Take 1 capsule (100 mg) by mouth 2 times daily, Disp: 60 capsule, Rfl: 0     melatonin 5 MG tablet, Take 1 tablet (5 mg) by mouth every evening as needed for sleep, Disp: , Rfl:      mirtazapine (REMERON) 30 MG tablet, Take 1 tablet (30 mg) by mouth At Bedtime, Disp: 90 tablet, Rfl: 1     pantoprazole (PROTONIX) 40 MG EC tablet, Take 1 tablet (40 mg) by mouth 2 times daily (before meals), Disp: 180 tablet, Rfl: 3     rifaximin (XIFAXAN) 550 MG TABS tablet, Take 1 tablet (550 mg) by mouth 2 times daily, Disp: 180 tablet, Rfl: 3     thiamine (B-1) 100 MG tablet, Take 1 tablet (100 mg) by mouth daily, Disp: 90 tablet, Rfl: 3     traZODone (DESYREL) 50 MG tablet, Take 1 tablet (50 mg) by mouth At Bedtime, Disp: 90 tablet, Rfl: 1    ALLERGIES:  No Known Allergies    FAMILY HISTORY:  History reviewed. No pertinent family history.    SOCIAL HISTORY:   Social History     Socioeconomic History     Marital status:  "Single     Spouse name: Not on file     Number of children: Not on file     Years of education: Not on file     Highest education level: Not on file   Occupational History     Not on file   Tobacco Use     Smoking status: Current Every Day Smoker     Packs/day: 1.00     Types: Cigarettes     Smokeless tobacco: Never Used   Substance and Sexual Activity     Alcohol use: Yes     Comment: a liter of Vodka everyday     Drug use: Yes     Types: Marijuana     Sexual activity: Not Currently   Other Topics Concern     Not on file   Social History Narrative     Not on file     Social Determinants of Health     Financial Resource Strain: Not on file   Food Insecurity: Not on file   Transportation Needs: Not on file   Physical Activity: Not on file   Stress: Not on file   Social Connections: Not on file   Intimate Partner Violence: Not on file   Housing Stability: Not on file       PHYSICAL EXAM:    Vitals: /60   Pulse 99   Temp 99.4  F (37.4  C) (Temporal)   Resp 18   Ht 1.803 m (5' 11\")   Wt 72.6 kg (160 lb)   SpO2 100%   BMI 22.32 kg/m     Constitutional: Well developed, well nourished. Comfortable appearing.  HENT: Ulcer to right lateral tongue. Normocephalic, atraumatic, mucous membranes moist, nose normal.   Neck: Supple, gross ROM intact.   Eyes: Scleral icterus. Pupils mid-range, no discharge  Respiratory: Clear to auscultation bilaterally, no respiratory distress, no wheezing, speaks full sentences easily.  Cardiovascular: Normal heart rate, regular rhythm. No lower extremity edema.  GI: Soft, no tenderness to deep palpation in all quadrants  Musculoskeletal: Moving all 4 extremities intentionally and without pain. No obvious deformity.  Skin: Mild jaundice. Warm, dry, no rash.  Neurologic: Alert & oriented x 3, speech clear, moving all extremities spontaneously   Psychiatric: Affect normal, cooperative.     LAB:  All pertinent labs reviewed and interpreted.  Labs Ordered and Resulted from Time of ED " Arrival to Time of ED Departure   COMPREHENSIVE METABOLIC PANEL - Abnormal       Result Value    Sodium 134 (*)     Potassium 3.2 (*)     Chloride 100      Carbon Dioxide (CO2) 23      Anion Gap 11      Urea Nitrogen 9      Creatinine 0.72      Calcium 7.9 (*)     Glucose 114      Alkaline Phosphatase 120      AST 95 (*)     ALT 22      Protein Total 7.3      Albumin 2.6 (*)     Bilirubin Total 13.7 (*)     GFR Estimate >90     MAGNESIUM - Abnormal    Magnesium 1.5 (*)    ETHYL ALCOHOL LEVEL - Normal    Alcohol, Blood <10         RADIOLOGY:  No orders to display       EKG:   Performed at: 1842  Impression:   Sinus rhythm  Rightward axis  Nonspecific T wave abnormality  Abnormal ECG    Rate: 92  Rhythm: Sinus   QRS Interval: 104  QTc Interval: 390  Comparison: When compared with ECG of 2/25/2022 1107, no TRINITY    I have independently reviewed and interpreted the EKG(s) documented above.     PROCEDURES:   Procedures       I, Jaci Archuleta, am serving as a scribe to document services personally performed by Dr. Queta Asif based on my observation and the provider's statements to me. I, Queta Asif MD attest that Jaci Archuleta is acting in a scribe capacity, has observed my performance of the services and has documented them in accordance with my direction.      Queta Asif M.D.  Emergency Medicine  [facility] EMERGENCY ROOM  [address] [address] [phone]  Dept: [phone]       Queta Asif MD  03/22/22 3540

## 2022-03-23 NOTE — PROGRESS NOTES
Post Discharge Outreach 3/14/2022   Admission Date 2/25/2022   Reason for Admission Hematemesis   Discharge Date 3/13/2022   Discharge Diagnosis Hematemesis   How are you doing now that you are home? Patient shares that he is doing great and has alot of support and resources to stay sober.   How are your symptoms? (Red Flag symptoms escalate to triage hotline per guidelines) Improved   Do you feel your condition is stable enough to be safe at home until your provider visit? Yes   Does the patient have their discharge instructions?  Yes   Does the patient have questions regarding their discharge instructions?  No   Were you started on any new medications or were there changes to any of your previous medications?  Yes   Does the patient have all of their medications? Yes   Do you have questions regarding any of your medications?  No   Do you have all of your needed medical supplies or equipment (DME)?  (i.e. oxygen tank, CPAP, cane, etc.) Yes   Discharge follow-up appointment scheduled within 14 calendar days?  No   Discharge Follow Up Appointment Scheduled with? Primary Care Provider     Hospital Follow-up Visit:    Hospital/Nursing Home/IP Rehab Facility: [facility]  Date of Admission: 2/25/2022  Date of Discharge: 3/13/2022  Reason(s) for Admission: alcoholic hepatitis      Was your hospitalization related to COVID-19? No   Problems taking medications regularly:  None  Medication changes since discharge: None  Problems adhering to non-medication therapy:  None    Summary of hospitalization:  [facility] discharge summary reviewed  Diagnostic Tests/Treatments reviewed.  Follow up needed: none  Other Healthcare Providers Involved in Patient s Care:         None  Update since discharge: improved. Post Discharge Medication Reconciliation: discharge medications reconciled, continue medications without change.  Plan of care communicated with patient                Assessment &  Plan     Alcoholic hepatitis with ascites    He had several medicines and he needed refills on on the basis that he was started on in the hospital so we did do a low-dose refills as listed below.  He needs to have his bilirubin checked again today along with his other electrolytes that were frankly abnormal in the hospital.  It would not surprise me if these were still a little elevated but we can advise him the best once we get those lab today.    Obviously he needs to continue to stay sober, having now 25 days of sobriety behind him he is optimistic that he will be able to do that.  He has a lot of ongoing issues that are presenting as physical symptoms from his alcohol issues, such as continued jaundice scleral icterus and telangiectasias, but hopefully some of these will continue to resolve over time and if he stays sober.    - mirtazapine (REMERON) 30 MG tablet; Take 1 tablet (30 mg) by mouth At Bedtime  - traZODone (DESYREL) 50 MG tablet; Take 1 tablet (50 mg) by mouth At Bedtime  - rifaximin (XIFAXAN) 550 MG TABS tablet; Take 1 tablet (550 mg) by mouth 2 times daily  - pantoprazole (PROTONIX) 40 MG EC tablet; Take 1 tablet (40 mg) by mouth 2 times daily (before meals)  - thiamine (B-1) 100 MG tablet; Take 1 tablet (100 mg) by mouth daily  - CBC with platelets and differential; Future  - Comprehensive metabolic panel (BMP + Alb, Alk Phos, ALT, AST, Total. Bili, TP); Future  - CBC with platelets and differential  - Comprehensive metabolic panel (BMP + Alb, Alk Phos, ALT, AST, Total. Bili, TP)    Canker sore    We will try some oral doxycycline for these canker sores which may be of benefit to him.  We talked about it mostly being a viral issue and how he will just have to wait this out as it gets better over time.    - doxycycline hyclate (VIBRAMYCIN) 100 MG capsule; Take 1 capsule (100 mg) by mouth 2 times daily for 10 days    Review of external notes as documented elsewhere in note  Review of the result(s) of  each unique test - labs  Ordering of each unique test  Prescription drug management         Tobacco Cessation:   reports that he has been smoking cigarettes. He has been smoking about 1.00 pack per day. He has never used smokeless tobacco.          No follow-ups on file.    Kelby Dumont MD  [facility]    Sofía Meredith is a 36 year old who presents for the following health issues     HPI     Patient here today for a hospital follow-up.  He was in the hospital for some time with alcoholic hepatitis, jaundice and other related issues.  He has been sober 25 days now and believes that he is starting to get better.  He still is fatigued and still has a lot of jaundice and his appetite has not fully returned to normal.  He is feels that his strength is also not improved to the point where he would like it to be.        Review of Systems   Constitutional, HEENT, cardiovascular, pulmonary, gi and gu systems are negative, except as otherwise noted.      Objective    /56 (BP Location: Left arm, Patient Position: Sitting, Cuff Size: Adult Regular)   Pulse 101   Wt 72.1 kg (159 lb)   SpO2 99%   BMI 22.18 kg/m    Body mass index is 22.18 kg/m .  Physical Exam   He still appears jaundiced with scleral icterus present.  He has many telangiectasias as well.  His chest is clear to auscultation heart regular rate and rhythm.  Abdomen is soft nontender.        Results for orders placed or performed during the hospital encounter of 03/22/22   Comprehensive metabolic panel     Status: Abnormal   Result Value Ref Range    Sodium 134 (L) 136 - 145 mmol/L    Potassium 3.2 (L) 3.5 - 5.0 mmol/L    Chloride 100 98 - 107 mmol/L    Carbon Dioxide (CO2) 23 22 - 31 mmol/L    Anion Gap 11 5 - 18 mmol/L    Urea Nitrogen 9 8 - 22 mg/dL    Creatinine 0.72 0.70 - 1.30 mg/dL    Calcium 7.9 (L) 8.5 - 10.5 mg/dL    Glucose 114 70 - 125 mg/dL    Alkaline Phosphatase 120 45 - 120 U/L    AST 95 (H) 0 - 40 U/L     ALT 22 0 - 45 U/L    Protein Total 7.3 6.0 - 8.0 g/dL    Albumin 2.6 (L) 3.5 - 5.0 g/dL    Bilirubin Total 13.7 (H) 0.0 - 1.0 mg/dL    GFR Estimate >90 >60 mL/min/1.73m2   Alcohol level blood     Status: Normal   Result Value Ref Range    Alcohol, Blood <10 None detected mg/dL   Magnesium     Status: Abnormal   Result Value Ref Range    Magnesium 1.5 (L) 1.8 - 2.6 mg/dL   ECG 12-LEAD WITH MUSE (LHE)     Status: None   Result Value Ref Range    Systolic Blood Pressure  mmHg    Diastolic Blood Pressure  mmHg    Ventricular Rate 92 BPM    Atrial Rate 92 BPM    UT Interval 168 ms    QRS Duration 104 ms     ms    QTc 390 ms    P Axis 80 degrees    R AXIS 90 degrees    T Axis 53 degrees    Interpretation ECG       Sinus rhythm  Rightward axis  Nonspecific T wave abnormality  Abnormal ECG  When compared with ECG of 25-FEB-2022 11:07,  Significant changes have occurred  Confirmed by SEE ED PROVIDER NOTE FOR, ECG INTERPRETATION ([id number]),  LILIAN DAMICO ([id number]) on 3/22/2022 10:39:06 PM     Results for orders placed or performed in visit on 03/22/22   Comprehensive metabolic panel (BMP + Alb, Alk Phos, ALT, AST, Total. Bili, TP)     Status: Abnormal   Result Value Ref Range    Sodium 134 (L) 136 - 145 mmol/L    Potassium 2.8 (LL) 3.5 - 5.0 mmol/L    Chloride 97 (L) 98 - 107 mmol/L    Carbon Dioxide (CO2) 25 22 - 31 mmol/L    Anion Gap 12 5 - 18 mmol/L    Urea Nitrogen 9 8 - 22 mg/dL    Creatinine 0.83 0.70 - 1.30 mg/dL    Calcium 8.0 (L) 8.5 - 10.5 mg/dL    Glucose 93 70 - 125 mg/dL    Alkaline Phosphatase 132 (H) 45 - 120 U/L    AST 84 (H) 0 - 40 U/L    ALT 20 0 - 45 U/L    Protein Total 7.6 6.0 - 8.0 g/dL    Albumin 2.7 (L) 3.5 - 5.0 g/dL    Bilirubin Total 15.0 (H) 0.0 - 1.0 mg/dL    GFR Estimate >90 >60 mL/min/1.73m2   CBC with platelets and differential     Status: Abnormal   Result Value Ref Range    WBC Count 9.6 4.0 - 11.0 10e3/uL    RBC Count 2.64 (L) 4.40 - 5.90 10e6/uL    Hemoglobin 9.0 (L) 13.3 - 17.7  g/dL    Hematocrit 26.8 (L) 40.0 - 53.0 %     (H) 78 - 100 fL    MCH 34.1 (H) 26.5 - 33.0 pg    MCHC 33.6 31.5 - 36.5 g/dL    RDW 21.2 (H) 10.0 - 15.0 %    Platelet Count 80 (L) 150 - 450 10e3/uL    % Neutrophils 79 %    % Lymphocytes 13 %    % Monocytes 5 %    % Eosinophils 2 %    % Basophils 0 %    % Immature Granulocytes 1 %    Absolute Neutrophils 7.6 1.6 - 8.3 10e3/uL    Absolute Lymphocytes 1.3 0.8 - 5.3 10e3/uL    Absolute Monocytes 0.5 0.0 - 1.3 10e3/uL    Absolute Eosinophils 0.2 0.0 - 0.7 10e3/uL    Absolute Basophils 0.0 0.0 - 0.2 10e3/uL    Absolute Immature Granulocytes 0.1 <=0.4 10e3/uL    Narrative    Repeat platelet agreed with first run. [facility]   CBC with platelets and differential     Status: Abnormal    Narrative    The following orders were created for panel order CBC with platelets and differential.  Procedure                               Abnormality         Status                     ---------                               -----------         ------                     CBC with platelets and d...[[id number]]  Abnormal            Final result                 Please view results for these tests on the individual orders.

## 2022-03-23 NOTE — DISCHARGE INSTRUCTIONS
We replaced your magnesium and potassium today  Please follow up with GI team and primary care doctor this upcoming week as scheduled (will need lab recheck)  Return if you have fevers, bloody vomit or stools, abdominal pain, chest pain or any other concerns or worsening symptoms    Keep up the good work on your sobriety!

## 2022-03-28 NOTE — TELEPHONE ENCOUNTER
Central Prior Authorization Team  Phone: [phone]    PA Initiation    Medication: PA on Pantoprazole  Insurance Company: [employer] - Phone [phone] Fax [phone]  Pharmacy Filling the Rx: [facility] [address] [place], [place] - [address]  Filling Pharmacy Phone: [phone]  Filling Pharmacy Fax:    Start Date: 3/28/2022

## 2022-03-29 NOTE — TELEPHONE ENCOUNTER
Prior Authorization Approval    Authorization Effective Date: 1/1/2022  Authorization Expiration Date: 3/29/2023  Medication: Pantoprazole- APPROVED   Approved Dose/Quantity:  Reference #:     Insurance Company: [employer] - Phone [phone] Fax [phone]  Expected CoPay:       CoPay Card Available:      Foundation Assistance Needed:    Which Pharmacy is filling the prescription (Not needed for infusion/clinic administered): [facility], [place], [facility] [id number] [place] [address] - [address]  Pharmacy Notified: Yes  Patient Notified:  **Instructed pharmacy to notify patient when script is ready to /ship.**

## 2022-04-02 NOTE — PROGRESS NOTES
Assessment & Plan     Aphthous ulcer  Patient presents with a simple aphthous ulcer that is recurrent on the right side of the tongue.  We will try oral topical cortical steroids with dexamethasone elixir, instructed patient with use 3 times a day keep mouth for 5 minutes do not eat or drink for the following 30 minutes.  Double corticosteroids are typically more effective early in the course, we will also try a course of cephalexin for any bacterial involvement of the aphthous ulcer.  - dexamethasone (DECADRON) 0.5 MG/5ML elixir; Take 20 mLs (2 mg) by mouth 3 times daily  - cephALEXin (KEFLEX) 500 MG capsule; Take 1 capsule (500 mg) by mouth 2 times daily for 10 days    Return if symptoms worsen or fail to improve.    Valentin Vila MD  [facility]    Sofía Meredith is a 36 year old who presents for the following health issues     HPI     Past Medical History:   Diagnosis Date     Acute alcoholic intoxication in alcoholism with complication (H)      Acute metabolic encephalopathy      Alcohol abuse      Alcoholic cirrhosis of liver without ascites (H)      Alcoholic ketoacidosis 9/19/2019     Bacteremia 3/4/2022    2/2522- parvimonas micra; ? Significance  3/3/22: Dicussed with ID and could be contaminant  Metronidazole started       Chronic acquired lymphedema      Cirrhosis of liver (H)      Depression      ED (erectile dysfunction)      Elevated liver enzymes 4/27/2018     Esophageal varices without bleeding (H)      GI (gastrointestinal bleed)     hematemesis     Hematemesis 4/25/2018    Added automatically from request for surgery [id number]     History of transfusion      Hypokalemia 9/19/2019     Hypomagnesemia 4/27/2018     Liver disease      Aiyana-Vizcaino tear 4/27/2018     Nausea with vomiting      Neuropathy      Right patella fracture      Seizures (H)     withdrawal     Substance abuse (H)      Thrombocytopenia (H)        Patient presents with an aphthous ulcer on  the right side of the tongue.  His symptoms started 40 days ago, was seen once in the outpatient setting and prescribed chlorhexidine oral rinse and given a 10-day course of doxycycline on 3/22.  Since then his symptoms have not resolved, continues to have significant pain on the right side of the tongue.  States that he has been having difficulty with oral intake.  Does have a significant history of liver cirrhosis, and is currently actively smoking.  Was told that he would just have to wait it out until his aphthous ulcer heals.      Review of Systems   Constitutional, HEENT, cardiovascular, pulmonary, gi and gu systems are negative, except as otherwise noted.      Objective    /69   Pulse 106   Temp 99.1  F (37.3  C)   Resp 16   Wt 73 kg (161 lb)   SpO2 97%   BMI 22.45 kg/m    Body mass index is 22.45 kg/m .  Physical Exam   GENERAL: healthy, alert and no distress  EYES: Eyes grossly normal to inspection, PERRL and conjunctivae and sclerae normal  HENT: ear canals and TM's normal, mouth with aphthous ulcers of the right side of tongue  NECK: no adenopathy, no asymmetry, masses, or scars and thyroid normal to palpation  MS: no gross musculoskeletal defects noted, no edema  SKIN: Jaundiced skin, no suspicious rashes  NEURO: Normal strength and tone, mentation intact and speech normal  PSYCH: mentation appears normal, affect normal/bright    No results found for any visits on 04/02/22.    ----- Service Performed and Documented by Resident or Fellow ------

## 2022-04-02 NOTE — LETTER
RETURN TO WORK/SCHOOL FORM    4/2/2022    Re: Masoud Huddleston  1985      To Whom It May Concern:     Masoud Huddleston was seen in clinic today..  He may return to work without restrictions on 4/3/22          Restrictions:  None full duty      Valentin Vila MD  4/2/2022 11:47 AM

## 2022-04-02 NOTE — Clinical Note
Masoud Huddleston was seen and treated in our emergency department on 4/2/2022.         Sincerely,     [facility]

## 2022-04-02 NOTE — PATIENT INSTRUCTIONS
Patient Education     Canker Sore    A canker sore is a painful sore on the lining of the mouth. It s also called an aphthous ulcer. It is most painful during the first few days, and it lasts about 7 to 14 days before going away.   Causes  Canker sores are not cold sores or fever blisters. They are not contagious, so they are not spread by contact. The exact cause of canker sores is not clear, but there are various things that can trigger them in different people.     Mild injury, such as biting the inside of the mouth, lip, or cheek, or dental procedures    Stress    Poor diet, or lack of certain nutrients, including B vitamins and iron    Foods that can irritate the mouth, including tomatoes, citrus fruits, and some nuts (foods that are acidic or contain bitter substances called tannins)    Irritating chemicals, such as those in some toothpastes and mouthwashes    Some chronic illnesses  Symptoms  Canker sores occur on the lining of the mouth. They can be inside the cheeks or lips, on the roof of the mouth, at the base of the gums, on the tongue, or in the back of the throat. Canker sores:     Are small and flat (not raised)    Can be white or yellowish bumps that are red around the edges or have a red halo    Are usually small in size, roundish, and in groups    Cause pain or burning  Canker sores don't leave a scar. But they usually come back.  Home care  The goals of canker sore treatment are to decrease the pain, speed healing, and prevent sores from coming back. No single treatment works for everyone. Try different methods to see what works best.   General care    You may find that soft, easy-to-chew foods cause less pain.    Drink with a straw to direct liquids away from the sore.    Use a soft-bristle toothbrush, and brush your teeth gently.    Don't eat acidic, salty, or spicy foods. These may irritate the canker sores.    Don't eat rough or crunchy foods such as crusty bread or potato chips. These kinds  of foods can hurt the inside of your mouth or scrape your canker sores.  Medicines  You can try over-the-counter medicines that cover the sores and numb them. This protects the sores while they heal and helps reduce pain.   Homemade rinses and solutions  You can use these solutions as mouth rinses. Spit them out after using them. You can also dab them on the sores. You can repeat these treatments as often as needed.     Rinse your mouth with 1/2 teaspoon of salt in 1 glass of warm water.    Mix equal amounts of hydrogen peroxide and water. You can use this as a mouthwash or dab it on sores with a cotton swab. You can also add sodium bicarbonate to this to make a paste, and then dab it on sores.  Follow-up care  Follow up with your healthcare provider, or as advised. If a culture was done, you can call as directed for the results. You will be told if your treatment needs to be changed.   Call 911  Call 911 if you have any of these:     Trouble breathing    Inability to swallow    Extreme drowsiness or trouble waking up    Fainting or loss of consciousness    Fast heart rate    Seizure    Stiff neck  When to seek medical advice  Call your healthcare provider right away if any of these occur:    You have a fever of 100.4 F (38 C) or higher, or as directed by your provider    You are pregnant    You just had surgery or another medical procedure, or you were just discharged from the hospital    It's too painful to eat or swallow    The sore does not go away within 2 weeks  [employer] last reviewed this educational content on 8/1/2020 2000-2021 [employer]. All rights reserved. This information is not intended as a substitute for professional medical care. Always follow your healthcare professional's instructions.

## 2022-04-03 NOTE — ED TRIAGE NOTES
Pt reports he has had a large sore in his mouth that has been persistent for over a month. Pt also reports he is getting intermittent bleeding sores on his tongue. Pt also appears jaundiced and has had multiple recent medical problems. Seen at clinic today, but prescriptions were sent to pharmacy that won't be open until Monday.

## 2022-04-03 NOTE — ED PROVIDER NOTES
Emergency Department Encounter     Evaluation Date & Time:   2022  7:34 PM    CHIEF COMPLAINT:  Oral Pain      Triage Note:Pt reports he has had a large sore in his mouth that has been persistent for over a month. Pt also reports he is getting intermittent bleeding sores on his tongue. Pt also appears jaundiced and has had multiple recent medical problems. Seen at clinic today, but prescriptions were sent to pharmacy that won't be open until Monday.      FINAL IMPRESSION:    ICD-10-CM    1. Tongue ulceration  K14.0 Oral Surgery Referral   2. Dental decay  K02.9    3. Aphthous ulcer  K12.0 cephALEXin (KEFLEX) 500 MG capsule     dexamethasone (DECADRON) 0.5 MG/5ML elixir     Oral Surgery Referral       Impression and Plan     ED COURSE & MEDICAL DECISION MAKIN:53 PM I met with the patient to gather history and to perform my initial exam. We discussed plans for the ED course, including diagnostic testing and treatment.   8:03 PM I discussed plans for discharge with the patient, which he was agreeable to  We discussed supportive cares at home and reasons for return to the ER including new or worsening symptoms. All questions and concerns were addressed. Patient to be discharged by RN.    ED Course as of 22   Sat  Patient only issue today is the ongoing ulcer on his tongue causing pain.  There is no evidence of infection, but the urgent care recommended keflex and decadron for treatment of that and to prevent infection.  I certainly think the antibiotics are fine to try and prevent infection, and since he couldn't fill those at his pharmacy until Monday, and is anxious to start, I will transfer that to [facility] at his request.  Otherwise, we discussed that for intraoral issues like this ulcer that are not resolving, he really needs dentistry or oral surgery care.  In this case I think his issue is more oral surgery although he certainly needs a dentist for his dental decay as  well.  I recommended that he call his insurance and find out which oral surgery group is within network and make an appointment to go there.  If they require a referral he should get that from his primary care.  Outside of that he has no findings of an acute infection today.  He has no other systemic issues today.  He did request a work slip to state that he was here today and I did write that as well.       At the conclusion of the encounter I discussed the results of all the tests and the disposition. The questions were answered. The patient or family acknowledged understanding and was agreeable with the care plan.          0 minutes of critical care time        MEDICATIONS GIVEN IN THE EMERGENCY DEPARTMENT:  Medications - No data to display    NEW PRESCRIPTIONS STARTED AT TODAY'S ED VISIT:  Discharge Medication List as of 4/2/2022  8:10 PM          HPI     HPI     Masoud Huddleston is a 36 year old male with a pertinent medical history of bacteremia, bleeding esophageal varices, alcohol use disorder, anemia, thrombocytopenia, seizures, who presents to this ED via walk-in for evaluation of tongue pain.     Per Chart Review, patient was seen at [facility] earlier today for a simple aphthous ulcer that is recurrent on the right side of the tongue. Patient was instructed to try oral topical cortical steroids with dexamethasone elixir, instructed patient with use 3 times a day keep mouth for 5 minutes do not eat or drink for the following 30 minutes and  a course of cephalexin for any bacterial involvement of the aphthous ulcer. He was discharged with prescriptions of decadron and Keflex.     Patient endorses the history above. He states he was prompted to visit the ED because he was unable to  his prescribed medications due to his pharmacy being closed until Monday. He mentions he wanted to get his medications filled, but because he couldn't he visited the ED with hopes of  sending the prescription to another pharmacy that is open. He endorses having this cancer sore on his tongue for approximately 1.5 months and endorses associated tongue pain radiating to his right ear and dysgeusia. He denies any other complications at this time.     REVIEW OF SYSTEMS:  Review of Systems   HENT:        Positive for tongue pain. Positive for right ear pain.    Skin: Positive for wound (tongue).   Neurological:        Positive for dysgeusia.    All other systems reviewed and are negative.    remainder of systems are all otherwise negative.        Medical History     Past Medical History:   Diagnosis Date     Acute alcoholic intoxication in alcoholism with complication (H)      Acute metabolic encephalopathy      Alcohol abuse      Alcoholic cirrhosis of liver without ascites (H)      Alcoholic ketoacidosis 9/19/2019     Bacteremia 3/4/2022     Chronic acquired lymphedema      Cirrhosis of liver (H)      Depression      ED (erectile dysfunction)      Elevated liver enzymes 4/27/2018     Esophageal varices without bleeding (H)      GI (gastrointestinal bleed)      Hematemesis 4/25/2018     History of transfusion      Hypokalemia 9/19/2019     Hypomagnesemia 4/27/2018     Liver disease      Aiyana-Vizcaino tear 4/27/2018     Nausea with vomiting      Neuropathy      Right patella fracture      Seizures (H)      Substance abuse (H)      Thrombocytopenia (H)        Past Surgical History:   Procedure Laterality Date     ESOPHAGOSCOPY, GASTROSCOPY, DUODENOSCOPY (EGD), COMBINED N/A 4/25/2018    Procedure: ESOPHAGOGASTRODUODENOSCOPY (EGD);  Surgeon: Lora Valencia MD;  Location: [facility] GI;  Service:      ESOPHAGOSCOPY, GASTROSCOPY, DUODENOSCOPY (EGD), COMBINED N/A 2/26/2022    Procedure: ESOPHAGOGASTRODUODENOSCOPY (EGD) WITH ESOPHAGEAL VARICEAL BANDING;  Surgeon: Moises Michael DO;  Location: [facility] OR      OPEN RX PATELLA FX Right 4/28/2018    Procedure: OPEN REDUCTION INTERNAL FIXATION,  "FRACTURE, PATELLA;  Surgeon: Bertram Beltran MD;  Location: [facility];  Service: Orthopedics     KNEE SURGERY Right 2018     NC ESOPHAGOGASTRODUODENOSCOPY TRANSORAL DIAGNOSTIC N/A 5/3/2020    Procedure: ESOPHAGOGASTRODUODENOSCOPY (EGD);  Surgeon: Ricardo Barbosa MD;  Location: [facility] OR;  Service: Gastroenterology      PARACENTESIS  1/16/2020      PARACENTESIS  1/23/2020       No family history on file.    Social History     Tobacco Use     Smoking status: Current Every Day Smoker     Packs/day: 1.00     Types: Cigarettes     Smokeless tobacco: Never Used   Substance Use Topics     Alcohol use: Yes     Comment: a liter of Vodka everyday     Drug use: Yes     Types: Marijuana       cephALEXin (KEFLEX) 500 MG capsule  dexamethasone (DECADRON) 0.5 MG/5ML elixir  chlorhexidine (PERIDEX) 0.12 % solution  eplerenone (INSPRA) 25 MG tablet  furosemide (LASIX) 40 MG tablet  gabapentin (NEURONTIN) 100 MG capsule  melatonin 5 MG tablet  mirtazapine (REMERON) 30 MG tablet  pantoprazole (PROTONIX) 40 MG EC tablet  rifaximin (XIFAXAN) 550 MG TABS tablet  thiamine (B-1) 100 MG tablet  traZODone (DESYREL) 50 MG tablet        Physical Exam     First Vitals:  Patient Vitals for the past 24 hrs:   BP Temp Temp src Pulse Resp SpO2 Height Weight   04/02/22 1910 122/70 98.5  F (36.9  C) Oral 94 18 100 % 1.803 m (5' 11\") 73 kg (161 lb)       PHYSICAL EXAM:   Constitutional:   Patient semi-reclining in chair.  HENT:  Normocephalic, posterior pharynx wnl, mucous membranes moist and dark pink. Superficial ulceration to posterior right tongue. Diffuse dental decay, but no obvious abscess. No trismus.   Eyes:  PERRL, EOMI, Conjunctiva normal, No discharge. Scleral icterus present.   Respiratory:  Breathing easily, lungs clear to ausculation   Cardiovascular:  Regular rate and rhythm. nl s1s2 0 murmurs, rubs, or gallops.  Peripheral pulses dp, pt, and radial are wnl.  No peripheral edema   Musculoskeletal:  Moves all " extremities.  No erythematous or swollen major joints,   Integument:  Jaundiced. Spider angiomas.   Lymphatic:  No cervical lymphadenopathy  Neurologic:  Alert & oriented x 3, Normal motor function, Normal sensory function, No focal deficits noted. Normal speech.  Psychiatric:  Affect normal, Judgment normal, Mood normal.     Results     LAB AND RADIOLOGY:  All pertinent labs reviewed and interpreted  No results found for any visits on 04/02/22.      ECG:    None.    PROCEDURES:  Procedures:    None.           The creation of this record is based on the scribe s observations of the work being performed by Masoud Zapien and the provider s statements to them. This document has been checked and approved by MD Fern Mott MD  Emergency Medicine  [facility] EMERGENCY ROOM           Fern Sheppard MD  04/02/22 [id number]

## 2022-04-03 NOTE — DISCHARGE INSTRUCTIONS
If you have a dentist, I would call them and discuss with them your ulcer on your tongue to see if they are able to help you with that or if they would like you to see an oral surgeon.  Then, call your insurance and find out which oral surgery group is within your network.  I did make a referral for you today but I would make sure that the clinic that you go to is in your network to help with cost.    In the meantime I did change those prescriptions to be filled at the [facility].    You should still see your dentist either way because you do have a significant amount of decay of your teeth that needs to be treated.

## 2022-04-11 NOTE — TELEPHONE ENCOUNTER
Outpatient Medication Detail     Disp Refills Start End SLIM   doxycycline hyclate (VIBRAMYCIN) 100 MG capsule 20 capsule 0 3/22/2022 2022 --   Sig - Route: Take 1 capsule (100 mg) by mouth 2 times daily for 10 days - Oral   Sent to pharmacy as: Doxycycline Hyclate 100 MG Oral Capsule (VIBRAMYCIN)   Class: E-Prescribe   Order: [id number]   E-Prescribing Status: Receipt confirmed by pharmacy (3/22/2022  9:18 AM CDT)     Routing refill request to provider for review/approval because:  Drug not on the [facility] refill protocol    script    Last office visit provider:  3/22/22     Requested Prescriptions   Pending Prescriptions Disp Refills     doxycycline hyclate (VIBRAMYCIN) 100 MG capsule [Pharmacy Med Name: Doxycycline Hyclate 100MG CAPS] 20 capsule 0     Sig: TAKE 1 CAPSULE BY MOUTH TWICE A DAY FOR 10 DAYS       There is no refill protocol information for this order          Norberto Best RN 22 10:53 AM

## 2022-04-28 NOTE — ED PROVIDER NOTES
EMERGENCY DEPARTMENT ENCOUNTER      NAME: Masoud Huddleston  AGE: 36 year old male  YOB: 1985  MRN: [id number]  EVALUATION DATE & TIME: 2022  7:48 PM    PCP: System, Provider Not In    ED PROVIDER: Nyasia Gillette DO      Chief Complaint   Patient presents with     Alcohol Intoxication     Suicidal         FINAL IMPRESSION:  1. Alcoholic intoxication without complication (H)    2. Alcohol abuse    3. Suicidal thoughts          ED COURSE & MEDICAL DECISION MAKIN-year-old male with a history of alcohol abuse, cirrhosis, esophageal varices was brought into the ED by his mother for evaluation of suicidal ideation.  The patient's mother states that the patient stated that he was suicidal earlier this evening.  She stated that he also informed her that he attempted to purchase a firearm a few days ago with the intent to kill himself.  Here in the ED, however, the patient states that he made this up and that he did not in fact attempted to purchase a firearm.  He also denies that he is suicidal here in the ED.  Patient was clearly intoxicated upon arrival.  He stated that he wanted help with his drinking.  He states that he has been prescribed medications for anxiety and depression but he does not take them.  He reports occasional marijuana use but denies any other illicit drug use.  The patient denies any physical symptoms or complaints.  The patient stated that his last drink was a few hours ago.  On arrival to the ED the patient was noted to be tachycardic but he was otherwise hemodynamically stable.  The patient was clearly intoxicated.  He was noted to have telangiectasia chest consistent with his history of cirrhosis.  The remainder of his physical exam was unremarkable.    The patient's blood alcohol level was 418.  His potassium level was low at 2.7.  1 month ago the potassium was 2.8 when he arrived to the ED.  Patient was also noted to be thrombocytopenic with a platelet count of 36.  Most  "recent platelet count was 81-month ago.  The total bilirubin was elevated at 4.4 which is a marked improvement from 1 month ago when it was 13.7.    Because of the hypokalemia and an EKG was obtained which revealed normal sinus rhythm without any new or concerning ST or T wave changes.  The patient was given oral potassium here in the ED.     The patient will need to sober up before he can be reevaluated to determine if he is truly suicidal by the DEC  in the AM.  The patient's care was turned over to Dr. Pinto.     Pertinent Labs & Imaging studies reviewed. (See chart for details)  8:11 PM I met with the patient to gather history and to perform my initial exam. We discussed plans for the ED course, including diagnostic testing and treatment.   9:09 PM Received critical lab result. Patient's platelets are 36.      At the conclusion of the encounter I discussed the results of all of the tests and the disposition. The questions were answered. The patient or family acknowledged understanding and was agreeable with the care plan.        PPE worn: n95 mask, goggles    MEDICATIONS GIVEN IN THE EMERGENCY:  Medications   potassium chloride (KLOR-CON) Packet 40 mEq (40 mEq Oral Given 4/27/22 2150)       NEW PRESCRIPTIONS STARTED AT TODAY'S ER VISIT  New Prescriptions    No medications on file          =================================================================    HPI    Patient information was obtained from: Patient, Mother    Use of : N/A       Masoud Huddleston is a 36 year old male with a pertinent history of alcoholism, alcoholic cirrhosis with ascites, iron deficiency anemia, bleeding esophageal varices, and alcohol withdrawal syndrome who presents to this ED by walk in with his mother for evaluation of alcohol problem.    The patient reports he is coming in because he is an alcoholic and needs help. He reports having \"a lot of issues\" for the past 1.5 weeks. He denies suicidal thoughts, and " "states he would not do that because of he has kids and would not do that to him. He sometimes feels like he should not be surviving because \"others deserve something I don't.\" He reports using alcohol and weed as his outlets.    Per the patient's mother, the patient had said something to his parents about wanting to hurt himself. He told her he recently tried to buy a gun, but was told he was not allowed to.    The patient denies trying to buy a gun. He told his mom this because he wanted someone to be concerned about him because no one cares about him.    The patient's mother also reports the patient told his parents at dinner [facility] he tried to kill himself 8 days ago with gasoline. The patient states he would not do that due to the high gas prices, and he only told his parents he tried to kill himself \"to piss them off.\"    The patient's mother reports the patient was seen here last month and was told if he started drinking again he might die. After this the patient became more depressed and started drinking again. He is now not eating due a tongue problem and is not taking his medications. He recently lost his job because he started drinking again. She also reports it was difficult to get the patient to come in [facility].    The patient reports he asked to be brought in [facility] because he needs help with his alcoholism. He states something in his head is not agreeing with society that needs to be well. He states, \"I am a warrior trying to push forward.\" He is currently prescribed medication for anxiety and depression, but is not compliant because he does not mix his medications with alcohol. He reports he was previously admitted to [facility] for alcohol problems, but was \"forced out after a month to [place].\" He is wanting help with his alcohol abuse again and does not know what to do. He reports he has been to treatment \"too many times.\"    He reports his last drink was approximately 3 hours ago. He " endorses marijuana use, denies any other drug use. He denies suicidal ideation, homicidal ideation, auditory hallucinations, visual hallucinations, and any physical complaints.    Per chart review, the patient was admitted to [facility] from 2/25/22 to 3/13/22 (16 days). He initially presented to the ED with 2 days of hematemesis and black stools and as admitted to the hospital. EGD on 2/26 revealing esophageal varices which were banded x 3. Patient experienced mild alcohol withdrawal on 2/27 with worsening withdrawal with hallucinations on 3/1. On 3/2 ammonia elevated at 66, INR elevated at 2. Metronidazole given 3/3-3/10 after 1 of 2 blood cultures was positive for parvimonas micra. RUQ US on 3/7 with cirrhosis, mild ascites, and cholelithiasis. HIDA scan ordered, no evidence of cholecystitis. Patient feeling improved and eager to get home at time of discharge on 3/13.    REVIEW OF SYSTEMS   Review of Systems   Psychiatric/Behavioral: Negative for hallucinations (no AH or VH), self-injury and suicidal ideas.        Negative for homicidal ideations   All other systems reviewed and are negative.      PAST MEDICAL HISTORY:  Past Medical History:   Diagnosis Date     Acute alcoholic intoxication in alcoholism with complication (H)      Acute metabolic encephalopathy      Alcohol abuse      Alcoholic cirrhosis of liver without ascites (H)      Alcoholic ketoacidosis 9/19/2019     Bacteremia 3/4/2022    2/2522- parvimonas micra; ? Significance  3/3/22: Dicussed with ID and could be contaminant  Metronidazole started       Chronic acquired lymphedema      Cirrhosis of liver (H)      Depression      ED (erectile dysfunction)      Elevated liver enzymes 4/27/2018     Esophageal varices without bleeding (H)      GI (gastrointestinal bleed)     hematemesis     Hematemesis 4/25/2018    Added automatically from request for surgery [id number]     History of transfusion      Hypokalemia 9/19/2019     Hypomagnesemia 4/27/2018      Liver disease      Aiyana-Vizcaino tear 4/27/2018     Nausea with vomiting      Neuropathy      Right patella fracture      Seizures (H)     withdrawal     Substance abuse (H)      Thrombocytopenia (H)        PAST SURGICAL HISTORY:  Past Surgical History:   Procedure Laterality Date     ESOPHAGOSCOPY, GASTROSCOPY, DUODENOSCOPY (EGD), COMBINED N/A 4/25/2018    Procedure: ESOPHAGOGASTRODUODENOSCOPY (EGD);  Surgeon: Lora Valencia MD;  Location: [facility];  Service:      ESOPHAGOSCOPY, GASTROSCOPY, DUODENOSCOPY (EGD), COMBINED N/A 2/26/2022    Procedure: ESOPHAGOGASTRODUODENOSCOPY (EGD) WITH ESOPHAGEAL VARICEAL BANDING;  Surgeon: Moises Michael DO;  Location: [facility] OR     HC OPEN RX PATELLA FX Right 4/28/2018    Procedure: OPEN REDUCTION INTERNAL FIXATION, FRACTURE, PATELLA;  Surgeon: Bertram Beltran MD;  Location: [facility];  Service: Orthopedics     KNEE SURGERY Right 2018     UT ESOPHAGOGASTRODUODENOSCOPY TRANSORAL DIAGNOSTIC N/A 5/3/2020    Procedure: ESOPHAGOGASTRODUODENOSCOPY (EGD);  Surgeon: Ricardo Barbosa MD;  Location: [facility];  Service: Gastroenterology      PARACENTESIS  1/16/2020      PARACENTESIS  1/23/2020           CURRENT MEDICATIONS:    cephALEXin (KEFLEX) 500 MG capsule  chlorhexidine (PERIDEX) 0.12 % solution  dexamethasone (DECADRON) 0.5 MG/5ML elixir  doxycycline hyclate (VIBRAMYCIN) 100 MG capsule  eplerenone (INSPRA) 25 MG tablet  furosemide (LASIX) 40 MG tablet  gabapentin (NEURONTIN) 100 MG capsule  melatonin 5 MG tablet  mirtazapine (REMERON) 30 MG tablet  pantoprazole (PROTONIX) 40 MG EC tablet  rifaximin (XIFAXAN) 550 MG TABS tablet  thiamine (B-1) 100 MG tablet  traZODone (DESYREL) 50 MG tablet        ALLERGIES:  No Known Allergies    FAMILY HISTORY:  No family history on file.    SOCIAL HISTORY:   Social History     Socioeconomic History     Marital status: Single   Tobacco Use     Smoking status: Current Every Day Smoker     Packs/day: 1.00      Types: Cigarettes     Smokeless tobacco: Never Used   Substance and Sexual Activity     Alcohol use: Yes     Comment: a liter of Vodka everyday     Drug use: Yes     Types: Marijuana     Sexual activity: Not Currently       VITALS:  /62 (BP Location: Right arm)   Pulse 95   Temp 98.4  F (36.9  C) (Oral)   Resp 18   Wt 72.6 kg (160 lb)   SpO2 96%   BMI 22.32 kg/m      PHYSICAL EXAM    General presentation: Alert, Vital signs reviewed. NAD. Moderately intoxicated appearing.  HENT: ENT inspection is normal. Oropharynx is moist and clear.   Eye: Pupils are equal and reactive to light. EOMI  Neck: The neck is supple, with full ROM, with no evidence of meningismus.  Pulmonary: Currently in no acute respiratory distress. Normal, non labored respirations, the lung sounds are normal with good equal air movement. Clear to auscultation bilaterally.   Circulatory: Regular rate and rhythm. Peripheral pulses are strong and equal. No murmurs, rubs, or gallops.   Abdominal: The abdomen is soft. Nontender. No rigidity, guarding, or rebound. Bowel sounds normal.   Neurologic: Alert, oriented to person, place, and time. No motor deficit. No sensory deficit. Cranial nerves II through XII are intact.  Musculoskeletal: No extremity tenderness. Full range of motion in all extremities. No extremity edema.   Skin: Skin color is normal. Telangiectasia on neck and chest. Warm. Dry to touch.   Psych: Denies SI and HI     LAB:  All pertinent labs reviewed and interpreted.  Results for orders placed or performed during the hospital encounter of 04/27/22   Alcohol level blood   Result Value Ref Range    Alcohol, Blood 418 (H) None detected mg/dL   Comprehensive metabolic panel   Result Value Ref Range    Sodium 144 136 - 145 mmol/L    Potassium 2.7 (LL) 3.5 - 5.0 mmol/L    Chloride 109 (H) 98 - 107 mmol/L    Carbon Dioxide (CO2) 20 (L) 22 - 31 mmol/L    Anion Gap 15 5 - 18 mmol/L    Urea Nitrogen 5 (L) 8 - 22 mg/dL    Creatinine  0.67 (L) 0.70 - 1.30 mg/dL    Calcium 7.9 (L) 8.5 - 10.5 mg/dL    Glucose 104 70 - 125 mg/dL    Alkaline Phosphatase 159 (H) 45 - 120 U/L     (H) 0 - 40 U/L    ALT 35 0 - 45 U/L    Protein Total 8.0 6.0 - 8.0 g/dL    Albumin 2.7 (L) 3.5 - 5.0 g/dL    Bilirubin Total 4.4 (H) 0.0 - 1.0 mg/dL    GFR Estimate >90 >60 mL/min/1.73m2   CBC with platelets and differential   Result Value Ref Range    WBC Count 4.8 4.0 - 11.0 10e3/uL    RBC Count 3.53 (L) 4.40 - 5.90 10e6/uL    Hemoglobin 11.4 (L) 13.3 - 17.7 g/dL    Hematocrit 32.7 (L) 40.0 - 53.0 %    MCV 93 78 - 100 fL    MCH 32.3 26.5 - 33.0 pg    MCHC 34.9 31.5 - 36.5 g/dL    RDW 14.9 10.0 - 15.0 %    Platelet Count 36 (LL) 150 - 450 10e3/uL    % Neutrophils 70 %    % Lymphocytes 25 %    % Monocytes 4 %    % Eosinophils 1 %    % Basophils 0 %    % Immature Granulocytes 0 %    NRBCs per 100 WBC 0 <1 /100    Absolute Neutrophils 3.3 1.6 - 8.3 10e3/uL    Absolute Lymphocytes 1.2 0.8 - 5.3 10e3/uL    Absolute Monocytes 0.2 0.0 - 1.3 10e3/uL    Absolute Eosinophils 0.0 0.0 - 0.7 10e3/uL    Absolute Basophils 0.0 0.0 - 0.2 10e3/uL    Absolute Immature Granulocytes 0.0 <=0.4 10e3/uL    Absolute NRBCs 0.0 10e3/uL       RADIOLOGY:  Reviewed all pertinent imaging. Please see official radiology report.  No orders to display       EKG:    Normal sinus rhythm.  Rate of 67.  Sinus arrhythmia noted.  Normal QRS.  Normal QT.  No ST or T wave changes noted.  Compared to EKG on 3/22/2022 the sinus arrhythmia appears similar.  No other significant changes noted.    I have independently reviewed and interpreted the EKG(s) documented above.          Mental Health Risk Assessment      PSS-3    Date and Time Over the past 2 weeks have you felt down, depressed, or hopeless? Over the past 2 weeks have you had thoughts of killing yourself? Have you ever attempted to kill yourself? When did this last happen? User   04/27/22 1940 yes yes yes more than 6 months ago [facility] ([place])     Date and Time Q1 Wished to be Dead (Past Month) Q2 Suicidal Thoughts (Past Month) Q3 Suicidal Thought Method Q4 Suicidal Intent without Specific Plan Q5 Suicide Intent with Specific Plan Q6 Suicide Behavior (Lifetime) Within the Past 3 Months? RETIRED: Level of Risk per Screen Screening Not Complete User   04/27/22 1940 no yes no yes no no -- -- -- IAW                Item Assessment   Suicidal Ideation Suicidal intent with specific plan   Plan Shoot himself   Intent    Suicidal or self-harm behaviors Obtained a firearm   Risk Factors Hopeless or dissatisfied with treatment and Substance abuse or dependence   Protective Factors Responsibility to family or others; living with family           I, Nick De La Vega, am serving as a scribe to document services personally performed by Nyasia Gillette DO based on my observation and the provider's statements to me. I, Nyasia Gillette, attest that Nick De La Vega is acting in a scribe capacity, has observed my performance of the services and has documented them in accordance with my direction.    Nyasia Gillette DO  Emergency Medicine  [facility] EMERGENCY ROOM  [address] [facility] [id number] [phone]     Nyasia Gillette DO  04/28/22 0259

## 2022-04-28 NOTE — ED NOTES
Pt awoke approx 0800 and requested something to drink. Provided with (2) 4oz juice cups; pt currently ordering breakfast with help of 1:1. Pt is pleasant and has no complaints at this time; endorses safety. VSS.

## 2022-04-28 NOTE — ED NOTES
"4/27/2022  Masoud Huddleston 1985     [facility] Crisis Assessment    Patient was assessed: remote  Patient location: [facility] Emergency Department  Was a release of information signed:  Pt educated on PATRICIA which was faxed to ED. Pt noted verbal agreement. Providers included on the release: [facility] and [facility].     Referral Data and Chief Complaint  Masoud is a 36 year old who uses He/Him pronouns. Patient presented to the ED with family/friends and was referred to the ED by family/friends. Patient is presenting to the ED for the following concerns:  Per Martha Sultana RN on 4/27/22:    \"Patient is here with mother, patient is intoxicated, last drink was 1.5 hours PTA, patient has been dx with cirrhosis and during last admission he was told that he is dying, patient is depressed since then and has been drinking, mother brings patient in as he has been expressing want for SI and attempted to purchase a firearm recently, but was denied.  Patient admits to SI.\"      Informed Consent and Assessment Methods    Patient is his own guardian. Writer met with patient and explained the crisis assessment process, including applicable information disclosures and limits to confidentiality, assessed understanding of the process, and obtained consent to proceed with the assessment. Patient was observed to be able to participate in the assessment as evidenced by responding to assessors questions, showing eye contact and being orientated. Assessment methods included conducting a formal interview with patient, review of medical records, collaboration with medical staff, and obtaining relevant collateral information from family and community providers when available.    Narrative Summary of Presenting Problem and Current Functioning  What led to the patient presenting for crisis services, factors that make the crisis life threatening or complex, stressors, how is this disrupting the patient's " "life, and how current functioning is in comparison to baseline. How is patient presenting during the assessment.     At the time of encounter, Pt stated \"I relapsed\" as to reasoning why he was present in Emergency Department. Pt noted he was in treatment historically, and relapsed roughly 1.5 months ago. Pt noted he has since then tried to maintain sobriety, though reported difficulty over the past 1.5 weeks. Pt stated \"things stacked up upon me\". Pt reported his uncle recently committed suicide and  is in two days. Pt reported due to increased stressors in the family, he  tried to cook his parents dinner last night and \"started drinking way to much\". Pt noted this past evening Pt stated \"I was trying to piss them off\". Pt reported he has minimal support outside of his parents and friend and noted he recently lost his job due to drinking and feels as though he is a \"failure\". Pt stated \"I knew it would be a big situation with my parents, because I can't get my shit together\" noting spending time with family can be a trigger in this regard. Pt noted he has been \"trying to stay sober\", though \"I put myself down, I am my own worse enemy\".     Pt noted last night when his parents were present, he began to increase his drinking, due to shame and guilt. Pt reported half a liter of vodka. Pt noted he attempted to cook them dinner though the parents did not want to eat the food. Pt noted the situation as \"embarrassing\". Pt noted he began becoming dsyregulated and making comments around ending his life, stating \"maybe looking for attention\" stating \"I have no one to really talk to\". Pt stated \"I don't have any ideations\". Pt reported \"I have kids\" as a protective factor. Pt also stated  \"I have a lot of pride\" which is a protective factor for him.     Pt reported he made comments around buying a gun and wanting to end his life, though noted on several occasions he never attempted to buy a gun and reported \"I was " "looking for attention\" when making such comments. At time of encounter, pt denied ideation with plan or intent. Pt stated \"Obviously I was seek attention, trying to piss people off.\" Pt then reported \"why would I do that and then ask to come to the hospital\" if he had plans to harm self. Pt attributed his behavior to alcohol stating \"I was pretty intoxicated\".      Pt noted he has been drinking every day over the past 1.5 weeks, roughly half a liter of vodka. Pt noted \"I am pretty functional which I am not proud of\". Pt noted he also engage in smoking weed,. Roughly 2 times a week.  Pt denies being under the influence of alcohol at the time of encounter due to sleeping in Emergency Department overnight. Pt denied active withdrawal symptoms at the time of encounter. Pt denied history of seizures. Pt reported largest symptoms as low self esteem, feeling depressed, sleep disturbances, unable to concentrate, racing thoughts and feeling worthless. Pt noted these symptoms lead to alcohol consumption stating \"I run back to the booze\".     At the time of encounter, pt noted he felt safe to return home and was requesting to do so. Pt denied the need for detox and reported he would like to obtain a Rule 25 evaluation for on going substance use care, a individual therapy appointment to address his depression and anxiety and follow up with his psychiatrist in regards to medications. Pt was orientated in all spheres at the time of encounter. Pt denied psychosis or manic like symptoms, none of which were observed by .       History of the Crisis  Duration of the current crisis, coping skills attempted to reduce the crisis, community resources used, and past presentations.    Pt reports a history of being placed on commitment due to on going alcohol use.     Per previous medical records pt hast two previous admissions at [facility] 12/15/2020 - 12/30/2020 and 7/2/2020 - 7/17/2020.     Per medical records it appears pt was " "see at [facility] on 3/22/22 and reported 25 days in recovery at the time.     Pt was medically hospitalized in February 2/25-3/13 where he was informed if he continued drinking, he is risking his life.     Pt noted history of MI/CD programming. Pt denied current therapy services. Pt noted he has a psychiatrist but does not take medication while drinking, due to side effects and dangers.     Pt reported coping skills as drawing, listening to music and playing with his children. Pt noted having supportive parents and friend. Pt noted he was trying to attend AA meetings, though reported he does not feel this community resources is enough to support his recovery at this time.     Collateral Information  The following information was received from Millie whose relationship to the patient is Mother. Information was obtained via phone. Their phone number is [phone] and they last had contact with patient last night.    What happened today: Mother noted she brought the patient to the hospital last night. Mother noted they were at pt's house for dinner last night, and pt was intoxicated. Mother noted pt was drinking and said \"he had a lot of things on his mind\". Mother stated that pt reported \"8 days ago he attempted to commit suicide\" and was trying to \"inhale gasoline and was shocked it didn't kill him\". Pt stated to mother that he also went to a store to buy a gun though the store denied this of him. Mother noted last night pt reported two prior suicide attempts, historically. Mother noted pt \"rambled about a lot of different things\" and pt was asked if he would go to hospital, though reported he would go willingly. Mother noted when patient arrived to hospital he began to \"down play things\".     What is different about patient's functioning: Mother noted pt was in the hospital a few weeks ago and relapsed after he left the hospital. Mother noted pt was denying in-patient care at that time for CD concerns. Mother " "stated over \"the last year\" pt has gotten worse in regards to alcohol use. Mother noted pt has a history of drinking while working. Mother noted pt was recently fired due to alcohol use while working. Mother noted \"two different jobs in the past three months\". Mother noted pt has been feeling like a failure lately.     Concern about alcohol/drug use: Mother noted pt has had alcohol concerns for \"many years\".     What do you think the patient needs: Mother noted \"he needs long term alcohol treatment\".     Has patient made comments about wanting to kill themselves/others:  Mother noted pt has made comments around wanting to hurt himself. Mother denies comments or threats towards other. Mother noted historically patient has made comments around ideation towards self.     If d/c is recommended, can they take part in safety/aftercare planning: Mother noted difficulty supporting patient, noting historically \"we have tried, but we failed\".     Other information: Mother noted pt worked at [employer] for 10 years. Mother noted in the past 6 months pt has worked for 2 other places of employment and hasn't been able to maintain.  Mother noted pt was placed on a commitment two years ago through [facility], noting it was helpful and patient did well. Mother noted it was mentioned in the past pt has had concerns for bi-polar. Mother noted pt reports he has trauma, though would not share with mother this past evening when asked. Mother denies awareness of trauma.     Risk Assessment    Risk of Harm to Self     ESS-6  1.a. Over the past 2 weeks, have you had thoughts of killing yourself? Yes  1.b. Have you ever attempted to kill yourself and, if yes, when did this last happen? Yes Pt noted about 20 years ago he attempted through overdosing  2. Recent or current suicide plan? No   3. Recent or current intent to act on ideation? No  4. Lifetime psychiatric hospitalization? Yes  5. Pattern of excessive substance use? Yes  6. Current " "irritability, agitation, or aggression? No  Scoring note: BOTH 1a and 1b must be yes for it to score 1 point, if both are not yes it is zero. All others are 1 point per number. If all questions 1a/1b - 6 are no, risk is negligible. If one of 1a/1b is yes, then risk is mild. If either question 2 or 3, but not both, is yes, then risk is automatically moderate regardless of total score. If both 2 and 3 are yes, risk is automatically high regardless of total score.     Score: 3, moderate risk    The patient has the following risk factors for suicide: substance abuse, depressive symptoms, family member or friend completed suicide, health stressors, poor decision making, poor impulse control, prior suicide attempt and family disruption    Is the patient experiencing current suicidal ideation: No. Pt noted passive ideation in the past two weeks, though denied having plan or intent.     Is the patient engaging in preparatory suicide behaviors (formulating how to act on plan, giving away possessions, saying goodbye, displaying dramatic behavior changes, etc)? No    Does the patient have access to firearms or other lethal means? no. Pt denied trying to recently obtain a gun, noting he told his parents that to \"piss my mom and dad off, I know it is a touchy subject\".     The patient has the following protective factors: radha system, future focused thinking, displays insight, expresses desire to engage in treatment, sense of obligation to people/pets and safe/stable housing    Support system information: Pt noted father and mother and Jon (good friend)     Patient strengths: Pt has had time in sobriety in the past. Pt has completed treatment in the past. Pt has insight into the needs for higher level of care. Pt has stable housing and supportive family. Pt has access to services with insurance.     Does the patient engage in non-suicidal self-injurious behavior (NSSI/SIB)? no    Is the patient vulnerable to sexual " "exploitation?  No    Is the patient experiencing abuse or neglect? no    Is the patient a vulnerable adult? No      Risk of Harm to Others  The patient has the following risk factors of harm to others: no risk factors identified    Does the patient have thoughts of harming others? No    Is the patient engaging in sexually inappropriate behavior?  no       Current Substance Abuse    Is there recent substance abuse?  Substance type(s): THC Frequency: Twice a week Quantity: \"very small, not a joint\".  Method: Smoking Duration: N/A Last use: \"couple of days ago\"    Substance type(s): Alcohol Frequency: Daily Quantity: half a liter Method: Oral ingestion Duration: Throughout day Last use: 4/27/22    Was a urine drug screen or blood alcohol level obtained: Yes BAL on 4/27/22 was 0.418    CAGE AID  Have you felt you ought to cut down on your drinking or drug use?  Yes  Have people annoyed you by criticizing your drinking or drug use? Yes  Have you felt bad or guilty about your drinking or drug use? Yes  Have you ever had a drink or used drugs first thing in the morning to steady your nerves or to get rid of a hangover? Yes  Score: 4/4       Current Symptoms/Concerns    Symptoms  Attention, hyperactivity, and impulsivity symptoms present: Yes: Impulsive    Anxiety symptoms present: Yes: Generalized Symptoms: Avoidance, Cognitive anxiety - feelings of doom, racing thoughts, difficulty concentrating , Excessive worry, Physiological anxiety - sweating, flushing, shaking, shortness of breath, or racing heart and Somatic symptoms - abdominal pain, headache, or tension      Appetite symptoms present: No     Behavioral difficulties present: Yes: Impulsivity/Disinhibition     Cognitive impairment symptoms present: No    Depressive symptoms present: Yes Depressed mood, Excessive guilt , Feelings of helplessness , Feelings of hopelessness , Feelings of worthlessness , Isolative , Loss of interest / Anhedonia , Low self esteem , Sleep " disturbance  and Thoughts of suicide/death      Eating disorder symptoms present: No    Learning disabilities, cognitive challenges, and/or developmental disorder symptoms present: No     Manic/hypomanic symptoms present: No    Personality and interpersonal functioning difficulties present : Yes: Emotional Deregulation and Impaired Impulse Control    Psychosis symptoms present: No      Sleep difficulties present: Yes: Difficulty falling asleep  and Difficulty staying sleep     Substance abuse disorder symptoms present: Yes Substance(s) taken in larger amounts or over a longer period than intended, Persistent desire or unsuccessful efforts to cut down or control use, Cravings or strong desire to use, Recurrent substance use resulting in failure to fulfill major role obligations at school, work, or home, Continued substance use despite having persistent or recurrent social or interpersonal problems caused by or exacerbated by the use of substance(s), Important social, occupational, or recreational activities are given up or reduced because of substance use, Recurrent substance use in situations in which it is physically hazardous , Substance abuse is continued despite knowledge of having a persistent or recurrent physical or psychological problem that has been caused of exacerbated by substance use and Tolerance (increased amounts to obtain desired effect or diminished effect with the same amount of use)     Trauma and stressor related symptoms present: No       Mental Status Exam   Affect: Appropriate   Appearance: Appropriate    Attention Span/Concentration: Attentive?    Eye Contact: Engaged   Fund of Knowledge: Appropriate    Language /Speech Content: Fluent   Language /Speech Volume: Normal    Language /Speech Rate/Productions: Normal    Recent Memory: Intact   Remote Memory: Intact   Mood: Normal    Orientation to Person: Yes    Orientation to Place: Yes   Orientation to Time of Day: Yes    Orientation to Date:  Yes    Situation (Do they understand why they are here?): Yes    Psychomotor Behavior: Normal    Thought Content: Clear   Thought Form: Intact       Mental Health and Substance Abuse History    History  Current and historical diagnoses or mental health concerns: Depression, Anxiety and Alcohol use Disorder     Prior MH services (inpatient, programmatic care, outpatient, etc) : Pt noted history of individual therapy though denied current services.  Pt noted having a psychiatry though denies taking medications consistently. Pt appears to have two prior admissions for MI/CD care through [facility].     Has the patient used [facility] crisis team services before?: Unknown.     History of substance abuse: Yes Pt noted long term history with alcohol use    Prior CHEO services (inpatient, programmatic care, detox, outpatient, etc) : Yes : Pt noted history of detox, roughly 6 times. Pt noted history of treatment for Alcohol use as well, noting last time as year ago through [facility], out-patient serices. Pt reported he has likely attended 4 residential services in his life. Pt noted he has been on commitment in the past for such concerns. Pt noted last residential care was through [facility].     History of commitment: Pt noted history of commitment. Pt denies current commitment.     Family history of MH/CHEO: Pt denied     Trauma history: No    Medication  Psychotropic medications: No current medications but a history of Trazadone and mirtazapine .Pt noted he has not taken medications in over a week due to alcohol use and concerns with mixing medications with alcohol.     Current Care Team  Primary Care Provider: Yes. Name: Dr. Kelby Dumont. Location: [facility]. Date of last visit: last saw a couple of months ago. Frequency: As needed. Perceived helpfulness: Yes.    Psychiatrist: Yes. Name: Unknown. Location: [facility] in [place], [place]. Date of last visit: Last saw a couple of months ago.  Frequency: Every three months. Perceived helpfulness: N/a.    Therapist: No    : No    CTSS or ARMHS: No    ACT Team: No    Other: No    Biopsychosocial Information    Socioeconomic Information  Current living situation: Pt noted he lives on his own.     Employment/income source: Pt noted having savings to support self right now though noted he needs to find future employment.     Relevant legal issues:  Pt denied     Cultural, Temple, or spiritual influences on mental health care: Pt noted history of attending Gnosticism. Pt noted he continues to believe in a high power.     Is the patient active in the  or a : No      Relevant Medical Concerns   Patient identifies concerns with completing ADLs? No     Patient can ambulate independently? Yes     Other medical concerns? Pt noted cirrhosis of the liver    History of concussion or TBI? No        Diagnosis    311 (F32.9) Unspecified Depressive Disorder  - primary and - by history     Substance-Related & Addictive Disorders Alcohol Intoxication  303.00 (F10.129) Alcohol Intoxication with use disorder, Moderate or severe - by history     300.02 (F41.1) Generalized Anxiety Disorder - by history       Therapeutic Intervention  The following therapeutic methodologies were employed when working with the patient: establishing rapport, active listening, assessing dimensions of crisis, identifying additional supports and alternative coping skills, establishing a discharge plan, safety planning, psychoeducation and motivational interviewing. Patient response to intervention: Pt showed significant insight into the need for services to address his recovery and mental health. Pt showed insight into coping skills. Pt showed ability to safety plan and was open to scheduled services. Pt was provided psychoeducation around on going dangers with substance use and appeared open to conversation. .      Disposition  Recommended disposition: Individual Therapy,  "Medication Management, Substance Abuse Disorder Treatment, Detox and Rule 25/CHEO Assessment      Reviewed case and recommendations with attending provider. Attending Name: Dr. Vargas    Attending concurs with disposition: Yes      Patient concurs with disposition: No: Pt is denying the need for detox . Pt is open to scheduled mental health therapy and Rule 25 to obtain higher level of care to address substance use and mental health concerns.     Guardian concurs with disposition: NA     Final disposition: Individual therapy , Medication management and Rule 25/CHEO Assessment.       Clinical Substantiation of Recommendations   Rationale with supporting factors for disposition and diagnosis.     Pt is a 36 year old male with a history of alcohol use, depression and anxiety. Pt had prior committment for such concerns as well. Pt was brought into ED by the help of his mother, though was open to such care. Pt reported last night he made comments around ideations for \"attention seeking\" and denied active ideation at the time of encounter with . Pt noted shame and guilt around relapse with alcohol due to his parents and due to medical concerns with on going drinking. Pt noted he made several comments this past night to his parents around ideation and self harm, noting it as a \"touchy subject\" as his uncle recently committed suicide. Pt noted the  is in two days and stated \"I was drinking, I was reaching for attention\".     Pt showed insight into symptoms and concerns and reported understanding and awareness that he needs to abstain from alcohol and work towards sobriety to support his mental health. Pt was future orientated and was open to scheduling appointments for MI/CD. Pt denied wanting to attend detox, noting he has minimal withdrawal concerns at the time of encounter and feels safe to discharge home and follow up on his own.     Mental health providers can only make a judgment regarding the level of " risk for suicide and risk at the time of encounter with information provided, we cannot predict if or when a patient may attempt to harm self or become violent. As always,  evaluations are limited to what a patient is willing to divulge at the time of encounter. Due to patient showing insight into yesterdays events, denying current ideation with plans, means or intent,  showing insight into the need for services and was willing to schedule; pt does not appear to be holdable. While pt's on going alcohol use is placing pt at a higher risk due to medical and mental health exacerbation, pt displays ability to make appropriate decisions at the time of encounter and showed insight into his needs. Pt displayed strong safety planning ability and does have a history of time in sobriety.     Pt was scheduled for individual therapy and for a Rule 25 assessment, though was encouraged to utilize the resources provided at discharge and obtain a sooner Rule 25 evaluation to support next level of care and recovery. Pt was open to resources, noting ability to follow up and would return to ED with any new or worsening symptoms for safety. Pt noted he would abstain from alcohol and follow up with his established psychiatrist as well.       Assessment Details  Patient interview started at: 8:53AM and completed at: 9:40AM.    Total duration spent on the patient case in minutes: 1.25 hrs     CPT code(s) utilized: 69552 - Psychotherapy for Crisis - 60 (30-74*) min       Aftercare and Safety Planning  Follow up plans with MH/CHEO services: Yes Pt scheduled for Rule 25      Aftercare plan placed in the AVS and provided to patient: Yes. Given to patient by ED Staff    [facility] Reddy [facility]      Aftercare Plan  If I am feeling unsafe or I am in a crisis, I will:   Contact my established care providers   Call the National Suicide Prevention Lifeline: [phone]   Go to the nearest emergency room   Call 673     Warning signs that I or other  people might notice when a crisis is developing for me:   -Feeling shameful  -Ideation  -Alcohol intake   -Isolating self    Things I am able to do on my own to cope or help me feel better:   -Like to draw  -Listen to music  -Making art with kids    Things that I am able to do with others to cope or help me feel better:   -Spend time with family (mother and father)    -Visit with my children on the weekends    -Work    Things I can use or do for distraction:   -Practice deep breathing skills     -Use community resources, including hotline numbers, Critical access hospital crisis and support meetings     -Maintain a daily schedule/routine     -Download a meditation juan and spend 15-20 minutes per day mediating/relaxing. Some apps to download include: Calm, Headspace and Insight Timer. All 3 of these apps have free version     -I will commit to 30 minutes of self care daily - this can be as simple as taking a shower, going for a walk, cooking a meal, reading, writing, watching something funny, cleaning your home, or evening looking up affirmations.     -I will practice square breathing when I begin to feel anxious - in breath through the nose for the count of 4 and the first line on the square. Out breath through the mouth for the count of 4 for the second line of the square. Repeat to complete the square. Repeat the square as many times as needed.     -I will use distraction skills of: going for walks, watching TV, spending time outside, calling a friend or family member, creating a playlist, write a hand written letter to a loved one, or listening to a pod cast.      Changes I can make to support my mental health and wellness:   -I will disclose my urges to people I trust, such as: Mother, Father and friend Jon    -I will abstain from all mood altering chemicals not currently prescribed to me, including alcohol    -I will attend scheduled mental health therapy and psychiatric appointments and follow all recommendations     -I will  obtain a Rule 25 and follow level of care recommendations    -Come back to the Emergency Department with any new or worsening symptoms     People in my life that I can ask for help:   -Mother  -Father  -Jon    [facility] has a mental health crisis team you can call 24/7: [facility]  [phone]    Other things that are important when I m in crisis:   [place] crisis line @ **CRISIS (**721125) or by texting  [place]  to 972943.      Crisis Intervention: 430.270.3323 or 973-076-0744 (TTY: 583.548.5826). Call anytime for help.     National Starlight on Mental Illness (www.[place].estuardo.org): 970.891.5758 or 588-117-2578.      [facility] Phone (free remote counseling): [phone] Web address: https://Firmex.org/      Appointment information and/or additional resources available to me:    Scheduled Appointments:    Date: Wednesday, 5/4/2022  Time: 9:00 am - 10:00 am  Provider: Yumiko Harper  Location: [facility], [address] [place], [place] [address]  Phone: [phone]  Type: Teletherapy    Patient Instructions  For all appointments: you will be sent information to fill out the intake paperwork online. Please fill the paperwork prior to your appointment. For telehealth appointments: you will also be sent a zoom link to attend the appointment remotely.      Date: Tuesday, 5/10/2022  Time: 2:00 pm - 3:00 pm  Provider: Liv [place] MD, MD  Location: [facility], [address] [place] [address]  Phone: [phone]  Type: Substance Use Disorder Assessment    Patient Instructions  THIS IS ONLY A RESERVATION. PATIENT MUST CALL [phone] TO PRE-REGISTER AND CONFIRM APPOINTMENT. Please arrive 15 min early with ID and insurance card. Insurance accepted: [place], PMAPs, commercial insurance. If pt has no insurance, we have a MNSure Navigator available to assist in applying for state insurance. We have 3 locations - intake appts available in [place] or [facility]  on day/time of week. Please call [phone] to verify location and pre-register to confirm appt.    Substance Abuse Resources    To access substance abuse treatment you must have an assessment complete or have an updated assessment within 30 days of starting any program.  Information for this to be completed and to secure funding if you have medical assistance or no insurance can be found through your County's chemical health intake line.    If you have private insurance, call the customer service number on the back of your insurance card to find an in-network provider for substance abuse assessment. The ideal provider will be a treatment facility, licensed in the [facility].    For those with Medicaid with the [facility], you will need a Rule 25 assessment: The following are phone numbers for each county. Rule 25 assessments must be completed by the county you reside in currently. Once approved for funding you can connect with a facility that does Rule 25 assessment.  [place] - [phone]  [facility] [phone]  [facility] [phone]  [facility] [phone]  [facility] [phone]  [facility] [phone]  [place] - [phone]  [facility] [phone]    [facility], [facility] offers mobile assessments for Rule 25. In-person and telehealth is available. Call Kimberly at [phone].     The following facilities offer Rule 25/chemical health assessments:    [facility]  [phone]  Mon-Friday: [address] [facility], [address]  Saturday:  [address], [place], [address]  M-F assessments (7a-1:45p); Saturday assessments (7:45-10:45a)    [facility]  [phone]  [address] [place], [place], [address]  *by appointment only M-W; walk ins available Fridays from 10-2.    [facility]  [phone] (phone consultation available 24/7)  In-person Assessments:  [address], [address], [place] [address]  [address] [address] [place] [address] [address] [place] [address]  [place] [address]  [address] [place] [address]     [facility]  [phone]  [address] [facility]  [place], [place], [address]  *walk in and appointments available by calling    [facility]  [phone] [address] [place], [place], [address]  *by appointment only M-Th    [facility]  [phone]  [address] [place], [place], [address]  *walk ins available M-F    [facility]  [phone]  [address] [place], [place], [address]  *available by appointments only      [facility]  [phone] [address] [place], [place], [address]  *available by appointment only      [facility]  [phone]  [facility]  [address] [place], [place], [address]    [facility] ([facility])  [phone]  [address] [place] [address], [address]  *Walk in assessments also available M-F starting at 8 am.    [facility]  [phone]  [address] [place], [place], [address]    *available by appointments      [facility]  [phone]  [address] [place], [place], [address]  *walk in assessments available M-F starting at 7 am.    [facility]  [phone]  [facility]  [address] [place], [place], [address]  *Walk in assessments availble M-F starting at 8 am OR [phone]    [facility]  [address] [place] [address]  *Walk in assessments available M-F starting at 8 am    [facility]  [phone]  [address] [place] [address]    [facility]  [phone]  [address] [place], [place], [address]    *available by appointment only    [facility]  [phone]  [address]  [place], [place], [address]    [facility]  [phone]  [address] [place] [address], [address]  *available by appointment    [facility]  [phone]  [address] [facility] [address] [address]  [place], [place], [address]  *walk ins available M-TH from 9-3    [facility]  [phone]  [address]  [place], [place], [address]    [place]  [phone]  [address] [place], [place] [address] [address] [address], [place] [address]    [facility] (Does Rule 25 Assessments)  http://www.[facility].org/  [phone]  [address] [place] [address], [address], [place] [address]    [facility]  https://www.Spot formerly PlacePop.InTouch Technologies/our-services/drug-alcohol-treatment  [phone], [address], [place] [address]  [phone], [address], [place], [place] [id number] [phone], [address] [facility], [place] [address]  [phone], [address] [facility] , [address], ([facility]), [place], [place] [address] [phone], [address] [address], [place] [address]      If you are intoxicated, you may be required to detox at a detox facility before starting treatment. The following are detox facilities that you can self present to. All detox facilities are able to help you complete an assessment/rule 25 prior to discharge if you choose:      [facility]: [address] [place], [place], [address].         [phone]    [facility]: [address] [place], [place], [address]  [phone]    [facility]: [address] [place], [place], [address] [phone]    [facility]: [address] [place], [place], [address] [phone]            Recommendations:  It is recommended that you abstain from all mood altering chemicals. Please contact the sober support hotline (220-717-3262) as needed; phones are answered 24 hours a day, 7 days a week. Other support hotlines include:    [facility] Mental Health & Addiction: 6-506-201-6537    [facility] Support Line: 1-830.319.9146       Ways to help cope with sobriety:    -- Take prescribed medicines as  scheduled  -- Keep follow-up appointments  -- Talk to others about your concerns  -- Get regular exercise    -- Practice deep breathing skills  -- Eat a healthy diet  -- Use community resources, including hotline numbers, [facility] crisis and support meetings  -- Stay sober and avoid places/people/things associated with substance use    --Maintain a daily schedule/routine    --Get at least 7-8 hours of sleep per night    --Create a list 10--20 healthy activities that you can do that are enjoyable and do not involve substance use    --Create daily goals (approx. 1-4 goals) per day and work to achieve them throughout the day.          Free Resources:    [facility] ([facility])  [facility] connects people seeking recovery to resources that help foster and sustain long-term recovery. Whether you are seeking resources for treatment, transportation, housing, job training, education, health care or other pathways to recovery, [facility] is a great place to start.    Phone: 276.375.6131. www.[place]FiscalNote.iConclude (Great listing of all types of recovery and non-recovery related resources)      Alcoholics Anonymous    Phone: 6-241-ALCOHOL    Website: HTTP://WWW.AA.ORG/      [facility] [place] (540-523-9959 or http://aaSurreal InkÂºneaRoundPegg.org)    [facility] [place] (414-089-5954 or www.aasaul.org)       Narcotics Anonymous    Phone: 197.807.7876    Website: www.Ahandyhand.Vega-Chi.        [employer] [facility]    [address] [place] [address], [address], [place], [place],    Phone: [phone]    Drop-in Hours: Monday-Friday 9-11:30 am. By appointment at other times.    Provides: [facility] is a drop-in center on the east side [facility] that provides a safe space for individuals who are homeless and have a history of chemical use. Sobriety is not a requirement but drugs and alcohol are not allowed on the property.    Services: Non-clients can access drop-in services such as Recovery and Harm Reduction Groups, referrals to case management, community  "activities, shower facilities, and a pool table. Individuals who are homeless and have chemical health needs may be eligible for enrollment into [facility]'s case management program. Clients and  work together to access benefits, treatment, health care, shelter, and external housing resources.         [facility] Chemical Assessment & Referral Unit    [address] [place] [address]    Phone: [phone]    Hours: Monday-Friday 8 am-5 pm.    Provides: Rule 25 assessment and referral for individuals seeking treatment or counseling for chemical dependency. Must be a resident of [facility]. There is no fee for assessment. There is some funding available for treatment programs.      Crisis Lines  Crisis Text Line  Text 699161  You will be connected with a trained live crisis counselor to provide support.    Por espanol, texto  WOODROW a 535436 o texto a 442-AYUDAME en WhatsApp    National Hope Line  1.800.SUICIDE [7634809]      Community Resources  Fast Tracker  Linking people to mental health and substance use disorder resources  fasttrackCampaign Monitorn.org     [place] Mental Health Warm Line  Peer to peer support  Monday thru Saturday, 12 pm to 10 pm  358.702.4262 or 0.200.190.4942  Text \"Support\" to 365[address]    National [place] on Mental Illness (JENNI)  758.825.1132 or 1.888.JENNI.HELPS        Mental Health Apps  My3  https://Gentispp.org/    VirtualHopeBox  https://[employer].org/apps/virtual-hope-box/      Additional Information  Today you were seen by a licensed mental health professional through Triage and Transition services, [facility] ([facility])  for a crisis assessment in the Emergency Department at [facility].  It is recommended that you follow up with your established providers (psychiatrist, mental health therapist, and/or primary care doctor - as relevant) as soon as possible. Coordinators from P will be calling you in the next 24-48 hours to ensure " that you have the resources you need.  You can also contact [facility] coordinators directly at [phone]. You may have been scheduled for or offered an appointment with a mental health provider. [facility] maintains an extensive network of licensed behavioral health providers to connect patients with the services they need.  We do not charge providers a fee to participate in our referral network.  We match patients with providers based on a patient's specific needs, insurance coverage, and location.  Our first effort will be to refer you to a provider within your care system, and will utilize providers outside your care system as needed.             Susie Reddy, [facility]  04/28/22 1123

## 2022-04-28 NOTE — ED PROVIDER NOTES
eMERGENCY dEPARTMENT PROGRESS NOTE      Patient was signed out to me by Dr. Gillette at 3:58 AM.      With alcohol intoxication.  Had made suicidal statements.  Plan at time of signout is to reevaluate when sober and have DEC assess    LABS  Pertinent lab results reviewed in chart.  Results for orders placed or performed during the hospital encounter of 04/27/22   Alcohol level blood   Result Value Ref Range    Alcohol, Blood 418 (H) None detected mg/dL   Comprehensive metabolic panel   Result Value Ref Range    Sodium 144 136 - 145 mmol/L    Potassium 2.7 (LL) 3.5 - 5.0 mmol/L    Chloride 109 (H) 98 - 107 mmol/L    Carbon Dioxide (CO2) 20 (L) 22 - 31 mmol/L    Anion Gap 15 5 - 18 mmol/L    Urea Nitrogen 5 (L) 8 - 22 mg/dL    Creatinine 0.67 (L) 0.70 - 1.30 mg/dL    Calcium 7.9 (L) 8.5 - 10.5 mg/dL    Glucose 104 70 - 125 mg/dL    Alkaline Phosphatase 159 (H) 45 - 120 U/L     (H) 0 - 40 U/L    ALT 35 0 - 45 U/L    Protein Total 8.0 6.0 - 8.0 g/dL    Albumin 2.7 (L) 3.5 - 5.0 g/dL    Bilirubin Total 4.4 (H) 0.0 - 1.0 mg/dL    GFR Estimate >90 >60 mL/min/1.73m2   CBC with platelets and differential   Result Value Ref Range    WBC Count 4.8 4.0 - 11.0 10e3/uL    RBC Count 3.53 (L) 4.40 - 5.90 10e6/uL    Hemoglobin 11.4 (L) 13.3 - 17.7 g/dL    Hematocrit 32.7 (L) 40.0 - 53.0 %    MCV 93 78 - 100 fL    MCH 32.3 26.5 - 33.0 pg    MCHC 34.9 31.5 - 36.5 g/dL    RDW 14.9 10.0 - 15.0 %    Platelet Count 36 (LL) 150 - 450 10e3/uL    % Neutrophils 70 %    % Lymphocytes 25 %    % Monocytes 4 %    % Eosinophils 1 %    % Basophils 0 %    % Immature Granulocytes 0 %    NRBCs per 100 WBC 0 <1 /100    Absolute Neutrophils 3.3 1.6 - 8.3 10e3/uL    Absolute Lymphocytes 1.2 0.8 - 5.3 10e3/uL    Absolute Monocytes 0.2 0.0 - 1.3 10e3/uL    Absolute Eosinophils 0.0 0.0 - 0.7 10e3/uL    Absolute Basophils 0.0 0.0 - 0.2 10e3/uL    Absolute Immature Granulocytes 0.0 <=0.4 10e3/uL    Absolute NRBCs 0.0 10e3/uL        EKG      RADIOLOGY  [unfilled]    PROCEDURES       ED COURSE & MEDICAL DECISION MAKING    Pertinent Labs and Imagaing reviewed (see chart for details)    36 year old male with alcohol abuse and some suicidal statements.  Patient slept through the night.  Signed out to Dr. Salazar pending sobriety and evaluation by DEC    3:58 AM Patient signed out to me by Dr. Gillette.    At the conclusion of the encounter I discussed  the results of all of the tests and the disposition.   The questions were answered.  The patient or family acknowledged understanding and was agreeable with the care plan.       FINAL IMPRESSION        1. Alcoholic intoxication without complication (H)    2. Alcohol abuse    3. Suicidal thoughts          CRITICAL CARE      Garcia Pinto MD  [facility] Emergency Department     Garcia Pinto MD  04/28/22 6196

## 2022-04-28 NOTE — ED TRIAGE NOTES
Patient is here with mother, patient is intoxicated, last drink was 1.5 hours PTA, patient has been dx with cirrhosis and during last admission he was told that he is dying, patient is depressed since then and has been drinking, mother brings patient in as he has been expressing want for SI and attempted to purchase a firearm recently, but was denied.  Patient admits to SI.  Martha Sultana RN.......4/27/2022 7:50 PM     Triage Assessment     Row Name 04/27/22 1947       Triage Assessment (Adult)    Airway WDL WDL       Respiratory WDL    Respiratory WDL WDL       Skin Circulation/Temperature WDL    Skin Circulation/Temperature WDL WDL       Cardiac WDL    Cardiac WDL WDL       Peripheral/Neurovascular WDL    Peripheral Neurovascular WDL WDL       Cognitive/Neuro/Behavioral WDL    Cognitive/Neuro/Behavioral WDL X  intoxicated

## 2022-04-28 NOTE — DISCHARGE INSTRUCTIONS
You have a low platelet count and high liver numbers. That is probably because you are developing alcoholic liver damage (cirrhosis) and those numbers will probably get worse if you continue to drink any alcohol. There are OTHER causes of low platelets and sometimes low platelets can be very dangerous, and makes bleeding much more likely. Therefore it is very important that you make sure you're able to follow up with the internal medicine physician so they can do more tests of your blood and make sure your platelet numbers go up if you are able to stop drinking alcohol, so you won't be in danger of bleeding and other complications of low platelets.     Your potassium level was also low I the ER today, possibly relating to your alcohol. You were given potassium in the ER. The primary care medical team will check on your potassium again when they do blood tests.    Our primary care medical scheduling service will contact you to help you set up an appointment to be seen in the next two weeks. If you want to stop drinking alcohol but feel like you are going through medical withdrawal, please go to detox. There are many detox centers, including [facility]. They are voluntary programs that insurance pays for including detox to stop drinking alcohol.    Aftercare Plan  If I am feeling unsafe or I am in a crisis, I will:   Contact my established care providers   Call the National Suicide Prevention Lifeline: 919.298.6237   Go to the nearest emergency room   Call 187     Warning signs that I or other people might notice when a crisis is developing for me:   -Feeling shameful  -Ideation  -Alcohol intake   -Isolating self    Things I am able to do on my own to cope or help me feel better:   -Like to draw  -Listen to music  -Making art with kids    Things that I am able to do with others to cope or help me feel better:   -Spend time with family (mother and father)    -Visit with my children on the  weekends    -Work    Things I can use or do for distraction:   -Practice deep breathing skills     -Use community resources, including hotline numbers, [facility] crisis and support meetings     -Maintain a daily schedule/routine     -Download a meditation juan and spend 15-20 minutes per day mediating/relaxing. Some apps to download include: Calm, Headspace and Insight Timer. All 3 of these apps have free version     -I will commit to 30 minutes of self care daily - this can be as simple as taking a shower, going for a walk, cooking a meal, reading, writing, watching something funny, cleaning your home, or evening looking up affirmations.     -I will practice square breathing when I begin to feel anxious - in breath through the nose for the count of 4 and the first line on the square. Out breath through the mouth for the count of 4 for the second line of the square. Repeat to complete the square. Repeat the square as many times as needed.     -I will use distraction skills of: going for walks, watching TV, spending time outside, calling a friend or family member, creating a playlist, write a hand written letter to a loved one, or listening to a pod cast.      Changes I can make to support my mental health and wellness:   -I will disclose my urges to people I trust, such as: Mother, Father and friend Jon    -I will abstain from all mood altering chemicals not currently prescribed to me, including alcohol    -I will attend scheduled mental health therapy and psychiatric appointments and follow all recommendations     -I will obtain a Rule 25 and follow level of care recommendations    -Come back to the Emergency Department with any new or worsening symptoms     People in my life that I can ask for help:   -Mother  -Father  -Jon    [facility] has a mental health crisis team you can call 24/7: [facility]  [phone]    Other things that are important when I m in crisis:   [place] crisis line @  **CRISIS (**626973) or by texting  [place]  to 844998.      Crisis Intervention: 425.140.3420 or 664-992-0027 (TTY: 602.482.8496). Call anytime for help.     National [place] on Mental Illness (www.[place].estuardo.org): 763.519.6104 or 806-848-6475.      [facility] Phone (free remote counseling): [phone] Web address: https://walkin.org/      Appointment information and/or additional resources available to me:    Scheduled Appointments:    Date: Wednesday, 5/4/2022  Time: 9:00 am - 10:00 am  Provider: Yumiko Harper  Location: [facility], [address] [place], [place] [address]  Phone: [phone]  Type: Teletherapy    Patient Instructions  For all appointments: you will be sent information to fill out the intake paperwork online. Please fill the paperwork prior to your appointment. For telehealth appointments: you will also be sent a zoom link to attend the appointment remotely.      Date: Tuesday, 5/10/2022  Time: 2:00 pm - 3:00 pm  Provider: Liv Patterson MD, MD  Location: [facility], [address] [place] [address]  Phone: [phone]  Type: Substance Use Disorder Assessment    Patient Instructions  THIS IS ONLY A RESERVATION. PATIENT MUST CALL [phone] TO PRE-REGISTER AND CONFIRM APPOINTMENT. Please arrive 15 min early with ID and insurance card. Insurance accepted: [place], [place], commercial insurance. If pt has no insurance, we have a [facility] Navigator available to assist in applying for state insurance. We have 3 locations - intake appts available in [place] or [place], depending on day/time of week. Please call [phone] to verify location and pre-register to confirm appt.    Substance Abuse Resources    To access substance abuse treatment you must have an assessment complete or have an updated assessment within 30 days of starting any program.  Information for this to be completed and to secure funding if you have medical assistance or no insurance can be found through  your [facility]'s chemical health intake line.    If you have private insurance, call the customer service number on the back of your insurance card to find an in-network provider for substance abuse assessment. The ideal provider will be a treatment facility, licensed in the [facility].    For those with Medicaid with the [facility], you will need a Rule 25 assessment: The following are phone numbers for each Psychiatric hospital. Rule 25 assessments must be completed by the county you reside in currently. Once approved for funding you can connect with a facility that does Rule 25 assessment.  [facility] [phone]  [facility] [phone]  [facility] [phone]  [facility] [phone]  [facility] [phone]  [facility] [phone]  [place] - [phone]  [facility] [phone]    [facility] offers mobile assessments for Rule 25. In-person and telehealth is available. Call Kimberly at [phone].     The following facilities offer Rule 25/chemical health assessments:    [facility]  [phone]  Mon-Friday: [address] [facility], [address]  Saturday:  [address], [place], [address]  M-F assessments (7a-1:45p); Saturday assessments (7:45-10:45a)    [facility]  [phone]  [address] [place], [place], [address]  *by appointment only M-W; walk ins available Fridays from 10-2.    [facility]  [phone] (phone consultation available 24/7)  In-person Assessments:  [address] [facility] [address] [address]  [address] [address] [place] [phone] [place] [address]  [address] [place] [address]     [facility]  [phone]  [address]  [place], [place], [address]  *walk in and appointments available by calling    [facility]  [phone] [address] [place], [place], [address]  *by appointment only M-Th    [facility]  [phone]  [address] [place] [address],  [address]  *walk ins available M-F    [facility]  [phone]  [address] [place], [place], [address]  *available by appointments only      [facility]  [phone]  [address] [place], [place], [address]  *available by appointment only      [facility]  [phone]  [facility]  [address] [place], [place], [address]    [facility] ([facility])  [phone]  [address] [facility] [address]  [place], [place], [address]  *Walk in assessments also available M-F starting at 8 am.    [facility]  [phone]  [address] [place], [place], [address]    *available by appointments      [facility]  [phone]  [address] [place], [place], [address]  *walk in assessments available M-F starting at 7 am.    [facility]  [phone]  [facility]  [address] [place], [place], [address]  *Walk in assessments availble M-F starting at 8 am OR [phone]    [facility]  [address] [place] [address]  *Walk in assessments available M-F starting at 8 am    [facility]  [phone]  [address] [place] [address]    [facility]  [phone]  [address] [place], [place], [address]    *available by appointment only    [facility]  [phone]  [address] [place], [place], [address]    [facility]  [phone]  [address] [place], [place], [address]  *available by appointment    [facility]  [phone]  [address] [place], [place], [address]  *walk ins available M-TH from 9-3    [facility]  [phone]  [address] [place], [place], [address]    [facility]  [phone] [address] [place], [place] [address] [address] [address]    [facility] (Does Rule 25 Assessments)  http://www..org/  [phone]  [address] [place] [address], [address], [place] [address]    [facility]  [email or web address].com/our-services/drug-alcohol-treatment  [phone], [address], [place] [address]  [phone], [address], [place], [place] [address] [phone], [address] [facility], [address], [place] [address]  [phone], [address], ([facility]), [place], [place] [id number] [phone], [address] [address] [place] [address]      If you are intoxicated, you may be required to detox at a detox facility before starting treatment. The following are detox facilities that you can self present to. All detox facilities are able to help you complete an assessment/rule 25 prior to discharge if you choose:      [facility]: [address] [place], [place], [address].         [phone]    [facility]: [address] [place], [place], [address]  [phone]    [facility]: [address] [place], [place], [address] [phone]    [facility]: [address] [place], [place], [address] [phone]            Recommendations:  It is recommended that you abstain from all mood altering chemicals. Please contact the sober support hotline (035-078-7622) as needed; phones are answered 24 hours a day, 7 days a week. Other support hotlines include:    [facility] Mental Health & Addiction: [phone]    [facility] Support Line: 1-682.703.2424       Ways to help cope with sobriety:    -- Take prescribed medicines as scheduled  -- Keep follow-up appointments  -- Talk to others about your concerns  -- Get regular exercise    -- Practice deep breathing skills  -- Eat a healthy diet  -- Use community resources, including hotline numbers, [facility] crisis and support meetings  -- Stay sober and avoid places/people/things associated with substance use    --Maintain a daily schedule/routine    --Get at least 7-8 hours of sleep per  night    --Create a list 10--20 healthy activities that you can do that are enjoyable and do not involve substance use    --Create daily goals (approx. 1-4 goals) per day and work to achieve them throughout the day.          Free Resources:    [facility] ([facility])  [facility] connects people seeking recovery to resources that help foster and sustain long-term recovery. Whether you are seeking resources for treatment, transportation, housing, job training, education, health care or other pathways to recovery, [facility] is a great place to start.    Phone: 242.454.7920. www.minnesotaAction Online Publishing (Great listing of all types of recovery and non-recovery related resources)      Alcoholics Anonymous    Phone: -109-ALCOHOL    Website: HTTP://WWW.AA.ORG/      [facility] [place] (002-956-8376 or http://aaClearMRI SolutionsneaHASH.org)    [facility] [place] (260-195-5270 or www.aastpaul.org)       Narcotics Anonymous    Phone: 551.195.8600    Website: www.[employer].Ataxion.        [employer] [address], [place], [place],    Phone: [phone]    Drop-in Hours: Monday-Friday 9-11:30 am. By appointment at other times.    Provides: [facility] is a drop-in center on the [facility] side [facility] that provides a safe space for individuals who are homeless and have a history of chemical use. Sobriety is not a requirement but drugs and alcohol are not allowed on the property.    Services: Non-clients can access drop-in services such as Recovery and Harm Reduction Groups, referrals to case management, community activities, shower facilities, and a pool table. Individuals who are homeless and have chemical health needs may be eligible for enrollment into [facility]'s case management program. Clients and  work together to access benefits, treatment, health care, shelter, and external housing resources.         [facility] Chemical Assessment & Referral Unit    [address] [place] [address],  "[place]    Phone: [phone]    Hours: Monday-Friday 8 am-5 pm.    Provides: Rule 25 assessment and referral for individuals seeking treatment or counseling for chemical dependency. Must be a resident of [facility]. There is no fee for assessment. There is some funding available for treatment programs.      Crisis Lines  Crisis Text Line  Text 740665  You will be connected with a trained live crisis counselor to provide support.    Por espanol, texto  WOODROW a 726930 o texto a 442-AYUDAME en WhatsApp    National Hope Line  1.800.SUICIDE [2812491]      Community Resources  Fast Tracker  Linking people to mental health and substance use disorder resources  TranSiCn.org     [place] Mental Health Warm Line  Peer to peer support  Monday thru Saturday, 12 pm to 10 pm  122.234.7080 or 2.409.690.7682  Text \"Support\" to 81833    National [place] on Mental Illness (JENNI)  352.928.0401 or 1.888.JENNI.HELPS        Mental Health Apps  My3  https://Offerti.org/    VirtualHopeBox  https://[employer]/apps/virtual-hope-box/      Additional Information  Today you were seen by a licensed mental health professional through Triage and Transition services, Behavioral Healthcare Providers ([facility])  for a crisis assessment in the Emergency Department at [facility].  It is recommended that you follow up with your established providers (psychiatrist, mental health therapist, and/or primary care doctor - as relevant) as soon as possible. Coordinators from [facility] will be calling you in the next 24-48 hours to ensure that you have the resources you need.  You can also contact [facility] coordinators directly at [phone]. You may have been scheduled for or offered an appointment with a mental health provider. [facility] maintains an extensive network of licensed behavioral health providers to connect patients with the services they need.  We do not charge providers a fee to participate in our referral network.  We match patients " with providers based on a patient's specific needs, insurance coverage, and location.  Our first effort will be to refer you to a provider within your care system, and will utilize providers outside your care system as needed.

## 2022-04-28 NOTE — ED PROVIDER NOTES
"EMERGENCY DEPARTMENT SIGN OUT NOTE        ED COURSE AND MEDICAL DECISION MAKING  Patient was signed out to me by Dr Garcia Pinto at 6:10 AM.   9:38 AM I met with the patient and discussed further workup.   10:26 AM Received update from DEC .     In brief, Masoud Huddleston is a 36 year old male who initially presented for evaluation of alcohol problem. The patient reports he is coming in because he is an alcoholic and needs help. He reports having \"a lot of issues\" for the past 1.5 weeks. He denies suicidal thoughts, sometimes feels like he should not be surviving because \"others deserve something I don't.\" He reports using alcohol and weed as his outlets. Per the patient's mother, the patient had said something to his parents about wanting to hurt himself. He reports his last drink was approximately 3 hours prior to arrival.     At time of sign out, disposition was pending DEC assessment and clinical sobriety.    ED Course as of 22 1027   Thu 2022   0611 Pt with SI with an expressed plan to shoot self (doesn't have a gun) while intoxicated, with EtOH 418 last night now approaching sobriety, potassium 2.7, repleted, pending DEC assessment   0653 DEC aware of patient in ED and will assess patient at 9:30am per team. Platelet count 36 and AsT 117 likely alcoholic cirrhosis related, will defer to PMD f/up.   0934 DEC assessment underway with clinically sober patient    1011 DEC assessed patient and notes therapy appointment scheduled for , patient sanjiv for safety, has recently relapsed and admitted suicidal statements were specifically attention seeking in setting of upcoming  of family member and family stressors, patient had requested ED visit, DEC spoke with mother of patient for collateral discussion, patient with intention to stop drinking,DEC spoke with  him about his known liver cirrhosis (likely relating to today's labs with prior information that he had cirrhosis also), " scheduled for rule 25 and patient declined detox, given chemical dependency resources. Patient discharged after being provided with extensive anticipatory guidance and given return precautions, importance of PMD follow-up emphasized.          FINAL IMPRESSION    1. Alcoholic intoxication without complication (H)    2. Alcohol abuse    3. Suicidal thoughts    4. Thrombocytopenia (H)        ED MEDS  Medications   potassium chloride (KLOR-CON) Packet 40 mEq (40 mEq Oral Given 4/27/22 2150)       LAB  Labs Ordered and Resulted from Time of ED Arrival to Time of ED Departure   ETHYL ALCOHOL LEVEL - Abnormal       Result Value    Alcohol, Blood 418 (*)    COMPREHENSIVE METABOLIC PANEL - Abnormal    Sodium 144      Potassium 2.7 (*)     Chloride 109 (*)     Carbon Dioxide (CO2) 20 (*)     Anion Gap 15      Urea Nitrogen 5 (*)     Creatinine 0.67 (*)     Calcium 7.9 (*)     Glucose 104      Alkaline Phosphatase 159 (*)      (*)     ALT 35      Protein Total 8.0      Albumin 2.7 (*)     Bilirubin Total 4.4 (*)     GFR Estimate >90     CBC WITH PLATELETS AND DIFFERENTIAL - Abnormal    WBC Count 4.8      RBC Count 3.53 (*)     Hemoglobin 11.4 (*)     Hematocrit 32.7 (*)     MCV 93      MCH 32.3      MCHC 34.9      RDW 14.9      Platelet Count 36 (*)     % Neutrophils 70      % Lymphocytes 25      % Monocytes 4      % Eosinophils 1      % Basophils 0      % Immature Granulocytes 0      NRBCs per 100 WBC 0      Absolute Neutrophils 3.3      Absolute Lymphocytes 1.2      Absolute Monocytes 0.2      Absolute Eosinophils 0.0      Absolute Basophils 0.0      Absolute Immature Granulocytes 0.0      Absolute NRBCs 0.0           RADIOLOGY    No orders to display       DISCHARGE MEDS  New Prescriptions    No medications on file       I, Ting Quinteros, am serving as a scribe to document services personally performed by Dr. Sam MD, based on my observation and the provider's statements to me. I, Dr. Sam MD attest that Ting  Aldair is acting in a scribe capacity, has observed my performance of the services and has documented them in accordance with my direction.      Nella Vargas MD  04/28/22 1020

## 2022-04-28 NOTE — ED NOTES
Report given to Jasmyne LOUISE   The results of your lab work done at today's visit may be shown in [facility], however, that doesn't mean that your Physician has reviewed your results.  You can expect our office to contact you with your results within 7 days.

## 2022-04-28 NOTE — ED NOTES
"Spoke with pt about expectations and what he was feeling.  Pt explained that he has been suicidal most of his life but isnt sure he would ever do it as he has kids and his uncle committed suicide.  Pt did state that he may have said something about buying a gun but didn't actually go attempt to buy one.  Pt states that he said that to get a rise out of his parents.  Pt states that he has been drinking and actually had a 52 days of sobriety that he \"messed up\" a couple weeks ago.  Pt states that he asked his parents to bring him here today for help.    "

## 2022-04-28 NOTE — ED NOTES
Pt currently sleeping with 1:1 in room.  Nothing needed at this time.  Room is safe.  No change in ideation.

## 2022-05-09 NOTE — TELEPHONE ENCOUNTER
"Routing refill request to provider for review/approval because:  Labs out of range:  Multiple    Last Written Prescription Date:  3/14/22  Last Fill Quantity: 60,  # refills: 1   Last office visit provider:  3/22/22     Requested Prescriptions   Pending Prescriptions Disp Refills     furosemide (LASIX) 40 MG tablet [Pharmacy Med Name: Furosemide 40MG TABS] 60 tablet 1     Sig: TAKE 1 TABLET BY MOUTH TWICE A DAY (09:00AM & 06:00PM)       Diuretics (Including Combos) Protocol Failed - 5/9/2022 11:46 AM        Failed - Normal serum creatinine on file in past 12 months     Recent Labs   Lab Test 04/27/22 2058   CR 0.67*              Failed - Normal serum potassium on file in past 12 months     Recent Labs   Lab Test 04/27/22 2058   POTASSIUM 2.7*                    Passed - Blood pressure under 140/90 in past 12 months     BP Readings from Last 3 Encounters:   04/28/22 110/61   04/02/22 122/70   04/02/22 121/69                 Passed - Recent (12 mo) or future (30 days) visit within the authorizing provider's specialty     Patient has had an office visit with the authorizing provider or a provider within the authorizing providers department within the previous 12 mos or has a future within next 30 days. See \"Patient Info\" tab in inbasket, or \"Choose Columns\" in Meds & Orders section of the refill encounter.              Passed - Medication is active on med list        Passed - Patient is age 18 or older        Passed - Normal serum sodium on file in past 12 months     Recent Labs   Lab Test 04/27/22 2058                      Norberto Best RN 05/09/22 11:46 AM    "

## 2022-05-10 NOTE — H&P
[facility] MEDICINE ADMISSION HISTORY AND PHYSICAL     Assessment & Plan      Masoud Huddleston is a 36 year old old male with history of alcoholic liver cirrhosis, variceal bleeding status post banding in March 2022, continued alcohol abuse presented with hematemesis    Hematemesis/upper GI bleed possibly variceal  -Patient immediately taken for EGD, varices banded   -Hemoglobin every 6, transfuse for less than 7 or hemodynamic instability  -PPI  -Appreciate GI consult  Severe alcohol use disorder  Continues to drink vodka 0.75-1 L/day continuous since last discharge March 2022   Blood alcohol level 209 mg/dL  Ativan per [facility] protocol  Alcoholic liver cirrhosis/alcoholic hepatitis with portal hypertension, esophageal varices, ascites, coagulopathy, thrombocytopenia, hypoalbuminemia  History of hepatic encephalopathy  Alcohol cessation counseling  EGD as above  Vitamin K 10 mg daily x3 days  Monitor CBC, INR, LFTs    H/O Parvimonas Micra Bacteremia in 3/22         Clinically Significant Risk Factors Present on Admission           # Hypomagnesemia: Mg = 0.9 mg/dL (Ref range: 1.8 - 2.6 mg/dL) on admission, will replace as needed  # Anion Gap Metabolic Acidosis: AG = 16 mmol/L (Ref range: 5 - 18 mmol/L) on admission, will monitor and treat as appropriate  # Hypoalbuminemia: Albumin = 2.4 g/dL (Ref range: 3.5 - 5.0 g/dL) on admission, will monitor as appropriate   # Coagulation Defect: INR = 2.17 (Ref range: 0.85 - 1.15) and/or PTT = 40 Seconds (Ref range: 22 - 38 Seconds) on admission, will monitor for bleeding  # Thrombocytopenia: Plts = 53 10e3/uL (Ref range: 150 - 450 10e3/uL) on admission, will monitor for bleeding         DVTP: Mechanical Prophylaxis/ Sequential Compression Devices  Code Status: Prior  Disposition: Inpatient   Expected LOS( including midnights in ER): 3+ days   Expected Discharge Destination: Home   Goals for the hospitalization: - stable Hb, alcohol withdrawal           Disposition Plan   Expected Discharge: 05/13/2022   Anticipated discharge location:  Awaiting care coordination huddle         The patient's care was discussed with the Patient and ER MD .    Nayeli Gaspar MD  [facility] [place]  Securely message with the Vocera Web Console (learn more here)  Text page via [facility] Paging/Directory         Chief Complaint hemetemesis    History is obtained from the patient  Electronic medical record and ER MD  HISTORY     Masoud Huddleston is a 36 year old old male with PMH documented above p/w hematemesis starting last night, initial vomitus was like a chocolate milk and then blood.  Prior history of GI bleed including Aiyana-Vizcaino tear, variceal bleed.  In February 202 had EGD with banding of large varices.  Did not follow-up for subsequent EGD.  Continues to drink alcohol.  Vomited 20 times since last night.  Black tarry stools.    EGD 2/26/22    - Large (> 5 mm) esophageal varices with no stigmata                          of recent bleeding. Completely eradicated. Banded.                          - Portal hypertensive gastropathy.                          - Bilious gastric fluid.                          - Erythematous duodenopathy.                          - No specimens collected.     EGD 5/10/22   - Large distal esophageal varices, probable source of                          recent GI bleed. Treated with band ligation.     Past Medical History     Past Medical History:   Diagnosis Date     Acute alcoholic intoxication in alcoholism with complication (H)      Acute metabolic encephalopathy      Alcohol abuse      Alcoholic cirrhosis of liver without ascites (H)      Alcoholic ketoacidosis 09/19/2019     Bacteremia 03/04/2022 2/2522- parvimonas micra; ? Significance  3/3/22: Dicussed with ID and could be contaminant  Metronidazole started       Chronic acquired lymphedema      Cirrhosis of liver (H)      Depression      ED (erectile dysfunction)       Elevated liver enzymes 04/27/2018     Esophageal varices without bleeding (H)      GI (gastrointestinal bleed)     hematemesis     Hematemesis 04/25/2018    Added automatically from request for surgery [id number]     History of transfusion      Hypokalemia 09/19/2019     Hypomagnesemia 04/27/2018     Liver disease      Aiyana-Vizcaino tear 04/27/2018     Nausea with vomiting      Neuropathy      PONV (postoperative nausea and vomiting)      Right patella fracture      Seizures (H)     withdrawal     Sleep apnea      Substance abuse (H)      Thrombocytopenia (H)        Surgical History     Past Surgical History:   Procedure Laterality Date     ESOPHAGOSCOPY, GASTROSCOPY, DUODENOSCOPY (EGD), COMBINED N/A 4/25/2018    Procedure: ESOPHAGOGASTRODUODENOSCOPY (EGD);  Surgeon: Lora Valencia MD;  Location: [facility];  Service:      ESOPHAGOSCOPY, GASTROSCOPY, DUODENOSCOPY (EGD), COMBINED N/A 2/26/2022    Procedure: ESOPHAGOGASTRODUODENOSCOPY (EGD) WITH ESOPHAGEAL VARICEAL BANDING;  Surgeon: Moises Michael DO;  Location: [facility] OR     HC OPEN RX PATELLA FX Right 4/28/2018    Procedure: OPEN REDUCTION INTERNAL FIXATION, FRACTURE, PATELLA;  Surgeon: Bertram Beltran MD;  Location: [facility] OR;  Service: Orthopedics     KNEE SURGERY Right 2018     NE ESOPHAGOGASTRODUODENOSCOPY TRANSORAL DIAGNOSTIC N/A 5/3/2020    Procedure: ESOPHAGOGASTRODUODENOSCOPY (EGD);  Surgeon: Ricardo Barbosa MD;  Location: [facility] OR;  Service: Gastroenterology      PARACENTESIS  1/16/2020      PARACENTESIS  1/23/2020     Family History    Reviewed, and History reviewed. No pertinent family history.     Social History      Social History     Tobacco Use     Smoking status: Current Every Day Smoker     Packs/day: 1.00     Types: Cigarettes     Smokeless tobacco: Never Used   Substance Use Topics     Alcohol use: Yes     Comment: a liter of Vodka everyday     Drug use: Yes     Types: Marijuana     Allergies   No Known  Allergies  Prior to Admission Medications      Prior to Admission Medications   Prescriptions Last Dose Informant Patient Reported? Taking?   QUEtiapine (SEROQUEL) 100 MG tablet More than a month at Unknown time  Yes Yes   Sig: Take 100 mg by mouth At Bedtime   eplerenone (INSPRA) 25 MG tablet More than a month at Unknown time  No Yes   Sig: Take 1 tablet (25 mg) by mouth daily   furosemide (LASIX) 40 MG tablet More than a month at Unknown time  No Yes   Sig: TAKE 1 TABLET BY MOUTH TWICE A DAY (09:00AM & 06:00PM)   gabapentin (NEURONTIN) 100 MG capsule More than a month at Unknown time  No Yes   Sig: Take 1 capsule (100 mg) by mouth 2 times daily   melatonin 5 MG tablet 5/10/2022 at 0600  No Yes   Sig: Take 1 tablet (5 mg) by mouth every evening as needed for sleep   mirtazapine (REMERON) 30 MG tablet More than a month at Unknown time  No Yes   Sig: Take 1 tablet (30 mg) by mouth At Bedtime   pantoprazole (PROTONIX) 40 MG EC tablet More than a month at Unknown time  No Yes   Sig: Take 1 tablet (40 mg) by mouth 2 times daily (before meals)   rifaximin (XIFAXAN) 550 MG TABS tablet More than a month at Unknown time  No Yes   Sig: Take 1 tablet (550 mg) by mouth 2 times daily   thiamine (B-1) 100 MG tablet More than a month at Unknown time  No Yes   Sig: Take 1 tablet (100 mg) by mouth daily   traZODone (DESYREL) 50 MG tablet 5/10/2022 at 0600  No Yes   Sig: Take 1 tablet (50 mg) by mouth At Bedtime      Facility-Administered Medications: None      Review of Systems     A 12 point comprehensive review of systems was negative except as noted above in HPI.    PHYSICAL EXAMINATION       Vitals      Temp:  [97.7  F (36.5  C)-98.3  F (36.8  C)] 97.7  F (36.5  C)  Pulse:  [] 119  Resp:  [15-28] 25  BP: ()/(46-73) 112/51  SpO2:  [95 %-99 %] 95 %  Wt Readings from Last 2 Encounters:   05/10/22 72.6 kg (160 lb)   04/27/22 72.6 kg (160 lb)         Examination     General Appearance:  Alert, cooperative, no distress,  appears stated age   Head:  Normocephalic, without obvious abnormality, atraumatic   Eyes:  PERRL,+Icterus    Nose: Nares normal, septum midline, mucosa normal, no drainage   Throat: Lips, mucosa, and tongue normal; teeth and gums normal   Neck: Supple, symmetrical, trachea midline, no adenopathy, thyroid: not enlarged, symmetric, no carotid bruit or JVD   Back:   Symmetric, no curvature, ROM normal, no CVA tenderness   Lungs:   Clear to auscultation bilaterally, respirations unlabored   Chest Wall:  No tenderness or deformity   Heart:  Regular rate and rhythm, S1, S2 normal,no murmur, rub or gallop   Abdomen:   Distended, epigastric tenderness, BS+    Extremities: Extremities normal, atraumatic, no cyanosis or edema   Skin: Spider angiomata, jaundiced    Neurologic:   Alert and oriented X 3, Moves all 4 extremities + tremors      Pertinent Lab and Radiology from this Visit      Personally reviewed.  Recent Labs   Lab 05/10/22  0709   WBC 7.0   HGB 9.9*   MCV 93   PLT 53*   INR 2.17*      POTASSIUM 3.2*   CHLORIDE 102   CO2 23   BUN 13   CR 0.66*   ANIONGAP 16   FÉLIX 7.8*   *   ALBUMIN 2.4*   PROTTOTAL 7.1   BILITOTAL 6.2*   ALKPHOS 148*   ALT 31   *   LIPASE 56*     No results found for this or any previous visit (from the past 24 hour(s)).      Cardiology      EKG Results: personally reviewed.  and NSR, normal   Echocardiogram   [unfilled]         [facility] MD Chasity

## 2022-05-10 NOTE — ED PROVIDER NOTES
EMERGENCY DEPARTMENT ENCOUNTER      NAME: Masoud Huddleston  AGE: 36 year old male  YOB: 1985  MRN: [id number]  EVALUATION DATE & TIME: 5/10/2022  6:57 AM    PCP: System, Provider Not In    ED PROVIDER: Mando Encarnacion M.D.      Chief Complaint   Patient presents with     Hematemesis         FINAL IMPRESSION:  1. Hematemesis with nausea    2. Secondary esophageal varices with bleeding (H)          ED COURSE & MEDICAL DECISION MAKING:    Pertinent Labs & Imaging studies reviewed. (See chart for details)  36 year old male presents to the Emergency Department for evaluation of hematemesis, black stools. Patient appears non toxic with stable vitals signs, patient appears ill, is tachycardic, no fever or increased work of breathing.  Patient appears jaundice, abdomen is benign and lungs are clear.  Review the medical record shows history of alcohol use, liver disease, esophageal varices.  Concern for bleeding from esophageal varices, upper GI bleed, anemia.  We did obtain screening labs, type and cross.  Patient was started on Protonix, octreotide, antiemetic, IV fluids and ceftriaxone.  Laboratory studies returned, concerning for low magnesium we did replete here, hemoglobin 9.9.  Abdomen otherwise benign with no focal tenderness to suggest appendicitis, diverticulitis, cholecystitis, pancreatitis, obstruction, perforation.  Reports black stools and again concern for upper GI bleed, no bright red blood per bowel movement by report, nothing to suggest lower GI bleed.  I did discuss patient case with gastroenterology on-call, who agrees that the patient will need to be taken to the OR for intubation, emergent EGD and possible banding of varices.  They agree with the care plan of medications here and will arrange transfer to the OR.  Patient will be need to be admitted following this to the ICU for continued infusions and monitoring.  I discussed all these findings recommendations including EGD and admission  to the patient who agrees with care plan.  Discussed care plan to admitting service.      7:13 AM I introduced myself to the patient, performed my physical exam, and discussed plan for ED workup.   8:02 AM Critical Lab Result: Magnesium 0.9  8:35 AM Spoke with Dr. Barbosa [facility]. Patient will likely need intubation and EGD.  8:41 AM Updated the patient.  9:39 AM Call placed to admitting physician.  10:14 AM I spoke with the hospitalist, Dr. Gaspar. We discussed the patient's case, and they agree to admit the patient.     At the conclusion of the encounter I discussed the results of all of the tests and the disposition. The questions were answered and return precautions provided. The patient or family acknowledged understanding and was agreeable with the care plan.         MEDICATIONS GIVEN IN THE EMERGENCY:  Medications   0.9% sodium chloride BOLUS (0 mLs Intravenous Stopped 5/10/22 0821)     Followed by   sodium chloride 0.9% infusion ( Intravenous New Bag 5/10/22 0822)   pantoprazole (PROTONIX) 80 mg in sodium chloride 0.9 % 100 mL infusion (8 mg/hr Intravenous New Bag 5/10/22 0818)   lidocaine 1 % 0.1-1 mL (has no administration in time range)   lidocaine (LMX4) cream (has no administration in time range)   sodium chloride (PF) 0.9% PF flush 3 mL (has no administration in time range)   sodium chloride (PF) 0.9% PF flush 3 mL (has no administration in time range)   lidocaine 1 % 0.1-1 mL (has no administration in time range)   lidocaine (LMX4) cream (has no administration in time range)   sodium chloride (PF) 0.9% PF flush 3 mL (has no administration in time range)   sodium chloride (PF) 0.9% PF flush 3 mL (has no administration in time range)   lactated ringers infusion (has no administration in time range)   phytonadione 5 mg in sodium chloride 0.9 % 50 mL intermittent infusion ( Intravenous Automatically Held 5/12/22 0800)   lactated ringers infusion (has no administration in time range)   fentaNYL (PF)  (SUBLIMAZE) injection 25 mcg (has no administration in time range)   fentaNYL (PF) (SUBLIMAZE) injection 25 mcg (has no administration in time range)   ketorolac (TORADOL) injection 15 mg (has no administration in time range)   haloperidol lactate (HALDOL) injection 1 mg (has no administration in time range)   meperidine (DEMEROL) injection 12.5 mg (has no administration in time range)   ondansetron (ZOFRAN) injection 4 mg (has no administration in time range)   eplerenone (INSPRA) tablet 25 mg (has no administration in time range)   furosemide (LASIX) tablet 40 mg (has no administration in time range)   mirtazapine (REMERON) tablet 30 mg (has no administration in time range)   QUEtiapine (SEROquel) tablet 100 mg (has no administration in time range)   rifaximin (XIFAXAN) tablet 550 mg (has no administration in time range)   thiamine (B-1) tablet 100 mg (has no administration in time range)   traZODone (DESYREL) tablet 50 mg (has no administration in time range)   glucose gel 15-30 g (has no administration in time range)     Or   dextrose 50 % injection 25-50 mL (has no administration in time range)     Or   glucagon injection 1 mg (has no administration in time range)   octreotide (sandoSTATIN) injection 50 mcg (has no administration in time range)   octreotide (sandoSTATIN) 1,250 mcg in D5W 250 mL (has no administration in time range)   ondansetron (ZOFRAN ODT) ODT tab 4 mg (has no administration in time range)     Or   ondansetron (ZOFRAN) injection 4 mg (has no administration in time range)   flumazenil (ROMAZICON) injection 0.2 mg (has no administration in time range)   folic acid injection 1 mg (has no administration in time range)   folic acid injection 1 mg (has no administration in time range)   folic acid (FOLVITE) tablet 1 mg (has no administration in time range)   thiamine (B-1) 200 mg in sodium chloride 0.9 % 50 mL intermittent infusion (has no administration in time range)   thiamine (B-1) tablet 100  mg (has no administration in time range)   thiamine (B-1) tablet 100 mg (has no administration in time range)   multivitamin w/minerals (THERA-VIT-M) tablet 1 tablet (has no administration in time range)   metoprolol (LOPRESSOR) injection 5 mg (has no administration in time range)   cloNIDine (CATAPRES) tablet 0.1 mg (has no administration in time range)   haloperidol lactate (HALDOL) injection 2.5-5 mg (has no administration in time range)   gabapentin (NEURONTIN) tablet 1,200 mg (has no administration in time range)   gabapentin (NEURONTIN) capsule 900 mg (has no administration in time range)   gabapentin (NEURONTIN) capsule 600 mg (has no administration in time range)   gabapentin (NEURONTIN) capsule 300 mg (has no administration in time range)   gabapentin (NEURONTIN) capsule 100 mg (has no administration in time range)   LORazepam (ATIVAN) tablet 1-2 mg (has no administration in time range)     Or   LORazepam (ATIVAN) injection 1-2 mg (has no administration in time range)   cefTRIAXone (ROCEPHIN) 1 g vial to attach to  mL bag for ADULTS or NS 50 mL bag for PEDS (has no administration in time range)   cefTRIAXone (ROCEPHIN) 1 g vial to attach to  mL bag for ADULTS or NS 50 mL bag for PEDS (0 g Intravenous Stopped 5/10/22 0807)   pantoprazole (PROTONIX) IV push injection 80 mg (80 mg Intravenous Given 5/10/22 0737)   octreotide (sandoSTATIN) injection 50 mcg (50 mcg Intravenous Given 5/10/22 0752)   metoclopramide (REGLAN) injection 10 mg (10 mg Intravenous Given 5/10/22 0736)   magnesium sulfate 2 g in water intermittent infusion (0 g Intravenous Stopped 5/10/22 0905)       NEW PRESCRIPTIONS STARTED AT TODAY'S ER VISIT  Current Discharge Medication List               =================================================================    HPI    Patient information was obtained from: Patient    Use of Intrepreter: N/A       Masoud Huddleston is a 36 year old male with a history of bleeding esophageal  "varices, alcoholic hepatitis with ascites, hematemesis, hypomagnesemia, and hypokalemia who presents to the ED for evaluation of vomiting.    The patient reports he has not eaten for three days. He relapsed on alcohol about a month ago. Since last night he has been nauseated, and had multiple episodes of hematemesis and black stools with associated abdominal pain. He states he has vomited \"a gallon of dark red blood.\" He feels very dehydrated and reports he is unable to keep anything down.    He has a known history of esophageal varices requiring banding. He is not currently anticoagulated. He reports he is not compliant with his medications.    The patient reports drinking approximately 750 ml of vodka yesterday, last drink was around 2130 last night.    He otherwise denies chest pain, shortness of breath, new cough, hemoptysis, and any other complaints at this time.    He endorses tobacco, alcohol, and marijuana use.    Per chart review, the patient was admitted at [facility] from 2/25/22 to 3/13/22 (16 days) with esophageal varices, portal hypertension, and cirrhosis. On 2/26/22 the patient had an EGD with esophageal varices banded x 3. Repeat EGD scheduled for 3/26. No repeat EGD documented in chart.    REVIEW OF SYSTEMS   Constitutional:  Denies fever, chills  Respiratory:  Denies productive cough, hemoptysis, or increased work of breathing  Cardiovascular:  Denies chest pain, palpitations  GI:  Denies or change in bladder habits. Endorses nausea, abdominal pain, vomiting (hematemesis), melena.  Musculoskeletal:  Denies any new muscle/joint swelling  Skin:  Denies rash   Neurologic:  Denies focal weakness  All systems negative except as marked.     PAST MEDICAL HISTORY:  Past Medical History:   Diagnosis Date     Acute alcoholic intoxication in alcoholism with complication (H)      Acute metabolic encephalopathy      Alcohol abuse      Alcoholic cirrhosis of liver without ascites (H)      Alcoholic " ketoacidosis 09/19/2019     Bacteremia 03/04/2022 2/2522- parvimonas micra; ? Significance  3/3/22: Dicussed with ID and could be contaminant  Metronidazole started       Chronic acquired lymphedema      Cirrhosis of liver (H)      Depression      ED (erectile dysfunction)      Elevated liver enzymes 04/27/2018     Esophageal varices without bleeding (H)      GI (gastrointestinal bleed)     hematemesis     Hematemesis 04/25/2018    Added automatically from request for surgery [id number]     History of transfusion      Hypokalemia 09/19/2019     Hypomagnesemia 04/27/2018     Liver disease      Aiyana-Vizcaino tear 04/27/2018     Nausea with vomiting      Neuropathy      PONV (postoperative nausea and vomiting)      Right patella fracture      Seizures (H)     withdrawal     Sleep apnea      Substance abuse (H)      Thrombocytopenia (H)        PAST SURGICAL HISTORY:  Past Surgical History:   Procedure Laterality Date     ESOPHAGOSCOPY, GASTROSCOPY, DUODENOSCOPY (EGD), COMBINED N/A 4/25/2018    Procedure: ESOPHAGOGASTRODUODENOSCOPY (EGD);  Surgeon: Lora Valencia MD;  Location: [facility];  Service:      ESOPHAGOSCOPY, GASTROSCOPY, DUODENOSCOPY (EGD), COMBINED N/A 2/26/2022    Procedure: ESOPHAGOGASTRODUODENOSCOPY (EGD) WITH ESOPHAGEAL VARICEAL BANDING;  Surgeon: Moises Michael DO;  Location: [facility]     HC OPEN RX PATELLA FX Right 4/28/2018    Procedure: OPEN REDUCTION INTERNAL FIXATION, FRACTURE, PATELLA;  Surgeon: Bertram Beltran MD;  Location: [facility];  Service: Orthopedics     KNEE SURGERY Right 2018     IL ESOPHAGOGASTRODUODENOSCOPY TRANSORAL DIAGNOSTIC N/A 5/3/2020    Procedure: ESOPHAGOGASTRODUODENOSCOPY (EGD);  Surgeon: Ricardo Barbosa MD;  Location: [facility];  Service: Gastroenterology     US PARACENTESIS  1/16/2020      PARACENTESIS  1/23/2020         CURRENT MEDICATIONS:    Prior to Admission medications    Medication Sig Start Date End Date Taking? Authorizing  Provider   cephALEXin (KEFLEX) 500 MG capsule Take 1 capsule (500 mg) by mouth 2 times daily 4/2/22   Fern Sheppard MD   chlorhexidine (PERIDEX) 0.12 % solution Swish and spit 15 mLs in mouth 4 times daily as needed (canker sores) 3/13/22   Steve Del Rio MD   dexamethasone (DECADRON) 0.5 MG/5ML elixir Take 20 mLs (2 mg) by mouth 3 times daily 4/2/22   Fern Sheppard MD   doxycycline hyclate (VIBRAMYCIN) 100 MG capsule TAKE 1 CAPSULE BY MOUTH TWICE A DAY FOR 10 DAYS 4/11/22   Kelby Dumont MD   eplerenone (INSPRA) 25 MG tablet Take 1 tablet (25 mg) by mouth daily 3/14/22   Steve Del Rio MD   furosemide (LASIX) 40 MG tablet TAKE 1 TABLET BY MOUTH TWICE A DAY (09:00AM & 06:00PM) 5/9/22   Kelby Dumont MD   gabapentin (NEURONTIN) 100 MG capsule Take 1 capsule (100 mg) by mouth 2 times daily 3/13/22   Steve Del Rio MD   melatonin 5 MG tablet Take 1 tablet (5 mg) by mouth every evening as needed for sleep 3/13/22   Steve Del Rio MD   mirtazapine (REMERON) 30 MG tablet Take 1 tablet (30 mg) by mouth At Bedtime 3/22/22   Kelby Dumont MD   pantoprazole (PROTONIX) 40 MG EC tablet Take 1 tablet (40 mg) by mouth 2 times daily (before meals) 3/22/22   Kelby Dumont MD   rifaximin (XIFAXAN) 550 MG TABS tablet Take 1 tablet (550 mg) by mouth 2 times daily 3/22/22   Kelby Dumont MD   thiamine (B-1) 100 MG tablet Take 1 tablet (100 mg) by mouth daily 3/22/22   Kelby Dumont MD   traZODone (DESYREL) 50 MG tablet Take 1 tablet (50 mg) by mouth At Bedtime 3/22/22   Kelby Dumont MD        ALLERGIES:  No Known Allergies    FAMILY HISTORY:  History reviewed. No pertinent family history.    SOCIAL HISTORY:   Social History     Socioeconomic History     Marital status: Single   Tobacco Use     Smoking status: Current Every Day Smoker     Packs/day: 1.00     Types: Cigarettes     Smokeless tobacco: Never Used   Substance and Sexual Activity     Alcohol use: Yes     Comment: a liter of Vodka everyday      "Drug use: Yes     Types: Marijuana     Sexual activity: Not Currently       VITALS:  Patient Vitals for the past 24 hrs:   BP Temp Temp src Pulse Resp SpO2 Height Weight   05/10/22 1200 109/66 98.4  F (36.9  C) -- 85 16 100 % -- --   05/10/22 1150 -- 98  F (36.7  C) -- -- -- 99 % -- --   05/10/22 1140 -- -- -- -- -- 99 % -- --   05/10/22 1130 -- 97.7  F (36.5  C) -- -- -- 99 % -- --   05/10/22 1120 108/64 -- -- 102 14 100 % -- --   05/10/22 1118 109/64 98  F (36.7  C) Temporal 95 15 100 % -- --   05/10/22 0930 112/51 -- -- 119 25 95 % -- --   05/10/22 0919 -- 97.7  F (36.5  C) Oral -- -- -- -- --   05/10/22 0915 98/73 -- -- 109 -- -- -- --   05/10/22 0903 98/50 -- -- 105 21 97 % -- --   05/10/22 0845 100/50 -- -- 106 27 -- -- --   05/10/22 0830 93/46 -- -- 96 18 97 % -- --   05/10/22 0815 95/52 -- -- 108 28 99 % -- --   05/10/22 0800 119/59 -- -- 100 25 97 % -- --   05/10/22 0745 104/58 -- -- 118 -- 99 % -- --   05/10/22 0730 99/62 -- -- 114 15 -- -- --   05/10/22 0715 107/57 -- -- 118 -- 99 % -- --   05/10/22 0655 97/62 98.3  F (36.8  C) Oral (!) 138 20 98 % 1.803 m (5' 11\") 72.6 kg (160 lb)        PHYSICAL EXAM    Constitutional:  Awake, alert, in no apparent distress  HENT:  Normocephalic, Atraumatic. Bilateral external ears normal. Oropharynx moist. Nose normal. Neck- Normal range of motion with no guarding, No midline cervical tenderness, Supple, No stridor.   Eyes:  PERRL, EOMI with no signs of entrapment, Scleral icterus, No discharge.   Respiratory:  Normal breath sounds, No respiratory distress, No wheezing.    Cardiovascular:  Tachycardic, Normal rhythm, No appreciable rubs or gallops.   GI:  Soft, No tenderness, No distension, No palpable masses  Musculoskeletal:  Intact distal pulses, No edema. Good range of motion in all major joints. No tenderness to palpation or major deformities noted.  Integument:  Warm, Dry, Slight jaundice.   Neurologic:  Alert & oriented, Normal motor function, Normal sensory " function, No focal deficits noted.   Psychiatric:  Affect normal, Judgment normal, Mood normal.     LAB:  All pertinent labs reviewed and interpreted.  Results for orders placed or performed during the hospital encounter of 05/10/22   Result Value Ref Range    Upper GI Endoscopy       [facility]  [address] [place] [address]  _______________________________________________________________________________  Patient Name: Masoud Huddleston         Procedure Date: 5/10/2022 10:10 AM  MRN: [id number]                       Account Number: [id number]  YOB: 1985               Admit Type: Inpatient  Age: 36                               Room: [place] [address]  Note Status: Finalized                Attending MD: CONSUELO COELLO MD,   Total Sedation Time:                  Instrument Name: EGD Scope 134  _______________________________________________________________________________     Procedure:             Upper GI endoscopy  Indications:           Hematemesis  Providers:             CONSUELO COELLO MD  Referring MD:            Medicines:             General Anesthesia  Complications:         No immediate complications. Estimated blood loss:                          Minimal.  _______________________________________________________________ ________________  Procedure:             Pre-Anesthesia Assessment:                         - Prior to the procedure, a History and Physical was                          performed, and patient medications and allergies were                          reviewed. The patient is competent. The risks and                          benefits of the procedure and the sedation options and                          risks were discussed with the patient. All questions                          were answered and informed consent was obtained.                          Patient identification and proposed procedure were                          verified by the physician in  the pre-procedure area.                          Mental Status Examination: alert and oriented. Airway                          Examination: normal oropharyngeal airway and neck                          mobility. Respiratory Examination: clear to                          auscultation. CV Examination: normal. Prophylactic                           Antibiotics: The patient does not require prophylactic                          antibiotics. Prior Anticoagulants: The patient has                          taken no anticoagulant or antiplatelet agents. ASA                          Grade Assessment: IV - A patient with severe systemic                          disease that is a constant threat to life. After                          reviewing the risks and benefits, the patient was                          deemed in satisfactory condition to undergo the                          procedure. The anesthesia plan was to use general                          anesthesia. Immediately prior to administration of                          medications, the patient was re-assessed for adequacy                          to receive sedatives. The heart rate, respiratory                          rate, oxygen saturations, blood pressure, adequacy of                          pulmonary ventilation, and response to care were                           monitored throughout the procedure. The physical                          status of the patient was re-assessed after the                          procedure.                         After obtaining informed consent, the endoscope was                          passed under direct vision. Throughout the procedure,                          the patient's blood pressure, pulse, and oxygen                          saturations were monitored continuously. The endoscope                          was introduced through the mouth, and advanced to the                          third part of duodenum. The upper GI  endoscopy was                          accomplished without difficulty. The patient tolerated                          the procedure well.                                                                                   Findings:       Large varices were found in the distal esophagus (from 40 cm to 44 cm        from the incisors), two columns, one with red babita markings. No active        b leeding seen. G-E junction at 44 cm from the incisors. Three bands were        successfully placed with complete eradication, resulting in deflation of        varices. There was no bleeding at the end of the procedure.       Clotted blood was found in the proximal gastric body, obscuring portions        of the proximal stomach.       The examined duodenum was normal.                                                                                   Moderate Sedation:       See Anesthesia Note  Impression:            - Large distal esophageal varices, probable source of                          recent GI bleed. Treated with band ligation.  Recommendation:        - Admit the patient to ICU for ongoing care.                         - Continue octreotide for three days total                         - Continue ceftriaxone (abx total for five days)                         - ICU admission with close hemodynamic monitoring                         - NPO for now                         - Trend ME LD labs                         - GI following closely. Please call with any change in                          status, questions or concerns otherwise                                                                                     Consuelo Coello MD  __________________  CONSUELO COELLO MD,   5/10/2022 11:22:43 AM  I was physically present for the entire viewing portion of the exam.  __________________________  Signature of teaching physician  Reece/Fam COELLO MD  Number of Addenda: 0    Note Initiated On: 5/10/2022 10:10 AM  Scope In: 10:44:43  AM  Scope Out: 10:59:27 AM     Result Value Ref Range    INR 2.17 (H) 0.85 - 1.15   Basic metabolic panel   Result Value Ref Range    Sodium 141 136 - 145 mmol/L    Potassium 3.2 (L) 3.5 - 5.0 mmol/L    Chloride 102 98 - 107 mmol/L    Carbon Dioxide (CO2) 23 22 - 31 mmol/L    Anion Gap 16 5 - 18 mmol/L    Urea Nitrogen 13 8 - 22 mg/dL    Creatinine 0.66 (L) 0.70 - 1.30 mg/dL    Calcium 7.8 (L) 8.5 - 10.5 mg/dL    Glucose 137 (H) 70 - 125 mg/dL    GFR Estimate >90 >60 mL/min/1.73m2   CBC with platelets and differential   Result Value Ref Range    WBC Count 7.0 4.0 - 11.0 10e3/uL    RBC Count 3.09 (L) 4.40 - 5.90 10e6/uL    Hemoglobin 9.9 (L) 13.3 - 17.7 g/dL    Hematocrit 28.6 (L) 40.0 - 53.0 %    MCV 93 78 - 100 fL    MCH 32.0 26.5 - 33.0 pg    MCHC 34.6 31.5 - 36.5 g/dL    RDW 14.7 10.0 - 15.0 %    Platelet Count 53 (L) 150 - 450 10e3/uL    % Neutrophils 68 %    % Lymphocytes 23 %    % Monocytes 8 %    % Eosinophils 0 %    % Basophils 0 %    % Immature Granulocytes 1 %    NRBCs per 100 WBC 0 <1 /100    Absolute Neutrophils 4.8 1.6 - 8.3 10e3/uL    Absolute Lymphocytes 1.7 0.8 - 5.3 10e3/uL    Absolute Monocytes 0.6 0.0 - 1.3 10e3/uL    Absolute Eosinophils 0.0 0.0 - 0.7 10e3/uL    Absolute Basophils 0.0 0.0 - 0.2 10e3/uL    Absolute Immature Granulocytes 0.1 <=0.4 10e3/uL    Absolute NRBCs 0.0 10e3/uL   Extra Red Top Tube   Result Value Ref Range    Hold Specimen [facility]    Extra Green Top (Lithium Heparin) ON ICE   Result Value Ref Range    Hold Specimen [facility]    Hepatic function panel   Result Value Ref Range    Bilirubin Total 6.2 (H) 0.0 - 1.0 mg/dL    Bilirubin Direct 2.8 (H) <=0.5 mg/dL    Protein Total 7.1 6.0 - 8.0 g/dL    Albumin 2.4 (L) 3.5 - 5.0 g/dL    Alkaline Phosphatase 148 (H) 45 - 120 U/L     (H) 0 - 40 U/L    ALT 31 0 - 45 U/L   Result Value Ref Range    Lipase 56 (H) 0 - 52 U/L   Result Value Ref Range    Magnesium 0.9 (LL) 1.8 - 2.6 mg/dL   Ethyl Alcohol Level   Result Value Ref Range     Alcohol, Blood 209 (H) None detected mg/dL   Partial thromboplastin time   Result Value Ref Range    aPTT 40 (H) 22 - 38 Seconds   Asymptomatic COVID-19 Virus (Coronavirus) by PCR Nasopharyngeal    Specimen: Nasopharyngeal; Swab   Result Value Ref Range    SARS CoV2 PCR Negative Negative   Adult Type and Screen   Result Value Ref Range    ABO/RH(D) A NEG     Antibody Screen Negative Negative    SPECIMEN EXPIRATION DATE [id number]        RADIOLOGY:  No orders to display          EKG:    Sinus rhythm, no specific ST acute ischemic changes, no concerning dysrhythmias or interval prolongation, when compared to ECG of April 27, 2022, no specific ST acute ischemic change appreciated  I have independently reviewed and interpreted the EKG(s) documented above.    PROCEDURES    CRITICAL CARE NOTE:  Indication: Upper GI bleed, tachycardia  Interventions: There was concern for endorgan dysfunction and cardiovascular collapse as the patient was tachycardic with active bleeding, had 2 episodes of hematemesis here in the emergency department.  Patient was given aggressive IV fluids, Protonix, octreotide, antinausea medication and ceftriaxone.  Discussed patient case with GI who will take the patient urgently to the OR for EGD.  Patient will be admitted to the ICU.  Discussed all these findings with the patient who is in agreement.  Treatments: IV fluids, octreotide, Protonix, Zofran, ceftriaxone, GI consultation  Critical Care time excluding procedures: 60 minutes      I, Nick De La Vega, am serving as a scribe to document services personally performed by Mando Encarnacion MD, based on my observation and the provider's statements to me. I, Mando Encarnacion MD attest that Nick De La Vega is acting in a scribe capacity, has observed my performance of the services and has documented them in accordance with my direction.    Mando Encarnacion M.D.  Emergency Medicine  [facility]  EMERGENCY ROOM  [address] [place] [id number]  [phone]  Dept: [phone]       Mando Encarnacion MD  05/10/22 1240

## 2022-05-10 NOTE — CONSULTS
"GI CONSULT NOTE      Name: Masoud Huddleston    Medical Record #: [id number]    YOB: 1985    Date: 5/10/2022    CC: We were consulted by [facility] ER provider to see Masoud Huddleston in regards to hematemesis.     HPI: This is a 36 year old male with a history of alcoholic cirrhosis.  He has been seen by us for multiple inpatient consultations.  Last seen February 2021 with upper GI bleed.  Underwent EGD with banding of large varices.  Was to follow-up in 1 month for repeat EGD but he has yet to do so.  Continues to use alcohol; last drink was yesterday.  Drinks 0.75L Vodka per day.  +BAL on admit.  Onset of hematemesis yesterday evening.  Estimates vomiting about 20 times in the past 12 hours.  Emesis initially \"looked like chocolate milk, but then splattered on the toilet and could tell it was blood.\"  Reports black stools, also says this has been an intermittent issue for him.  Has abdominal soreness which she attributes to the retching.  Current labs show Hgb 9.9, MCV 93, Plt 53K, INR 2.17, BUN 13, K 2.7 on admit and Mag 0.9.  LFTs with total bilirubin 6.2, direct 2.8, , ALT 31, alk phos 148.    EGD February 26 showed 3 columns of large varices in the lower third of the esophagus. Three bands were placed with complete eradication.  He had moderate portal hypertensive gastropathy; had erythema noted in duodenal bulb.    He has only seen his once in liver clinic, this was back in May 2020.    Past medical history  Past Medical History:   Diagnosis Date     Acute alcoholic intoxication in alcoholism with complication (H)      Acute metabolic encephalopathy      Alcohol abuse      Alcoholic cirrhosis of liver without ascites (H)      Alcoholic ketoacidosis 9/19/2019     Bacteremia 3/4/2022    2/2522- parvimonas micra; ? Significance  3/3/22: Dicussed with ID and could be contaminant  Metronidazole started       Chronic acquired lymphedema      Cirrhosis of liver (H)      Depression      ED (erectile " dysfunction)      Elevated liver enzymes 4/27/2018     Esophageal varices without bleeding (H)      GI (gastrointestinal bleed)     hematemesis     Hematemesis 4/25/2018    Added automatically from request for surgery [id number]     History of transfusion      Hypokalemia 9/19/2019     Hypomagnesemia 4/27/2018     Liver disease      Aiyana-Vizcaino tear 4/27/2018     Nausea with vomiting      Neuropathy      Right patella fracture      Seizures (H)     withdrawal     Substance abuse (H)      Thrombocytopenia (H)         Family history  Neg for GI disease or malignancy.  No alcoholism in first degree relatives.     Social history  Social History     Tobacco Use     Smoking status: Current Every Day Smoker     Packs/day: 1.00     Types: Cigarettes     Smokeless tobacco: Never Used   Substance Use Topics     Alcohol use: Yes     Comment: a liter of Vodka everyday     Drug use: Yes     Types: Marijuana       Medications:   Current Outpatient Medications   Medication Sig Dispense Refill     eplerenone (INSPRA) 25 MG tablet Take 1 tablet (25 mg) by mouth daily 30 tablet 0     furosemide (LASIX) 40 MG tablet TAKE 1 TABLET BY MOUTH TWICE A DAY (09:00AM & 06:00PM) 60 tablet 1     gabapentin (NEURONTIN) 100 MG capsule Take 1 capsule (100 mg) by mouth 2 times daily 60 capsule 0     melatonin 5 MG tablet Take 1 tablet (5 mg) by mouth every evening as needed for sleep       mirtazapine (REMERON) 30 MG tablet Take 1 tablet (30 mg) by mouth At Bedtime 90 tablet 1     pantoprazole (PROTONIX) 40 MG EC tablet Take 1 tablet (40 mg) by mouth 2 times daily (before meals) 180 tablet 3     QUEtiapine (SEROQUEL) 100 MG tablet Take 100 mg by mouth At Bedtime       rifaximin (XIFAXAN) 550 MG TABS tablet Take 1 tablet (550 mg) by mouth 2 times daily 180 tablet 3     thiamine (B-1) 100 MG tablet Take 1 tablet (100 mg) by mouth daily 90 tablet 3     traZODone (DESYREL) 50 MG tablet Take 1 tablet (50 mg) by mouth At Bedtime 90 tablet 1       "    Allergies:  No Known Allergies       REVIEW OF SYSTEMS (ROS): Review of systems is as per HPI.  Remainder of complete review of systems is negative.      PHYSICAL EXAMINATION:      /57   Pulse 118   Temp 98.3  F (36.8  C) (Oral)   Resp 20   Ht 1.803 m (5' 11\")   Wt 72.6 kg (160 lb)   SpO2 99%   BMI 22.32 kg/m    GEN: Well developed, well nourished 36 year old male in no acute distress.  HEENT: sclera anicteric, moist mucous membranes, neck soft and supple.  LYMPH: No cervical lymphadenopathy  PULM: lungs clear to auscultation bilaterally.  CARDIO: Regular rate and rhythm  GI: Non-distended.  Bowel sounds positive.  Soft.  Mildly-tender to palpation.  No guarding.  EXT: warm, no lower extremity edema  NEURO: Alert and oriented.  Speech fluid.    PSYCH: Mental status appropriate, mood and affect normal.    SKIN: rash upper chest. Tattoos.      LABS and IMAGING  Recent Labs   Lab 05/10/22  0709   WBC 7.0   RBC 3.09*   HGB 9.9*   HCT 28.6*   MCV 93   MCH 32.0   MCHC 34.6   RDW 14.7   PLT 53*      Recent Labs   Lab 05/10/22  0709      CO2 23   BUN 13     Recent Labs   Lab 05/10/22  0709   ALKPHOS 148*   *   ALT 31     Lab Results   Component Value Date    INR 2.17 (H) 05/10/2022    INR 2.45 (H) 03/13/2022    INR 2.64 (H) 03/12/2022     ASSESSMENT  This is a 36-year-old male with history of alcoholic cirrhosis and esophageal varices who continues to actively use alcohol.  Presented with hematemesis and melena.    1.  Hematemesis and melena.  Upper GI bleed -differential diagnosis includes bleed from esophageal varices, portal gastropathy, Aiyana-Vizcaino tear, PUD, esophagitis.  Note that he also has coagulopathy with INR 2.17.  He will need EGD today.  2.  Cirrhosis, alcoholic.  MELD-Na 22. Continues to actively drink.  Decompensated.  History ascites with paracenteses, coagulopathy, thrombocytopenia, esophageal varices.  No overt encephalopathy at this point.  History alcohol withdrawal " seizure.    Patient Active Problem List   Diagnosis     Alcoholic intoxication without complication (H)     Alcohol withdrawal syndrome without complication (H)     Hypokalemia     Hypomagnesemia     Scleral icterus     Hematemesis with nausea     Bleeding esophageal varices (H)     Alcohol use disorder, severe, dependence (H)     Iron deficiency anemia due to chronic blood loss     Liver disease, chronic, with cirrhosis (H)     Severe protein-calorie malnutrition (H)     Thrombocytopenia (H)     Tobacco use disorder     Alcoholic hepatitis with ascites     PLAN  1. NPO for EGD today.   2. PPI infusion.   3. Octreotide  4. Vitamin K 5 mg IV x 3 days.   5. Monitor Hgb, vitals.   6. On ceftriaxone (cirrhotic with GI bleed).   7. Antiemetics PRN.     8. Replace Mag    A total of 45 minutes was spent on today's care including chart review, discussion and examination of the patient, discussion with RN, discussion with Dr. Barbosa, formulating assessment and plan, .  Thank you for asking to consult on this patient.    Stephan Mckinney PA-C   5/10/2022 8:47 AM  [facility]  [phone]    ADDENDUM:    I have met and examined the patient and agree with Stephan Mckinney, VICTORIA above.  This is a 36 year old male with ethanol misuse and dependence along with cirrhosis secondary to ETOH, complicated by portal hypertension and variceal bleeding in past, here with hematemesis and melena.  He has been actively drinking.  Exam: Alert, NAD.  Multiple spider angiomata, distended abdomen, soft BP.    Impression:  Etoh cirrhosis  Upper GI bleeding, likely variceal    Recommendations:  Urgent EGD  Octreotide and ceftriaxone underway  PPI started  ICU admission  ETOH withdrawal protocol  Trend MELD labs  GI following closely    Ricardo Barbosa MD

## 2022-05-10 NOTE — PLAN OF CARE
Patient arrived from PACU alert and oriented. Ambulates safely with an assist of 1 but requires alarms on at all times. Patient was treated for one CIWA score of 10 with 1mg of oral Ativan. After administration patient has been sleeping most of shift. Blood pressures have trended down to upper 90s-110 systolic but has remained stable. Continues on room air. IV fluids infusing, potassium, and magnesium replacement completed. Patient continues to have loose red stools, difficult to assess urine output when mixed. Patient had one episode of emesis, Provider aware.     Problem: Alcohol Withdrawal  Goal: Alcohol Withdrawal Symptom Control  Outcome: Ongoing, Progressing

## 2022-05-10 NOTE — ANESTHESIA CARE TRANSFER NOTE
Patient: Masoud Huddleston    Procedure: Procedure(s):  ESOPHAGOGASTRODUODENOSCOPY (EGD) WITH VARICEAL BANDING       Diagnosis: Hematemesis with nausea [K92.0]  Secondary esophageal varices with bleeding (H) [I85.11]  Diagnosis Additional Information: No value filed.    Anesthesia Type:   MAC     Note:    Oropharynx: oropharynx clear of all foreign objects  Level of Consciousness: awake  Oxygen Supplementation: face mask  Level of Supplemental Oxygen (L/min / FiO2): 4  Independent Airway: airway patency satisfactory and stable  Dentition: dentition unchanged  Vital Signs Stable: post-procedure vital signs reviewed and stable  Report to RN Given: handoff report given  Patient transferred to: PACU    Handoff Report: Identifed the Patient, Identified the Reponsible Provider, Reviewed the pertinent medical history, Discussed the surgical course, Reviewed Intra-OP anesthesia mangement and issues during anesthesia, Set expectations for post-procedure period and Allowed opportunity for questions and acknowledgement of understanding      Vitals:  Vitals Value Taken Time   /64 05/10/22 1120   Temp     Pulse 101 05/10/22 1122   Resp 22 05/10/22 1122   SpO2 100 % 05/10/22 1122   Vitals shown include unvalidated device data.    Electronically Signed By: ERUM Celaya CRNA  May 10, 2022  11:24 AM

## 2022-05-10 NOTE — ED NOTES
Patient reports relapsing with alcohol about 1.5 months ago, he ha been drinking about 1 liter of Vodka per day, last drink at 2130 yesterday.

## 2022-05-10 NOTE — PHARMACY-ADMISSION MEDICATION HISTORY
Pharmacy Note - Admission Medication History    Pertinent Provider Information: Patient states he is not compliant with medications and has not taken medications in a couple of months.  Did take melatonin and trazodone this morning to try and help with sleep.   ______________________________________________________________________    Prior To Admission (PTA) med list completed and updated in EMR.       PTA Med List   Medication Sig Last Dose     eplerenone (INSPRA) 25 MG tablet Take 1 tablet (25 mg) by mouth daily More than a month at Unknown time     furosemide (LASIX) 40 MG tablet TAKE 1 TABLET BY MOUTH TWICE A DAY (09:00AM & 06:00PM) More than a month at Unknown time     gabapentin (NEURONTIN) 100 MG capsule Take 1 capsule (100 mg) by mouth 2 times daily More than a month at Unknown time     melatonin 5 MG tablet Take 1 tablet (5 mg) by mouth every evening as needed for sleep 5/10/2022 at 0600     mirtazapine (REMERON) 30 MG tablet Take 1 tablet (30 mg) by mouth At Bedtime More than a month at Unknown time     pantoprazole (PROTONIX) 40 MG EC tablet Take 1 tablet (40 mg) by mouth 2 times daily (before meals) More than a month at Unknown time     QUEtiapine (SEROQUEL) 100 MG tablet Take 100 mg by mouth At Bedtime More than a month at Unknown time     rifaximin (XIFAXAN) 550 MG TABS tablet Take 1 tablet (550 mg) by mouth 2 times daily More than a month at Unknown time     thiamine (B-1) 100 MG tablet Take 1 tablet (100 mg) by mouth daily More than a month at Unknown time     traZODone (DESYREL) 50 MG tablet Take 1 tablet (50 mg) by mouth At Bedtime 5/10/2022 at 0600       Information source(s): Patient and CareEverywhere/SureScripts    Method of interview communication: in-person    Patient was asked about OTC/herbal products specifically.  PTA med list reflects this.    Based on the pharmacist's assessment, the PTA med list information appears reliable    Allergies were reviewed, assessed, and updated with the  patient.      Patient did not bring any medications to the hospital and can't retrieve from home. No multi-dose medications are available for use during hospital stay.      Thank you for the opportunity to participate in the care of this patient.      Randolph Maravilla [facility]     5/10/2022     8:34 AM

## 2022-05-10 NOTE — ANESTHESIA POSTPROCEDURE EVALUATION
Patient: Masoud Huddleston    Procedure: Procedure(s):  ESOPHAGOGASTRODUODENOSCOPY (EGD) WITH VARICEAL BANDING       Anesthesia Type:  MAC    Note:  Disposition: Inpatient   Postop Pain Control: Uneventful            Sign Out: Well controlled pain   PONV: No   Neuro/Psych: Uneventful            Sign Out: Acceptable/Baseline neuro status   Airway/Respiratory: Uneventful            Sign Out: Acceptable/Baseline resp. status   CV/Hemodynamics: Uneventful            Sign Out: Acceptable CV status; No obvious hypovolemia; No obvious fluid overload   Other NRE: NONE   DID A NON-ROUTINE EVENT OCCUR? No    Event details/Postop Comments:  HR 90,            Last vitals:  Vitals Value Taken Time   BP 99/55 05/10/22 1140   Temp     Pulse 89 05/10/22 1146   Resp 15 05/10/22 1146   SpO2 100 % 05/10/22 1126   Vitals shown include unvalidated device data.    Electronically Signed By: Murali Painter MD  May 10, 2022  11:48 AM

## 2022-05-10 NOTE — PROGRESS NOTES
PRE-PROCEDURE NOTE      REASON FOR PROCEDURE: Hematemesis    History and Physical: Reviewed, no changes    Pre-sedation Assessment:     VS: Tachycardic, soft BP  General: Alert, NAD  Airway: normal  Heart: Tachycardia  Lungs: CTA    Previous Reaction to Sedation: None    Sedation Plan Based on Assessment: General Anesthesia    Mallampati: II    ASA Classification: 4      Impression: Patient deemed adequate candidate for general anesthesia    Plan: esophagogastroduodenoscopy              Ricardo Barbosa MD  Thank you for the opportunity to participate in the care of this patient.   Please feel free to call me with any questions or concerns.  Phone number [phone].            5/10/2022 10:19 AM

## 2022-05-10 NOTE — PROGRESS NOTES
05/10/22 1314   Quick Adds   Type of Visit Initial PT Evaluation   Living Environment   People in Home parent(s)   Current Living Arrangements house   Home Accessibility stairs to enter home;stairs within home   Number of Stairs, Main Entrance 2   Number of Stairs, Within Home, Primary greater than 10 stairs   Stair Railings, Within Home, Primary railings safe and in good condition   Transportation Anticipated family or friend will provide   Self-Care   Usual Activity Tolerance good   Current Activity Tolerance good   Equipment Currently Used at Home none   Fall history within last six months no   Activity/Exercise/Self-Care Comment I with all ADL's   General Information   Onset of Illness/Injury or Date of Surgery 05/10/22   Pertinent History of Current Problem (include personal factors and/or comorbidities that impact the POC) Hematemesis with nausea (Chronic) 2/25/2022    Bleeding esophageal varices (H)     Hematemesis with nausea (Chronic) 2/25/2022    Bleeding esophageal varices (H)   Cognition   Affect/Mental Status (Cognition) WFL   Follows Commands (Cognition) WFL   Posture    Posture Not impaired   Range of Motion (ROM)   Range of Motion ROM is WFL   Strength (Manual Muscle Testing)   Strength (Manual Muscle Testing) strength is WFL   Transfers   Transfers no deficits identified   Gait/Stairs (Locomotion)   Todd Level (Gait) independent   Distance in Feet (Required for LE Total Joints) 15 x 2   Comment, (Gait/Stairs) demonstrating stable steady gait, standing for several minutes and adjusting closes, tolieting all without any issue,loss of balance   Balance   Balance no deficits were identified   Sensory Examination   Sensory Perception patient reports no sensory changes   Coordination   Coordination no deficits were identified   Muscle Tone   Muscle Tone no deficits were identified   Clinical Impression   Criteria for Skilled Therapeutic Intervention Evaluation only   PT Discharge Planning   PT  Discharge Recommendation (DC Rec) home   PT Rationale for DC Rec patient mobilzing well, does not have skiled PT needs. Encouraged to continue ambulating [facility] nursing   Plan of Care Review   Plan of Care Reviewed With patient   Total Evaluation Time   Total Evaluation Time (Minutes) 15

## 2022-05-10 NOTE — ANESTHESIA PREPROCEDURE EVALUATION
Anesthesia Pre-Procedure Evaluation    Patient: Masuod Huddleston   MRN: [id number] : 1985        Procedure : Procedure(s):  ESOPHAGOGASTRODUODENOSCOPY (EGD)          Past Medical History:   Diagnosis Date     Acute alcoholic intoxication in alcoholism with complication (H)      Acute metabolic encephalopathy      Alcohol abuse      Alcoholic cirrhosis of liver without ascites (H)      Alcoholic ketoacidosis 2019     Bacteremia 3/4/2022    2522- parvimonas micra; ? Significance  3/3/22: Dicussed with ID and could be contaminant  Metronidazole started       Chronic acquired lymphedema      Cirrhosis of liver (H)      Depression      ED (erectile dysfunction)      Elevated liver enzymes 2018     Esophageal varices without bleeding (H)      GI (gastrointestinal bleed)     hematemesis     Hematemesis 2018    Added automatically from request for surgery [id number]     History of transfusion      Hypokalemia 2019     Hypomagnesemia 2018     Liver disease      Aiyana-Vizcaino tear 2018     Nausea with vomiting      Neuropathy      Right patella fracture      Seizures (H)     withdrawal     Substance abuse (H)      Thrombocytopenia (H)       Past Surgical History:   Procedure Laterality Date     ESOPHAGOSCOPY, GASTROSCOPY, DUODENOSCOPY (EGD), COMBINED N/A 2018    Procedure: ESOPHAGOGASTRODUODENOSCOPY (EGD);  Surgeon: Lora Valencia MD;  Location: [facility];  Service:      ESOPHAGOSCOPY, GASTROSCOPY, DUODENOSCOPY (EGD), COMBINED N/A 2022    Procedure: ESOPHAGOGASTRODUODENOSCOPY (EGD) WITH ESOPHAGEAL VARICEAL BANDING;  Surgeon: Moises Michael DO;  Location: [facility] Main OR     HC OPEN RX PATELLA FX Right 2018    Procedure: OPEN REDUCTION INTERNAL FIXATION, FRACTURE, PATELLA;  Surgeon: Bertram Beltran MD;  Location: [facility] OR;  Service: Orthopedics     KNEE SURGERY Right 2018     TN ESOPHAGOGASTRODUODENOSCOPY TRANSORAL DIAGNOSTIC N/A 5/3/2020    Procedure:  ESOPHAGOGASTRODUODENOSCOPY (EGD);  Surgeon: Ricardo Barbosa MD;  Location: [facility] Main OR;  Service: Gastroenterology      PARACENTESIS  1/16/2020      PARACENTESIS  1/23/2020      No Known Allergies   Social History     Tobacco Use     Smoking status: Current Every Day Smoker     Packs/day: 1.00     Types: Cigarettes     Smokeless tobacco: Never Used   Substance Use Topics     Alcohol use: Yes     Comment: a liter of Vodka everyday      Wt Readings from Last 1 Encounters:   05/10/22 72.6 kg (160 lb)        Anesthesia Evaluation   Pt has had prior anesthetic. Type: General.    No history of anesthetic complications       ROS/MED HX  ENT/Pulmonary:  - neg pulmonary ROS     Neurologic:  - neg neurologic ROS     Cardiovascular:  - neg cardiovascular ROS     METS/Exercise Tolerance:     Hematologic: Comments: Thromobcytpenia, INR >2.    (+) anemia, history of blood transfusion,     Musculoskeletal:  - neg musculoskeletal ROS     GI/Hepatic: Comment: Hematemesis.    (+) esophageal disease, liver disease,     Renal/Genitourinary:       Endo: Comment: Hypokalemia, Hyponatremia, Hypomagnesemia.      Psychiatric/Substance Use:     (+) alcohol abuse     Infectious Disease:  - neg infectious disease ROS     Malignancy:  - neg malignancy ROS     Other:               OUTSIDE LABS:  CBC:   Lab Results   Component Value Date    WBC 7.0 05/10/2022    WBC 4.8 04/27/2022    HGB 9.9 (L) 05/10/2022    HGB 11.4 (L) 04/27/2022    HCT 28.6 (L) 05/10/2022    HCT 32.7 (L) 04/27/2022    PLT 53 (L) 05/10/2022    PLT 36 (LL) 04/27/2022     BMP:   Lab Results   Component Value Date     05/10/2022     04/27/2022    POTASSIUM 3.2 (L) 05/10/2022    POTASSIUM 2.7 (LL) 04/27/2022    CHLORIDE 102 05/10/2022    CHLORIDE 109 (H) 04/27/2022    CO2 23 05/10/2022    CO2 20 (L) 04/27/2022    BUN 13 05/10/2022    BUN 5 (L) 04/27/2022    CR 0.66 (L) 05/10/2022    CR 0.67 (L) 04/27/2022     (H) 05/10/2022     04/27/2022      COAGS:   Lab Results   Component Value Date    PTT 40 (H) 05/10/2022    INR 2.17 (H) 05/10/2022     POC: No results found for: BGM, HCG, HCGS  HEPATIC:   Lab Results   Component Value Date    ALBUMIN 2.4 (L) 05/10/2022    PROTTOTAL 7.1 05/10/2022    ALT 31 05/10/2022     (H) 05/10/2022     (H) 11/25/2020    ALKPHOS 148 (H) 05/10/2022    BILITOTAL 6.2 (H) 05/10/2022    SHIMON 49 (H) 03/05/2022     OTHER:   Lab Results   Component Value Date    PH 7.42 03/04/2022    LACT 3.0 (H) 02/25/2022    FÉLIX 7.8 (L) 05/10/2022    PHOS 1.8 (L) 03/11/2022    MAG 0.9 (LL) 05/10/2022    LIPASE 56 (H) 05/10/2022    AMYLASE 83 07/05/2020    TSH 1.79 11/25/2020    CRP 2.9 (H) 01/14/2020       Anesthesia Plan    ASA Status:  3, emergent    NPO Status:  NPO Appropriate    Anesthesia Type: General.     - Airway: ETT   Induction: Intravenous, Propofol.   Maintenance: TIVA.        Consents    Anesthesia Plan(s) and associated risks, benefits, and realistic alternatives discussed. Questions answered and patient/representative(s) expressed understanding.    - Discussed:     - Discussed with:  Patient      - Extended Intubation/Ventilatory Support Discussed: No.      - Patient is DNR/DNI Status: No    Use of blood products discussed: Yes.     - Discussed with: Patient.     - Consented: consented to blood products            Reason for refusal: other.     Postoperative Care    Pain management: IV analgesics, Multi-modal analgesia.   PONV prophylaxis: Ondansetron (or other 5HT-3), Dexamethasone or Solumedrol     Comments:    Other Comments: Decadron, Zofran.  Diprivan infusion.  PE, Ephedrine PRN.    GA with ETT as requested by Dr Barbosa.            Murali Painter MD

## 2022-05-11 NOTE — PROGRESS NOTES
Pt AAox2-3. CIWA's Q4. Garbled speech. Very sleppy. NSR on tele. On room air. NPO. x3 PIV and Midline. Family in room

## 2022-05-11 NOTE — CONSULTS
Care Management Initial Consult    General Information  Assessment completed with: Patient,    Type of CM/SW Visit: Initial Assessment    Primary Care Provider verified and updated as needed:     Readmission within the last 30 days:        Reason for Consult: substance use concerns  Advance Care Planning:            Communication Assessment  Patient's communication style: spoken language (English or Bilingual)    Hearing Difficulty or Deaf: no   Wear Glasses or Blind: no    Cognitive  Cognitive/Neuro/Behavioral: .WDL except, level of consciousness, speech, orientation  Level of Consciousness: lethargic  Arousal Level: arouses to voice  Orientation: place  Mood/Behavior: calm, cooperative  Best Language: 0 - No aphasia  Speech: logical, garbled    Living Environment:   People in home: alone     Current living Arrangements: apartment      Able to return to prior arrangements:         Family/Social Support:  Care provided by: self  Provides care for: no one  Marital Status: Single  Parent(s)          Description of Support System:           Current Resources:   Patient receiving home care services: No     Community Resources:    Equipment currently used at home: none  Supplies currently used at home: None    Employment/Financial:  Employment Status: employed full-time        Financial Concerns: No concerns identified           Lifestyle & Psychosocial Needs:  Social Determinants of Health     Tobacco Use: High Risk     Smoking Tobacco Use: Current Every Day Smoker     Smokeless Tobacco Use: Never Used   Alcohol Use: Not on file   Financial Resource Strain: Not on file   Food Insecurity: Not on file   Transportation Needs: Not on file   Physical Activity: Not on file   Stress: Not on file   Social Connections: Not on file   Intimate Partner Violence: Not on file   Depression: At risk     PHQ-2 Score: 6   Housing Stability: Not on file       Functional Status:  Prior to admission patient needed assistance:   Dependent  ADLs:: Independent  Dependent IADLs:: Independent       Mental Health Status:          Chemical Dependency Status:  Chemical Dependency Status: Current Concern             Values/Beliefs:  Spiritual, Cultural Beliefs, Zoroastrianism Practices, Values that affect care: no               Additional Information:  SWCM met and introduced self and CM services to Pt.  Pt was difficult to understand due to slurred and mumbled speech.  SWCM offered to come back and Pt said he could answer questions.  Pt stated that he has been to treatment 6 times and were court ordered.  Pt states that he does in person AA meetings on Wednesday. Pt stated that he would be interested in treatment options.  SWCM to re-assess when Pt is more alert and able to communicate.     RU Nava

## 2022-05-11 NOTE — PROGRESS NOTES
"GI Progress Note  Masoud Huddleston  -64     Subjective:   Had episodes of emesis yesterday -- this was after recovering from EGD.   Had a few episodes of melena overnight. No further emesis.     Getting prbc now for hgb 6.8.   Platelet 23K    He is asking for clear liquids.      Abd feels a bit full on exam - I asked him about this and confirms abd feeling more full than usual.    Objective:   /59   Pulse 79   Temp 97.4  F (36.3  C)   Resp 15   Ht 1.803 m (5' 11\")   Wt 76.9 kg (169 lb 8 oz)   SpO2 96%   BMI 23.64 kg/m    Body mass index is 23.64 kg/m .   Gen: No acute distress  Cardio: RRR  GI: Non-distended, BS positive, soft, non-tender. No guarding.  Neuro: A&O x 3. Answers questions appropriately. Converses in simple terms, speech bumpy; not as smooth as yesterday. Mild twitch when checking for asterixis.     Laboratory  Recent Labs   Lab 05/11/22  0602 05/10/22  2351 05/10/22  1846 05/10/22  1350 05/10/22  0709   WBC 2.4*  --   --   --  7.0   RBC 2.12*  --   --   --  3.09*   HGB 6.8*  6.8* 7.2* 7.3*   < > 9.9*   HCT 20.4*  --   --   --  28.6*   MCV 96  --   --   --  93   MCH 32.1  --   --   --  32.0   MCHC 33.3  --   --   --  34.6   RDW 14.9  --   --   --  14.7   PLT 23*  --   --   --  53*    < > = values in this interval not displayed.      Recent Labs   Lab 05/11/22  0602 05/10/22  0709    141   CO2 21* 23   BUN 14 13     Recent Labs   Lab 05/11/22  0602 05/10/22  0709   ALKPHOS 94 148*   AST 74* 115*   ALT 24 31     Lab Results   Component Value Date    INR 2.57 (H) 05/11/2022    INR 2.17 (H) 05/10/2022    INR 2.45 (H) 03/13/2022     Assessment:   This is a 36-year-old male with history of alcoholic cirrhosis and esophageal varices who continues to actively use alcohol.  Presented with hematemesis and melena.     1.  Hematemesis and melena.    EGD 5/10/22 showing large varices in distal esophagus; two columns; one with red babita markings. No active bleeding.  Three bands placed.  He " had emesis after recovering from EGD, so there is risk that some of the bands could have been compromised/dislodged from this.   Could still be bleeding, given downward trend in Hgb and melena.  He is also thrombocytopenic; cirrhosis and marrow suppression from EtOH. Watch closely and keep NPO in case he needs repeat EGD.     2.  Cirrhosis, alcoholic.  MELD-Na 22 on admit, versus 23 today.   Continues to actively drink up to this admission.    Decompensated cirrhosis --   -Esophageal varices - banded 5/10.   -Coagulopathy - received 5mg IV vit K yesterday; change to 10mg today.   -Thrombocytopenia - 23K.  -History ascites with paracenteses - No ascites on current CT.   -Hypoalbuminemia - Alb 1.8  -No overt encephalopathy but mild twitch when checking for asterixis.  Very groggy and simplifies his speech now -- hepatic encephalopathy versus EtOH withdrawal.  Received ativan per CIWA protocol.     3. EtOH use disorder.     Patient Active Problem List   Diagnosis     Alcoholic intoxication without complication (H)     Alcohol withdrawal syndrome without complication (H)     Hypokalemia     Hypomagnesemia     Scleral icterus     Hematemesis with nausea     Bleeding esophageal varices (H)     Alcohol use disorder, severe, dependence (H)     Iron deficiency anemia due to chronic blood loss     Liver disease, chronic, with cirrhosis (H)     Severe protein-calorie malnutrition (H)     Thrombocytopenia (H)     Tobacco use disorder     Alcoholic hepatitis with ascites      Plan:   1. Continue NPO at this point.  Will need to watch hgb, melena, vitals. There is still a chance he might need EGD again -- needs to stay NPO until there is less concern for repeat scope.   2. Repeat Hgb at 2PM. Will check platelets at that time as well.  May need transfusion.   3. Continue on PPI infusion.   4. Continue octreotide x 72 hours total.   Started 5/10/22 at 13:30.  5. Vitamin K - received 5mg yesterday; will get 10mg today and tomorrow.   "Spoke with PharmD; orders entered.   6. On ceftriaxone (cirrhotic with GI bleed). Continue for 5 days total.   7. Trend LFT\"s, MELD    8. Complete EtOH cessation.  Will need to be address further prior to discharge.      Thank you.  Stephan Mckinney PA-C  [facility]  [phone]       ADDENDUM  Discussed with Dr Barbosa.  Agree with platelet transfusion.    Spoke with RN - will order q8 hour Hgb checks starting at 9pm today.  Last hgb 7.7 at 3PM.   Pt had one episode of loose black stool on day shift.   Continue NPO.  Even clear liquids have risk of dislodging esophageal bands.      Contact GI with any concerns of active bleeding.      Thank you     Stephan Mckinney PA-C  [facility]  [phone]       "

## 2022-05-11 NOTE — PROCEDURES
"MIDLINE Line Insertion Procedure Note  Pt. Name: Masoud Huddleston  MRN:    [id number]      Procedure: Insertion of a  Dual Lumen  5 fr  B[employer] MIDLINE, Lot number [id number]    Indications: access    Contraindications : n/a    Procedure Details   Patient identified with 2 identifiers and \"Time Out\" conducted.  .     MIDLINE bundle followed: hand hygeine performed prior to procedure, site cleansed with cholraprep, hat, mask, sterile gloves,sterile gown worn, patient draped with maximum barrier head to toe drape, sterile field maintained.    The vein was assessed and found to be compressible and of adequate size. 4 ml 1% Lidocaine administered sq to the insertion site. A 5 Fr MIDLINE was inserted into the cephalic vein of the left arm with ultrasound guidance. 1 attempt(s) required to access vein.   Catheter threaded without difficulty. Good blood return noted.    Modified Seldinger Technique used for insertion.    The 8 sharps that are included in the MIDLINE insertion kit were accounted for and disposed of in the sharps container prior to breakdown of the sterile field.    Catheter secured with Statlock, biopatch and Tegaderm dressing applied.    Findings:  Total catheter length  20 cm, with 0 cm exposed. Mid upper arm circumference is 35 cm. Catheter was flushed with 30 cc NS. Patient  tolerated procedure well.    CLABSI prevention brochure left at bedside.    Patient's primary RN notified MIDLINE is ready for use.    Comments:        Gera Lee, DERREKN, RN  [facility]  "

## 2022-05-11 NOTE — PROGRESS NOTES
[facility] Medicine PROGRESS NOTE      Identification/Summary:      Masoud Huddleston is a 36 year old male with a past medical history of alcoholic liver cirrhosis, variceal bleeding status post banding in March 2022, continued alcohol abuse  who was admitted on 5/10/2022 for hematemesis.  Underwent EGD, nonbleeding varices were banded.  Continued drop in hemoglobin to 6.8, ordered PRBC.  INR remains elevated despite vitamin K.    Assessment & Plan      Hematemesis/upper GI bleed possibly variceal  Acute blood loss anemia  -Patient immediately taken for EGD, varices banded   -Hemoglobin every 6, transfuse for less than 7 or hemodynamic instability  - 1 unit pRBC 5/11 for Hb 6.8   -PPI infusion   - octreotide infusion x 72 hours from 5/10 noon- 5/13 noon   - Rocephin 1 gram IV daily x 5 days 5/10-5/14   - NPO   -Appreciate GI consult  Severe alcohol use disorder  Continues to drink vodka 0.75-1 L/day continuous since last discharge March 2022   Blood alcohol level 209 mg/dL  CIWA score 7-10   Ativan per CIWA protocol  Alcoholic liver cirrhosis/alcoholic hepatitis with portal hypertension, esophageal varices, ascites, coagulopathy, thrombocytopenia, hypoalbuminemia  History of hepatic encephalopathy  Alcohol cessation counseling  EGD as above  Vitamin K 10 mg daily x3 days  May consider FFP if continued drop in Hb   Transfuse 1 pack platelets given bleeding   Monitor CBC, INR, LFTs    Hypocalcemia   - calcium gluconate IV     Hyperglycemia  -Check hemoglobin A1c  -Glucose checks 4 times daily  - if persistent hyperglycemia then will consider adding sliding scale insulin  H/O Parvimonas Micra Bacteremia in 3/22      Clinically Significant Risk Factors Present on Admission          # Hypocalcemia: corrected calcium <8.5 on admission, will replace as needed    # Hypoalbuminemia: Albumin = 1.8 g/dL (Ref range: 3.5 - 5.0 g/dL) on admission, will monitor as appropriate   # Coagulation Defect: INR = 2.57 (Ref range:  "0.85 - 1.15) and/or PTT = 40 Seconds (Ref range: 22 - 38 Seconds) on admission, will monitor for bleeding  # Thrombocytopenia: Plts = 53 10e3/uL (Ref range: 150 - 450 10e3/uL) on admission, will monitor for bleeding          Diet: NPO for Medical/Clinical Reasons Except for: Meds, Ice Chips  DVT Prophylaxis: coagulopathy, active bleeding   Casanova Catheter: Not present  Central Lines: None    Code Status: Full Code    Discharge Planning   Anticipated Discharge in :3+ days   Expected Discharge Destination: Home ?   Milestones/Criteria For Discharge:- stable Hb  - no active alcohol withdrawal   Disposition Plan   Expected Discharge: 05/13/2022     Anticipated discharge location:  Awaiting care coordination huddle         The patient's care was discussed with the Bedside Nurse and Patient.      Interval History/Subjective:     Reports feeling hungry, 1 episode of vomiting yesterday, liquid black stools  No abdominal pain.  No fever or chills  CIWA scores 7-10      Physical Exam/Objective:     Vitals I/O   Vital signs:  Temp: 98.1  F (36.7  C) Temp src: Oral BP: 98/59 Pulse: 89   Resp: 27 SpO2: 96 % O2 Device: None (Room air) Oxygen Delivery: 6 LPM Height: 180.3 cm (5' 11\") Weight: 76.9 kg (169 lb 8 oz)  Estimated body mass index is 23.64 kg/m  as calculated from the following:    Height as of this encounter: 1.803 m (5' 11\").    Weight as of this encounter: 76.9 kg (169 lb 8 oz).       I/O last 3 completed shifts:  In: 5387 [P.O.:15; I.V.:4222; IV Piggyback:1150]  Out: 600 [Urine:600]     Vitals:    05/10/22 0655 05/11/22 0500   Weight: 72.6 kg (160 lb) 76.9 kg (169 lb 8 oz)      Weight change: 4.309 kg (9 lb 8 oz)   Body mass index is 23.64 kg/m .    General: Awake alert and comfortable  Lungs: CTA without rales or rhonchi  Heart: S1, S2 without murmurs  Abdomen: Soft, distended nontender, bowel sounds positive  CNS: Nonfocal  Extremities: No edema  Skin: Spider angiomata    Medications:     Medications     octreotide " (sandoSTATIN) infusion ADULT 50 mcg/hr (05/11/22 0600)     pantoprazole (PROTONIX) infusion ADULT/PEDS GREATER than or EQUAL to 45 kg 8 mg/hr (05/11/22 0600)     sodium chloride 125 mL/hr at 05/11/22 0658       cefTRIAXone  1 g Intravenous Q24H     cloNIDine  0.1 mg Oral Q8H     eplerenone  25 mg Oral Daily     [START ON 5/13/2022] folic acid  1 mg Oral Daily     folic acid  1 mg Intravenous Daily     furosemide  40 mg Oral BID     [START ON 5/17/2022] gabapentin  100 mg Oral Q8H     [START ON 5/15/2022] gabapentin  300 mg Oral Q8H     [START ON 5/13/2022] gabapentin  600 mg Oral Q8H     gabapentin  900 mg Oral Q8H     mirtazapine  15 mg Oral At Bedtime     multivitamin w/minerals  1 tablet Oral Daily     phytonadione  5 mg Intravenous Daily     QUEtiapine  100 mg Oral At Bedtime     rifaximin  550 mg Oral BID     sodium chloride (PF)  10 mL Intracatheter Q8H     thiamine  200 mg Intravenous TID     [START ON 5/12/2022] thiamine  100 mg Oral TID     [START ON 5/17/2022] thiamine  100 mg Oral Daily     traZODone  50 mg Oral At Bedtime       Data Reviewed   I personally reviewed all new medications, labs, imaging/diagnostics reports over the past 24 hours.     Pertinent findings include Hb  6.8  Bili 5.1  INR 2.57.     Labs    Recent Labs   Lab 05/11/22  0602 05/10/22  2351 05/10/22  1846 05/10/22  1350 05/10/22  0709   WBC  --   --   --   --  7.0   HGB 6.8* 7.2* 7.3* 8.0* 9.9*   MCV  --   --   --   --  93   PLT  --   --   --   --  53*   INR 2.57*  --   --   --  2.17*     --   --   --  141   POTASSIUM 3.9  --   --  3.4* 3.2*   CHLORIDE 109*  --   --   --  102   CO2 21*  --   --   --  23   BUN 14  --   --   --  13   CR 0.74  --   --   --  0.66*   ANIONGAP 10  --   --   --  16   FÉLIX 6.4*  --   --   --  7.8*   *  --   --   --  137*   ALBUMIN 1.8*  --   --   --  2.4*   PROTTOTAL 5.2*  --   --   --  7.1   BILITOTAL 5.1*  --   --   --  6.2*   ALKPHOS 94  --   --   --  148*   ALT 24  --   --   --  31   AST 74*   --   --   --  115*   LIPASE  --   --   --   --  56*       Imaging   No results found for this or any previous visit (from the past 24 hour(s)).      EKG:      Nayeli Gaspar MD  Internal Medicine / [facility] medicine   [facility]  Securely message with the Vocera Web Console (learn more here)  Text page via [employer])

## 2022-05-12 NOTE — PROGRESS NOTES
[facility] Medicine PROGRESS NOTE      Identification/Summary:      Masoud Huddleston is a 36 year old male with a past medical history of alcoholic liver cirrhosis, variceal bleeding status post banding in March 2022, continued alcohol abuse  who was admitted on 5/10/2022 for hematemesis.  Underwent EGD, nonbleeding varices were banded.  Continued drop in hemoglobin to 6.8, ordered PRBC.  INR remains elevated despite vitamin K.    Assessment & Plan      Hematemesis/upper GI bleed possibly variceal  Acute blood loss anemia  -Patient immediately taken for EGD, varices banded   -  Hb q 8 hours    - 1 unit pRBC 5/11 for Hb 6.8   Hb 7.7 and stable   -PPI infusion - can convert to BID when ok with GI   - octreotide infusion x 72 hours from 5/10 1330  [id number]  - Rocephin 1 gram IV daily x 5 days 5/10-5/14   - NPO , diet per GI   -Appreciate GI consult  Severe alcohol use disorder  Continues to drink vodka 0.75-1 L/day continuous since last discharge March 2022   Blood alcohol level 209 mg/dL  CIWA score 3-4  Ativan per CIWA protocol  Alcoholic liver cirrhosis/alcoholic hepatitis with portal hypertension, esophageal varices, ascites, coagulopathy, thrombocytopenia, hypoalbuminemia  History of hepatic encephalopathy  Alcohol cessation counseling  EGD as above  Vitamin K 10 mg daily x3 days  May consider FFP if continued drop in Hb   Transfused 1 pack platelets given bleeding 5/11   Platelet 32 K      No further transfusion needed at this time   Monitor CBC, INR, LFTs    Hypocalcemia   - calcium gluconate IV   Hypokalemia, hypomagnesemia, Hypophosphatemia   -per protocol     Hyperglycemia  -Glucose checks 4 times daily  - if persistent hyperglycemia then will consider adding sliding scale insulin  H/O Parvimonas Micra Bacteremia in 3/22      Clinically Significant Risk Factors Present on Admission                     Diet: NPO for Medical/Clinical Reasons Except for: Meds, Ice Chips  DVT Prophylaxis: coagulopathy,  "active bleeding   Casanova Catheter: Not present  Central Lines: PRESENT     update status to general medicine with no tele     Code Status: Full Code    Discharge Planning   Anticipated Discharge in :3+ days   Expected Discharge Destination: Home ?   Milestones/Criteria For Discharge:- stable Hb  - no active alcohol withdrawal   Disposition Plan   Expected Discharge: 05/13/2022     Anticipated discharge location: home           The patient's care was discussed with the Bedside Nurse and Patient.      Interval History/Subjective:     Did well overnight. No n/v/d   No chest pain or sob   Mumbles speech       Physical Exam/Objective:     Vitals I/O   Vital signs:  Temp: 97.6  F (36.4  C) Temp src: Oral BP: 126/60 Pulse: 69   Resp: 15 SpO2: 93 % O2 Device: None (Room air) Oxygen Delivery: 6 LPM Height: 180.3 cm (5' 11\") Weight: 77.4 kg (170 lb 9.6 oz)  Estimated body mass index is 23.79 kg/m  as calculated from the following:    Height as of this encounter: 1.803 m (5' 11\").    Weight as of this encounter: 77.4 kg (170 lb 9.6 oz).       I/O last 3 completed shifts:  In: 4200 [P.O.:240; I.V.:3610]  Out: 2375 [Urine:2375]     Vitals:    05/10/22 0655 05/11/22 0500 05/12/22 0500   Weight: 72.6 kg (160 lb) 76.9 kg (169 lb 8 oz) 77.4 kg (170 lb 9.6 oz)      Weight change: 0.499 kg (1 lb 1.6 oz)   Body mass index is 23.79 kg/m .    General: awake/alert  NAD   Lungs: CTA without rales or rhonchi  Heart: S1, S2 without murmurs  Abdomen: Soft, distended nontender, bowel sounds positive  CNS: Nonfocal  Extremities: No edema  Skin: Spider angiomata    Medications:     Medications     octreotide (sandoSTATIN) infusion ADULT 50 mcg/hr (05/12/22 0600)     pantoprazole (PROTONIX) infusion ADULT/PEDS GREATER than or EQUAL to 45 kg 8 mg/hr (05/12/22 0600)     sodium chloride 125 mL/hr at 05/12/22 0600       cefTRIAXone  1 g Intravenous Q24H     cloNIDine  0.1 mg Oral Q8H     eplerenone  25 mg Oral Daily     [START ON 5/13/2022] folic acid "  1 mg Oral Daily     folic acid  1 mg Intravenous Daily     furosemide  40 mg Oral BID     [START ON 5/17/2022] gabapentin  100 mg Oral Q8H     [START ON 5/15/2022] gabapentin  300 mg Oral Q8H     [START ON 5/13/2022] gabapentin  600 mg Oral Q8H     gabapentin  900 mg Oral Q8H     mirtazapine  15 mg Oral At Bedtime     multivitamin w/minerals  1 tablet Oral Daily     phytonadione  10 mg Intravenous Once     potassium chloride  20 mEq Intravenous Q1H     QUEtiapine  100 mg Oral At Bedtime     rifaximin  550 mg Oral BID     sodium chloride (PF)  10 mL Intracatheter Q8H     sodium phosphate  15 mmol Intravenous Once     thiamine  200 mg Intravenous TID     thiamine  100 mg Oral TID     [START ON 5/17/2022] thiamine  100 mg Oral Daily     traZODone  50 mg Oral At Bedtime       Data Reviewed   I personally reviewed all new medications, labs, imaging/diagnostics reports over the past 24 hours.     Pertinent findings include Hb  6.8  Bili 5.1  INR 2.57.     Labs    Recent Labs   Lab 05/12/22  0502 05/11/22  2201 05/11/22  1507 05/11/22  0602 05/10/22  1846 05/10/22  1350 05/10/22  0709   WBC 2.9*  --   --  2.4*  --   --  7.0   HGB 7.7*  7.7* 8.0* 7.7* 6.8*  6.8*   < > 8.0* 9.9*   MCV 95  --   --  96  --   --  93   PLT 32*  --  26* 23*  --   --  53*   INR 2.17*  --   --  2.57*  --   --  2.17*     --   --  140  --   --  141   POTASSIUM 3.3*  --   --  3.9  --  3.4* 3.2*   CHLORIDE 107  --   --  109*  --   --  102   CO2 26  --   --  21*  --   --  23   BUN 15  --   --  14  --   --  13   CR 0.65*  --   --  0.74  --   --  0.66*   ANIONGAP 6  --   --  10  --   --  16   FÉLIX 6.3*  --   --  6.4*  --   --  7.8*   *  --   --  214*  --   --  137*   ALBUMIN 1.8*  --   --  1.8*  --   --  2.4*   PROTTOTAL 5.1*  --   --  5.2*  --   --  7.1   BILITOTAL 4.0*  --   --  5.1*  --   --  6.2*   ALKPHOS 92  --   --  94  --   --  148*   ALT 29  --   --  24  --   --  31   AST 95*  --   --  74*  --   --  115*   LIPASE  --   --   --   --    --   --  56*    < > = values in this interval not displayed.       Imaging   No results found for this or any previous visit (from the past 24 hour(s)).      EKG:      Nayeli Gaspar MD  Internal Medicine / [facility] medicine   [facility]  Securely message with the Vocera Web Console (learn more here)  Text page via [employer] ([email or web address])

## 2022-05-12 NOTE — PLAN OF CARE
Goal Outcome Evaluation:  Pt A+Ox3, garbled speech but appropriate. Impulsive, bed alarm and seizure precautions for safety. CIWA q4h, scoring intermittently 9-11 treated with 1 mg PO ativan. VSS on RA. NPO per GI. Lactulose added per GI for encephalopathy. Intermittently incontinent of urine. Midline with blood return and PIV x2 WDL. Oct, protonix, and NS infusing. BS q6h. Hgb trending up, now monitoring q12h. Phos protocol. Nicotine x2 (total of 28 mg) patch to left shoulder.    Plan of Care Reviewed With: patient        Donya Benjamin RN      Problem: Alcohol Withdrawal  Goal: Alcohol Withdrawal Symptom Control  Outcome: Ongoing, Progressing  Intervention: Minimize or Manage Alcohol Withdrawal Symptoms  Recent Flowsheet Documentation  Taken 5/12/2022 1525 by Donya Benjamin, RN  Sensory Stimulation Regulation: care clustered  Aspiration Precautions:   awake/alert before oral intake   upright posture maintained   respiratory status monitored  Taken 5/12/2022 1211 by Donya Benjamin, RN  Sensory Stimulation Regulation: care clustered  Aspiration Precautions:   awake/alert before oral intake   upright posture maintained   respiratory status monitored

## 2022-05-12 NOTE — DISCHARGE INSTRUCTIONS
Smoking Cessation  -Upon discharge recommend the patient have the following pharmacotherapy: Patch 28 mg = 7 mg + 21 mg and Gum 4 mg every 1-2 hours    -Taper NRT recommended after 4 weeks of successful cessation for Patch and 3 weeks for gum.  Patch down to 21 mg for 4 weeks then 14 mg for 4 weeks then 7 mg for 4 weeks. For Gum/Lozenge down 4 mg every 2-3 hours for 3 weeks then down to 2 mg every 2-3 hours for 3 weeks, then 3-4 hours for 3 weeks, then 4-5 hours for 3 weeks, then as needed for 3 weeks.   -You will receive a phone call for follow up counseling about 2-3 weeks after discharge from the hospital.  Questions or concerns regarding your smoking cessation plan call R[facility]hi at [phone].

## 2022-05-12 NOTE — PROGRESS NOTES
"GI Progress Note  Masoud Huddleston  -65     Subjective:   Groggy.  He thinks he is at the [facility].  Knows the year is 2022.  No stool overnight or this morning.  Last bowel movement was yesterday early afternoon.  Received platelets yesterday afternoon.    Objective:   /60 (BP Location: Right leg)   Pulse 69   Temp 97.5  F (36.4  C) (Axillary)   Resp 11   Ht 1.803 m (5' 11\")   Wt 77.4 kg (170 lb 9.6 oz)   SpO2 94%   BMI 23.79 kg/m    Body mass index is 23.79 kg/m .   Gen: No acute distress  Cardio: RRR  GI: Non-distended, BS positive, soft, non-tender. No guarding.  Neuro: Not oriented to place.  Very groggy and sleepy.  Barely stays awake to answer questions. Garbled speech. No asterixis flap.    Laboratory  Recent Labs   Lab 05/12/22  0502 05/11/22  2201 05/11/22  1507 05/11/22  0602 05/10/22  1350 05/10/22  0709   WBC 2.9*  --   --  2.4*  --  7.0   RBC 2.42*  --   --  2.12*  --  3.09*   HGB 7.7*  7.7* 8.0* 7.7* 6.8*  6.8*   < > 9.9*   HCT 23.0*  --   --  20.4*  --  28.6*   MCV 95  --   --  96  --  93   MCH 31.8  --   --  32.1  --  32.0   MCHC 33.5  --   --  33.3  --  34.6   RDW 15.2*  --   --  14.9  --  14.7   PLT 32*  --  26* 23*  --  53*    < > = values in this interval not displayed.      Recent Labs   Lab 05/12/22  0502 05/11/22  0602 05/10/22  0709    140 141   CO2 26 21* 23   BUN 15 14 13     Recent Labs   Lab 05/12/22  0502 05/11/22  0602 05/10/22  0709   ALKPHOS 92 94 148*   AST 95* 74* 115*   ALT 29 24 31     Lab Results   Component Value Date    INR 2.17 (H) 05/12/2022    INR 2.57 (H) 05/11/2022    INR 2.17 (H) 05/10/2022     Assessment:   This is a 36-year-old male with history of alcoholic cirrhosis and esophageal varices who continues to actively use alcohol.  Presented with hematemesis and melena.     1.  Hematemesis and melena.    Bleeding appears to have stopped.  Hemoglobin had trended down yesterday, though stable over the past 18 hours.  EGD 5/10/22 showing large " SUBJECTIVE:    Patient ID: Waldemar Mckeon is a 80 y.o.male. HPI:   Patient here for follow-up of multiple medical problems. Patient is an 79-year-old white male. He had diabetes. He is on triple therapy. He is A1c is elevated. He takes [facility] [place] met and Trulicity. He is noncompliant with diet. He also have hypertension. Blood pressure slightly elevated today. He takes a beta-blocker. He also have hyperlipidemia. Take cholesterol medication. He is compliant with therapy. Health maintenance  Patient have family history of colon cancer and would like to get a colonoscopy. Past Medical History:   Diagnosis Date    B12 deficiency     CAD (coronary artery disease)     sees jett ulrich cardiologist    Dyspepsia     Dyspnea     Glaucoma     Heart attack ([facility] [address]     Hyperlipidemia     Hypertension     Obesity     Pericarditis     Presbyacusis     Reactive airway disease     Testosterone deficiency     Type 2 diabetes mellitus without complication (HCC)       Current Outpatient Medications   Medication Sig Dispense Refill    Dulaglutide 3 MG/0.5ML SOPN Inject 3 mg into the skin once a week 4 pen 5    atorvastatin (LIPITOR) 20 MG tablet Take 1 tablet by mouth daily 90 tablet 3    Coenzyme Q10 (CO Q 10) 100 MG CAPS Take by mouth      nitroGLYCERIN (NITROSTAT) 0.4 MG SL tablet Place 1 tablet under the tongue every 5 minutes as needed for Chest pain up to max of 3 total doses.  If no relief after 1 dose, call 911. 25 tablet 0    JANUMET  MG per tablet TAKE 1 TABLET BY MOUTH TWICE A DAY WITH FOOD 180 tablet 3    carvedilol (COREG) 6.25 MG tablet Take 1 tablet by mouth 2 times daily (with meals) 180 tablet 3    aspirin EC 81 MG EC tablet Take 1 tablet by mouth 2 times daily 60 tablet 0    cephALEXin (KEFLEX) 500 MG capsule Take 1 capsule by mouth 4 times daily 28 capsule 0    fluticasone (FLONASE) 50 MCG/ACT nasal spray 1 spray by Nasal route daily as needed for Rhinitis       "varices in distal esophagus; two columns; one with red babita markings. No active bleeding.  Three bands placed.  He had emesis after recovering from EGD, so there is risk that some of the bands could have been compromised/dislodged from this.     2.  Cirrhosis, alcoholic.  MELD-Na 22 on admit; 23 on 5/11; 21 today.   Continued to actively drink up to this admission.    Decompensated cirrhosis --   -Esophageal varices - banded 5/10.   -Coagulopathy - received vit K the past 2 days - plan is for 3 days total. INR down a bit today.   -Thrombocytopenia - 32K after platelets.  -History ascites with paracenteses - No ascites on current CT.   -Hypoalbuminemia - Alb 1.8    3. EtOH use disorder. On withdrawal protocol.  Hallucinating. Garbled speech.     Patient Active Problem List   Diagnosis     Alcoholic intoxication without complication (H)     Alcohol withdrawal syndrome without complication (H)     Hypokalemia     Hypomagnesemia     Scleral icterus     Hematemesis with nausea     Bleeding esophageal varices (H)     Alcohol use disorder, severe, dependence (H)     Iron deficiency anemia due to chronic blood loss     Liver disease, chronic, with cirrhosis (H)     Severe protein-calorie malnutrition (H)     Thrombocytopenia (H)     Tobacco use disorder     Alcoholic hepatitis with ascites      Plan:   1. NPO this morning - will potentially advance to clear liquids later today.   2. Monitor stools, Hgb, platelets, INR.   3. Continue on PPI infusion - started 5/10 at 13:30.   4. Continue octreotide x 72 hours total.   Started 5/10/22 at 13:30.  5. Vitamin K - today will be day 3.   6. On ceftriaxone (cirrhotic with GI bleed). Continue for 5 days total. Started 5/10/22.  7. Trend LFT\"s, MELD    8. Complete EtOH cessation.  Will need to be addressed further prior to discharge.      Thank you.  Stephan Mckinney PA-C  [facility]  [phone]       ADDENDUM  Given altered mental status, would try to avoid sedating " Travoprost, BAK Free, (TRAVATAN Z) 0.004 % SOLN ophthalmic solution INSTILL 1 DROP INTO BOTH EYES AT BEDTIME (Patient taking differently: Place 1 drop into both eyes nightly ) 15 mL 5    blood glucose test strips (ASCENSIA AUTODISC VI;ONE TOUCH ULTRA TEST VI) strip 1 each by In Vitro route 2 times daily One touch Ultra Blue test strips DX:E11.9 120 each 3    esomeprazole (NEXIUM) 20 MG delayed release capsule Take 1 capsule by mouth every morning (before breakfast) 30 capsule 5    clopidogrel (PLAVIX) 75 MG tablet Take 75 mg by mouth daily  3    Omega-3 Fatty Acids (FISH OIL) 1000 MG CAPS Take 1,000 mg by mouth 2 times daily      Cholecalciferol (VITAMIN D3) 1000 units CAPS Take 1,000 Units by mouth 2 times daily      CINNAMON PO Take 1,000 mg by mouth 2 times daily       vitamin B-12 (CYANOCOBALAMIN) 500 MCG tablet Take 500 mcg by mouth 2 times daily      budesonide-formoterol (SYMBICORT) 160-4.5 MCG/ACT AERO Inhale 2 puffs into the lungs 2 times daily as needed       Clobetasol Propionate 0.05 % LIQD Apply 1 drop topically 2 times daily 59 mL 1     No current facility-administered medications for this visit. Allergies   Allergen Reactions    Penicillins Other (See Comments)     Joint swelling (age 27     Adhesive Tape Rash       Review of Systems   Constitutional: Negative. HENT: Negative. Eyes: Negative. Respiratory: Negative. Cardiovascular: Negative. Gastrointestinal: Negative. Endocrine: Negative. Genitourinary: Negative. Musculoskeletal: Negative. Skin: Negative. Allergic/Immunologic: Negative. Neurological: Negative. Hematological: Negative. Psychiatric/Behavioral: Negative. OBJECTIVE:    Physical Exam  Vitals reviewed. Constitutional:       Appearance: Normal appearance. He is well-developed. HENT:      Head: Normocephalic and atraumatic. Right Ear: Tympanic membrane, ear canal and external ear normal. There is no impacted cerumen. Left Ear: Tympanic membrane, ear canal and external ear normal. There is no impacted cerumen. Nose: Nose normal.      Mouth/Throat:      Lips: Pink. Mouth: Mucous membranes are moist.      Dentition: Normal dentition. Tongue: No lesions. Pharynx: Oropharynx is clear. Uvula midline. Tonsils: No tonsillar exudate or tonsillar abscesses. Eyes:      General: Lids are normal.         Right eye: No discharge. Left eye: No discharge. Extraocular Movements:      Right eye: Normal extraocular motion. Left eye: Normal extraocular motion. Conjunctiva/sclera: Conjunctivae normal.      Right eye: Right conjunctiva is not injected. Left eye: Left conjunctiva is not injected. Pupils: Pupils are equal, round, and reactive to light. Neck:      Thyroid: No thyromegaly. Vascular: No carotid bruit or JVD. Cardiovascular:      Rate and Rhythm: Normal rate and regular rhythm. Pulses:           Carotid pulses are 2+ on the right side and 2+ on the left side. Radial pulses are 2+ on the right side and 2+ on the left side. Heart sounds: Normal heart sounds, S1 normal and S2 normal. No murmur heard. Pulmonary:      Effort: Pulmonary effort is normal. No accessory muscle usage. Breath sounds: Normal breath sounds. Abdominal:      General: Bowel sounds are normal. There is no distension or abdominal bruit. Palpations: Abdomen is soft. There is no mass. Tenderness: There is no abdominal tenderness. Hernia: No hernia is present. Musculoskeletal:         General: Normal range of motion. Cervical back: Normal range of motion and neck supple. Right lower leg: No edema. Left lower leg: No edema. Lymphadenopathy:      Cervical:      Right cervical: No superficial cervical adenopathy. Left cervical: No superficial cervical adenopathy. Skin:     General: Skin is warm and dry.       Coloration: Skin is not jaundiced or medication as much as possible. Can not exclude hepatic encephalopathy either.    -Lactulose enema x1 today.  -SW to reassess when pt more alert. Hopefully this will help clarify goals of care.      Discussed with Dr Barbosa.  Thank you.  Stephan Mckinney PA-C  [facility]  [phone]    ADDENDUM II  Received call from RN in ICU.  Pt able to take oral meds now.  She observed him swallowing appropriately and asked about potential for oral lactulose.  Since pt is able to take oral meds, will order oral lactulose 15ml TID.  If pt is noted to have any swallow dysfunction that develops, would stop the medication to avoid aspiration.  Can discontinue lactulose enema.      Discussed with Dr Barbosa.  Thank you.  Stephan Mckinney PA-C  [facility]  [phone]         pale.      Findings: No lesion or rash. Nails: There is no clubbing. Neurological:      Mental Status: He is alert and oriented to person, place, and time. Cranial Nerves: No facial asymmetry. Motor: No weakness or tremor. Coordination: Coordination normal.      Gait: Gait normal.      Deep Tendon Reflexes: Reflexes are normal and symmetric. Psychiatric:         Attention and Perception: Attention normal.         Mood and Affect: Mood normal.         Speech: Speech normal.         Behavior: Behavior normal.         Thought Content: Thought content normal.         Cognition and Memory: Memory normal.         Judgment: Judgment normal.        BP (!) 142/78 (Site: Left Upper Arm, Position: Sitting, Cuff Size: Medium Adult)   Pulse 78   Temp 98.2 °F (36.8 °C) (Temporal)   Ht 5' 11.5\" (1.816 m)   Wt 207 lb (93.9 kg)   SpO2 97%   BMI 28.47 kg/m²      ASSESSMENT:     Diagnosis Orders   1. Type 2 diabetes mellitus without complication, without long-term current use of insulin (HCC)- not controlled  Dulaglutide 3 MG/0.5ML SOPN    Lipid Panel    Hemoglobin A1C    Comprehensive Metabolic Panel    CBC    Urinalysis    MICROALBUMIN, RANDOM URINE (W/O CREATININE)   2. Hypertension, unspecified type-stable    3. Hyperlipidemia, unspecified hyperlipidemia type-stable    4. Family history of colon cancer  External Referral To Gastroenterology        PLAN:    1. Encourage diet and exercise. Blood were discussed with patient. Increase Trulicity to 3 mg once weekly. Repeat blood work in 3 months  2. Continue medication. 3.  Continue medication  4.   Refer to Dr. Garth Conner 3 months

## 2022-05-12 NOTE — CONSULTS
Tobacco Treatment Consult  5/12/2022, 4:36 PM    Patient Admitted for: Secondary esophageal varices with bleeding (H) [I85.11]  Hematemesis with nausea [K92.0] on 5/10/2022    History of Tobacco Use:   Tobacco Product(s):cigarette  Amount used: 1.5 ppd  Number of quit attempts in past: few  Longest period of without use: 3 months  Pharmacotherapy tried in the past: patch and gum  Pharmacotherapy tried that has been beneficial: patch and gum  Pharmacotherapy tried that has not been beneficial or was not tolerated: none mentioned  Fagerstrom Score: 8 Level of dependence 8-10 high  Medical co-morbidities impacting tobacco use: depression, ETOH abuse and substance abuse    Assessment:   Importance of quitting to patient: 4  Current confidence in ability to quit: 3  Readiness to quit/planning to quit: pre-contemplative  Reasons for wanting to quit: unable to discuss well due to tiredness and some mumbled speech  Emotional Triggers:anger, sadness, boredom, nervous or stressed and argument with family  Physical and behavioral triggers: drinking alcohol, drinking coffee, eating a meal and finishing a task  Nicotine withdrawal symptoms experiencing as an inpatient: irritability and difficulty concentrating  Current major life stressors: experiencing a major health problem and had a friend recently commit suicide  Barriers to quitting: co-addiction  -Masoud was open to talking even though he was sleepy, his speech was a bit mumbled at time and would need him to repeat what he was saying.     Education done during visit:  -Discussed triggers and response to them  -Discussed pharmacotherapy and what patient felt helped in the past and how can help in the future  -Gave information on alternatives to smoking to help deal with tirggers  -Issued tobacco cessation materials and resource information.  -Discussed how quitting tobacco can help him quit alcohol and be more successful with both    Recommendations:  -As an inpatient the  patient should have the following pharmacotherapy: Patch 28 mg = 7 mg + 21 mg  -Upon discharge recommend the patient have the following pharmacotherapy: Patch 28 mg = 7 mg + 21 mg and Gum 4 mg every 1-2 hours for 3 weeks.  -Taper NRT recommended after 4 weeks of successful cessation.  Patch down to 21 mg for 4 weeks then 14 mg after 4 weeks down to 7 mg for 4 weeks. For Gum/Lozenge to 4 mg every 2-3 hours for 3 weeks, then down to 2 mg every 2-3 hours for 3 weeks, then 3-4 hours for 3 weeks, 4-5 for 3 weeks, then PRN for 3 weeks.  -Continued cessation therapy by this service via phone  -Continued encouragement from health care providers to quit and stay tobacco free.      Masoud will participate in follow-up calls post-discharge. Will continue to be available to patient throughout hospital stay.    Total 18 minutes spent in smoking cessation, and 38 minutes spent in chart review,care coordination, and documentation.    Riddhi Johnson, RT, Chronic Pulmonary Disease Specialist, Tobacco Treatment Specialist  Phone [phone]

## 2022-05-13 NOTE — PROGRESS NOTES
"GI Progress Note  Masoud Huddleston  -89     Subjective:   Still groggy, oriented x3 today.  Watery black stool early this morning.  Hemoglobin 9.1 yesterday evening, 7.7 this morning.  Platelets down to 25,000. INR 2.2  Remains n.p.o. except ice chips/sips.  Bladder scanner picked up 1000 cc.  However, he denies any voiding problems.  Feels that he is able to adequately empty his bladder.  He had a small amount of ascites seen on the CT scan.  We discussed that the bladder scan could have been picking up some ascites.  He does not have tense ascites nor is ascites compromising his breathing.  Reviewed paracentesis from yesterday and that there is not any infection.    Objective:   BP 93/50 (BP Location: Right arm)   Pulse 72   Temp 97.6  F (36.4  C) (Oral)   Resp 18   Ht 1.803 m (5' 11\")   Wt 73 kg (161 lb)   SpO2 95%   BMI 22.45 kg/m    Body mass index is 22.45 kg/m .   Gen: No acute distress  Cardio: RRR  GI: Lower abdominal distended, BS positive, soft and upper abdomen, slightly firm in lower abdomen.  Nontender to palpation.  Neuro: Not oriented to place.  Very groggy and sleepy.  Barely stays awake to answer questions. Garbled speech. No asterixis flap.    Laboratory  Recent Labs   Lab 05/13/22  0605 05/12/22  1801 05/12/22  1433 05/12/22  0951 05/12/22  0502 05/11/22  2201 05/11/22  1507 05/11/22  0602   WBC 2.4*  --   --   --  2.9*  --   --  2.4*   RBC 2.38*  --   --   --  2.42*  --   --  2.12*   HGB 7.7*  7.7* 9.1* 8.4*   < > 7.7*  7.7*   < > 7.7* 6.8*  6.8*   HCT 22.4*  --   --   --  23.0*  --   --  20.4*   MCV 94  --   --   --  95  --   --  96   MCH 32.4  --   --   --  31.8  --   --  32.1   MCHC 34.4  --   --   --  33.5  --   --  33.3   RDW 14.7  --   --   --  15.2*  --   --  14.9   PLT 25*  --   --   --  32*  --  26* 23*    < > = values in this interval not displayed.      Recent Labs   Lab 05/13/22  0605 05/12/22  0502 05/11/22  0602    139 140   CO2 25 26 21*   BUN 9 15 14 "     Recent Labs   Lab 05/13/22  0605 05/12/22  0502 05/11/22  0602   ALKPHOS 90 92 94   * 95* 74*   ALT 37 29 24     Lab Results   Component Value Date    INR 2.20 (H) 05/13/2022    INR 2.17 (H) 05/12/2022    INR 2.57 (H) 05/11/2022     Assessment:   This is a 36-year-old male with history of alcoholic cirrhosis and esophageal varices who continues to actively use alcohol.  Presented with hematemesis and melena.     1.  Hematemesis and melena.    Bleeding appears to have stopped.  Hemoglobin dropped from 9.1 last night to 7.7 this morning.  Recheck hemoglobin this afternoon.  Overall, he has been trending lower 8/upper 7 range since his transfusion on May 11.  However, note that he had a loose black stool very early this morning.  Thus, need to watch carefully.    Had EGD 5/10/22 showing large varices in distal esophagus; two columns; one with red babita markings. No active bleeding.  Three bands placed.  He had emesis after recovering from EGD, so there is risk that some of the bands could have been compromised/dislodged from this.     2.  Cirrhosis, alcoholic.  MELD-Na 22 on admit; 23 on 5/11; 21 on 5/12; 21 today.   Continued to actively drink up to this admission.    Decompensated cirrhosis --   -Esophageal varices - banded 5/10.   -Coagulopathy - received vit K x3 days.    -Thrombocytopenia - 25K after platelets.  -History ascites with paracenteses - No ascites on current CT.   -Hypoalbuminemia - Alb 1.8    3. EtOH use disorder. On withdrawal protocol.  Still with garbled speech, delirium appears improved.    Patient Active Problem List   Diagnosis     Alcoholic intoxication without complication (H)     Alcohol withdrawal syndrome without complication (H)     Hypokalemia     Hypomagnesemia     Scleral icterus     Hematemesis with nausea     Bleeding esophageal varices (H)     Alcohol use disorder, severe, dependence (H)     Iron deficiency anemia due to chronic blood loss     Liver disease, chronic, with  "cirrhosis (H)     Severe protein-calorie malnutrition (H)     Thrombocytopenia (H)     Tobacco use disorder     Alcoholic hepatitis with ascites      Plan:   1. NPO until next hgb is back.  Continue to watch for any more black stools.  2. Monitor stools, Hgb, platelets, INR.  3.  If we are seeing evidence of rebleeding, will then need FFP and consider repeat EGD.  Also note that he appeared to have had a very mild response to vitamin K, thus, could reinstitute this.  3.   PPI twice daily.  4. Continue octreotide x 72 hours total.   Started 5/10/22 at 13:30.  This has nearly completed now.  6. On ceftriaxone (cirrhotic with GI bleed). Continue for 5 days total. Started 5/10/22.  7. Trend LFT\"s, MELD    8. Complete EtOH cessation.  Will need to be addressed further prior to discharge.   9. [facility] protocol.      Thank you.  Stephan Mckinney PA-C  [facility]  [phone]       "

## 2022-05-13 NOTE — PROGRESS NOTES
[facility] Medicine PROGRESS NOTE      Identification/Summary:      Masoud Huddleston is a 36 year old male with a past medical history of alcoholic liver cirrhosis, variceal bleeding status post banding in March 2022, continued alcohol abuse  who was admitted on 5/10/2022 for hematemesis.  Underwent EGD, nonbleeding varices were banded.  Received 1 unit PRBC for hemoglobin less than 7.  Hemoglobin fluctuant but relatively stable.  Platelets low, with concerns for bleeding he also received 1 pack of platelets.  Was confused, confusion has resolved      Assessment & Plan      Hematemesis/upper GI bleed possibly variceal  Acute blood loss anemia  -Patient immediately taken for EGD, varices banded   -  Hb q 8 hours    - 1 unit pRBC 5/11 for Hb 6.8   Hb 7.7 and stable , fluctuation probably represent measurement variation than bleeding.  -PPI infusion - can convert to BID when ok with GI   - octreotide infusion x 72 hours from 5/10 1330  [id number]  - Rocephin 1 gram IV daily x 5 days 5/10-5/14   - NPO , diet per GI   -Appreciate GI consult  Severe alcohol use disorder  Continues to drink vodka 0.75-1 L/day continuous since last discharge March 2022   Blood alcohol level 209 mg/dL  CIWA score 0-2  Ativan per CIWA protocol  Alcoholic liver cirrhosis/alcoholic hepatitis with portal hypertension, esophageal varices, ascites, coagulopathy, thrombocytopenia, hypoalbuminemia  History of hepatic encephalopathy  Alcohol cessation counseling  EGD as above  Vitamin K 10 mg daily x3 days  May consider FFP if evidence of active bleeding  Transfused 1 pack platelets given bleeding 5/11   Platelet 26k      No further transfusion needed at this time   Monitor CBC, INR, LFTs    Hypocalcemia   - calcium gluconate IV   Severe hypokalemia, hypomagnesemia, Hypophosphatemia   -per protocol     Hyperglycemia-blood sugars now reasonable  -Stop glucose checks  H/O Parvimonas Micra Bacteremia in 3/22      Clinically Significant Risk  "Factors Present on Admission                     Diet: NPO for Medical/Clinical Reasons Except for: Meds, Ice Chips  DVT Prophylaxis: coagulopathy, active bleeding   Casanova Catheter: Not present  Central Lines: PRESENT     update status to general medicine with no tele     Code Status: Full Code    Discharge Planning   Anticipated Discharge in :3+ days   Expected Discharge Destination: Home ?   Milestones/Criteria For Discharge:- stable Hb  - no active alcohol withdrawal   Disposition Plan   Expected Discharge: 05/14/2022     Anticipated discharge location: home           The patient's care was discussed with the Bedside Nurse and Patient.      Interval History/Subjective:   1 stool that was black, no nausea or vomiting, hungry wants to eat.  No fever or chills.  No other complaints or issues      Physical Exam/Objective:     Vitals I/O   Vital signs:  Temp: 97.2  F (36.2  C) Temp src: Temporal BP: 96/59 Pulse: 78   Resp: 15 SpO2: 98 % O2 Device: None (Room air) Oxygen Delivery: 6 LPM Height: 180.3 cm (5' 11\") Weight: 73 kg (161 lb)  Estimated body mass index is 22.45 kg/m  as calculated from the following:    Height as of this encounter: 1.803 m (5' 11\").    Weight as of this encounter: 73 kg (161 lb).       I/O last 3 completed shifts:  In: 3328.58 [P.O.:866; I.V.:2462.58]  Out: 3475 [Urine:3475]     Vitals:    05/11/22 0500 05/12/22 0500 05/13/22 0500   Weight: 76.9 kg (169 lb 8 oz) 77.4 kg (170 lb 9.6 oz) 73 kg (161 lb)      Weight change: -4.355 kg (-9 lb 9.6 oz)   Body mass index is 22.45 kg/m .    General: awake/alert, oriented x3 NAD   Lungs: CTA without rales or rhonchi  Heart: S1, S2 without murmurs  Abdomen: Soft, distended nontender, bowel sounds positive  CNS: Nonfocal  Extremities: No edema  Skin: Spider angiomata    Medications:     Medications     octreotide (sandoSTATIN) infusion ADULT 50 mcg/hr (05/12/22 1802)     pantoprazole (PROTONIX) infusion ADULT/PEDS GREATER than or EQUAL to 45 kg 8 mg/hr " (05/13/22 0745)     sodium chloride 100 mL/hr at 05/13/22 0406       cefTRIAXone  1 g Intravenous Q24H     cloNIDine  0.1 mg Oral Q8H     eplerenone  25 mg Oral Daily     folic acid  1 mg Oral Daily     [Held by provider] furosemide  40 mg Oral BID     [START ON 5/17/2022] gabapentin  100 mg Oral Q8H     [START ON 5/15/2022] gabapentin  300 mg Oral Q8H     gabapentin  600 mg Oral Q8H     gabapentin  900 mg Oral Q8H     lactulose  10 g Oral TID     mirtazapine  15 mg Oral At Bedtime     multivitamin w/minerals  1 tablet Oral Daily     nicotine  1 patch Transdermal Daily     nicotine  1 patch Transdermal Daily     nicotine   Transdermal Q8H     nicotine   Transdermal Q8H     potassium chloride  10 mEq Intravenous Q1H     QUEtiapine  100 mg Oral At Bedtime     rifaximin  550 mg Oral BID     sodium chloride (PF)  10 mL Intracatheter Q8H     thiamine  100 mg Oral TID     [START ON 5/17/2022] thiamine  100 mg Oral Daily     traZODone  50 mg Oral At Bedtime       Data Reviewed   I personally reviewed all new medications, labs, imaging/diagnostics reports over the past 24 hours.     Pertinent findings include Hb  6.8  Bili 5.1  INR 2.57.     Labs    Recent Labs   Lab 05/13/22  0614 05/13/22  0605 05/12/22  1802 05/12/22  1801 05/12/22  1433 05/12/22  1201 05/12/22  0951 05/12/22  0502 05/11/22  2201 05/11/22  1507 05/11/22  0602 05/10/22  1350 05/10/22  0709   WBC  --  2.4*  --   --   --   --   --  2.9*  --   --  2.4*  --  7.0   HGB  --  7.7*  7.7*  --  9.1* 8.4*  --  7.9* 7.7*  7.7*   < > 7.7* 6.8*  6.8*   < > 9.9*   MCV  --  94  --   --   --   --   --  95  --   --  96  --  93   PLT  --  25*  --   --   --   --   --  32*  --  26* 23*  --  53*   INR  --  2.20*  --   --   --   --   --  2.17*  --   --  2.57*  --  2.17*   NA  --  138  --   --   --   --   --  139  --   --  140  --  141   POTASSIUM  --  2.5*  --   --   --   --  5.4* 3.3*  --   --  3.9   < > 3.2*   CHLORIDE  --  104  --   --   --   --   --  107  --   --  109*   --  102   CO2  --  25  --   --   --   --   --  26  --   --  21*  --  23   BUN  --  9  --   --   --   --   --  15  --   --  14  --  13   CR  --  0.58*  --   --   --   --   --  0.65*  --   --  0.74  --  0.66*   ANIONGAP  --  9  --   --   --   --   --  6  --   --  10  --  16   FÉLIX  --  6.2*  --   --   --   --   --  6.3*  --   --  6.4*  --  7.8*   * 155* 125*  --   --    < >  --  127*  --   --  214*  --  137*   ALBUMIN  --  1.8*  --   --   --   --   --  1.8*  --   --  1.8*  --  2.4*   PROTTOTAL  --  5.1*  --   --   --   --   --  5.1*  --   --  5.2*  --  7.1   BILITOTAL  --  3.9*  --   --   --   --   --  4.0*  --   --  5.1*  --  6.2*   ALKPHOS  --  90  --   --   --   --   --  92  --   --  94  --  148*   ALT  --  37  --   --   --   --   --  29  --   --  24  --  31   AST  --  125*  --   --   --   --   --  95*  --   --  74*  --  115*   LIPASE  --   --   --   --   --   --   --   --   --   --   --   --  56*    < > = values in this interval not displayed.       Imaging   No results found for this or any previous visit (from the past 24 hour(s)).      EKG:      Nayeli Gaspar MD  Internal Medicine / [facility]  Securely message with the Vocera Web Console (learn more here)  Text page via [employer] ([employer])

## 2022-05-13 NOTE — PLAN OF CARE
Problem: Risk for Delirium  Goal: Improved Sleep  Outcome: Ongoing, Progressing     Problem: Alcohol Withdrawal  Goal: Alcohol Withdrawal Symptom Control  Outcome: Ongoing, Progressing  Intervention: Minimize or Manage Alcohol Withdrawal Symptoms  Recent Flowsheet Documentation  Taken 5/13/2022 0325 by Funmilayo Cook RN  Sensory Stimulation Regulation: care clustered  Taken 5/13/2022 0008 by Funmilayo Cook RN  Sensory Stimulation Regulation: care clustered  Taken 5/12/2022 1958 by Funmilayo Cook RN  Sensory Stimulation Regulation: care clustered     Pt drowsy this shift, but arouses to voice and oriented x3-4, speech garbled. Pt denying pain this shift. CIWA's 0 and 2, no PRN ativan given this shift. Was using urinal to void, however pt would spill on himself so he was switched to a male external catheter. Good UOP this shift. Pt had 1 small black BM this shift. Platelets 25 this morning, Potassium 2.5, MD notified. Octreotide gtt running at 10ml/hr, protonix at 10ml/hr, and NaCl running at 100ml/hr. Pt NPO, sips of water with meds. Pt refused lactulose this shift. Heavy assist of 2 with gait belt to bedside commode. Bed alarm on for safety.     Funmilayo Cook RN  [phone]

## 2022-05-13 NOTE — PROGRESS NOTES
Received page re plt  RN reports no active bleeding currently  CTM, transfuse plt if bleeding recurs

## 2022-05-14 PROBLEM — F10.20 ALCOHOL USE DISORDER, SEVERE, DEPENDENCE (H): Chronic | Status: ACTIVE | Noted: 2019-05-10

## 2022-05-14 PROBLEM — I85.01 BLEEDING ESOPHAGEAL VARICES (H): Chronic | Status: ACTIVE | Noted: 2022-01-01

## 2022-05-14 PROBLEM — F10.920 ALCOHOLIC INTOXICATION WITHOUT COMPLICATION (H): Chronic | Status: ACTIVE | Noted: 2020-11-25

## 2022-05-14 PROBLEM — F10.920 ALCOHOLIC INTOXICATION WITHOUT COMPLICATION (H): Status: ACTIVE | Noted: 2020-11-25

## 2022-05-14 NOTE — PROGRESS NOTES
No complaints of pain. Pt remains lethargic with soft BPs. Scoring 0 on CIWA. Ambulating Ax2 with gait belt to bathroom, having watery stools. Alarms on for pt safety.

## 2022-05-14 NOTE — PROGRESS NOTES
Summary/Update  Unfortunate 36-year-old man lives with his parents who has refractory alcoholism and complications related to alcoholic cirrhosis thrombocytopenia gastric varices etc. he was admitted with intoxication and has withdrawal he is now starting to improve and get an appetite  Profound thrombocytopenia noted      Assessment/Plan/Narrative    Active Hospital Problems    Hematemesis with nausea      Liver disease, chronic, with cirrhosis (H)      Bleeding esophageal varices (H)      Alcohol withdrawal syndrome without complication (H)      Alcoholic intoxication without complication (H)      Alcohol use disorder, severe, dependence (H)      Thrombocytopenia (H)          Code Status full code    Discharge Planning    Subjective:  He is feeling better and interacts more than earlier in the day no pain issues he actually is hungry    Objective:    Vital signs in last 24 hours  Temp:  [97.1  F (36.2  C)-98.9  F (37.2  C)] 98.4  F (36.9  C)  Pulse:  [] 99  Resp:  [16-20] 18  BP: ()/(50-59) 124/57  SpO2:  [91 %-99 %] 97 %  Weight:           Physical Exam  General: He looks older than stated age  VS-see above  HEENT:  Lungs: Clear  Cor: Regular  ABD: Benign  Ext :  Neuro:    Pertinent Labs   Lab Results   Component Value Date    WBC 4.2 05/14/2022    HGB 7.6 (L) 05/14/2022    HGB 7.6 (L) 05/14/2022    HCT 22.7 (L) 05/14/2022    MCV 95 05/14/2022    PLT 33 (LL) 05/14/2022     Lab Results   Component Value Date    BUN 9 05/14/2022     05/14/2022    CO2 25 05/14/2022         Rm Sparks MD.

## 2022-05-14 NOTE — PLAN OF CARE
Problem: Alcohol Withdrawal  Goal: Alcohol Withdrawal Symptom Control  Outcome: Ongoing, Progressing     Problem: Risk for Delirium  Goal: Improved Attention and Thought Clarity  Outcome: Ongoing, Progressing     Problem: Plan of Care - These are the overarching goals to be used throughout the patient stay.    Goal: Optimal Comfort and Wellbeing  Outcome: Ongoing, Progressing  Intervention: Provide Person-Centered Care  Recent Flowsheet Documentation  Taken 5/14/2022 0800 by Ernst Del Rio RN  Trust Relationship/Rapport:   care explained   questions answered   questions encouraged   reassurance provided   Goal Outcome Evaluation:    Plan of Care Reviewed With: patient     Overall Patient Progress: improving     AVSS on RA; BP soft. A/O, but at times seems forgetful. CIWA scores of 1. Alarms activated for safety. GI has no plans for further testing at this time, so NPO status lifted. Denies pain. Mag and phos replaced via IV. Midline dressing changed. Up with 2-A.     Plan: Continue to monitor and support POC. Watch for any GI bleeding. Lab rechecks tomorrow.

## 2022-05-14 NOTE — PROGRESS NOTES
"GASTROENTEROLOGY PROGRESS NOTE     SUBJECTIVE: more alert today. No asterixis. 2 loose stools so far today. No lower extremity edema. LFT and INR stable. HGB stable.     OBJECTIVE:   /59 (BP Location: Right arm)   Pulse 108   Temp 98  F (36.7  C) (Oral)   Resp 20   Ht 1.803 m (5' 11\")   Wt 73 kg (161 lb)   SpO2 99%   BMI 22.45 kg/m     Temp (24hrs), Av.2  F (36.8  C), Min:97.1  F (36.2  C), Max:98.9  F (37.2  C)     Patient Vitals for the past 72 hrs:   Weight   22 0500 73 kg (161 lb)   22 0500 77.4 kg (170 lb 9.6 oz)        Intake/Output Summary (Last 24 hours) at 2022 1520  Last data filed at 2022 1100  Gross per 24 hour   Intake 600 ml   Output --   Net 600 ml      PHYSICAL EXAM   Constitutional: chronically ill appearing male with petechia acrose face, upper body.   Cardiovascular: Regular rate and rhythm.   Respiratory: respirations non labored.   Abdomen: Soft, non-tender, non-distended, normally active bowel sounds. No masses or hepatosplenomegaly appreciated. No guarding or rebound tenderness.    I have reviewed the patient's new clinical lab results:   Recent Labs   Lab Test 22  0622  1802 22  0605 22  0951 22  0502   WBC 4.2  --  2.4*  --  2.9*   HGB 7.6*  7.6* 7.3* 7.7*  7.7*   < > 7.7*  7.7*   MCV 95  --  94  --  95   PLT 33*  --  25*  --  32*   INR 2.09*  --  2.20*  --  2.17*    < > = values in this interval not displayed.      Recent Labs   Lab Test 22  0627 22  2149 22  1354 22  0605 22  0951 22  0502     --   --  138  --  139   POTASSIUM 3.5 3.6 2.9* 2.5*   < > 3.3*   CHLORIDE 106  --   --  104  --  107   CO2 25  --   --  25  --  26   BUN 9  --   --  9  --  15   CR 0.73  --   --  0.58*  --  0.65*   ANIONGAP 6  --   --  9  --  6   FÉLIX 6.7*  --   --  6.2*  --  6.3*    < > = values in this interval not displayed.      Recent Labs   Lab Test 22  0627 22  0605 22  0502 " 05/11/22  0602 05/10/22  0709 03/08/22  0634 03/07/22  0636 03/06/22  1243 02/26/22  0503 02/25/22  1115 12/20/20  0808 12/15/20  1718 07/10/20  0751 07/05/20  0728 05/01/20  0857 02/28/20  0954 01/09/20  0542 01/08/20  1644   ALBUMIN 1.8* 1.8* 1.8*   < > 2.4*   < > 1.7*  --    < > 3.4*   < >  --    < > 2.6*   < > 2.4*   < >  --    BILITOTAL 3.7* 3.9* 4.0*   < > 6.2*   < > 15.2*  --    < > 6.4*   < >  --    < > 3.8*   < > 1.5*   < >  --    ALT 42 37 29   < > 31   < > 18  --    < > 28   < >  --    < > 54*   < > 30   < >  --    * 125* 95*   < > 115*   < > 63*  --    < > 124*   < >  --    < > 141*   < > 52*   < >  --    ALKPHOS 90 90 92   < > 148*   < > 151*  --    < > 196*   < >  --    < > 188*   < > 100   < >  --    PROTEIN  --   --   --   --   --   --   --  Negative  --   --   --  30 mg/dL*  --   --   --   --   --  Negative   LIPASE  --   --   --   --  56*  --  137*  --   --  16  --   --    < > 77*   < > 71*   < >  --    AMYLASE  --   --   --   --   --   --   --   --   --   --   --   --   --  83  --  85  --   --     < > = values in this interval not displayed.      Assessment: This is a 36-year-old male with history of alcoholic cirrhosis and esophageal varices who continues to actively use alcohol.  Presented with hematemesis and melena. He also has elevated LFT's consistent with alcoholic hepatitis with underlying chronic liver disease.     1. Alcoholic cirrhosis with portal hypertension. MELD-Na 22 on admit.Continued to actively drink up to this admission.    Decompensated cirrhosis -- -Esophageal varices - banded 5/10. -Coagulopathy - received vit K x3 days.  -Thrombocytopenia - 25K after platelets.-History ascites with paracenteses - No ascites on current CT, on diuretic therapy with normal creatinine. -Hypoalbuminemia - Alb 1.8    2. Melena - with stable HGB.  Had EGD 5/10/22 showing large varices in distal esophagus; two columns; one with red babita markings. No active bleeding.  Three bands placed.  He  had emesis after recovering from EGD, so there is risk that some of the bands could have been compromised/dislodged from this. plt 25,000, inr 2.2. If signs of acute brisk bleeding then may need FFP and platelets prior to emergent procedure. Will continue to monitor for now. Octreotide was on for 72 hours. Ceftriaxone with last dose today. PPI two times daily     3. Alcohol dependence - he continues to try to stop drinking. He had an appt yesterday for an intake. I suggested he reach out to his sister to help getting this rescheduled.     4. HE with clear mentation now- continue lactulose.      Plan:   ADAT   Trend LFT, INR, CBC  Continue BID pantoprazole, diuretics and lactulose.   EGD if signs of brisk bleeding.     Approximately 30 minutes of total time was spent providing patient care, including patient evaluation, reviewing documentation/test result, and .  Kayleigh Land CNP  [facility]   Office

## 2022-05-14 NOTE — PLAN OF CARE
Problem: Alcohol Withdrawal  Goal: Alcohol Withdrawal Symptom Control  Outcome: Ongoing, Progressing   Goal Outcome Evaluation:      Ciwa scores of 0 x2 this shift.  Encephalopathy varies.  Mostly able to make needs known.

## 2022-05-15 PROBLEM — Z92.89 HISTORY OF TRANSFUSION OF PLATELETS: Status: ACTIVE | Noted: 2022-01-01

## 2022-05-15 PROBLEM — Z92.89 HISTORY OF RECENT BLOOD TRANSFUSION: Status: ACTIVE | Noted: 2022-01-01

## 2022-05-15 NOTE — PROGRESS NOTES
"GASTROENTEROLOGY PROGRESS NOTE     SUBJECTIVE: feeling tired, alert and oriented. No signs of GI bleeding. Eating a regular diet. LFT stable.     OBJECTIVE:   BP 93/52 (BP Location: Right arm)   Pulse 113   Temp 98.7  F (37.1  C) (Oral)   Resp 18   Ht 1.803 m (5' 11\")   Wt 75.7 kg (166 lb 12.8 oz)   SpO2 96%   BMI 23.26 kg/m     Temp (24hrs), Av.6  F (37  C), Min:98  F (36.7  C), Max:98.9  F (37.2  C)     Patient Vitals for the past 72 hrs:   Weight   05/15/22 0400 75.7 kg (166 lb 12.8 oz)   22 0500 73 kg (161 lb)        Intake/Output Summary (Last 24 hours) at 5/15/2022 1137  Last data filed at 5/15/2022 0100  Gross per 24 hour   Intake 1400 ml   Output --   Net 1400 ml      PHYSICAL EXAM   Constitutional: chronically ill appearing male with jaundice and icterus. Petechia and bruising.  Cardiovascular: Regular rate and rhythm.   Respiratory: respirations non labored.   Abdomen: Soft, non-tender, non-distended, normally active bowel sounds. No masses or hepatosplenomegaly appreciated. No guarding or rebound tenderness.    I have reviewed the patient's new clinical lab results:   Recent Labs   Lab Test 05/15/22  0550 22  18022  0627 22  1802 22  0605 22  0951 22  0502   WBC  --   --  4.2  --  2.4*  --  2.9*   HGB 7.4* 7.4* 7.6*  7.6*   < > 7.7*  7.7*   < > 7.7*  7.7*   MCV  --   --  95  --  94  --  95   PLT  --   --  33*  --  25*  --  32*   INR  --   --  2.09*  --  2.20*  --  2.17*    < > = values in this interval not displayed.      Recent Labs   Lab Test 05/15/22  0550 22  0627 22  2149 22  1354 22  0605 22  0951 22  0502   NA  --  137  --   --  138  --  139   POTASSIUM 3.1* 3.5 3.6   < > 2.5*   < > 3.3*   CHLORIDE  --  106  --   --  104  --  107   CO2  --  25  --   --  25  --  26   BUN  --  9  --   --  9  --  15   CR  --  0.73  --   --  0.58*  --  0.65*   ANIONGAP  --  6  --   --  9  --  6   FÉLIX  --  6.7*  --   --  6.2*  " --  6.3*    < > = values in this interval not displayed.      Recent Labs   Lab Test 05/15/22  0550 05/14/22  0627 05/13/22  0605 05/11/22  0602 05/10/22  0709 03/08/22  0634 03/07/22  0636 03/06/22  1243 02/26/22  0503 02/25/22  1115 12/20/20  0808 12/15/20  1718 07/10/20  0751 07/05/20  0728 05/01/20  0857 02/28/20  0954 01/09/20  0542 01/08/20  1644   ALBUMIN 1.8* 1.8* 1.8*   < > 2.4*   < > 1.7*  --    < > 3.4*   < >  --    < > 2.6*   < > 2.4*   < >  --    BILITOTAL 3.3* 3.7* 3.9*   < > 6.2*   < > 15.2*  --    < > 6.4*   < >  --    < > 3.8*   < > 1.5*   < >  --    ALT 38 42 37   < > 31   < > 18  --    < > 28   < >  --    < > 54*   < > 30   < >  --    AST 88* 130* 125*   < > 115*   < > 63*  --    < > 124*   < >  --    < > 141*   < > 52*   < >  --    ALKPHOS 116 90 90   < > 148*   < > 151*  --    < > 196*   < >  --    < > 188*   < > 100   < >  --    PROTEIN  --   --   --   --   --   --   --  Negative  --   --   --  30 mg/dL*  --   --   --   --   --  Negative   LIPASE  --   --   --   --  56*  --  137*  --   --  16  --   --    < > 77*   < > 71*   < >  --    AMYLASE  --   --   --   --   --   --   --   --   --   --   --   --   --  83  --  85  --   --     < > = values in this interval not displayed.      Assessment: This is a 36-year-old male with history of alcoholic cirrhosis and esophageal varices who continues to actively use alcohol.  Presented with hematemesis and melena. He also has elevated LFT's consistent with alcoholic hepatitis with underlying chronic liver disease.      1. Alcoholic cirrhosis with portal hypertension. MELD-Na 22 on admit.Continued to actively drink up to this admission.    Decompensated cirrhosis -- -Esophageal varices - banded 5/10. -Coagulopathy - received vit K x3 days.  -Thrombocytopenia - 25K after platelets has improved today now 33.-History ascites with paracenteses - No ascites on current CT, on diuretic therapy with normal creatinine. -Hypoalbuminemia - Alb 1.8     2. Melena - with  stable HGB.  Had EGD 5/10/22 showing large varices in distal esophagus; two columns; one with red babita markings. No active bleeding.  Three bands placed.  He had emesis after recovering from EGD, so there is risk that some of the bands could have been compromised/dislodged from this. plt 25,000, inr 2.2. If signs of acute brisk bleeding then may need FFP and platelets prior to emergent procedure. Will continue to monitor for now. Octreotide was on for 72 hours. Ceftriaxone with last dose yesterday. PPI two times daily      3. Alcohol dependence - he continues to try to stop drinking. He had an appt friday for an intake. I suggested he reach out to his sister to help getting this rescheduled.      4. HE with clear mentation now- continue lactulose.      Plan:   Trend LFT, INR, CBC  Continue BID pantoprazole, diuretics and lactulose.   EGD if signs of brisk bleeding.     Approximately 22 minutes of total time was spent providing patient care, including patient evaluation, reviewing documentation/test result, and .  Kayleigh Land [facility]   Office

## 2022-05-15 NOTE — PLAN OF CARE
Problem: Plan of Care - These are the overarching goals to be used throughout the patient stay.    Goal: Plan of Care Review/Shift Note  Outcome: Ongoing, Progressing   Goal Outcome Evaluation:      Patient is alert and oriented, patient is a assist of 1, ciwa was 0. Bowel sounds are hypoactive and lung sounds are diminished.

## 2022-05-15 NOTE — PROGRESS NOTES
Summary/Update  36-year-old man with advanced alcoholism and alcoholic liver disease with cirrhosis esophageal varices profound thrombocytopenia who presented with upper gastrointestinal bleeding related to varices  He has been transfused packed red blood cells and platelets  He is recovering from withdrawal and starting to eat symptoms are under control  He needs potassium phosphorus and magnesium replacement protocol  We will continue to monitor hemoglobin and platelets  Gastrointestinal help is greatly appreciated  He has failed alcohol counseling in the past  Will defer to CD counselor regarding disposition prognosis is very poor overall with this refractory problem    Discharge when laboratory studies are stable he is eating and no evidence of withdrawal symptoms or GI bleeding      Assessment/Plan/Narrative    Active Hospital Problems    Thrombocytopenia (H)      Bleeding esophageal varices (H)      Hematemesis with nausea      History of recent blood transfusion      History of transfusion of platelets      Liver disease, chronic, with cirrhosis (H)      Alcohol withdrawal syndrome without complication (H)      Alcoholic intoxication without complication (H)      Alcohol use disorder, severe, dependence (H)          Code Status full code    Discharge Planning probably home although prognosis is very poor if he is not in some type of snf setting    Subjective:  He is feeling better and is eating more we can stop his IV he needs replacement on his electrolytes monitoring of his hemoglobin rechecking labs tomorrow Home most likely when stable Unless Some Other Arrangement Is Made with chemical dependency consultants    Objective:    Vital signs in last 24 hours  Temp:  [98  F (36.7  C)-98.9  F (37.2  C)] 98.7  F (37.1  C)  Pulse:  [] 113  Resp:  [18-20] 18  BP: ()/(49-59) 93/52  SpO2:  [96 %-99 %] 96 %  Weight:           Physical Exam  General: He is pale  VS-see above  HEENT:  Lungs:  Cor:  Regular  ABD: Benign  Ext :  Neuro: No asterixis    Pertinent Labs   Lab Results   Component Value Date    WBC 4.2 05/14/2022    HGB 7.4 (L) 05/15/2022    HCT 22.7 (L) 05/14/2022    MCV 95 05/14/2022    PLT 33 (LL) 05/14/2022     Lab Results   Component Value Date    BUN 9 05/14/2022     05/14/2022    CO2 25 05/14/2022         Rm Sparks MD.

## 2022-05-15 NOTE — CONSULTS
Care Management Follow Up    Length of Stay (days): 5    Expected Discharge Date: 05/16/2022     Concerns to be Addressed:       Patient plan of care discussed at interdisciplinary rounds: Yes    Anticipated Discharge Disposition: Home     Anticipated Discharge Services:  Will obtain Rule 25 Assessment   Anticipated Discharge DME:  None       Education Provided on the Discharge Plan:  yes  Patient/Family in Agreement with the Plan:  yes    Referrals Placed by CM/SW:  None   Private pay costs discussed: Not applicable    Additional Information:  TIFFANY met with pt and discussed pt current alcohol use and any need for resources. PT reports that he want to obtain sobriety. Pt stated taht he has been in many treatment programs and stated that many were helpful for him. JUSTINO asked pt what resources or assistance that JUSTINO can provide to help pt be successful in his sobriety and pt stated that he already has the resources. Pt did note that he has 2 children 8 and 10 that lives with their moms . Pt does see children but noted them as a motivator for him. Pt reports that he plans to reschedule his Rule 25 assessment and declined any help from SW with this. Pt did report that he has a strong family support system in place. Pt declined any resources form SW at this time. Pt plan is to obtain Rule 25 and then follow recommendations for treatment. SW asked if Pt would like for SW to talk with any family member and pt declined.       GISELA Prieto

## 2022-05-15 NOTE — PLAN OF CARE
Pt was calm and cooperative with discussions- upset that wasn't discharging  but was redirectable. Pt lying with head covered- did not want the blinds open- sensitive to light. Pt CIWA 0 most of the am started to get more sensitive and irritable around 1pm. Pt hypersensitive to noise and light. Gets confused occasionally. Pt more irritable and said he feels very irritable and agitated.   Talked with pt and gave him 1 mg ativan to see if it helps is anxiety and irritability.   Pt continues on seizure precautions-intake good. IV phosphorus given due to protocol and redraw was 3.0        Problem: Plan of Care - These are the overarching goals to be used throughout the patient stay.    Goal: Absence of Hospital-Acquired Illness or Injury  Intervention: Identify and Manage Fall Risk  Recent Flowsheet Documentation  Taken 5/15/2022 1305 by Marlen Whitt RN  Safety Promotion/Fall Prevention:   activity supervised   fall prevention program maintained   clutter free environment maintained  Taken 5/15/2022 0900 by Marlen Whitt RN  Safety Promotion/Fall Prevention:   activity supervised   fall prevention program maintained   clutter free environment maintained  Intervention: Prevent Skin Injury  Recent Flowsheet Documentation  Taken 5/15/2022 1305 by Marlen Whitt RN  Body Position: position changed independently  Taken 5/15/2022 0900 by Marlen Whitt RN  Body Position: position changed independently  Intervention: Prevent and Manage VTE (Venous Thromboembolism) Risk  Recent Flowsheet Documentation  Taken 5/15/2022 1305 by Marlen Whitt RN  Activity Management: activity adjusted per tolerance  Taken 5/15/2022 0900 by Marlen Whitt RN  Activity Management: activity adjusted per tolerance  Intervention: Prevent Infection  Recent Flowsheet Documentation  Taken 5/15/2022 1305 by Marlen Whitt RN  Infection Prevention: environmental surveillance performed  Taken 5/15/2022 0900 by Marlen Whitt RN  Infection Prevention:  environmental surveillance performed  Goal: Optimal Comfort and Wellbeing  Intervention: Provide Person-Centered Care  Recent Flowsheet Documentation  Taken 5/15/2022 1305 by Marlen Whitt, RN  Trust Relationship/Rapport: care explained  Taken 5/15/2022 0900 by Marlen Whitt, RN  Trust Relationship/Rapport: care explained   Goal Outcome Evaluation:

## 2022-05-15 NOTE — PLAN OF CARE
Problem: Alcohol Withdrawal  Goal: Alcohol Withdrawal Symptom Control  Outcome: Ongoing, Progressing   Goal Outcome Evaluation:        No signs of withdrawal this shift.  Ciwa scores 0.  More alert this shift.  Tolerating diet.     VSS on room air.

## 2022-05-16 NOTE — PROGRESS NOTES
"Care Management Discharge Note    Discharge Date: 05/16/2022       Discharge Disposition: Home    Discharge Services: None      Additional Information:  Chart reviewed. Patient was discharged per MD and bedside RN.     CM received two VM's from father Haroldo ([phone]), and a phone call stating \"frustating for my wife and I to be notified he is out again, we would like to see him get another civil commitment like the hospital did in 2019\". CM explained that CM is not able to provide any specific information about patient at this time. CM offered resources available in community.     Michelle Santoro, RN        "

## 2022-05-16 NOTE — PLAN OF CARE
Goal Outcome Evaluation:      Problem: Alcohol Withdrawal  Goal: Alcohol Withdrawal Symptom Control  Outcome: Met     CIWA score is 2.  Pt does not feel he's withdrawal ing anymore.  Pt wants to discharge home.      Problem: Plan of Care - These are the overarching goals to be used throughout the patient stay.    Goal: Plan of Care Review/Shift Note  Description: The Plan of Care Review/Shift note should be completed every shift.  The Outcome Evaluation is a brief statement about your assessment that the patient is improving, declining, or no change.  This information will be displayed automatically on your shift note.  Outcome: Met       Pt's vitals are stable.  Pt states he feels good and wants to discharge home.  Pt understands his labs are low and abnormal.  Pt knows he is to follow up with his primary MD in 1 week to recheck his labs.  Strongly encouraged pt to STOP DRINKING ALCOHOL.  Educated how it is harming his body.  He states he goes to meetings sometimes.  Pt is advised not to smoke if he is using the nicotine patch.  Pt does not have family with him at time of discharge.  Pt was escorted to the front entrance where he is going to walk to [employer] to meet his brother.  Midline and peripheral IV's were removed.

## 2022-05-16 NOTE — DISCHARGE SUMMARY
[facility]  Hospitalist Discharge Summary      Date of Admission:  5/10/2022  Date of Discharge:  5/16/2022  Discharging Provider: Marleny Monroy MD, [facility]  Discharge Service: Hospitalist Service    Discharge Diagnoses   Alcoholism  Acute blood loss anemia secondary to upper GI bleed status post EGD as well as transfusion  Severe thrombocytopenia secondary to alcohol liver disease  Chronic tobacco abuse  Personality disorder  Alcoholic liver cirrhosis    Follow-ups Needed After Discharge     Discharge Disposition   Discharged to home  Condition at discharge: Fair      Hospital Course   36 year old man with a past medical history of active and chronic alcohol abuse with alcoholic liver cirrhosis, variceal bleeding status post banding in March 2022, continued alcohol abuse  who was admitted on 5/10/2022 for hematemesis.  Underwent EGD, nonbleeding varices were banded.  Received 1 unit PRBC for hemoglobin less than 7.  Hemoglobin fluctuant but relatively stable.  Platelets low, with concerns for bleeding he also received 1 pack of platelets.  Was initially confused, confusion has resolved.  Patient also had multiple electrolyte abnormalities and these were corrected on an ongoing basis.  At the time of discharge patient was demonstrating belligerent behavior but was not holdable.  Long-term prognosis of patient is certainly guarded on the CT make significant lifestyle changes.  Issue of alcoholism seems to be refractory in this patient and has failed multiple attempts of outpatient CD counseling and treatment  Prior to discharge he was counseled once again to quit tobacco and alcohol use in the interest of his long-term health.        Consultations This Hospital Stay   PHYSICAL THERAPY ADULT IP CONSULT  SOCIAL WORK IP CONSULT  VASCULAR ACCESS ADULT IP CONSULT  SMOKING CESSATION PROGRAM IP CONSULT  SOCIAL WORK IP CONSULT    Code Status   Full Code    Time Spent on this Encounter   Marleny PADILLA  MD Eliana, MHA, personally saw the patient today and spent greater than 30 minutes discharging this patient.       Marleny Monroy MD, ARTHUR  [place] [address] [address]  [address] [place] [id number]  Phone: [phone]  Fax: [phone]  ______________________________________________________________________    Physical Exam   Vital Signs: Temp: 98.4  F (36.9  C) Temp src: Oral BP: 102/58 Pulse: 99   Resp: 18 SpO2: 98 % O2 Device: None (Room air)    Weight: 172 lbs 12.8 oz         Primary Care Physician   Provider Not In System    Discharge Orders      Reason for your hospital stay    Alcoholic liver disease and GI bleed     Follow-up and recommended labs and tests     Follow up with primary care provider, Provider Not In System, within 7 days for hospital follow- up and alcoholism.  The following labs/tests are recommended: CBC, CMP.     Activity    Your activity upon discharge: activity as tolerated and no lifting, driving, or strenuous exercise for 1 week     Diet    Follow this diet upon discharge: Orders Placed This Encounter      Regular Diet Adult  DO NOT DRINK ALCOHOL       Significant Results and Procedures   Most Recent 3 CBC's:Recent Labs   Lab Test 05/16/22  0424 05/15/22  0550 05/14/22  1807 05/14/22  0627 05/13/22  1802 05/13/22  0605   WBC 3.0*  --   --  4.2  --  2.4*   HGB 7.0* 7.4* 7.4* 7.6*  7.6*   < > 7.7*  7.7*   MCV 97  --   --  95  --  94   PLT 26*  --   --  33*  --  25*    < > = values in this interval not displayed.     Most Recent 3 BMP's:Recent Labs   Lab Test 05/16/22  0424 05/15/22  2012 05/15/22  1305 05/15/22  0550 05/14/22  0627 05/13/22  1354 05/13/22  1148 05/13/22  0614 05/13/22  0605     --   --   --  137  --   --   --  138   POTASSIUM 3.9 3.8 3.4*   < > 3.5   < >  --   --  2.5*   CHLORIDE 109*  --   --   --  106  --   --   --  104   CO2 20*  --   --   --  25  --   --   --  25   BUN 7*  --   --   --  9  --   --   --  9   CR 0.60*  --    --   --  0.73  --   --   --  0.58*   ANIONGAP 7  --   --   --  6  --   --   --  9   FÉLIX 7.1*  --   --   --  6.7*  --   --   --  6.2*     --   --   --  93  --  123*   < > 155*    < > = values in this interval not displayed.     Most Recent 2 LFT's:Recent Labs   Lab Test 05/15/22  0550 05/14/22  0627   AST 88* 130*   ALT 38 42   ALKPHOS 116 90   BILITOTAL 3.3* 3.7*   ,         Discharge Medications   Discharge Medication List as of 5/16/2022 10:31 AM      START taking these medications    Details   lactulose (CHRONULAC) 10 GM/15ML solution Take 15 mLs (10 g) by mouth 2 times daily, Disp-900 mL, R-0, E-Prescribe         CONTINUE these medications which have NOT CHANGED    Details   eplerenone (INSPRA) 25 MG tablet Take 1 tablet (25 mg) by mouth daily, Disp-30 tablet, R-0, Local Print      furosemide (LASIX) 40 MG tablet TAKE 1 TABLET BY MOUTH TWICE A DAY (09:00AM & 06:00PM), Disp-60 tablet, R-1, E-PrescribeMaximum Refills Reached      gabapentin (NEURONTIN) 100 MG capsule Take 1 capsule (100 mg) by mouth 2 times daily, Disp-60 capsule, R-0, Local Print      melatonin 5 MG tablet Take 1 tablet (5 mg) by mouth every evening as needed for sleep, OTC      mirtazapine (REMERON) 30 MG tablet Take 1 tablet (30 mg) by mouth At Bedtime, Disp-90 tablet, R-1, E-Prescribe      pantoprazole (PROTONIX) 40 MG EC tablet Take 1 tablet (40 mg) by mouth 2 times daily (before meals), Disp-180 tablet, R-3, E-Prescribe      QUEtiapine (SEROQUEL) 100 MG tablet Take 100 mg by mouth At Bedtime, Historical      rifaximin (XIFAXAN) 550 MG TABS tablet Take 1 tablet (550 mg) by mouth 2 times daily, Disp-180 tablet, R-3, E-Prescribe      thiamine (B-1) 100 MG tablet Take 1 tablet (100 mg) by mouth daily, Disp-90 tablet, R-3, E-Prescribe      traZODone (DESYREL) 50 MG tablet Take 1 tablet (50 mg) by mouth At Bedtime, Disp-90 tablet, R-1, E-Prescribe           Allergies   No Known Allergies

## 2022-05-16 NOTE — PLAN OF CARE
Problem: Plan of Care - These are the overarching goals to be used throughout the patient stay.    Goal: Plan of Care Review/Shift Note  Outcome: Ongoing, Progressing   Goal Outcome Evaluation:      Patient is alert and oriented, patient is an assist of 1, patient denies pain and has been resting. Patient has been agitated with a ciwa of 2, and threatened to leave ama. Bowel sounds are hypoactive.

## 2022-05-16 NOTE — PLAN OF CARE
Problem: Plan of Care - These are the overarching goals to be used throughout the patient stay.    Goal: Plan of Care Review/Shift Note  Outcome: Ongoing, Progressing   Goal Outcome Evaluation:      Patient is alert and oriented, patient is an assist of 1, patient denies pain and has been resting comfortably. Patients ciwa score is 0.  Bowel sounds are hypoactive.

## 2022-05-17 NOTE — PROGRESS NOTES
Clinic Care Coordination Contact  [facility]: Post-Discharge Note  SITUATION                                                      Admission:    Admission Date: 05/10/22   Reason for Admission: Alcoholism  Acute blood loss anemia secondary to upper GI bleed status post EGD as well as transfusion  Severe thrombocytopenia secondary to alcohol liver disease  Chronic tobacco abuse  Personality disorder  Alcoholic liver cirrhosis  Discharge:   Discharge Date: 05/16/22  Discharge Diagnosis: Alcoholism  Acute blood loss anemia secondary to upper GI bleed status post EGD as well as transfusion  Severe thrombocytopenia secondary to alcohol liver disease  Chronic tobacco abuse  Personality disorder  Alcoholic liver cirrhosis    BACKGROUND                                                      36 year old man with a past medical history of active and chronic alcohol abuse with alcoholic liver cirrhosis, variceal bleeding status post banding in March 2022, continued alcohol abuse  who was admitted on 5/10/2022 for hematemesis.  Underwent EGD, nonbleeding varices were banded.  Received 1 unit PRBC for hemoglobin less than 7.  Hemoglobin fluctuant but relatively stable.  Platelets low, with concerns for bleeding he also received 1 pack of platelets.  Was initially confused, confusion has resolved.  Patient also had multiple electrolyte abnormalities and these were corrected on an ongoing basis.  At the time of discharge patient was demonstrating belligerent behavior but was not holdable.  Long-term prognosis of patient is certainly guarded on the CT make significant lifestyle changes.  Issue of alcoholism seems to be refractory in this patient and has failed multiple attempts of outpatient CD counseling and treatment  Prior to discharge he was counseled once again to quit tobacco and alcohol use in the interest of his long-term health.    ASSESSMENT           Discharge Assessment  How are you doing now that you are home?:  Patient shares that he is doing really well and feels that he has everything he needs right now.  How are your symptoms? (Red Flag symptoms escalate to triage hotline per guidelines): Improved  Do you feel your condition is stable enough to be safe at home until your provider visit?: Yes  Does the patient have their discharge instructions? : Yes  Does the patient have questions regarding their discharge instructions? : No  Were you started on any new medications or were there changes to any of your previous medications? : Yes  Does the patient have all of their medications?: Yes  Do you have questions regarding any of your medications? : No  Do you have all of your needed medical supplies or equipment (DME)?  (i.e. oxygen tank, CPAP, cane, etc.): Yes  Discharge follow-up appointment scheduled within 14 calendar days? : Yes  Discharge Follow Up Appointment Date: 05/20/22  Discharge Follow Up Appointment Scheduled with?: Primary Care Provider    Post-op ([facility] Only)  If the patient had a surgery or procedure, do they have any questions for a nurse?: No    Post-op (Clinicians Only)  Fever: No  Chills: No      PLAN                                                      Outpatient Plan:  Follow up with primary care provider, Provider Not In System, within 7 days for hospital follow- up and alcoholism.  The following labs/tests are recommended: CBC, CMP    Future Appointments   Date Time Provider Department Center   5/20/2022  1:00 PM Trever Bonner MD [facility]         For any urgent concerns, please contact our 24 hour nurse triage line: [phone] ([phone])         RU Boykin

## 2022-06-01 NOTE — TELEPHONE ENCOUNTER
Spoke with Masoud, he is doing well, he is down to 1/4 ppd from 1 1/2 to 2 ppd when he admitted to the hospital.  He goes through periods of using the patch and then takes it off. Discussed different ways he could think about getting off the last couple, coping skills, NRT.  Congratulated him on his progress.  Let him know to call with any questions, concerns or cessation issues he is having.  Riddhi Johnson, RT, TTS, Chronic Pulmonary Disease Specialist

## 2022-07-26 NOTE — PHARMACY-ADMISSION MEDICATION HISTORY
Admission Medication History Completed by Pharmacy    See [facility] Admission Navigator for allergy information, preferred outpatient pharmacy, prior to admission medications and immunization status.     Medication History Sources:   Patient  [employer]    Changes made to PTA medication list (reason):  Added: (per patient and [employer])  Acamprosate 333mg tablet  Mirtazapine 15mg tablet  Deleted: None  Changed:  Hydroxyzine 50mg capsule: Take 50mg by mouth at bedtime --> Take 50mg by mouth 4 times daily as needed (per [facility] [employer])    Additional Information:  Patient was a good historian of his medications and was able to confirm most doses and directions.  Patient states that he has not taken any medications in about 6 days and says that he is still taking his gabapentin as directed but per [facility] [employer], last fill date was 1/12/21 for 30 days.   Information was to be verified per [place] fill history but fax was never received after 2 attempts.     Prior to Admission medications    Medication Sig Last Dose Taking? Auth Provider   acamprosate (CAMPRAL) 333 MG EC tablet Take 666 mg by mouth 3 times daily Past Week Yes Unknown, Entered By History   furosemide (LASIX) 40 MG tablet Take 40 mg by mouth 2 times daily  Past Week Yes Reported, Patient   gabapentin (NEURONTIN) 100 MG capsule Take 200 mg by mouth 3 times daily  Past Week Yes Reported, Patient   hydrOXYzine (VISTARIL) 50 MG capsule Take 50 mg by mouth 4 times daily as needed  Past Week Yes Reported, Patient   melatonin 3 MG tablet Take 2 tablets (6 mg) by mouth At Bedtime Past Week Yes Rm Solis MD   mirtazapine (REMERON) 15 MG tablet Take 15 mg by mouth At Bedtime Past Week Yes Unknown, Entered By History   Multiple Vitamins-Minerals (CEROVITE PO) Take 1 tablet by mouth daily Past Week Yes Unknown, Entered By History   potassium chloride ER (KLOR-CON M) 20 MEQ CR tablet Take 20 mEq by mouth 2 times daily  Past Week Yes Reported, Patient   QUEtiapine  The skin at the access site was anesthetized.  with ultrasound (SonoSite), under fluoroscopic guidance. A permanent recording was saved. Right Pleural fluid accessed  (SEROQUEL) 100 MG tablet Take 100 mg by mouth At Bedtime Past Week Yes Reported, Patient   rifaximin (XIFAXAN) 550 MG TABS tablet Take 550 mg by mouth 2 times daily  Past Week Yes Reported, Patient   sildenafil (REVATIO) 20 MG tablet Take 3-4 tabs one hour before sexual activity as directed Past Month Yes Reported, Patient   traZODone (DESYREL) 100 MG tablet Take 100 mg by mouth At Bedtime Past Week Yes Reported, Patient   nicotine (NICODERM CQ) 21 MG/24HR 24 hr patch Place 1 patch onto the skin daily More than a month  Rm Solis MD   omeprazole (PRILOSEC) 20 MG DR capsule Take 20 mg by mouth daily  More than a month  Reported, Patient       Date completed: 05/16/21    Medication history completed by: Jessi Rowland

## 2023-02-08 NOTE — ANESTHESIA CARE TRANSFER NOTE
2Last vitals:   Vitals:    05/03/20 1347   BP: 118/58   Pulse: 95   Resp: 20   Temp: 36.2  C (97.2  F)   SpO2: 95%     Patient's level of consciousness is drowsy  Spontaneous respirations: yes  Maintains airway independently: yes  Dentition unchanged: yes  Oropharynx: oropharynx clear of all foreign objects    QCDR Measures:  ASA# 20 - Surgical No data recorded  PQRS# 430 - Adult PONV Prevention: 4558F - Pt received => 2 anti-emetic agents (different classes) preop & intraop  ASA# 8 - Peds PONV Prevention: NA - Not pediatric patient, not GA or 2 or more risk factors NOT present  PQRS# 424 - Lilian-op Temp Management: 4559F - At least one body temp DOCUMENTED => 35.5C or 95.9F within required timeframe  PQRS# 426 - PACU Transfer Protocol: - Transfer of care checklist used  ASA# 14 - Acute Post-op Pain: ASA14B - Patient did NOT experience pain >= 7 out of 10   98

## 2025-05-07 NOTE — ED TRIAGE NOTES
Pt here for detox and his last drink was PTA. Pt states a hx of a seizure coming.   Number Of Freeze-Thaw Cycles: 1 freeze-thaw cycle Render Note In Bullet Format When Appropriate: No Consent: The patient's consent was obtained including but not limited to risks of crusting, scabbing, blistering, scarring, darker or lighter pigmentary change, recurrence, incomplete removal and infection. Post-Care Instructions: I reviewed with the patient in detail post-care instructions. Patient is to wear sunprotection, and avoid picking at any of the treated lesions. Pt may apply Vaseline to crusted or scabbing areas. Detail Level: Detailed Duration Of Freeze Thaw-Cycle (Seconds): 0 Show Aperture Variable?: Yes No

## (undated) DEVICE — SOL WATER IRRIG 1000ML BOTTLE 2F7114

## (undated) DEVICE — TUBING SUCTION MEDI-VAC 1/4"X20' N620A - HE

## (undated) DEVICE — SUCTION MANIFOLD NEPTUNE 2 SYS 1 PORT 702-025-000

## (undated) DEVICE — FORCEP BIOPSY DISP 000386

## (undated) DEVICE — ENDO LIGATOR UNIV 6 BAND G31917 MBL-U-6

## (undated) RX ORDER — FENTANYL CITRATE 50 UG/ML
INJECTION, SOLUTION INTRAMUSCULAR; INTRAVENOUS
Status: DISPENSED
Start: 2022-01-01

## (undated) RX ORDER — LIDOCAINE HYDROCHLORIDE 10 MG/ML
INJECTION, SOLUTION EPIDURAL; INFILTRATION; INTRACAUDAL; PERINEURAL
Status: DISPENSED
Start: 2022-01-01

## (undated) RX ORDER — ONDANSETRON 2 MG/ML
INJECTION INTRAMUSCULAR; INTRAVENOUS
Status: DISPENSED
Start: 2022-01-01

## (undated) RX ORDER — EPHEDRINE SULFATE 50 MG/ML
INJECTION, SOLUTION INTRAMUSCULAR; INTRAVENOUS; SUBCUTANEOUS
Status: DISPENSED
Start: 2022-01-01